# Patient Record
Sex: MALE | Race: WHITE | NOT HISPANIC OR LATINO | Employment: UNEMPLOYED | URBAN - NONMETROPOLITAN AREA
[De-identification: names, ages, dates, MRNs, and addresses within clinical notes are randomized per-mention and may not be internally consistent; named-entity substitution may affect disease eponyms.]

---

## 2017-01-24 ENCOUNTER — TRANSCRIBE ORDERS (OUTPATIENT)
Dept: ADMINISTRATIVE | Facility: HOSPITAL | Age: 62
End: 2017-01-24

## 2017-01-24 ENCOUNTER — HOSPITAL ENCOUNTER (OUTPATIENT)
Dept: GENERAL RADIOLOGY | Facility: HOSPITAL | Age: 62
Discharge: HOME | End: 2017-01-24
Admitting: FAMILY MEDICINE

## 2017-01-24 DIAGNOSIS — M54.2 CERVICALGIA: Primary | ICD-10-CM

## 2017-01-24 DIAGNOSIS — M54.2 CERVICALGIA: ICD-10-CM

## 2017-01-24 PROCEDURE — 72050 X-RAY EXAM NECK SPINE 4/5VWS: CPT

## 2017-01-24 PROCEDURE — 72050 X-RAY EXAM NECK SPINE 4/5VWS: CPT | Performed by: RADIOLOGY

## 2017-05-23 ENCOUNTER — OFFICE VISIT (OUTPATIENT)
Dept: SURGERY | Facility: CLINIC | Age: 62
End: 2017-05-23

## 2017-05-23 VITALS
SYSTOLIC BLOOD PRESSURE: 124 MMHG | HEIGHT: 69 IN | HEART RATE: 59 BPM | TEMPERATURE: 98.5 F | DIASTOLIC BLOOD PRESSURE: 70 MMHG | BODY MASS INDEX: 31.1 KG/M2 | WEIGHT: 210 LBS | RESPIRATION RATE: 18 BRPM

## 2017-05-23 DIAGNOSIS — E78.5 DYSLIPIDEMIA: ICD-10-CM

## 2017-05-23 DIAGNOSIS — Z95.1 S/P CABG X 3: ICD-10-CM

## 2017-05-23 DIAGNOSIS — IMO0001 IDDM (INSULIN DEPENDENT DIABETES MELLITUS): ICD-10-CM

## 2017-05-23 DIAGNOSIS — I10 ESSENTIAL HYPERTENSION: ICD-10-CM

## 2017-05-23 DIAGNOSIS — L98.499 SKIN ULCER, WITH UNSPECIFIED SEVERITY (HCC): ICD-10-CM

## 2017-05-23 DIAGNOSIS — I25.10 ASCVD (ARTERIOSCLEROTIC CARDIOVASCULAR DISEASE): Primary | ICD-10-CM

## 2017-05-23 PROCEDURE — 99215 OFFICE O/P EST HI 40 MIN: CPT | Performed by: SURGERY

## 2017-05-24 ENCOUNTER — ANESTHESIA EVENT (OUTPATIENT)
Dept: PERIOP | Facility: HOSPITAL | Age: 62
End: 2017-05-24

## 2017-05-24 ENCOUNTER — APPOINTMENT (OUTPATIENT)
Dept: PREADMISSION TESTING | Facility: HOSPITAL | Age: 62
End: 2017-05-24

## 2017-05-24 ENCOUNTER — HOSPITAL ENCOUNTER (OUTPATIENT)
Facility: HOSPITAL | Age: 62
Setting detail: HOSPITAL OUTPATIENT SURGERY
Discharge: HOME | End: 2017-05-24
Attending: SURGERY | Admitting: SURGERY

## 2017-05-24 ENCOUNTER — ANESTHESIA (OUTPATIENT)
Dept: PERIOP | Facility: HOSPITAL | Age: 62
End: 2017-05-24

## 2017-05-24 VITALS
TEMPERATURE: 97.9 F | WEIGHT: 210 LBS | RESPIRATION RATE: 18 BRPM | SYSTOLIC BLOOD PRESSURE: 134 MMHG | HEART RATE: 65 BPM | OXYGEN SATURATION: 98 % | HEIGHT: 69 IN | DIASTOLIC BLOOD PRESSURE: 74 MMHG | BODY MASS INDEX: 31.1 KG/M2

## 2017-05-24 DIAGNOSIS — L98.499 SKIN ULCER, WITH UNSPECIFIED SEVERITY (HCC): ICD-10-CM

## 2017-05-24 DIAGNOSIS — I25.10 ASCVD (ARTERIOSCLEROTIC CARDIOVASCULAR DISEASE): ICD-10-CM

## 2017-05-24 LAB
ALBUMIN SERPL-MCNC: 4.3 G/DL (ref 3.4–4.8)
ALBUMIN/GLOB SERPL: 1.1 G/DL (ref 1.5–2.5)
ALP SERPL-CCNC: 166 U/L (ref 40–129)
ALT SERPL W P-5'-P-CCNC: 40 U/L (ref 10–44)
ANION GAP SERPL CALCULATED.3IONS-SCNC: 9.4 MMOL/L (ref 3.6–11.2)
AST SERPL-CCNC: 28 U/L (ref 10–34)
BASOPHILS # BLD AUTO: 0.03 10*3/MM3 (ref 0–0.3)
BASOPHILS NFR BLD AUTO: 0.3 % (ref 0–2)
BILIRUB SERPL-MCNC: 0.3 MG/DL (ref 0.2–1.8)
BUN BLD-MCNC: 18 MG/DL (ref 7–21)
BUN/CREAT SERPL: 18.2 (ref 7–25)
CALCIUM SPEC-SCNC: 9.2 MG/DL (ref 7.7–10)
CHLORIDE SERPL-SCNC: 100 MMOL/L (ref 99–112)
CO2 SERPL-SCNC: 29.6 MMOL/L (ref 24.3–31.9)
CREAT BLD-MCNC: 0.99 MG/DL (ref 0.43–1.29)
DEPRECATED RDW RBC AUTO: 42.5 FL (ref 37–54)
EOSINOPHIL # BLD AUTO: 0.35 10*3/MM3 (ref 0–0.7)
EOSINOPHIL NFR BLD AUTO: 3.3 % (ref 0–5)
ERYTHROCYTE [DISTWIDTH] IN BLOOD BY AUTOMATED COUNT: 12.8 % (ref 11.5–14.5)
GFR SERPL CREATININE-BSD FRML MDRD: 76 ML/MIN/1.73
GLOBULIN UR ELPH-MCNC: 3.8 GM/DL
GLUCOSE BLD-MCNC: 212 MG/DL (ref 70–110)
GLUCOSE BLDC GLUCOMTR-MCNC: 205 MG/DL (ref 70–130)
GLUCOSE BLDC GLUCOMTR-MCNC: 223 MG/DL (ref 70–130)
HCT VFR BLD AUTO: 38.7 % (ref 42–52)
HGB BLD-MCNC: 12.8 G/DL (ref 14–18)
IMM GRANULOCYTES # BLD: 0.04 10*3/MM3 (ref 0–0.03)
IMM GRANULOCYTES NFR BLD: 0.4 % (ref 0–0.5)
LYMPHOCYTES # BLD AUTO: 3.78 10*3/MM3 (ref 1–3)
LYMPHOCYTES NFR BLD AUTO: 35.9 % (ref 21–51)
MCH RBC QN AUTO: 30.3 PG (ref 27–33)
MCHC RBC AUTO-ENTMCNC: 33.1 G/DL (ref 33–37)
MCV RBC AUTO: 91.7 FL (ref 80–94)
MONOCYTES # BLD AUTO: 0.87 10*3/MM3 (ref 0.1–0.9)
MONOCYTES NFR BLD AUTO: 8.3 % (ref 0–10)
NEUTROPHILS # BLD AUTO: 5.47 10*3/MM3 (ref 1.4–6.5)
NEUTROPHILS NFR BLD AUTO: 51.8 % (ref 30–70)
NRBC BLD MANUAL-RTO: 0 /100 WBC (ref 0–0)
OSMOLALITY SERPL CALC.SUM OF ELEC: 285.7 MOSM/KG (ref 273–305)
PLATELET # BLD AUTO: 259 10*3/MM3 (ref 130–400)
PMV BLD AUTO: 11.3 FL (ref 6–10)
POTASSIUM BLD-SCNC: 3.9 MMOL/L (ref 3.5–5.3)
PROT SERPL-MCNC: 8.1 G/DL (ref 6–8)
RBC # BLD AUTO: 4.22 10*6/MM3 (ref 4.7–6.1)
SODIUM BLD-SCNC: 139 MMOL/L (ref 135–153)
WBC NRBC COR # BLD: 10.54 10*3/MM3 (ref 4.5–12.5)

## 2017-05-24 PROCEDURE — 36415 COLL VENOUS BLD VENIPUNCTURE: CPT

## 2017-05-24 PROCEDURE — 25010000002 ONDANSETRON PER 1 MG: Performed by: NURSE ANESTHETIST, CERTIFIED REGISTERED

## 2017-05-24 PROCEDURE — 80053 COMPREHEN METABOLIC PANEL: CPT | Performed by: SURGERY

## 2017-05-24 PROCEDURE — 85025 COMPLETE CBC W/AUTO DIFF WBC: CPT | Performed by: SURGERY

## 2017-05-24 PROCEDURE — 25010000002 MIDAZOLAM PER 1 MG: Performed by: NURSE ANESTHETIST, CERTIFIED REGISTERED

## 2017-05-24 PROCEDURE — 25010000002 PROPOFOL 1000 MG/ML EMULSION: Performed by: NURSE ANESTHETIST, CERTIFIED REGISTERED

## 2017-05-24 PROCEDURE — 25010000002 PROPOFOL 10 MG/ML EMULSION: Performed by: NURSE ANESTHETIST, CERTIFIED REGISTERED

## 2017-05-24 PROCEDURE — 82962 GLUCOSE BLOOD TEST: CPT

## 2017-05-24 PROCEDURE — 11043 DBRDMT MUSC&/FSCA 1ST 20/<: CPT | Performed by: SURGERY

## 2017-05-24 PROCEDURE — 25010000002 FENTANYL CITRATE (PF) 100 MCG/2ML SOLUTION: Performed by: NURSE ANESTHETIST, CERTIFIED REGISTERED

## 2017-05-24 PROCEDURE — 25010000002 VANCOMYCIN PER 500 MG: Performed by: SURGERY

## 2017-05-24 PROCEDURE — 63710000001 INSULIN REGULAR HUMAN PER 5 UNITS: Performed by: ANESTHESIOLOGY

## 2017-05-24 RX ORDER — SODIUM CHLORIDE, SODIUM LACTATE, POTASSIUM CHLORIDE, CALCIUM CHLORIDE 600; 310; 30; 20 MG/100ML; MG/100ML; MG/100ML; MG/100ML
125 INJECTION, SOLUTION INTRAVENOUS CONTINUOUS
Status: DISCONTINUED | OUTPATIENT
Start: 2017-05-24 | End: 2017-05-24 | Stop reason: HOSPADM

## 2017-05-24 RX ORDER — SODIUM CHLORIDE 0.9 % (FLUSH) 0.9 %
1-10 SYRINGE (ML) INJECTION AS NEEDED
Status: DISCONTINUED | OUTPATIENT
Start: 2017-05-24 | End: 2017-05-24 | Stop reason: HOSPADM

## 2017-05-24 RX ORDER — FENTANYL CITRATE 50 UG/ML
INJECTION, SOLUTION INTRAMUSCULAR; INTRAVENOUS AS NEEDED
Status: DISCONTINUED | OUTPATIENT
Start: 2017-05-24 | End: 2017-05-24 | Stop reason: SURG

## 2017-05-24 RX ORDER — PROPOFOL 10 MG/ML
VIAL (ML) INTRAVENOUS AS NEEDED
Status: DISCONTINUED | OUTPATIENT
Start: 2017-05-24 | End: 2017-05-24 | Stop reason: SURG

## 2017-05-24 RX ORDER — MEPERIDINE HYDROCHLORIDE 25 MG/ML
12.5 INJECTION INTRAMUSCULAR; INTRAVENOUS; SUBCUTANEOUS
Status: DISCONTINUED | OUTPATIENT
Start: 2017-05-24 | End: 2017-05-24 | Stop reason: HOSPADM

## 2017-05-24 RX ORDER — IPRATROPIUM BROMIDE AND ALBUTEROL SULFATE 2.5; .5 MG/3ML; MG/3ML
3 SOLUTION RESPIRATORY (INHALATION) ONCE AS NEEDED
Status: DISCONTINUED | OUTPATIENT
Start: 2017-05-24 | End: 2017-05-24 | Stop reason: HOSPADM

## 2017-05-24 RX ORDER — FENTANYL CITRATE 50 UG/ML
50 INJECTION, SOLUTION INTRAMUSCULAR; INTRAVENOUS
Status: DISCONTINUED | OUTPATIENT
Start: 2017-05-24 | End: 2017-05-24 | Stop reason: HOSPADM

## 2017-05-24 RX ORDER — MIDAZOLAM HYDROCHLORIDE 1 MG/ML
INJECTION INTRAMUSCULAR; INTRAVENOUS AS NEEDED
Status: DISCONTINUED | OUTPATIENT
Start: 2017-05-24 | End: 2017-05-24 | Stop reason: SURG

## 2017-05-24 RX ORDER — BUPIVACAINE HYDROCHLORIDE AND EPINEPHRINE 5; 5 MG/ML; UG/ML
INJECTION, SOLUTION PERINEURAL AS NEEDED
Status: DISCONTINUED | OUTPATIENT
Start: 2017-05-24 | End: 2017-05-24 | Stop reason: HOSPADM

## 2017-05-24 RX ORDER — OXYCODONE HYDROCHLORIDE AND ACETAMINOPHEN 5; 325 MG/1; MG/1
1 TABLET ORAL ONCE AS NEEDED
Status: DISCONTINUED | OUTPATIENT
Start: 2017-05-24 | End: 2017-05-24 | Stop reason: HOSPADM

## 2017-05-24 RX ORDER — ONDANSETRON 2 MG/ML
4 INJECTION INTRAMUSCULAR; INTRAVENOUS ONCE AS NEEDED
Status: DISCONTINUED | OUTPATIENT
Start: 2017-05-24 | End: 2017-05-24 | Stop reason: HOSPADM

## 2017-05-24 RX ORDER — MAGNESIUM HYDROXIDE 1200 MG/15ML
LIQUID ORAL AS NEEDED
Status: DISCONTINUED | OUTPATIENT
Start: 2017-05-24 | End: 2017-05-24 | Stop reason: HOSPADM

## 2017-05-24 RX ORDER — LIDOCAINE HYDROCHLORIDE 20 MG/ML
INJECTION, SOLUTION INFILTRATION; PERINEURAL AS NEEDED
Status: DISCONTINUED | OUTPATIENT
Start: 2017-05-24 | End: 2017-05-24 | Stop reason: SURG

## 2017-05-24 RX ORDER — ONDANSETRON 2 MG/ML
INJECTION INTRAMUSCULAR; INTRAVENOUS AS NEEDED
Status: DISCONTINUED | OUTPATIENT
Start: 2017-05-24 | End: 2017-05-24 | Stop reason: SURG

## 2017-05-24 RX ORDER — SULFAMETHOXAZOLE AND TRIMETHOPRIM 800; 160 MG/1; MG/1
1 TABLET ORAL 2 TIMES DAILY
Qty: 14 TABLET | Refills: 1 | Status: SHIPPED | OUTPATIENT
Start: 2017-05-24 | End: 2017-05-31

## 2017-05-24 RX ADMIN — FENTANYL CITRATE 50 MCG: 50 INJECTION INTRAMUSCULAR; INTRAVENOUS at 11:03

## 2017-05-24 RX ADMIN — PROPOFOL 120 MG: 10 INJECTION, EMULSION INTRAVENOUS at 11:09

## 2017-05-24 RX ADMIN — PROPOFOL 60 MCG/KG/MIN: 10 INJECTION, EMULSION INTRAVENOUS at 11:07

## 2017-05-24 RX ADMIN — SODIUM CHLORIDE, POTASSIUM CHLORIDE, SODIUM LACTATE AND CALCIUM CHLORIDE: 600; 310; 30; 20 INJECTION, SOLUTION INTRAVENOUS at 11:03

## 2017-05-24 RX ADMIN — HUMAN INSULIN 2 UNITS: 100 INJECTION, SOLUTION SUBCUTANEOUS at 10:25

## 2017-05-24 RX ADMIN — PROPOFOL 10 MG: 10 INJECTION, EMULSION INTRAVENOUS at 11:07

## 2017-05-24 RX ADMIN — LIDOCAINE HYDROCHLORIDE 60 MG: 20 INJECTION, SOLUTION INFILTRATION; PERINEURAL at 11:07

## 2017-05-24 RX ADMIN — MIDAZOLAM HYDROCHLORIDE 4 MG: 1 INJECTION, SOLUTION INTRAMUSCULAR; INTRAVENOUS at 11:03

## 2017-05-24 RX ADMIN — FENTANYL CITRATE 50 MCG: 50 INJECTION INTRAMUSCULAR; INTRAVENOUS at 11:07

## 2017-05-24 RX ADMIN — VANCOMYCIN HYDROCHLORIDE 1.5 G: 5 INJECTION, POWDER, LYOPHILIZED, FOR SOLUTION INTRAVENOUS at 11:10

## 2017-05-24 RX ADMIN — ONDANSETRON 4 MG: 2 INJECTION, SOLUTION INTRAMUSCULAR; INTRAVENOUS at 11:03

## 2017-05-30 ENCOUNTER — OFFICE VISIT (OUTPATIENT)
Dept: SURGERY | Facility: CLINIC | Age: 62
End: 2017-05-30

## 2017-05-30 VITALS
WEIGHT: 210 LBS | HEART RATE: 71 BPM | DIASTOLIC BLOOD PRESSURE: 71 MMHG | BODY MASS INDEX: 31.1 KG/M2 | TEMPERATURE: 98.5 F | HEIGHT: 69 IN | SYSTOLIC BLOOD PRESSURE: 128 MMHG

## 2017-05-30 DIAGNOSIS — IMO0001 IDDM (INSULIN DEPENDENT DIABETES MELLITUS): ICD-10-CM

## 2017-05-30 DIAGNOSIS — L98.499 SKIN ULCER, WITH UNSPECIFIED SEVERITY (HCC): Primary | ICD-10-CM

## 2017-05-30 PROCEDURE — 99212 OFFICE O/P EST SF 10 MIN: CPT | Performed by: SURGERY

## 2017-06-01 ENCOUNTER — TELEPHONE (OUTPATIENT)
Dept: SURGERY | Facility: CLINIC | Age: 62
End: 2017-06-01

## 2017-06-01 PROBLEM — Z98.890 POST-OPERATIVE STATE: Status: ACTIVE | Noted: 2017-06-01

## 2017-06-01 NOTE — TELEPHONE ENCOUNTER
I called to confirm Christo's appointment with Dr Lee June 19, 2017 at 10:00am. I spoke with Pretty and she confirmed the appointment and I gave her directions and their telephone number.

## 2017-06-01 NOTE — PROGRESS NOTES
5/30/2017    Patient Information  Christo Clifton  [address]  [place] [address]  1955  [phone] (home)     Chief Complaint   Patient presents with   • Post-op Follow-up     Right first Toe         HPI  Patient is a 62-year-old white male here for follow-up on debridement of his right giant toe.        Review of Systems:    General: weight gain  Integumentary: non-healing wound  Eyes: negative  ENT: earache  Respiratory: shortness of breath  Gastrointestinal: negative  Cardiovascular: negative  Neurological: numbness  Psychiatric: depression and mood swings  Hematologic/Lymphatic: negative  Genitourinary: negative  Musculoskeletal: painful joints, sore muscles, joint swelling, back pain and joint stiffness  Endocrine: negative  Breasts: negative      Patient Active Problem List   Diagnosis   • IDDM (insulin dependent diabetes mellitus)   • ASCVD (arteriosclerotic cardiovascular disease)   • S/P CABG x 3   • Essential hypertension   • Dyslipidemia   • Skin ulcer         Past Medical History:   Diagnosis Date   • Anxiety and depression    • Arthritis    • COPD (chronic obstructive pulmonary disease)    • Coronary artery disease    • Diabetes mellitus    • Disease of thyroid gland    • Dyslipidemia    • History of transfusion    • Hypertension    • Seizure disorder     Pt states last seizure x1 mo.         Past Surgical History:   Procedure Laterality Date   • CARDIAC SURGERY     • CIRCUMCISION     • CORONARY ARTERY BYPASS GRAFT     • HERNIA REPAIR      X2   • IA DRAIN LOWER LEG DEEP ABSC/HEMATOMA Right 5/24/2017    Procedure: Debridement of right first toe;  Surgeon: Ronald Perdue MD;  Location: [facility];  Service: General   • TOE SURGERY Left     debridement         Family History   Problem Relation Age of Onset   • Diabetes Mother    • Hypertension Mother    • Heart disease Mother          Social History   Substance Use Topics   • Smoking status: Former Smoker     Packs/day: 2.00     Types: Cigarettes     Quit  "date: 2010   • Smokeless tobacco: Never Used   • Alcohol use No       Current Outpatient Prescriptions   Medication Sig Dispense Refill   • aspirin 81 MG EC tablet Take 81 mg by mouth daily.     • atorvastatin (LIPITOR) 80 MG tablet Take 1 tablet by mouth daily. 90 tablet 3   • CARTIA  MG 24 hr capsule TAKE 1 CAPSULE BY MOUTH DAILY. 90 capsule 1   • escitalopram (LEXAPRO) 10 MG tablet Take 10 mg by mouth daily.     • insulin aspart protamine-insulin aspart (NovoLOG 70/30) (70-30) 100 UNIT/ML injection Inject  under the skin 2 (two) times a day with meals.     • levothyroxine (SYNTHROID, LEVOTHROID) 50 MCG tablet Take 50 mcg by mouth daily.     • lisinopril (PRINIVIL,ZESTRIL) 40 MG tablet Take 40 mg by mouth daily.     • metoprolol tartrate (LOPRESSOR) 25 MG tablet TAKE 1 TABLET TWICE DAILY 180 tablet 1   • OMEGA-3 KRILL OIL PO Take 350 mg by mouth Daily.     • phenytoin (DILANTIN) 100 MG ER capsule Take 100 mg by mouth 3 (three) times a day.       No current facility-administered medications for this visit.          Allergies  Other and Gabapentin      Physical Exam    Wound looks excellent.  Measures 1 cm in diameter with good granulation tissue  /71 (BP Location: Left arm, Patient Position: Sitting)  Pulse 71  Temp 98.5 °F (36.9 °C)  Ht 69\" (175.3 cm)  Wt 210 lb (95.3 kg)  BMI 31.01 kg/m2      ASSESSMENT  Diabetic foot ulcer      PLAN    We will have Dr. chau podiatry see patient.     Ronald Perdue MD    "

## 2017-07-03 ENCOUNTER — LAB (OUTPATIENT)
Dept: LAB | Facility: HOSPITAL | Age: 62
End: 2017-07-03
Attending: PODIATRIST

## 2017-07-03 ENCOUNTER — TRANSCRIBE ORDERS (OUTPATIENT)
Dept: ADMINISTRATIVE | Facility: HOSPITAL | Age: 62
End: 2017-07-03

## 2017-07-03 DIAGNOSIS — E10.622 TYPE 1 DM WITH ULCER (HCC): ICD-10-CM

## 2017-07-03 DIAGNOSIS — E10.622 TYPE 1 DM WITH ULCER (HCC): Primary | ICD-10-CM

## 2017-07-03 DIAGNOSIS — L98.499 TYPE 1 DM WITH ULCER (HCC): ICD-10-CM

## 2017-07-03 DIAGNOSIS — L98.499 TYPE 1 DM WITH ULCER (HCC): Primary | ICD-10-CM

## 2017-07-03 LAB
BASOPHILS # BLD AUTO: 0.04 10*3/MM3 (ref 0–0.3)
BASOPHILS NFR BLD AUTO: 0.4 % (ref 0–2)
CRP SERPL-MCNC: 1.24 MG/DL (ref 0–0.99)
DEPRECATED RDW RBC AUTO: 43.4 FL (ref 37–54)
EOSINOPHIL # BLD AUTO: 0.29 10*3/MM3 (ref 0–0.7)
EOSINOPHIL NFR BLD AUTO: 3.1 % (ref 0–5)
ERYTHROCYTE [DISTWIDTH] IN BLOOD BY AUTOMATED COUNT: 13 % (ref 11.5–14.5)
ERYTHROCYTE [SEDIMENTATION RATE] IN BLOOD: 10 MM/HR (ref 0–20)
HCT VFR BLD AUTO: 38 % (ref 42–52)
HGB BLD-MCNC: 12.3 G/DL (ref 14–18)
IMM GRANULOCYTES # BLD: 0.03 10*3/MM3 (ref 0–0.03)
IMM GRANULOCYTES NFR BLD: 0.3 % (ref 0–0.5)
LYMPHOCYTES # BLD AUTO: 3.44 10*3/MM3 (ref 1–3)
LYMPHOCYTES NFR BLD AUTO: 36.6 % (ref 21–51)
MCH RBC QN AUTO: 29.9 PG (ref 27–33)
MCHC RBC AUTO-ENTMCNC: 32.4 G/DL (ref 33–37)
MCV RBC AUTO: 92.2 FL (ref 80–94)
MONOCYTES # BLD AUTO: 0.65 10*3/MM3 (ref 0.1–0.9)
MONOCYTES NFR BLD AUTO: 6.9 % (ref 0–10)
NEUTROPHILS # BLD AUTO: 4.94 10*3/MM3 (ref 1.4–6.5)
NEUTROPHILS NFR BLD AUTO: 52.7 % (ref 30–70)
PLATELET # BLD AUTO: 276 10*3/MM3 (ref 130–400)
PMV BLD AUTO: 9.9 FL (ref 6–10)
RBC # BLD AUTO: 4.12 10*6/MM3 (ref 4.7–6.1)
WBC NRBC COR # BLD: 9.39 10*3/MM3 (ref 4.5–12.5)

## 2017-07-03 PROCEDURE — 85652 RBC SED RATE AUTOMATED: CPT | Performed by: PODIATRIST

## 2017-07-03 PROCEDURE — 86140 C-REACTIVE PROTEIN: CPT | Performed by: PODIATRIST

## 2017-07-03 PROCEDURE — 36415 COLL VENOUS BLD VENIPUNCTURE: CPT

## 2017-07-03 PROCEDURE — 85025 COMPLETE CBC W/AUTO DIFF WBC: CPT | Performed by: PODIATRIST

## 2017-07-27 ENCOUNTER — LAB REQUISITION (OUTPATIENT)
Dept: LAB | Facility: HOSPITAL | Age: 62
End: 2017-07-27

## 2017-07-27 DIAGNOSIS — Z79.899 OTHER LONG TERM (CURRENT) DRUG THERAPY: ICD-10-CM

## 2017-07-27 LAB — VANCOMYCIN TROUGH SERPL-MCNC: 24.6 MCG/ML (ref 5–15)

## 2017-07-27 PROCEDURE — 80202 ASSAY OF VANCOMYCIN: CPT | Performed by: PODIATRIST

## 2017-08-02 ENCOUNTER — LAB REQUISITION (OUTPATIENT)
Dept: LAB | Facility: HOSPITAL | Age: 62
End: 2017-08-02

## 2017-08-02 DIAGNOSIS — Z79.899 OTHER LONG TERM (CURRENT) DRUG THERAPY: ICD-10-CM

## 2017-08-02 DIAGNOSIS — E11.9 TYPE 2 DIABETES MELLITUS WITHOUT COMPLICATIONS (HCC): ICD-10-CM

## 2017-08-02 LAB
ALBUMIN SERPL-MCNC: 4.2 G/DL (ref 3.4–4.8)
ALBUMIN/GLOB SERPL: 1.4 G/DL (ref 1.5–2.5)
ALP SERPL-CCNC: 167 U/L (ref 40–129)
ALT SERPL W P-5'-P-CCNC: 46 U/L (ref 10–44)
ANION GAP SERPL CALCULATED.3IONS-SCNC: 6.4 MMOL/L (ref 3.6–11.2)
AST SERPL-CCNC: 41 U/L (ref 10–34)
BASOPHILS # BLD AUTO: 0.04 10*3/MM3 (ref 0–0.3)
BASOPHILS NFR BLD AUTO: 0.4 % (ref 0–2)
BILIRUB SERPL-MCNC: 0.2 MG/DL (ref 0.2–1.8)
BUN BLD-MCNC: 17 MG/DL (ref 7–21)
BUN/CREAT SERPL: 18.9 (ref 7–25)
CALCIUM SPEC-SCNC: 9.4 MG/DL (ref 7.7–10)
CHLORIDE SERPL-SCNC: 104 MMOL/L (ref 99–112)
CO2 SERPL-SCNC: 28.6 MMOL/L (ref 24.3–31.9)
CREAT BLD-MCNC: 0.9 MG/DL (ref 0.43–1.29)
CRP SERPL-MCNC: 2.08 MG/DL (ref 0–0.99)
DEPRECATED RDW RBC AUTO: 42.4 FL (ref 37–54)
EOSINOPHIL # BLD AUTO: 0.36 10*3/MM3 (ref 0–0.7)
EOSINOPHIL NFR BLD AUTO: 4 % (ref 0–5)
ERYTHROCYTE [DISTWIDTH] IN BLOOD BY AUTOMATED COUNT: 12.9 % (ref 11.5–14.5)
ERYTHROCYTE [SEDIMENTATION RATE] IN BLOOD: 13 MM/HR (ref 0–20)
GFR SERPL CREATININE-BSD FRML MDRD: 85 ML/MIN/1.73
GLOBULIN UR ELPH-MCNC: 2.9 GM/DL
GLUCOSE BLD-MCNC: 178 MG/DL (ref 70–110)
HBA1C MFR BLD: 8.2 % (ref 4.5–5.7)
HCT VFR BLD AUTO: 37.2 % (ref 42–52)
HGB BLD-MCNC: 12 G/DL (ref 14–18)
IMM GRANULOCYTES # BLD: 0.02 10*3/MM3 (ref 0–0.03)
IMM GRANULOCYTES NFR BLD: 0.2 % (ref 0–0.5)
LYMPHOCYTES # BLD AUTO: 2.86 10*3/MM3 (ref 1–3)
LYMPHOCYTES NFR BLD AUTO: 32 % (ref 21–51)
MCH RBC QN AUTO: 30.1 PG (ref 27–33)
MCHC RBC AUTO-ENTMCNC: 32.3 G/DL (ref 33–37)
MCV RBC AUTO: 93.2 FL (ref 80–94)
MONOCYTES # BLD AUTO: 0.75 10*3/MM3 (ref 0.1–0.9)
MONOCYTES NFR BLD AUTO: 8.4 % (ref 0–10)
NEUTROPHILS # BLD AUTO: 4.9 10*3/MM3 (ref 1.4–6.5)
NEUTROPHILS NFR BLD AUTO: 55 % (ref 30–70)
OSMOLALITY SERPL CALC.SUM OF ELEC: 283.5 MOSM/KG (ref 273–305)
PLATELET # BLD AUTO: 252 10*3/MM3 (ref 130–400)
PMV BLD AUTO: 9.9 FL (ref 6–10)
POTASSIUM BLD-SCNC: 4.6 MMOL/L (ref 3.5–5.3)
PROT SERPL-MCNC: 7.1 G/DL (ref 6–8)
RBC # BLD AUTO: 3.99 10*6/MM3 (ref 4.7–6.1)
SODIUM BLD-SCNC: 139 MMOL/L (ref 135–153)
VANCOMYCIN TROUGH SERPL-MCNC: 14.5 MCG/ML (ref 5–15)
WBC NRBC COR # BLD: 8.93 10*3/MM3 (ref 4.5–12.5)

## 2017-08-02 PROCEDURE — 86140 C-REACTIVE PROTEIN: CPT | Performed by: PODIATRIST

## 2017-08-02 PROCEDURE — 80053 COMPREHEN METABOLIC PANEL: CPT | Performed by: PODIATRIST

## 2017-08-02 PROCEDURE — 85652 RBC SED RATE AUTOMATED: CPT | Performed by: PODIATRIST

## 2017-08-02 PROCEDURE — 80202 ASSAY OF VANCOMYCIN: CPT | Performed by: PODIATRIST

## 2017-08-02 PROCEDURE — 85025 COMPLETE CBC W/AUTO DIFF WBC: CPT | Performed by: PODIATRIST

## 2017-08-02 PROCEDURE — 83036 HEMOGLOBIN GLYCOSYLATED A1C: CPT | Performed by: PODIATRIST

## 2017-08-09 ENCOUNTER — LAB REQUISITION (OUTPATIENT)
Dept: LAB | Facility: HOSPITAL | Age: 62
End: 2017-08-09

## 2017-08-09 DIAGNOSIS — Z79.899 OTHER LONG TERM (CURRENT) DRUG THERAPY: ICD-10-CM

## 2017-08-09 LAB
ALBUMIN SERPL-MCNC: 4.2 G/DL (ref 3.4–4.8)
ALBUMIN/GLOB SERPL: 1.4 G/DL (ref 1.5–2.5)
ALP SERPL-CCNC: 157 U/L (ref 40–129)
ALT SERPL W P-5'-P-CCNC: 47 U/L (ref 10–44)
ANION GAP SERPL CALCULATED.3IONS-SCNC: 6.2 MMOL/L (ref 3.6–11.2)
AST SERPL-CCNC: 39 U/L (ref 10–34)
BASOPHILS # BLD AUTO: 0.04 10*3/MM3 (ref 0–0.3)
BASOPHILS NFR BLD AUTO: 0.5 % (ref 0–2)
BILIRUB SERPL-MCNC: 0.2 MG/DL (ref 0.2–1.8)
BUN BLD-MCNC: 20 MG/DL (ref 7–21)
BUN/CREAT SERPL: 21.3 (ref 7–25)
CALCIUM SPEC-SCNC: 9.7 MG/DL (ref 7.7–10)
CHLORIDE SERPL-SCNC: 102 MMOL/L (ref 99–112)
CO2 SERPL-SCNC: 27.8 MMOL/L (ref 24.3–31.9)
CREAT BLD-MCNC: 0.94 MG/DL (ref 0.43–1.29)
CRP SERPL-MCNC: 1.88 MG/DL (ref 0–0.99)
DEPRECATED RDW RBC AUTO: 42.7 FL (ref 37–54)
EOSINOPHIL # BLD AUTO: 0.31 10*3/MM3 (ref 0–0.7)
EOSINOPHIL NFR BLD AUTO: 3.9 % (ref 0–5)
ERYTHROCYTE [DISTWIDTH] IN BLOOD BY AUTOMATED COUNT: 13 % (ref 11.5–14.5)
ERYTHROCYTE [SEDIMENTATION RATE] IN BLOOD: 10 MM/HR (ref 0–20)
GFR SERPL CREATININE-BSD FRML MDRD: 81 ML/MIN/1.73
GLOBULIN UR ELPH-MCNC: 3.1 GM/DL
GLUCOSE BLD-MCNC: 236 MG/DL (ref 70–110)
HCT VFR BLD AUTO: 37.7 % (ref 42–52)
HGB BLD-MCNC: 12.3 G/DL (ref 14–18)
IMM GRANULOCYTES # BLD: 0.02 10*3/MM3 (ref 0–0.03)
IMM GRANULOCYTES NFR BLD: 0.3 % (ref 0–0.5)
LYMPHOCYTES # BLD AUTO: 2.82 10*3/MM3 (ref 1–3)
LYMPHOCYTES NFR BLD AUTO: 35.7 % (ref 21–51)
MCH RBC QN AUTO: 30.1 PG (ref 27–33)
MCHC RBC AUTO-ENTMCNC: 32.6 G/DL (ref 33–37)
MCV RBC AUTO: 92.4 FL (ref 80–94)
MONOCYTES # BLD AUTO: 0.65 10*3/MM3 (ref 0.1–0.9)
MONOCYTES NFR BLD AUTO: 8.2 % (ref 0–10)
NEUTROPHILS # BLD AUTO: 4.07 10*3/MM3 (ref 1.4–6.5)
NEUTROPHILS NFR BLD AUTO: 51.4 % (ref 30–70)
OSMOLALITY SERPL CALC.SUM OF ELEC: 282.2 MOSM/KG (ref 273–305)
PLATELET # BLD AUTO: 256 10*3/MM3 (ref 130–400)
PMV BLD AUTO: 10.3 FL (ref 6–10)
POTASSIUM BLD-SCNC: 5 MMOL/L (ref 3.5–5.3)
PROT SERPL-MCNC: 7.3 G/DL (ref 6–8)
RBC # BLD AUTO: 4.08 10*6/MM3 (ref 4.7–6.1)
SODIUM BLD-SCNC: 136 MMOL/L (ref 135–153)
VANCOMYCIN TROUGH SERPL-MCNC: 21.5 MCG/ML (ref 5–15)
WBC NRBC COR # BLD: 7.91 10*3/MM3 (ref 4.5–12.5)

## 2017-08-09 PROCEDURE — 86140 C-REACTIVE PROTEIN: CPT | Performed by: PODIATRIST

## 2017-08-09 PROCEDURE — 85025 COMPLETE CBC W/AUTO DIFF WBC: CPT | Performed by: PODIATRIST

## 2017-08-09 PROCEDURE — 80202 ASSAY OF VANCOMYCIN: CPT | Performed by: PODIATRIST

## 2017-08-09 PROCEDURE — 80053 COMPREHEN METABOLIC PANEL: CPT | Performed by: PODIATRIST

## 2017-08-09 PROCEDURE — 85652 RBC SED RATE AUTOMATED: CPT | Performed by: PODIATRIST

## 2017-08-16 ENCOUNTER — LAB REQUISITION (OUTPATIENT)
Dept: LAB | Facility: HOSPITAL | Age: 62
End: 2017-08-16

## 2017-08-16 DIAGNOSIS — G40.89 OTHER SEIZURES (HCC): ICD-10-CM

## 2017-08-16 DIAGNOSIS — Z79.899 OTHER LONG TERM (CURRENT) DRUG THERAPY: ICD-10-CM

## 2017-08-16 LAB
ALBUMIN SERPL-MCNC: 4.4 G/DL (ref 3.4–4.8)
ALBUMIN/GLOB SERPL: 1.5 G/DL (ref 1.5–2.5)
ALP SERPL-CCNC: 160 U/L (ref 40–129)
ALT SERPL W P-5'-P-CCNC: 44 U/L (ref 10–44)
ANION GAP SERPL CALCULATED.3IONS-SCNC: 9 MMOL/L (ref 3.6–11.2)
AST SERPL-CCNC: 35 U/L (ref 10–34)
BASOPHILS # BLD AUTO: 0.04 10*3/MM3 (ref 0–0.3)
BASOPHILS NFR BLD AUTO: 0.5 % (ref 0–2)
BILIRUB SERPL-MCNC: 0.3 MG/DL (ref 0.2–1.8)
BUN BLD-MCNC: 16 MG/DL (ref 7–21)
BUN/CREAT SERPL: 15.5 (ref 7–25)
CALCIUM SPEC-SCNC: 9.2 MG/DL (ref 7.7–10)
CHLORIDE SERPL-SCNC: 105 MMOL/L (ref 99–112)
CO2 SERPL-SCNC: 24 MMOL/L (ref 24.3–31.9)
CREAT BLD-MCNC: 1.03 MG/DL (ref 0.43–1.29)
CRP SERPL-MCNC: 1.58 MG/DL (ref 0–0.99)
DEPRECATED RDW RBC AUTO: 42.6 FL (ref 37–54)
EOSINOPHIL # BLD AUTO: 0.34 10*3/MM3 (ref 0–0.7)
EOSINOPHIL NFR BLD AUTO: 4.1 % (ref 0–5)
ERYTHROCYTE [DISTWIDTH] IN BLOOD BY AUTOMATED COUNT: 13 % (ref 11.5–14.5)
ERYTHROCYTE [SEDIMENTATION RATE] IN BLOOD: 11 MM/HR (ref 0–20)
GFR SERPL CREATININE-BSD FRML MDRD: 73 ML/MIN/1.73
GLOBULIN UR ELPH-MCNC: 2.9 GM/DL
GLUCOSE BLD-MCNC: 153 MG/DL (ref 70–110)
HCT VFR BLD AUTO: 37.9 % (ref 42–52)
HGB BLD-MCNC: 12.3 G/DL (ref 14–18)
IMM GRANULOCYTES # BLD: 0.02 10*3/MM3 (ref 0–0.03)
IMM GRANULOCYTES NFR BLD: 0.2 % (ref 0–0.5)
LYMPHOCYTES # BLD AUTO: 2.76 10*3/MM3 (ref 1–3)
LYMPHOCYTES NFR BLD AUTO: 33.1 % (ref 21–51)
MCH RBC QN AUTO: 29.9 PG (ref 27–33)
MCHC RBC AUTO-ENTMCNC: 32.5 G/DL (ref 33–37)
MCV RBC AUTO: 92.2 FL (ref 80–94)
MONOCYTES # BLD AUTO: 0.67 10*3/MM3 (ref 0.1–0.9)
MONOCYTES NFR BLD AUTO: 8 % (ref 0–10)
NEUTROPHILS # BLD AUTO: 4.51 10*3/MM3 (ref 1.4–6.5)
NEUTROPHILS NFR BLD AUTO: 54.1 % (ref 30–70)
OSMOLALITY SERPL CALC.SUM OF ELEC: 279.9 MOSM/KG (ref 273–305)
PHENYTOIN SERPL-MCNC: 13.7 MCG/ML (ref 10–20)
PLATELET # BLD AUTO: 249 10*3/MM3 (ref 130–400)
PMV BLD AUTO: 9.9 FL (ref 6–10)
POTASSIUM BLD-SCNC: 4.6 MMOL/L (ref 3.5–5.3)
PROT SERPL-MCNC: 7.3 G/DL (ref 6–8)
RBC # BLD AUTO: 4.11 10*6/MM3 (ref 4.7–6.1)
SODIUM BLD-SCNC: 138 MMOL/L (ref 135–153)
VANCOMYCIN TROUGH SERPL-MCNC: 20.8 MCG/ML (ref 5–15)
WBC NRBC COR # BLD: 8.34 10*3/MM3 (ref 4.5–12.5)

## 2017-08-16 PROCEDURE — 80202 ASSAY OF VANCOMYCIN: CPT | Performed by: PODIATRIST

## 2017-08-16 PROCEDURE — 80185 ASSAY OF PHENYTOIN TOTAL: CPT | Performed by: FAMILY MEDICINE

## 2017-08-16 PROCEDURE — 85652 RBC SED RATE AUTOMATED: CPT | Performed by: PODIATRIST

## 2017-08-16 PROCEDURE — 85025 COMPLETE CBC W/AUTO DIFF WBC: CPT | Performed by: PODIATRIST

## 2017-08-16 PROCEDURE — 86140 C-REACTIVE PROTEIN: CPT | Performed by: PODIATRIST

## 2017-08-16 PROCEDURE — 80053 COMPREHEN METABOLIC PANEL: CPT | Performed by: PODIATRIST

## 2017-08-18 ENCOUNTER — LAB REQUISITION (OUTPATIENT)
Dept: LAB | Facility: HOSPITAL | Age: 62
End: 2017-08-18

## 2017-08-18 DIAGNOSIS — Z79.899 OTHER LONG TERM (CURRENT) DRUG THERAPY: ICD-10-CM

## 2017-08-18 LAB — VANCOMYCIN TROUGH SERPL-MCNC: 18.6 MCG/ML (ref 5–15)

## 2017-08-18 PROCEDURE — 80202 ASSAY OF VANCOMYCIN: CPT | Performed by: PODIATRIST

## 2017-08-23 ENCOUNTER — LAB REQUISITION (OUTPATIENT)
Dept: LAB | Facility: HOSPITAL | Age: 62
End: 2017-08-23

## 2017-08-23 DIAGNOSIS — Z79.899 OTHER LONG TERM (CURRENT) DRUG THERAPY: ICD-10-CM

## 2017-08-23 LAB
ALBUMIN SERPL-MCNC: 4 G/DL (ref 3.4–4.8)
ALBUMIN/GLOB SERPL: 1.4 G/DL (ref 1.5–2.5)
ALP SERPL-CCNC: 145 U/L (ref 40–129)
ALT SERPL W P-5'-P-CCNC: 43 U/L (ref 10–44)
ANION GAP SERPL CALCULATED.3IONS-SCNC: 7.5 MMOL/L (ref 3.6–11.2)
AST SERPL-CCNC: 33 U/L (ref 10–34)
BASOPHILS # BLD AUTO: 0.03 10*3/MM3 (ref 0–0.3)
BASOPHILS NFR BLD AUTO: 0.4 % (ref 0–2)
BILIRUB SERPL-MCNC: 0.2 MG/DL (ref 0.2–1.8)
BUN BLD-MCNC: 16 MG/DL (ref 7–21)
BUN/CREAT SERPL: 17.4 (ref 7–25)
CALCIUM SPEC-SCNC: 9.4 MG/DL (ref 7.7–10)
CHLORIDE SERPL-SCNC: 104 MMOL/L (ref 99–112)
CO2 SERPL-SCNC: 24.5 MMOL/L (ref 24.3–31.9)
CREAT BLD-MCNC: 0.92 MG/DL (ref 0.43–1.29)
CRP SERPL-MCNC: 1.69 MG/DL (ref 0–0.99)
DEPRECATED RDW RBC AUTO: 42.4 FL (ref 37–54)
EOSINOPHIL # BLD AUTO: 0.36 10*3/MM3 (ref 0–0.7)
EOSINOPHIL NFR BLD AUTO: 5 % (ref 0–5)
ERYTHROCYTE [DISTWIDTH] IN BLOOD BY AUTOMATED COUNT: 12.8 % (ref 11.5–14.5)
ERYTHROCYTE [SEDIMENTATION RATE] IN BLOOD: 10 MM/HR (ref 0–20)
GFR SERPL CREATININE-BSD FRML MDRD: 83 ML/MIN/1.73
GLOBULIN UR ELPH-MCNC: 2.9 GM/DL
GLUCOSE BLD-MCNC: 198 MG/DL (ref 70–110)
HCT VFR BLD AUTO: 36 % (ref 42–52)
HGB BLD-MCNC: 11.8 G/DL (ref 14–18)
IMM GRANULOCYTES # BLD: 0.01 10*3/MM3 (ref 0–0.03)
IMM GRANULOCYTES NFR BLD: 0.1 % (ref 0–0.5)
LYMPHOCYTES # BLD AUTO: 2.36 10*3/MM3 (ref 1–3)
LYMPHOCYTES NFR BLD AUTO: 32.7 % (ref 21–51)
MCH RBC QN AUTO: 30.2 PG (ref 27–33)
MCHC RBC AUTO-ENTMCNC: 32.8 G/DL (ref 33–37)
MCV RBC AUTO: 92.1 FL (ref 80–94)
MONOCYTES # BLD AUTO: 0.68 10*3/MM3 (ref 0.1–0.9)
MONOCYTES NFR BLD AUTO: 9.4 % (ref 0–10)
NEUTROPHILS # BLD AUTO: 3.77 10*3/MM3 (ref 1.4–6.5)
NEUTROPHILS NFR BLD AUTO: 52.4 % (ref 30–70)
OSMOLALITY SERPL CALC.SUM OF ELEC: 278.7 MOSM/KG (ref 273–305)
PLATELET # BLD AUTO: 268 10*3/MM3 (ref 130–400)
PMV BLD AUTO: 10.2 FL (ref 6–10)
POTASSIUM BLD-SCNC: 4.8 MMOL/L (ref 3.5–5.3)
PROT SERPL-MCNC: 6.9 G/DL (ref 6–8)
RBC # BLD AUTO: 3.91 10*6/MM3 (ref 4.7–6.1)
SODIUM BLD-SCNC: 136 MMOL/L (ref 135–153)
VANCOMYCIN TROUGH SERPL-MCNC: 16.9 MCG/ML (ref 5–15)
WBC NRBC COR # BLD: 7.21 10*3/MM3 (ref 4.5–12.5)

## 2017-08-23 PROCEDURE — 80053 COMPREHEN METABOLIC PANEL: CPT | Performed by: PODIATRIST

## 2017-08-23 PROCEDURE — 85025 COMPLETE CBC W/AUTO DIFF WBC: CPT | Performed by: PODIATRIST

## 2017-08-23 PROCEDURE — 85652 RBC SED RATE AUTOMATED: CPT | Performed by: PODIATRIST

## 2017-08-23 PROCEDURE — 86140 C-REACTIVE PROTEIN: CPT | Performed by: PODIATRIST

## 2017-08-23 PROCEDURE — 80202 ASSAY OF VANCOMYCIN: CPT | Performed by: PODIATRIST

## 2017-08-30 ENCOUNTER — LAB REQUISITION (OUTPATIENT)
Dept: LAB | Facility: HOSPITAL | Age: 62
End: 2017-08-30

## 2017-08-30 DIAGNOSIS — Z79.899 OTHER LONG TERM (CURRENT) DRUG THERAPY: ICD-10-CM

## 2017-08-30 LAB
ALBUMIN SERPL-MCNC: 4.3 G/DL (ref 3.4–4.8)
ALBUMIN/GLOB SERPL: 1.5 G/DL (ref 1.5–2.5)
ALP SERPL-CCNC: 154 U/L (ref 40–129)
ALT SERPL W P-5'-P-CCNC: 42 U/L (ref 10–44)
ANION GAP SERPL CALCULATED.3IONS-SCNC: 8.3 MMOL/L (ref 3.6–11.2)
AST SERPL-CCNC: 28 U/L (ref 10–34)
BASOPHILS # BLD AUTO: 0.05 10*3/MM3 (ref 0–0.3)
BASOPHILS NFR BLD AUTO: 0.6 % (ref 0–2)
BILIRUB SERPL-MCNC: 0.3 MG/DL (ref 0.2–1.8)
BUN BLD-MCNC: 21 MG/DL (ref 7–21)
BUN/CREAT SERPL: 19.6 (ref 7–25)
CALCIUM SPEC-SCNC: 9.7 MG/DL (ref 7.7–10)
CHLORIDE SERPL-SCNC: 101 MMOL/L (ref 99–112)
CO2 SERPL-SCNC: 23.7 MMOL/L (ref 24.3–31.9)
CREAT BLD-MCNC: 1.07 MG/DL (ref 0.43–1.29)
CRP SERPL-MCNC: 2.03 MG/DL (ref 0–0.99)
DEPRECATED RDW RBC AUTO: 41.9 FL (ref 37–54)
EOSINOPHIL # BLD AUTO: 0.32 10*3/MM3 (ref 0–0.7)
EOSINOPHIL NFR BLD AUTO: 3.7 % (ref 0–5)
ERYTHROCYTE [DISTWIDTH] IN BLOOD BY AUTOMATED COUNT: 12.8 % (ref 11.5–14.5)
ERYTHROCYTE [SEDIMENTATION RATE] IN BLOOD: 10 MM/HR (ref 0–20)
GFR SERPL CREATININE-BSD FRML MDRD: 70 ML/MIN/1.73
GLOBULIN UR ELPH-MCNC: 2.9 GM/DL
GLUCOSE BLD-MCNC: 305 MG/DL (ref 70–110)
HCT VFR BLD AUTO: 37.1 % (ref 42–52)
HGB BLD-MCNC: 12.1 G/DL (ref 14–18)
IMM GRANULOCYTES # BLD: 0.01 10*3/MM3 (ref 0–0.03)
IMM GRANULOCYTES NFR BLD: 0.1 % (ref 0–0.5)
LYMPHOCYTES # BLD AUTO: 2.31 10*3/MM3 (ref 1–3)
LYMPHOCYTES NFR BLD AUTO: 27 % (ref 21–51)
MCH RBC QN AUTO: 29.9 PG (ref 27–33)
MCHC RBC AUTO-ENTMCNC: 32.6 G/DL (ref 33–37)
MCV RBC AUTO: 91.6 FL (ref 80–94)
MONOCYTES # BLD AUTO: 0.72 10*3/MM3 (ref 0.1–0.9)
MONOCYTES NFR BLD AUTO: 8.4 % (ref 0–10)
NEUTROPHILS # BLD AUTO: 5.16 10*3/MM3 (ref 1.4–6.5)
NEUTROPHILS NFR BLD AUTO: 60.2 % (ref 30–70)
OSMOLALITY SERPL CALC.SUM OF ELEC: 280.8 MOSM/KG (ref 273–305)
PLATELET # BLD AUTO: 240 10*3/MM3 (ref 130–400)
PMV BLD AUTO: 10.2 FL (ref 6–10)
POTASSIUM BLD-SCNC: 5.1 MMOL/L (ref 3.5–5.3)
PROT SERPL-MCNC: 7.2 G/DL (ref 6–8)
RBC # BLD AUTO: 4.05 10*6/MM3 (ref 4.7–6.1)
SODIUM BLD-SCNC: 133 MMOL/L (ref 135–153)
VANCOMYCIN TROUGH SERPL-MCNC: 19 MCG/ML (ref 5–15)
WBC NRBC COR # BLD: 8.57 10*3/MM3 (ref 4.5–12.5)

## 2017-08-30 PROCEDURE — 85652 RBC SED RATE AUTOMATED: CPT | Performed by: PODIATRIST

## 2017-08-30 PROCEDURE — 80202 ASSAY OF VANCOMYCIN: CPT | Performed by: PODIATRIST

## 2017-08-30 PROCEDURE — 86140 C-REACTIVE PROTEIN: CPT | Performed by: PODIATRIST

## 2017-08-30 PROCEDURE — 80053 COMPREHEN METABOLIC PANEL: CPT | Performed by: PODIATRIST

## 2017-08-30 PROCEDURE — 85025 COMPLETE CBC W/AUTO DIFF WBC: CPT | Performed by: PODIATRIST

## 2017-08-31 ENCOUNTER — HOSPITAL ENCOUNTER (OUTPATIENT)
Dept: GENERAL RADIOLOGY | Facility: HOSPITAL | Age: 62
Discharge: HOME | End: 2017-08-31
Admitting: FAMILY MEDICINE

## 2017-08-31 ENCOUNTER — OFFICE VISIT (OUTPATIENT)
Dept: CARDIOLOGY | Facility: CLINIC | Age: 62
End: 2017-08-31

## 2017-08-31 ENCOUNTER — TELEPHONE (OUTPATIENT)
Dept: CARDIOLOGY | Facility: CLINIC | Age: 62
End: 2017-08-31

## 2017-08-31 VITALS
BODY MASS INDEX: 29.82 KG/M2 | HEIGHT: 71 IN | WEIGHT: 213 LBS | SYSTOLIC BLOOD PRESSURE: 97 MMHG | DIASTOLIC BLOOD PRESSURE: 52 MMHG | HEART RATE: 55 BPM

## 2017-08-31 DIAGNOSIS — Z01.811 PRE-OP CHEST EXAM: ICD-10-CM

## 2017-08-31 DIAGNOSIS — I10 ESSENTIAL HYPERTENSION: ICD-10-CM

## 2017-08-31 DIAGNOSIS — I25.10 ASCVD (ARTERIOSCLEROTIC CARDIOVASCULAR DISEASE): Primary | ICD-10-CM

## 2017-08-31 DIAGNOSIS — Z01.810 PREOP CARDIOVASCULAR EXAM: ICD-10-CM

## 2017-08-31 DIAGNOSIS — E78.5 DYSLIPIDEMIA: ICD-10-CM

## 2017-08-31 PROCEDURE — 99213 OFFICE O/P EST LOW 20 MIN: CPT | Performed by: INTERNAL MEDICINE

## 2017-08-31 PROCEDURE — 71020 XR CHEST PA AND LATERAL: CPT | Performed by: RADIOLOGY

## 2017-08-31 PROCEDURE — 93000 ELECTROCARDIOGRAM COMPLETE: CPT | Performed by: INTERNAL MEDICINE

## 2017-08-31 PROCEDURE — 71020 HC CHEST PA AND LATERAL: CPT

## 2017-08-31 RX ORDER — DILTIAZEM HYDROCHLORIDE 120 MG/1
120 CAPSULE, COATED, EXTENDED RELEASE ORAL DAILY
Qty: 30 CAPSULE | Refills: 6 | Status: SHIPPED | OUTPATIENT
Start: 2017-08-31 | End: 2017-09-12

## 2017-09-07 ENCOUNTER — LAB REQUISITION (OUTPATIENT)
Dept: LAB | Facility: HOSPITAL | Age: 62
End: 2017-09-07

## 2017-09-07 DIAGNOSIS — Z79.899 OTHER LONG TERM (CURRENT) DRUG THERAPY: ICD-10-CM

## 2017-09-07 LAB
ALBUMIN SERPL-MCNC: 4.2 G/DL (ref 3.4–4.8)
ALBUMIN/GLOB SERPL: 1.4 G/DL (ref 1.5–2.5)
ALP SERPL-CCNC: 166 U/L (ref 40–129)
ALT SERPL W P-5'-P-CCNC: 49 U/L (ref 10–44)
ANION GAP SERPL CALCULATED.3IONS-SCNC: 11.9 MMOL/L (ref 3.6–11.2)
AST SERPL-CCNC: 37 U/L (ref 10–34)
BASOPHILS # BLD AUTO: 0.05 10*3/MM3 (ref 0–0.3)
BASOPHILS NFR BLD AUTO: 0.5 % (ref 0–2)
BILIRUB SERPL-MCNC: 0.4 MG/DL (ref 0.2–1.8)
BUN BLD-MCNC: 16 MG/DL (ref 7–21)
BUN/CREAT SERPL: 16.3 (ref 7–25)
CALCIUM SPEC-SCNC: 9.3 MG/DL (ref 7.7–10)
CHLORIDE SERPL-SCNC: 101 MMOL/L (ref 99–112)
CO2 SERPL-SCNC: 22.1 MMOL/L (ref 24.3–31.9)
CREAT BLD-MCNC: 0.98 MG/DL (ref 0.43–1.29)
CRP SERPL-MCNC: 2.61 MG/DL (ref 0–0.99)
DEPRECATED RDW RBC AUTO: 42.5 FL (ref 37–54)
EOSINOPHIL # BLD AUTO: 0.38 10*3/MM3 (ref 0–0.7)
EOSINOPHIL NFR BLD AUTO: 4 % (ref 0–5)
ERYTHROCYTE [DISTWIDTH] IN BLOOD BY AUTOMATED COUNT: 13 % (ref 11.5–14.5)
ERYTHROCYTE [SEDIMENTATION RATE] IN BLOOD: 6 MM/HR (ref 0–20)
GFR SERPL CREATININE-BSD FRML MDRD: 77 ML/MIN/1.73
GLOBULIN UR ELPH-MCNC: 2.9 GM/DL
GLUCOSE BLD-MCNC: 237 MG/DL (ref 70–110)
HCT VFR BLD AUTO: 38 % (ref 42–52)
HGB BLD-MCNC: 12.6 G/DL (ref 14–18)
IMM GRANULOCYTES # BLD: 0.04 10*3/MM3 (ref 0–0.03)
IMM GRANULOCYTES NFR BLD: 0.4 % (ref 0–0.5)
LYMPHOCYTES # BLD AUTO: 2.52 10*3/MM3 (ref 1–3)
LYMPHOCYTES NFR BLD AUTO: 26.7 % (ref 21–51)
MCH RBC QN AUTO: 30.7 PG (ref 27–33)
MCHC RBC AUTO-ENTMCNC: 33.2 G/DL (ref 33–37)
MCV RBC AUTO: 92.7 FL (ref 80–94)
MONOCYTES # BLD AUTO: 0.77 10*3/MM3 (ref 0.1–0.9)
MONOCYTES NFR BLD AUTO: 8.2 % (ref 0–10)
NEUTROPHILS # BLD AUTO: 5.68 10*3/MM3 (ref 1.4–6.5)
NEUTROPHILS NFR BLD AUTO: 60.2 % (ref 30–70)
OSMOLALITY SERPL CALC.SUM OF ELEC: 279 MOSM/KG (ref 273–305)
PLATELET # BLD AUTO: 142 10*3/MM3 (ref 130–400)
PMV BLD AUTO: 10.3 FL (ref 6–10)
POTASSIUM BLD-SCNC: 4.8 MMOL/L (ref 3.5–5.3)
PROT SERPL-MCNC: 7.1 G/DL (ref 6–8)
RBC # BLD AUTO: 4.1 10*6/MM3 (ref 4.7–6.1)
SODIUM BLD-SCNC: 135 MMOL/L (ref 135–153)
WBC NRBC COR # BLD: 9.44 10*3/MM3 (ref 4.5–12.5)

## 2017-09-07 PROCEDURE — 85025 COMPLETE CBC W/AUTO DIFF WBC: CPT | Performed by: PODIATRIST

## 2017-09-07 PROCEDURE — 80053 COMPREHEN METABOLIC PANEL: CPT | Performed by: PODIATRIST

## 2017-09-07 PROCEDURE — 86140 C-REACTIVE PROTEIN: CPT | Performed by: PODIATRIST

## 2017-09-07 PROCEDURE — 85652 RBC SED RATE AUTOMATED: CPT | Performed by: PODIATRIST

## 2017-09-12 ENCOUNTER — APPOINTMENT (OUTPATIENT)
Dept: PREADMISSION TESTING | Facility: HOSPITAL | Age: 62
End: 2017-09-12

## 2017-09-12 RX ORDER — DILTIAZEM HYDROCHLORIDE 180 MG/1
180 CAPSULE, COATED, EXTENDED RELEASE ORAL DAILY
COMMUNITY
End: 2019-12-09

## 2017-09-13 ENCOUNTER — HOSPITAL ENCOUNTER (OUTPATIENT)
Facility: HOSPITAL | Age: 62
Setting detail: HOSPITAL OUTPATIENT SURGERY
Discharge: HOME | End: 2017-09-13
Attending: PODIATRIST | Admitting: PODIATRIST

## 2017-09-13 ENCOUNTER — ANESTHESIA EVENT (OUTPATIENT)
Dept: PERIOP | Facility: HOSPITAL | Age: 62
End: 2017-09-13

## 2017-09-13 ENCOUNTER — ANESTHESIA (OUTPATIENT)
Dept: PERIOP | Facility: HOSPITAL | Age: 62
End: 2017-09-13

## 2017-09-13 VITALS
HEIGHT: 71 IN | BODY MASS INDEX: 29.4 KG/M2 | WEIGHT: 210 LBS | RESPIRATION RATE: 18 BRPM | TEMPERATURE: 98 F | SYSTOLIC BLOOD PRESSURE: 108 MMHG | HEART RATE: 60 BPM | OXYGEN SATURATION: 98 % | DIASTOLIC BLOOD PRESSURE: 63 MMHG

## 2017-09-13 DIAGNOSIS — L89.90 DECUBITUS ULCER: ICD-10-CM

## 2017-09-13 LAB
GLUCOSE BLDC GLUCOMTR-MCNC: 155 MG/DL (ref 70–130)
GLUCOSE BLDC GLUCOMTR-MCNC: 210 MG/DL (ref 70–130)

## 2017-09-13 PROCEDURE — 87070 CULTURE OTHR SPECIMN AEROBIC: CPT | Performed by: PODIATRIST

## 2017-09-13 PROCEDURE — 87205 SMEAR GRAM STAIN: CPT | Performed by: PODIATRIST

## 2017-09-13 PROCEDURE — 25010000002 PROPOFOL 10 MG/ML EMULSION: Performed by: NURSE ANESTHETIST, CERTIFIED REGISTERED

## 2017-09-13 PROCEDURE — 88311 DECALCIFY TISSUE: CPT | Performed by: PODIATRIST

## 2017-09-13 PROCEDURE — 82962 GLUCOSE BLOOD TEST: CPT

## 2017-09-13 PROCEDURE — 25010000002 FENTANYL CITRATE (PF) 100 MCG/2ML SOLUTION: Performed by: NURSE ANESTHETIST, CERTIFIED REGISTERED

## 2017-09-13 PROCEDURE — 87186 SC STD MICRODIL/AGAR DIL: CPT | Performed by: PODIATRIST

## 2017-09-13 PROCEDURE — 87077 CULTURE AEROBIC IDENTIFY: CPT | Performed by: PODIATRIST

## 2017-09-13 PROCEDURE — 63710000001 INSULIN REGULAR HUMAN PER 5 UNITS: Performed by: ANESTHESIOLOGY

## 2017-09-13 PROCEDURE — 25010000002 MIDAZOLAM PER 1 MG: Performed by: NURSE ANESTHETIST, CERTIFIED REGISTERED

## 2017-09-13 PROCEDURE — 88305 TISSUE EXAM BY PATHOLOGIST: CPT | Performed by: PODIATRIST

## 2017-09-13 RX ORDER — LIDOCAINE HYDROCHLORIDE 20 MG/ML
INJECTION, SOLUTION EPIDURAL; INFILTRATION; INTRACAUDAL; PERINEURAL AS NEEDED
Status: DISCONTINUED | OUTPATIENT
Start: 2017-09-13 | End: 2017-09-13 | Stop reason: SURG

## 2017-09-13 RX ORDER — PHENYTOIN SODIUM 100 MG/1
300 CAPSULE, EXTENDED RELEASE ORAL 2 TIMES DAILY
Status: CANCELLED | OUTPATIENT
Start: 2017-09-13

## 2017-09-13 RX ORDER — ESCITALOPRAM OXALATE 10 MG/1
10 TABLET ORAL DAILY
Status: CANCELLED | OUTPATIENT
Start: 2017-09-14

## 2017-09-13 RX ORDER — PHENYTOIN SODIUM 100 MG/1
300 CAPSULE, EXTENDED RELEASE ORAL 2 TIMES DAILY
COMMUNITY
End: 2020-08-13 | Stop reason: ALTCHOICE

## 2017-09-13 RX ORDER — OXYCODONE HYDROCHLORIDE AND ACETAMINOPHEN 5; 325 MG/1; MG/1
1 TABLET ORAL ONCE AS NEEDED
Status: DISCONTINUED | OUTPATIENT
Start: 2017-09-13 | End: 2017-09-13 | Stop reason: HOSPADM

## 2017-09-13 RX ORDER — IBUPROFEN 800 MG/1
800 TABLET ORAL EVERY 6 HOURS PRN
Qty: 60 TABLET | Refills: 0 | Status: SHIPPED | OUTPATIENT
Start: 2017-09-13

## 2017-09-13 RX ORDER — DILTIAZEM HYDROCHLORIDE 180 MG/1
180 CAPSULE, COATED, EXTENDED RELEASE ORAL DAILY
Status: CANCELLED | OUTPATIENT
Start: 2017-09-14

## 2017-09-13 RX ORDER — INSULIN ASPART 100 [IU]/ML
60 INJECTION, SUSPENSION SUBCUTANEOUS 2 TIMES DAILY WITH MEALS
COMMUNITY

## 2017-09-13 RX ORDER — INSULIN ASPART 100 [IU]/ML
45 INJECTION, SUSPENSION SUBCUTANEOUS NIGHTLY
Status: CANCELLED | OUTPATIENT
Start: 2017-09-13

## 2017-09-13 RX ORDER — ASPIRIN 81 MG/1
81 TABLET ORAL NIGHTLY
Status: CANCELLED | OUTPATIENT
Start: 2017-09-13

## 2017-09-13 RX ORDER — HYDROCODONE BITARTRATE AND ACETAMINOPHEN 10; 325 MG/1; MG/1
1 TABLET ORAL EVERY 6 HOURS PRN
Qty: 45 TABLET | Refills: 0 | Status: SHIPPED | OUTPATIENT
Start: 2017-09-13 | End: 2020-08-13 | Stop reason: ALTCHOICE

## 2017-09-13 RX ORDER — FENTANYL CITRATE 50 UG/ML
50 INJECTION, SOLUTION INTRAMUSCULAR; INTRAVENOUS
Status: DISCONTINUED | OUTPATIENT
Start: 2017-09-13 | End: 2017-09-13 | Stop reason: HOSPADM

## 2017-09-13 RX ORDER — LISINOPRIL 10 MG/1
40 TABLET ORAL DAILY
Status: CANCELLED | OUTPATIENT
Start: 2017-09-14

## 2017-09-13 RX ORDER — ASCORBIC ACID 500 MG
500 TABLET ORAL DAILY
Qty: 60 TABLET | Refills: 1 | Status: SHIPPED | OUTPATIENT
Start: 2017-09-13 | End: 2020-08-13 | Stop reason: ALTCHOICE

## 2017-09-13 RX ORDER — BUPIVACAINE HYDROCHLORIDE 5 MG/ML
INJECTION, SOLUTION PERINEURAL AS NEEDED
Status: DISCONTINUED | OUTPATIENT
Start: 2017-09-13 | End: 2017-09-13 | Stop reason: HOSPADM

## 2017-09-13 RX ORDER — SODIUM CHLORIDE, SODIUM LACTATE, POTASSIUM CHLORIDE, CALCIUM CHLORIDE 600; 310; 30; 20 MG/100ML; MG/100ML; MG/100ML; MG/100ML
125 INJECTION, SOLUTION INTRAVENOUS CONTINUOUS
Status: DISCONTINUED | OUTPATIENT
Start: 2017-09-13 | End: 2017-09-13 | Stop reason: HOSPADM

## 2017-09-13 RX ORDER — SODIUM CHLORIDE 0.9 % (FLUSH) 0.9 %
1-10 SYRINGE (ML) INJECTION AS NEEDED
Status: DISCONTINUED | OUTPATIENT
Start: 2017-09-13 | End: 2017-09-13 | Stop reason: HOSPADM

## 2017-09-13 RX ORDER — INSULIN ASPART 100 [IU]/ML
60 INJECTION, SUSPENSION SUBCUTANEOUS EVERY MORNING
Status: CANCELLED | OUTPATIENT
Start: 2017-09-14

## 2017-09-13 RX ORDER — IPRATROPIUM BROMIDE AND ALBUTEROL SULFATE 2.5; .5 MG/3ML; MG/3ML
3 SOLUTION RESPIRATORY (INHALATION) ONCE AS NEEDED
Status: DISCONTINUED | OUTPATIENT
Start: 2017-09-13 | End: 2017-09-13 | Stop reason: HOSPADM

## 2017-09-13 RX ORDER — ATORVASTATIN CALCIUM 40 MG/1
80 TABLET, FILM COATED ORAL NIGHTLY
Status: CANCELLED | OUTPATIENT
Start: 2017-09-13

## 2017-09-13 RX ORDER — LEVOTHYROXINE SODIUM 0.05 MG/1
50 TABLET ORAL DAILY
Status: CANCELLED | OUTPATIENT
Start: 2017-09-14

## 2017-09-13 RX ORDER — FENTANYL CITRATE 50 UG/ML
INJECTION, SOLUTION INTRAMUSCULAR; INTRAVENOUS AS NEEDED
Status: DISCONTINUED | OUTPATIENT
Start: 2017-09-13 | End: 2017-09-13 | Stop reason: SURG

## 2017-09-13 RX ORDER — CLINDAMYCIN PHOSPHATE 600 MG/50ML
600 INJECTION INTRAVENOUS
Status: COMPLETED | OUTPATIENT
Start: 2017-09-14 | End: 2017-09-13

## 2017-09-13 RX ORDER — MIDAZOLAM HYDROCHLORIDE 1 MG/ML
INJECTION INTRAMUSCULAR; INTRAVENOUS AS NEEDED
Status: DISCONTINUED | OUTPATIENT
Start: 2017-09-13 | End: 2017-09-13 | Stop reason: SURG

## 2017-09-13 RX ORDER — PROPOFOL 10 MG/ML
VIAL (ML) INTRAVENOUS AS NEEDED
Status: DISCONTINUED | OUTPATIENT
Start: 2017-09-13 | End: 2017-09-13 | Stop reason: SURG

## 2017-09-13 RX ORDER — ONDANSETRON 2 MG/ML
4 INJECTION INTRAMUSCULAR; INTRAVENOUS ONCE AS NEEDED
Status: DISCONTINUED | OUTPATIENT
Start: 2017-09-13 | End: 2017-09-13 | Stop reason: HOSPADM

## 2017-09-13 RX ORDER — MEPERIDINE HYDROCHLORIDE 25 MG/ML
12.5 INJECTION INTRAMUSCULAR; INTRAVENOUS; SUBCUTANEOUS
Status: DISCONTINUED | OUTPATIENT
Start: 2017-09-13 | End: 2017-09-13 | Stop reason: HOSPADM

## 2017-09-13 RX ORDER — CLINDAMYCIN HYDROCHLORIDE 300 MG/1
300 CAPSULE ORAL 3 TIMES DAILY
Qty: 30 CAPSULE | Refills: 2 | Status: SHIPPED | OUTPATIENT
Start: 2017-09-13 | End: 2020-08-13

## 2017-09-13 RX ADMIN — FENTANYL CITRATE 50 MCG: 50 INJECTION INTRAMUSCULAR; INTRAVENOUS at 14:25

## 2017-09-13 RX ADMIN — FENTANYL CITRATE 50 MCG: 50 INJECTION INTRAMUSCULAR; INTRAVENOUS at 15:14

## 2017-09-13 RX ADMIN — HUMAN INSULIN 2 UNITS: 100 INJECTION, SOLUTION SUBCUTANEOUS at 12:56

## 2017-09-13 RX ADMIN — EPHEDRINE SULFATE 10 MG: 50 INJECTION INTRAMUSCULAR; INTRAVENOUS; SUBCUTANEOUS at 15:03

## 2017-09-13 RX ADMIN — MIDAZOLAM HYDROCHLORIDE 2 MG: 1 INJECTION, SOLUTION INTRAMUSCULAR; INTRAVENOUS at 14:10

## 2017-09-13 RX ADMIN — CLINDAMYCIN PHOSPHATE 600 MG: 12 INJECTION, SOLUTION INTRAVENOUS at 14:15

## 2017-09-13 RX ADMIN — LIDOCAINE HYDROCHLORIDE 3 ML: 20 INJECTION, SOLUTION EPIDURAL; INFILTRATION; INTRACAUDAL; PERINEURAL at 14:14

## 2017-09-13 RX ADMIN — EPHEDRINE SULFATE 10 MG: 50 INJECTION INTRAMUSCULAR; INTRAVENOUS; SUBCUTANEOUS at 14:44

## 2017-09-13 RX ADMIN — PROPOFOL 150 MG: 10 INJECTION, EMULSION INTRAVENOUS at 14:15

## 2017-09-13 RX ADMIN — SODIUM CHLORIDE, POTASSIUM CHLORIDE, SODIUM LACTATE AND CALCIUM CHLORIDE 125 ML/HR: 600; 310; 30; 20 INJECTION, SOLUTION INTRAVENOUS at 12:53

## 2017-09-13 NOTE — OP NOTE
AMPUTATION DIGIT  Procedure Note    Christo Clifton  9/13/2017    Pre-op Diagnosis:   [id number]    Post-op Diagnosis:     * No Diagnosis Codes entered *      Procedure(s):  RIGHT 1ST TOE AMPUTATION     Surgeon(s):  Lee Lee MD      Anesthesia: Choice    Staff:   Circulator: Phong Prather RN  Scrub Person: Iwona Lawton; Carol Waters    HEMOSTASIS: Pneumatic Ankle Tourniquet, Right     Estimated Blood Loss: *No blood loss documented*      Specimens:                  Order Name Source Comment Collection Info Order Time   CULTURE, ROUTINE Toe, Right  Collected By: Lee Lee MD 9/13/2017  3:18 PM   TISSUE PATHOLOGY EXAM Toe, Right  Collected By: Lee Lee MD 9/13/2017  3:07 PM       Injectables:20cc .5% marcaine plain      Drains:           Findings: none     Complications: none       Procedure(s):  RIGHT 1ST TOE AMPUTATION    Under IV sedation the patient brought lower placed in operative table in the supine position.  The extremity was scrubbed prepped and draped in used to manner.  The limb disseminated pneumatic tourniquet for approximately 2 mmHg pressure.  Attention was then directed to the actually where the fishmouth type incision was made base of the first toe the skin space tissue down to bone the osteotic bone was disarticulated base of the first MPJ.  The necrotic portion of soft tissue and bone was sent for bone culture and soft tissue culture.  The flap was trimmed to appropriate no tension skin closure margins and closed over vancomycin powder utilizing a 2-0 Monocryl deep subcutaneous 3-0 Monocryl more superficial subcutaneous and 3 on 4-0 nylon sutures skin level.  20 cc point percent Marcaine plain injection bulky dressing was applied short leg splint patient transferred or PACU vessel stable neurovascular status intact extremity      Lee Lee MD     Date: 9/13/2017  Time: 3:23 PM

## 2017-09-13 NOTE — ANESTHESIA PREPROCEDURE EVALUATION
Anesthesia Evaluation     Patient summary reviewed and Nursing notes reviewed   no history of anesthetic complications:  NPO Solid Status: > 8 hours  NPO Liquid Status: > 8 hours     Airway   Mallampati: II  TM distance: >3 FB  Neck ROM: full  no difficulty expected  Dental - normal exam   (+) edentulous    Pulmonary - normal exam   (+) a smoker Former, COPD, decreased breath sounds,   (-) asthma  Cardiovascular - normal exam  Exercise tolerance: good (4-7 METS)    NYHA Classification: II  Patient on routine beta blocker and Beta blocker given within 24 hours of surgery    (+) hypertension, CAD, CABG, dysrhythmias, PVD, hyperlipidemia  (-) angina, CHF      Neuro/Psych  (+) seizures (July 2017), CVA, psychiatric history Depression and Anxiety,    GI/Hepatic/Renal/Endo    (+) obesity,  diabetes mellitus using insulin,   (-) GERD    Musculoskeletal     (+) back pain, neck pain, radiculopathy Right lower extremity  Abdominal  - normal exam    Bowel sounds: normal.   Substance History - negative use     OB/GYN negative ob/gyn ROS         Other   (+) arthritis                                     Anesthesia Plan    ASA 3     general     intravenous induction   Anesthetic plan and risks discussed with patient.  Use of blood products discussed with patient  Consented to blood products.

## 2017-09-13 NOTE — PLAN OF CARE
Problem: Perioperative Period (Adult)  Goal: Signs and Symptoms of Listed Potential Problems Will be Absent or Manageable (Perioperative Period)  Outcome: Ongoing (interventions implemented as appropriate)    09/13/17 1231   Perioperative Period   Problems Assessed (Perioperative Period) pain   Problems Present (Perioperative Period) none

## 2017-09-13 NOTE — ANESTHESIA PROCEDURE NOTES
Airway  Urgency: elective    Date/Time: 9/13/2017 2:16 PM  End Time:9/13/2017 2:16 PM    General Information and Staff    Patient location during procedure: OR  Anesthesiologist: DIANA COBIAN  CRNA: TORSTEN ENRIQUEZ    Indications and Patient Condition  Indications for airway management: airway protection    Preoxygenated: yes  MILS maintained throughout  Mask difficulty assessment: 0 - not attempted    Final Airway Details  Final airway type: supraglottic airway      Successful airway: unique  Size 4  Airway Seal Pressure (cm H2O): 20    Number of attempts at approach: 1    Additional Comments  Atraumatic

## 2017-09-13 NOTE — PLAN OF CARE
Problem: Patient Care Overview (Adult)  Goal: Plan of Care Review  Outcome: Ongoing (interventions implemented as appropriate)    09/13/17 1231   Coping/Psychosocial Response Interventions   Plan Of Care Reviewed With patient   Patient Care Overview   Progress progress toward functional goals as expected

## 2017-09-13 NOTE — INTERVAL H&P NOTE
H&P reviewed. The patient was examined and there are no changes to the H&P. medical clearance obtained on chart

## 2017-09-13 NOTE — PLAN OF CARE
Problem: Patient Care Overview (Adult)  Goal: Discharge Needs Assessment  Outcome: Ongoing (interventions implemented as appropriate)    09/13/17 1231   Discharge Needs Assessment   Concerns To Be Addressed no discharge needs identified   Readmission Within The Last 30 Days no previous admission in last 30 days   Equipment Needed After Discharge none   Discharge Disposition home or self-care   Current Health   Anticipated Changes Related to Illness none   Self-Care   Equipment Currently Used at Home none   Living Environment   Transportation Available car

## 2017-09-13 NOTE — ANESTHESIA POSTPROCEDURE EVALUATION
Patient: Christo Clifton    Procedure Summary     Date Anesthesia Start Anesthesia Stop Room / Location    09/13/17 1411 1524 [facility] OR 02 / [facility] OR       Procedure Diagnosis Surgeon Provider    RIGHT 1ST TOE AMPUTATION  (Right Fingers) ([id number]) MD Victorino Balderas MD          Anesthesia Type: general  Last vitals  BP   124/66 (09/13/17 1555)    Temp   98.1 °F (36.7 °C) (09/13/17 1524)    Pulse   57 (09/13/17 1555)   Resp   16 (09/13/17 1555)    SpO2   100 % (09/13/17 1555)      Post Anesthesia Care and Evaluation    Patient location during evaluation: PACU  Patient participation: complete - patient participated  Level of consciousness: awake and alert  Pain score: 1  Pain management: adequate  Airway patency: patent  Anesthetic complications: No anesthetic complications  PONV Status: controlled  Cardiovascular status: acceptable  Respiratory status: acceptable  Hydration status: acceptable

## 2017-09-15 NOTE — OP NOTE
Case ID: [id number] Case Time: 9/13/2017  2:30 PM Surgeon: Lee Lee MD      Procedure: RIGHT 1ST TOE AMPUTATION  Location: [facility] OR          []Hide copied text  []Hover for attribution information  AMPUTATION DIGIT  Procedure Note     Christo Clifton  9/13/2017     Pre-op Diagnosis:   [id number]     Post-op Diagnosis:     * No Diagnosis Codes entered *        Procedure(s):  RIGHT 1ST TOE AMPUTATION      Surgeon(s):  Lee Lee MD        Anesthesia: Choice     Staff:   Circulator: Phong Prather RN  Scrub Person: Iwona Lawton; Carol Waters     HEMOSTASIS: Pneumatic Ankle Tourniquet, Right      Estimated Blood Loss: *No blood loss documented*        Specimens:                   Order Name Source Comment Collection Info Order Time   CULTURE, ROUTINE Toe, Right   Collected By: Lee Lee MD 9/13/2017  3:18 PM   TISSUE PATHOLOGY EXAM Toe, Right   Collected By: Lee Lee MD 9/13/2017  3:07 PM         Injectables:20cc .5% marcaine plain      Drains:              Findings: none      Complications: none         Procedure(s):  RIGHT 1ST TOE AMPUTATION    Under IV sedation the patient brought lower placed in operative table in the supine position.  The extremity was scrubbed prepped and draped in used to manner.  The limb disseminated pneumatic tourniquet for approximately 2 mmHg pressure.  Attention was then directed to the actually where the fishmouth type incision was made base of the first toe the skin space tissue down to bone the osteotic bone was disarticulated base of the first MPJ.  The necrotic portion of soft tissue and bone was sent for bone culture and soft tissue culture.  The flap was trimmed to appropriate no tension skin closure margins and closed over vancomycin powder utilizing a 2-0 Monocryl deep subcutaneous 3-0 Monocryl more superficial subcutaneous and 3 on 4-0 nylon sutures skin level.  20 cc point percent Marcaine plain injection bulky dressing was applied short leg splint patient  transferred or PACU vessel stable neurovascular status intact extremity        Lee Lee MD      Date: 9/13/2017  Time: 3:23 PM

## 2017-09-15 NOTE — OP NOTE
Case ID: [id number] Case Time: 9/13/2017  2:30 PM Surgeon: Lee Lee MD      Procedure: RIGHT 1ST TOE AMPUTATION  Location: [facility] OR          []Hide copied text  []Hover for attribution information  AMPUTATION DIGIT  Procedure Note     Christo Clifton  9/13/2017     Pre-op Diagnosis:   [id number]     Post-op Diagnosis:     * No Diagnosis Codes entered *        Procedure(s):  RIGHT 1ST TOE AMPUTATION      Surgeon(s):  Lee Lee MD        Anesthesia: Choice     Staff:   Circulator: Phong Prather RN  Scrub Person: Iwona Lawton; Carol Waters     HEMOSTASIS: Pneumatic Ankle Tourniquet, Right      Estimated Blood Loss: *No blood loss documented*        Specimens:                   Order Name Source Comment Collection Info Order Time   CULTURE, ROUTINE Toe, Right   Collected By: Lee Lee MD 9/13/2017  3:18 PM   TISSUE PATHOLOGY EXAM Toe, Right   Collected By: Lee Lee MD 9/13/2017  3:07 PM         Injectables:20cc .5% marcaine plain      Drains:              Findings: none      Complications: none         Procedure(s):  RIGHT 1ST TOE AMPUTATION    Under IV sedation the patient brought lower placed in operative table in the supine position.  The extremity was scrubbed prepped and draped in used to manner.  The limb disseminated pneumatic tourniquet for approximately 2 mmHg pressure.  Attention was then directed to the actually where the fishmouth type incision was made base of the first toe the skin space tissue down to bone the osteotic bone was disarticulated base of the first MPJ.  The necrotic portion of soft tissue and bone was sent for bone culture and soft tissue culture.  The flap was trimmed to appropriate no tension skin closure margins and closed over vancomycin powder utilizing a 2-0 Monocryl deep subcutaneous 3-0 Monocryl more superficial subcutaneous and 3 on 4-0 nylon sutures skin level.  20 cc point percent Marcaine plain injection bulky dressing was applied short leg splint patient  transferred or PACU vessel stable neurovascular status intact extremity        Lee Lee MD      Date: 9/13/2017  Time: 3:23 PM

## 2017-09-18 LAB
BACTERIA SPEC AEROBE CULT: ABNORMAL
GRAM STN SPEC: ABNORMAL

## 2017-09-19 LAB
LAB AP CASE REPORT: NORMAL
Lab: NORMAL
PATH REPORT.FINAL DX SPEC: NORMAL

## 2017-09-20 LAB
BACTERIA SPEC AEROBE CULT: ABNORMAL
GRAM STN SPEC: ABNORMAL
GRAM STN SPEC: ABNORMAL

## 2017-09-25 ENCOUNTER — HOSPITAL ENCOUNTER (OUTPATIENT)
Dept: INFUSION THERAPY | Facility: HOSPITAL | Age: 62
Discharge: HOME | End: 2017-09-25
Admitting: NURSE PRACTITIONER

## 2017-09-25 ENCOUNTER — TRANSCRIBE ORDERS (OUTPATIENT)
Dept: INFUSION THERAPY | Facility: HOSPITAL | Age: 62
End: 2017-09-25

## 2017-09-25 VITALS
HEART RATE: 56 BPM | TEMPERATURE: 98.3 F | RESPIRATION RATE: 20 BRPM | HEIGHT: 71 IN | WEIGHT: 210 LBS | SYSTOLIC BLOOD PRESSURE: 132 MMHG | DIASTOLIC BLOOD PRESSURE: 66 MMHG | BODY MASS INDEX: 29.4 KG/M2

## 2017-09-25 DIAGNOSIS — M86.9 OSTEOMYELITIS OF FOOT, UNSPECIFIED CHRONICITY, UNSPECIFIED LATERALITY: Primary | ICD-10-CM

## 2017-09-25 DIAGNOSIS — M86.9 OSTEOMYELITIS OF FOOT, UNSPECIFIED CHRONICITY, UNSPECIFIED LATERALITY: ICD-10-CM

## 2017-09-25 PROCEDURE — 25010000002 MEROPENEM: Performed by: PODIATRIST

## 2017-09-25 PROCEDURE — 96365 THER/PROPH/DIAG IV INF INIT: CPT

## 2017-09-25 RX ADMIN — MEROPENEM 1 G: 1 INJECTION, POWDER, FOR SOLUTION INTRAVENOUS at 11:26

## 2017-09-25 NOTE — PATIENT INSTRUCTIONS
Meropenem injection  What is this medicine?  MEROPENEM (wen oh PEN em) is a carbapenem antibiotic. It is used to treat certain kinds of bacterial infections. It will not work for colds, flu, or other viral infections.  This medicine may be used for other purposes; ask your health care provider or pharmacist if you have questions.  COMMON BRAND NAME(S): Merrem  What should I tell my health care provider before I take this medicine?  They need to know if you have any of these conditions:  -brain tumor or lesion  -kidney disease  -seizure disorder  -an unusual or allergic reaction to meropenem, other antibiotics or medicines, foods, dyes, or preservatives  -pregnant or trying to get pregnant  -breast-feeding  How should I use this medicine?  This medicine is for infusion into a vein. It is usually given by a health care professional in a hospital or clinic setting.  If you get this medicine at home, you will be taught how to prepare and give this medicine. Use exactly as directed. Take your medicine at regular intervals. Do not take it more often than directed.  It is important that you put your used needles and syringes in a special sharps container. Do not put them in a trash can. If you do not have a sharps container, call your pharmacist or healthcare provider to get one.  Talk to your pediatrician regarding the use of this medicine in children. While this drug may be prescribed for children as young as newborns for selected conditions, precautions do apply.  Overdosage: If you think you have taken too much of this medicine contact a poison control center or emergency room at once.  NOTE: This medicine is only for you. Do not share this medicine with others.  What if I miss a dose?  If you miss a dose, use it as soon as you can. If it is almost time for your next dose, use only that dose. Do not use double or extra doses.  What may interact with this medicine?  -birth control pills  -probenecid  -valproic  acid  This list may not describe all possible interactions. Give your health care provider a list of all the medicines, herbs, non-prescription drugs, or dietary supplements you use. Also tell them if you smoke, drink alcohol, or use illegal drugs. Some items may interact with your medicine.  What should I watch for while using this medicine?  Your condition will be monitored carefully while you are receiving this medicine. Tell your doctor or healthcare professional if your symptoms do not start to get better or if they get worse.  Do not treat diarrhea with over the counter products. Contact your doctor if you have diarrhea that lasts more than 2 days or if it is severe and watery.  Do not drive, use machinery, or do anything that needs mental alertness until you know how this medicine affects you.  What side effects may I notice from receiving this medicine?  Side effects that you should report to your doctor or health care professional as soon as possible:  -allergic reactions like skin rash, itching or hives, swelling of the face, lips, or tongue  -breathing problems  -change in blood pressure  -chest pain  -confusion  -dark urine  -fever  -irregular heart rate  -pain, swelling, or irritation at site where injected  -pain, tingling, numbness in the hands or feet  -redness, blistering, peeling or loosening of the skin, including inside the mouth  -seizures  -unusual bleeding or bruising  -white or red patches in mouth  -yellowing of the eyes or skin  Side effects that usually do not require medical attention (report to your doctor or health care professional if they continue or are bothersome):  -constipation or diarrhea  -diaper rash  -headache  -nausea, vomiting  -stomach upset or gas  -vaginal itch or irritation  This list may not describe all possible side effects. Call your doctor for medical advice about side effects. You may report side effects to FDA at 4-988-FDA-2082.  Where should I keep my  medicine?  Keep out of the reach of children.  If you are using this medicine at home, you will be instructed on how to store this medicine. Throw away any unused medicine after the expiration date on the label.  NOTE: This sheet is a summary. It may not cover all possible information. If you have questions about this medicine, talk to your doctor, pharmacist, or health care provider.     © 2017, [employer]. (2017-01-05 11:57:07)

## 2017-09-27 ENCOUNTER — LAB REQUISITION (OUTPATIENT)
Dept: LAB | Facility: HOSPITAL | Age: 62
End: 2017-09-27

## 2017-09-27 DIAGNOSIS — Z79.899 OTHER LONG TERM (CURRENT) DRUG THERAPY: ICD-10-CM

## 2017-09-27 LAB
ALBUMIN SERPL-MCNC: 4.4 G/DL (ref 3.4–4.8)
ALBUMIN/GLOB SERPL: 1.5 G/DL (ref 1.5–2.5)
ALP SERPL-CCNC: 188 U/L (ref 40–129)
ALT SERPL W P-5'-P-CCNC: 38 U/L (ref 10–44)
ANION GAP SERPL CALCULATED.3IONS-SCNC: 7.5 MMOL/L (ref 3.6–11.2)
AST SERPL-CCNC: 31 U/L (ref 10–34)
BASOPHILS # BLD AUTO: 0.05 10*3/MM3 (ref 0–0.3)
BASOPHILS NFR BLD AUTO: 0.6 % (ref 0–2)
BILIRUB SERPL-MCNC: 0.2 MG/DL (ref 0.2–1.8)
BUN BLD-MCNC: 23 MG/DL (ref 7–21)
BUN/CREAT SERPL: 20.9 (ref 7–25)
CALCIUM SPEC-SCNC: 10 MG/DL (ref 7.7–10)
CHLORIDE SERPL-SCNC: 103 MMOL/L (ref 99–112)
CO2 SERPL-SCNC: 24.5 MMOL/L (ref 24.3–31.9)
CREAT BLD-MCNC: 1.1 MG/DL (ref 0.43–1.29)
CRP SERPL-MCNC: 2.32 MG/DL (ref 0–0.99)
DEPRECATED RDW RBC AUTO: 41.7 FL (ref 37–54)
EOSINOPHIL # BLD AUTO: 0.37 10*3/MM3 (ref 0–0.7)
EOSINOPHIL NFR BLD AUTO: 4.3 % (ref 0–5)
ERYTHROCYTE [DISTWIDTH] IN BLOOD BY AUTOMATED COUNT: 12.9 % (ref 11.5–14.5)
ERYTHROCYTE [SEDIMENTATION RATE] IN BLOOD: 17 MM/HR (ref 0–20)
GFR SERPL CREATININE-BSD FRML MDRD: 68 ML/MIN/1.73
GLOBULIN UR ELPH-MCNC: 3 GM/DL
GLUCOSE BLD-MCNC: 171 MG/DL (ref 70–110)
HCT VFR BLD AUTO: 36.5 % (ref 42–52)
HGB BLD-MCNC: 12 G/DL (ref 14–18)
IMM GRANULOCYTES # BLD: 0.06 10*3/MM3 (ref 0–0.03)
IMM GRANULOCYTES NFR BLD: 0.7 % (ref 0–0.5)
LYMPHOCYTES # BLD AUTO: 2.81 10*3/MM3 (ref 1–3)
LYMPHOCYTES NFR BLD AUTO: 32.8 % (ref 21–51)
MCH RBC QN AUTO: 30.2 PG (ref 27–33)
MCHC RBC AUTO-ENTMCNC: 32.9 G/DL (ref 33–37)
MCV RBC AUTO: 91.7 FL (ref 80–94)
MONOCYTES # BLD AUTO: 0.81 10*3/MM3 (ref 0.1–0.9)
MONOCYTES NFR BLD AUTO: 9.5 % (ref 0–10)
NEUTROPHILS # BLD AUTO: 4.47 10*3/MM3 (ref 1.4–6.5)
NEUTROPHILS NFR BLD AUTO: 52.1 % (ref 30–70)
OSMOLALITY SERPL CALC.SUM OF ELEC: 277.8 MOSM/KG (ref 273–305)
PLATELET # BLD AUTO: 331 10*3/MM3 (ref 130–400)
PMV BLD AUTO: 9.4 FL (ref 6–10)
POTASSIUM BLD-SCNC: 5.2 MMOL/L (ref 3.5–5.3)
PROT SERPL-MCNC: 7.4 G/DL (ref 6–8)
RBC # BLD AUTO: 3.98 10*6/MM3 (ref 4.7–6.1)
SODIUM BLD-SCNC: 135 MMOL/L (ref 135–153)
WBC NRBC COR # BLD: 8.57 10*3/MM3 (ref 4.5–12.5)

## 2017-09-27 PROCEDURE — 85652 RBC SED RATE AUTOMATED: CPT | Performed by: PODIATRIST

## 2017-09-27 PROCEDURE — 80053 COMPREHEN METABOLIC PANEL: CPT | Performed by: PODIATRIST

## 2017-09-27 PROCEDURE — 85025 COMPLETE CBC W/AUTO DIFF WBC: CPT | Performed by: PODIATRIST

## 2017-09-27 PROCEDURE — 86140 C-REACTIVE PROTEIN: CPT | Performed by: PODIATRIST

## 2017-10-04 ENCOUNTER — LAB REQUISITION (OUTPATIENT)
Dept: LAB | Facility: HOSPITAL | Age: 62
End: 2017-10-04

## 2017-10-04 DIAGNOSIS — Z79.899 OTHER LONG TERM (CURRENT) DRUG THERAPY: ICD-10-CM

## 2017-10-04 LAB
ALBUMIN SERPL-MCNC: 4 G/DL (ref 3.4–4.8)
ALBUMIN/GLOB SERPL: 1.3 G/DL (ref 1.5–2.5)
ALP SERPL-CCNC: 170 U/L (ref 40–129)
ALT SERPL W P-5'-P-CCNC: 45 U/L (ref 10–44)
ANION GAP SERPL CALCULATED.3IONS-SCNC: 6.4 MMOL/L (ref 3.6–11.2)
AST SERPL-CCNC: 37 U/L (ref 10–34)
BASOPHILS # BLD AUTO: 0.05 10*3/MM3 (ref 0–0.3)
BASOPHILS NFR BLD AUTO: 0.6 % (ref 0–2)
BILIRUB SERPL-MCNC: 0.2 MG/DL (ref 0.2–1.8)
BUN BLD-MCNC: 24 MG/DL (ref 7–21)
BUN/CREAT SERPL: 27 (ref 7–25)
CALCIUM SPEC-SCNC: 9.5 MG/DL (ref 7.7–10)
CHLORIDE SERPL-SCNC: 102 MMOL/L (ref 99–112)
CO2 SERPL-SCNC: 26.6 MMOL/L (ref 24.3–31.9)
CREAT BLD-MCNC: 0.89 MG/DL (ref 0.43–1.29)
CRP SERPL-MCNC: 1.25 MG/DL (ref 0–0.99)
DEPRECATED RDW RBC AUTO: 41 FL (ref 37–54)
EOSINOPHIL # BLD AUTO: 0.42 10*3/MM3 (ref 0–0.7)
EOSINOPHIL NFR BLD AUTO: 5.3 % (ref 0–5)
ERYTHROCYTE [DISTWIDTH] IN BLOOD BY AUTOMATED COUNT: 12.6 % (ref 11.5–14.5)
ERYTHROCYTE [SEDIMENTATION RATE] IN BLOOD: 14 MM/HR (ref 0–20)
GFR SERPL CREATININE-BSD FRML MDRD: 86 ML/MIN/1.73
GLOBULIN UR ELPH-MCNC: 3.1 GM/DL
GLUCOSE BLD-MCNC: 144 MG/DL (ref 70–110)
HCT VFR BLD AUTO: 35 % (ref 42–52)
HGB BLD-MCNC: 11.3 G/DL (ref 14–18)
IMM GRANULOCYTES # BLD: 0.03 10*3/MM3 (ref 0–0.03)
IMM GRANULOCYTES NFR BLD: 0.4 % (ref 0–0.5)
LYMPHOCYTES # BLD AUTO: 2.77 10*3/MM3 (ref 1–3)
LYMPHOCYTES NFR BLD AUTO: 35 % (ref 21–51)
MCH RBC QN AUTO: 29.8 PG (ref 27–33)
MCHC RBC AUTO-ENTMCNC: 32.3 G/DL (ref 33–37)
MCV RBC AUTO: 92.3 FL (ref 80–94)
MONOCYTES # BLD AUTO: 0.88 10*3/MM3 (ref 0.1–0.9)
MONOCYTES NFR BLD AUTO: 11.1 % (ref 0–10)
NEUTROPHILS # BLD AUTO: 3.76 10*3/MM3 (ref 1.4–6.5)
NEUTROPHILS NFR BLD AUTO: 47.6 % (ref 30–70)
OSMOLALITY SERPL CALC.SUM OF ELEC: 276.7 MOSM/KG (ref 273–305)
PLATELET # BLD AUTO: 327 10*3/MM3 (ref 130–400)
PMV BLD AUTO: 10 FL (ref 6–10)
POTASSIUM BLD-SCNC: 5 MMOL/L (ref 3.5–5.3)
PROT SERPL-MCNC: 7.1 G/DL (ref 6–8)
RBC # BLD AUTO: 3.79 10*6/MM3 (ref 4.7–6.1)
SODIUM BLD-SCNC: 135 MMOL/L (ref 135–153)
WBC NRBC COR # BLD: 7.91 10*3/MM3 (ref 4.5–12.5)

## 2017-10-04 PROCEDURE — 85025 COMPLETE CBC W/AUTO DIFF WBC: CPT | Performed by: PODIATRIST

## 2017-10-04 PROCEDURE — 85652 RBC SED RATE AUTOMATED: CPT | Performed by: PODIATRIST

## 2017-10-04 PROCEDURE — 86140 C-REACTIVE PROTEIN: CPT | Performed by: PODIATRIST

## 2017-10-04 PROCEDURE — 80053 COMPREHEN METABOLIC PANEL: CPT | Performed by: PODIATRIST

## 2017-10-11 ENCOUNTER — LAB REQUISITION (OUTPATIENT)
Dept: LAB | Facility: HOSPITAL | Age: 62
End: 2017-10-11

## 2017-10-11 DIAGNOSIS — Z79.899 OTHER LONG TERM (CURRENT) DRUG THERAPY: ICD-10-CM

## 2017-10-11 LAB
ALBUMIN SERPL-MCNC: 4 G/DL (ref 3.4–4.8)
ALBUMIN/GLOB SERPL: 1.3 G/DL (ref 1.5–2.5)
ALP SERPL-CCNC: 167 U/L (ref 40–129)
ALT SERPL W P-5'-P-CCNC: 51 U/L (ref 10–44)
ANION GAP SERPL CALCULATED.3IONS-SCNC: 7.3 MMOL/L (ref 3.6–11.2)
AST SERPL-CCNC: 36 U/L (ref 10–34)
BASOPHILS # BLD AUTO: 0.05 10*3/MM3 (ref 0–0.3)
BASOPHILS NFR BLD AUTO: 0.6 % (ref 0–2)
BILIRUB SERPL-MCNC: 0.3 MG/DL (ref 0.2–1.8)
BUN BLD-MCNC: 20 MG/DL (ref 7–21)
BUN/CREAT SERPL: 18.2 (ref 7–25)
CALCIUM SPEC-SCNC: 9.2 MG/DL (ref 7.7–10)
CHLORIDE SERPL-SCNC: 103 MMOL/L (ref 99–112)
CO2 SERPL-SCNC: 24.7 MMOL/L (ref 24.3–31.9)
CREAT BLD-MCNC: 1.1 MG/DL (ref 0.43–1.29)
CRP SERPL-MCNC: 1.33 MG/DL (ref 0–0.99)
DEPRECATED RDW RBC AUTO: 41.7 FL (ref 37–54)
EOSINOPHIL # BLD AUTO: 0.52 10*3/MM3 (ref 0–0.7)
EOSINOPHIL NFR BLD AUTO: 6.4 % (ref 0–5)
ERYTHROCYTE [DISTWIDTH] IN BLOOD BY AUTOMATED COUNT: 12.8 % (ref 11.5–14.5)
ERYTHROCYTE [SEDIMENTATION RATE] IN BLOOD: 14 MM/HR (ref 0–20)
GFR SERPL CREATININE-BSD FRML MDRD: 68 ML/MIN/1.73
GLOBULIN UR ELPH-MCNC: 3 GM/DL
GLUCOSE BLD-MCNC: 176 MG/DL (ref 70–110)
HCT VFR BLD AUTO: 34 % (ref 42–52)
HGB BLD-MCNC: 11 G/DL (ref 14–18)
IMM GRANULOCYTES # BLD: 0.07 10*3/MM3 (ref 0–0.03)
IMM GRANULOCYTES NFR BLD: 0.9 % (ref 0–0.5)
LYMPHOCYTES # BLD AUTO: 2.75 10*3/MM3 (ref 1–3)
LYMPHOCYTES NFR BLD AUTO: 33.9 % (ref 21–51)
MCH RBC QN AUTO: 30 PG (ref 27–33)
MCHC RBC AUTO-ENTMCNC: 32.4 G/DL (ref 33–37)
MCV RBC AUTO: 92.6 FL (ref 80–94)
MONOCYTES # BLD AUTO: 0.72 10*3/MM3 (ref 0.1–0.9)
MONOCYTES NFR BLD AUTO: 8.9 % (ref 0–10)
NEUTROPHILS # BLD AUTO: 4.01 10*3/MM3 (ref 1.4–6.5)
NEUTROPHILS NFR BLD AUTO: 49.3 % (ref 30–70)
OSMOLALITY SERPL CALC.SUM OF ELEC: 277 MOSM/KG (ref 273–305)
PLATELET # BLD AUTO: 279 10*3/MM3 (ref 130–400)
PMV BLD AUTO: 9.6 FL (ref 6–10)
POTASSIUM BLD-SCNC: 4.6 MMOL/L (ref 3.5–5.3)
PROT SERPL-MCNC: 7 G/DL (ref 6–8)
RBC # BLD AUTO: 3.67 10*6/MM3 (ref 4.7–6.1)
SODIUM BLD-SCNC: 135 MMOL/L (ref 135–153)
WBC NRBC COR # BLD: 8.12 10*3/MM3 (ref 4.5–12.5)

## 2017-10-11 PROCEDURE — 86140 C-REACTIVE PROTEIN: CPT | Performed by: PODIATRIST

## 2017-10-11 PROCEDURE — 80053 COMPREHEN METABOLIC PANEL: CPT | Performed by: PODIATRIST

## 2017-10-11 PROCEDURE — 85652 RBC SED RATE AUTOMATED: CPT | Performed by: PODIATRIST

## 2017-10-11 PROCEDURE — 85025 COMPLETE CBC W/AUTO DIFF WBC: CPT | Performed by: PODIATRIST

## 2017-10-18 ENCOUNTER — LAB REQUISITION (OUTPATIENT)
Dept: LAB | Facility: HOSPITAL | Age: 62
End: 2017-10-18

## 2017-10-18 DIAGNOSIS — Z79.899 OTHER LONG TERM (CURRENT) DRUG THERAPY: ICD-10-CM

## 2017-10-18 LAB
BASOPHILS # BLD AUTO: 0.03 10*3/MM3 (ref 0–0.3)
BASOPHILS NFR BLD AUTO: 0.4 % (ref 0–2)
CRP SERPL-MCNC: 1.35 MG/DL (ref 0–0.99)
DEPRECATED RDW RBC AUTO: 41.7 FL (ref 37–54)
EOSINOPHIL # BLD AUTO: 0.34 10*3/MM3 (ref 0–0.7)
EOSINOPHIL NFR BLD AUTO: 4.9 % (ref 0–5)
ERYTHROCYTE [DISTWIDTH] IN BLOOD BY AUTOMATED COUNT: 12.8 % (ref 11.5–14.5)
ERYTHROCYTE [SEDIMENTATION RATE] IN BLOOD: 13 MM/HR (ref 0–20)
HCT VFR BLD AUTO: 36.4 % (ref 42–52)
HGB BLD-MCNC: 11.9 G/DL (ref 14–18)
IMM GRANULOCYTES # BLD: 0.03 10*3/MM3 (ref 0–0.03)
IMM GRANULOCYTES NFR BLD: 0.4 % (ref 0–0.5)
LYMPHOCYTES # BLD AUTO: 2.61 10*3/MM3 (ref 1–3)
LYMPHOCYTES NFR BLD AUTO: 37.2 % (ref 21–51)
MCH RBC QN AUTO: 29.9 PG (ref 27–33)
MCHC RBC AUTO-ENTMCNC: 32.7 G/DL (ref 33–37)
MCV RBC AUTO: 91.5 FL (ref 80–94)
MONOCYTES # BLD AUTO: 0.6 10*3/MM3 (ref 0.1–0.9)
MONOCYTES NFR BLD AUTO: 8.6 % (ref 0–10)
NEUTROPHILS # BLD AUTO: 3.4 10*3/MM3 (ref 1.4–6.5)
NEUTROPHILS NFR BLD AUTO: 48.5 % (ref 30–70)
PLATELET # BLD AUTO: 249 10*3/MM3 (ref 130–400)
PMV BLD AUTO: 9.6 FL (ref 6–10)
RBC # BLD AUTO: 3.98 10*6/MM3 (ref 4.7–6.1)
WBC NRBC COR # BLD: 7.01 10*3/MM3 (ref 4.5–12.5)

## 2017-10-18 PROCEDURE — 85652 RBC SED RATE AUTOMATED: CPT | Performed by: PODIATRIST

## 2017-10-18 PROCEDURE — 86140 C-REACTIVE PROTEIN: CPT | Performed by: PODIATRIST

## 2017-10-18 PROCEDURE — 85025 COMPLETE CBC W/AUTO DIFF WBC: CPT | Performed by: PODIATRIST

## 2017-10-25 ENCOUNTER — LAB REQUISITION (OUTPATIENT)
Dept: LAB | Facility: HOSPITAL | Age: 62
End: 2017-10-25

## 2017-10-25 DIAGNOSIS — Z79.899 OTHER LONG TERM (CURRENT) DRUG THERAPY: ICD-10-CM

## 2017-10-25 LAB
BASOPHILS # BLD AUTO: 0.04 10*3/MM3 (ref 0–0.3)
BASOPHILS NFR BLD AUTO: 0.5 % (ref 0–2)
CRP SERPL-MCNC: 0.99 MG/DL (ref 0–0.99)
DEPRECATED RDW RBC AUTO: 40.1 FL (ref 37–54)
EOSINOPHIL # BLD AUTO: 0.37 10*3/MM3 (ref 0–0.7)
EOSINOPHIL NFR BLD AUTO: 4.5 % (ref 0–5)
ERYTHROCYTE [DISTWIDTH] IN BLOOD BY AUTOMATED COUNT: 12.3 % (ref 11.5–14.5)
ERYTHROCYTE [SEDIMENTATION RATE] IN BLOOD: 12 MM/HR (ref 0–20)
HCT VFR BLD AUTO: 36.8 % (ref 42–52)
HGB BLD-MCNC: 12.1 G/DL (ref 14–18)
IMM GRANULOCYTES # BLD: 0.03 10*3/MM3 (ref 0–0.03)
IMM GRANULOCYTES NFR BLD: 0.4 % (ref 0–0.5)
LYMPHOCYTES # BLD AUTO: 3.38 10*3/MM3 (ref 1–3)
LYMPHOCYTES NFR BLD AUTO: 41.2 % (ref 21–51)
MCH RBC QN AUTO: 29.8 PG (ref 27–33)
MCHC RBC AUTO-ENTMCNC: 32.9 G/DL (ref 33–37)
MCV RBC AUTO: 90.6 FL (ref 80–94)
MONOCYTES # BLD AUTO: 0.75 10*3/MM3 (ref 0.1–0.9)
MONOCYTES NFR BLD AUTO: 9.1 % (ref 0–10)
NEUTROPHILS # BLD AUTO: 3.63 10*3/MM3 (ref 1.4–6.5)
NEUTROPHILS NFR BLD AUTO: 44.3 % (ref 30–70)
PLATELET # BLD AUTO: 267 10*3/MM3 (ref 130–400)
PMV BLD AUTO: 9.6 FL (ref 6–10)
RBC # BLD AUTO: 4.06 10*6/MM3 (ref 4.7–6.1)
WBC NRBC COR # BLD: 8.2 10*3/MM3 (ref 4.5–12.5)

## 2017-10-25 PROCEDURE — 85652 RBC SED RATE AUTOMATED: CPT | Performed by: PODIATRIST

## 2017-10-25 PROCEDURE — 86140 C-REACTIVE PROTEIN: CPT | Performed by: PODIATRIST

## 2017-10-25 PROCEDURE — 85025 COMPLETE CBC W/AUTO DIFF WBC: CPT | Performed by: PODIATRIST

## 2017-11-01 ENCOUNTER — LAB REQUISITION (OUTPATIENT)
Dept: LAB | Facility: HOSPITAL | Age: 62
End: 2017-11-01

## 2017-11-01 DIAGNOSIS — Z79.899 OTHER LONG TERM (CURRENT) DRUG THERAPY: ICD-10-CM

## 2017-11-01 LAB
BASOPHILS # BLD AUTO: 0.04 10*3/MM3 (ref 0–0.3)
BASOPHILS NFR BLD AUTO: 0.3 % (ref 0–2)
CRP SERPL-MCNC: 1.38 MG/DL (ref 0–0.99)
DEPRECATED RDW RBC AUTO: 40.5 FL (ref 37–54)
EOSINOPHIL # BLD AUTO: 0.34 10*3/MM3 (ref 0–0.7)
EOSINOPHIL NFR BLD AUTO: 2.8 % (ref 0–5)
ERYTHROCYTE [DISTWIDTH] IN BLOOD BY AUTOMATED COUNT: 12.4 % (ref 11.5–14.5)
ERYTHROCYTE [SEDIMENTATION RATE] IN BLOOD: 15 MM/HR (ref 0–20)
HCT VFR BLD AUTO: 35.9 % (ref 42–52)
HGB BLD-MCNC: 11.7 G/DL (ref 14–18)
IMM GRANULOCYTES # BLD: 0.03 10*3/MM3 (ref 0–0.03)
IMM GRANULOCYTES NFR BLD: 0.2 % (ref 0–0.5)
LYMPHOCYTES # BLD AUTO: 2.93 10*3/MM3 (ref 1–3)
LYMPHOCYTES NFR BLD AUTO: 24 % (ref 21–51)
MCH RBC QN AUTO: 30.1 PG (ref 27–33)
MCHC RBC AUTO-ENTMCNC: 32.6 G/DL (ref 33–37)
MCV RBC AUTO: 92.3 FL (ref 80–94)
MONOCYTES # BLD AUTO: 0.96 10*3/MM3 (ref 0.1–0.9)
MONOCYTES NFR BLD AUTO: 7.9 % (ref 0–10)
NEUTROPHILS # BLD AUTO: 7.9 10*3/MM3 (ref 1.4–6.5)
NEUTROPHILS NFR BLD AUTO: 64.8 % (ref 30–70)
PLATELET # BLD AUTO: 285 10*3/MM3 (ref 130–400)
PMV BLD AUTO: 9.7 FL (ref 6–10)
RBC # BLD AUTO: 3.89 10*6/MM3 (ref 4.7–6.1)
WBC NRBC COR # BLD: 12.2 10*3/MM3 (ref 4.5–12.5)

## 2017-11-01 PROCEDURE — 86140 C-REACTIVE PROTEIN: CPT | Performed by: PODIATRIST

## 2017-11-01 PROCEDURE — 85025 COMPLETE CBC W/AUTO DIFF WBC: CPT | Performed by: PODIATRIST

## 2017-11-01 PROCEDURE — 85652 RBC SED RATE AUTOMATED: CPT | Performed by: PODIATRIST

## 2017-11-08 ENCOUNTER — LAB REQUISITION (OUTPATIENT)
Dept: LAB | Facility: HOSPITAL | Age: 62
End: 2017-11-08

## 2017-11-08 DIAGNOSIS — L08.9 LOCAL INFECTION OF SKIN AND SUBCUTANEOUS TISSUE: ICD-10-CM

## 2017-11-08 LAB
BASOPHILS # BLD AUTO: 0.04 10*3/MM3 (ref 0–0.3)
BASOPHILS NFR BLD AUTO: 0.5 % (ref 0–2)
CRP SERPL-MCNC: 1.13 MG/DL (ref 0–0.99)
DEPRECATED RDW RBC AUTO: 40.7 FL (ref 37–54)
EOSINOPHIL # BLD AUTO: 0.36 10*3/MM3 (ref 0–0.7)
EOSINOPHIL NFR BLD AUTO: 4.2 % (ref 0–5)
ERYTHROCYTE [DISTWIDTH] IN BLOOD BY AUTOMATED COUNT: 12.6 % (ref 11.5–14.5)
ERYTHROCYTE [SEDIMENTATION RATE] IN BLOOD: 18 MM/HR (ref 0–20)
HCT VFR BLD AUTO: 36.8 % (ref 42–52)
HGB BLD-MCNC: 12.4 G/DL (ref 14–18)
IMM GRANULOCYTES # BLD: 0.03 10*3/MM3 (ref 0–0.03)
IMM GRANULOCYTES NFR BLD: 0.4 % (ref 0–0.5)
LYMPHOCYTES # BLD AUTO: 2.43 10*3/MM3 (ref 1–3)
LYMPHOCYTES NFR BLD AUTO: 28.5 % (ref 21–51)
MCH RBC QN AUTO: 30.8 PG (ref 27–33)
MCHC RBC AUTO-ENTMCNC: 33.7 G/DL (ref 33–37)
MCV RBC AUTO: 91.5 FL (ref 80–94)
MONOCYTES # BLD AUTO: 0.69 10*3/MM3 (ref 0.1–0.9)
MONOCYTES NFR BLD AUTO: 8.1 % (ref 0–10)
NEUTROPHILS # BLD AUTO: 4.97 10*3/MM3 (ref 1.4–6.5)
NEUTROPHILS NFR BLD AUTO: 58.3 % (ref 30–70)
PLATELET # BLD AUTO: 261 10*3/MM3 (ref 130–400)
PMV BLD AUTO: 10.2 FL (ref 6–10)
RBC # BLD AUTO: 4.02 10*6/MM3 (ref 4.7–6.1)
WBC NRBC COR # BLD: 8.52 10*3/MM3 (ref 4.5–12.5)

## 2017-11-08 PROCEDURE — 85025 COMPLETE CBC W/AUTO DIFF WBC: CPT | Performed by: PODIATRIST

## 2017-11-08 PROCEDURE — 86140 C-REACTIVE PROTEIN: CPT | Performed by: PODIATRIST

## 2017-11-08 PROCEDURE — 85652 RBC SED RATE AUTOMATED: CPT | Performed by: PODIATRIST

## 2017-11-15 ENCOUNTER — LAB REQUISITION (OUTPATIENT)
Dept: LAB | Facility: HOSPITAL | Age: 62
End: 2017-11-15

## 2017-11-15 DIAGNOSIS — L08.9 LOCAL INFECTION OF SKIN AND SUBCUTANEOUS TISSUE: ICD-10-CM

## 2017-11-15 LAB
ALBUMIN SERPL-MCNC: 4 G/DL (ref 3.4–4.8)
ALBUMIN/GLOB SERPL: 1.4 G/DL (ref 1.5–2.5)
ALP SERPL-CCNC: 125 U/L (ref 40–129)
ALT SERPL W P-5'-P-CCNC: 32 U/L (ref 10–44)
ANION GAP SERPL CALCULATED.3IONS-SCNC: 6.4 MMOL/L (ref 3.6–11.2)
AST SERPL-CCNC: 31 U/L (ref 10–34)
BASOPHILS # BLD AUTO: 0.05 10*3/MM3 (ref 0–0.3)
BASOPHILS NFR BLD AUTO: 0.6 % (ref 0–2)
BILIRUB SERPL-MCNC: 0.1 MG/DL (ref 0.2–1.8)
BUN BLD-MCNC: 22 MG/DL (ref 7–21)
BUN/CREAT SERPL: 20.2 (ref 7–25)
CALCIUM SPEC-SCNC: 9.1 MG/DL (ref 7.7–10)
CHLORIDE SERPL-SCNC: 103 MMOL/L (ref 99–112)
CO2 SERPL-SCNC: 24.6 MMOL/L (ref 24.3–31.9)
CREAT BLD-MCNC: 1.09 MG/DL (ref 0.43–1.29)
DEPRECATED RDW RBC AUTO: 41.9 FL (ref 37–54)
EOSINOPHIL # BLD AUTO: 0.28 10*3/MM3 (ref 0–0.7)
EOSINOPHIL NFR BLD AUTO: 3.5 % (ref 0–5)
ERYTHROCYTE [DISTWIDTH] IN BLOOD BY AUTOMATED COUNT: 13.1 % (ref 11.5–14.5)
ERYTHROCYTE [SEDIMENTATION RATE] IN BLOOD: 9 MM/HR (ref 0–20)
GFR SERPL CREATININE-BSD FRML MDRD: 68 ML/MIN/1.73
GLOBULIN UR ELPH-MCNC: 2.8 GM/DL
GLUCOSE BLD-MCNC: 231 MG/DL (ref 70–110)
HCT VFR BLD AUTO: 34.8 % (ref 42–52)
HGB BLD-MCNC: 11.5 G/DL (ref 14–18)
IMM GRANULOCYTES # BLD: 0.03 10*3/MM3 (ref 0–0.03)
IMM GRANULOCYTES NFR BLD: 0.4 % (ref 0–0.5)
LYMPHOCYTES # BLD AUTO: 3.07 10*3/MM3 (ref 1–3)
LYMPHOCYTES NFR BLD AUTO: 38.1 % (ref 21–51)
MCH RBC QN AUTO: 30.3 PG (ref 27–33)
MCHC RBC AUTO-ENTMCNC: 33 G/DL (ref 33–37)
MCV RBC AUTO: 91.8 FL (ref 80–94)
MONOCYTES # BLD AUTO: 0.67 10*3/MM3 (ref 0.1–0.9)
MONOCYTES NFR BLD AUTO: 8.3 % (ref 0–10)
NEUTROPHILS # BLD AUTO: 3.95 10*3/MM3 (ref 1.4–6.5)
NEUTROPHILS NFR BLD AUTO: 49.1 % (ref 30–70)
OSMOLALITY SERPL CALC.SUM OF ELEC: 278.9 MOSM/KG (ref 273–305)
PLATELET # BLD AUTO: 242 10*3/MM3 (ref 130–400)
PMV BLD AUTO: 10.6 FL (ref 6–10)
POTASSIUM BLD-SCNC: 4.5 MMOL/L (ref 3.5–5.3)
PROT SERPL-MCNC: 6.8 G/DL (ref 6–8)
RBC # BLD AUTO: 3.79 10*6/MM3 (ref 4.7–6.1)
SODIUM BLD-SCNC: 134 MMOL/L (ref 135–153)
WBC NRBC COR # BLD: 8.05 10*3/MM3 (ref 4.5–12.5)

## 2017-11-15 PROCEDURE — 80053 COMPREHEN METABOLIC PANEL: CPT | Performed by: PODIATRIST

## 2017-11-15 PROCEDURE — 85025 COMPLETE CBC W/AUTO DIFF WBC: CPT | Performed by: PODIATRIST

## 2017-11-15 PROCEDURE — 85652 RBC SED RATE AUTOMATED: CPT | Performed by: PODIATRIST

## 2018-03-29 RX ORDER — DILTIAZEM HYDROCHLORIDE 120 MG/1
CAPSULE, EXTENDED RELEASE ORAL
Qty: 30 CAPSULE | Refills: 0 | Status: SHIPPED | OUTPATIENT
Start: 2018-03-29 | End: 2018-04-23 | Stop reason: SDUPTHER

## 2018-04-11 ENCOUNTER — OFFICE VISIT (OUTPATIENT)
Dept: CARDIOLOGY | Facility: CLINIC | Age: 63
End: 2018-04-11

## 2018-04-11 VITALS
WEIGHT: 211 LBS | BODY MASS INDEX: 29.54 KG/M2 | DIASTOLIC BLOOD PRESSURE: 70 MMHG | HEIGHT: 71 IN | HEART RATE: 67 BPM | RESPIRATION RATE: 16 BRPM | OXYGEN SATURATION: 98 % | SYSTOLIC BLOOD PRESSURE: 119 MMHG

## 2018-04-11 DIAGNOSIS — I10 ESSENTIAL HYPERTENSION: ICD-10-CM

## 2018-04-11 DIAGNOSIS — I25.10 ASCVD (ARTERIOSCLEROTIC CARDIOVASCULAR DISEASE): Primary | ICD-10-CM

## 2018-04-11 DIAGNOSIS — E78.5 DYSLIPIDEMIA: ICD-10-CM

## 2018-04-11 PROCEDURE — 93000 ELECTROCARDIOGRAM COMPLETE: CPT | Performed by: INTERNAL MEDICINE

## 2018-04-11 PROCEDURE — 99213 OFFICE O/P EST LOW 20 MIN: CPT | Performed by: INTERNAL MEDICINE

## 2018-04-13 ENCOUNTER — APPOINTMENT (OUTPATIENT)
Dept: LAB | Facility: HOSPITAL | Age: 63
End: 2018-04-13
Attending: INTERNAL MEDICINE

## 2018-04-13 LAB
ALBUMIN SERPL-MCNC: 4.3 G/DL (ref 3.4–4.8)
ALBUMIN/GLOB SERPL: 1.3 G/DL (ref 1.5–2.5)
ALP SERPL-CCNC: 146 U/L (ref 40–129)
ALT SERPL W P-5'-P-CCNC: 44 U/L (ref 10–44)
ANION GAP SERPL CALCULATED.3IONS-SCNC: 8.5 MMOL/L (ref 3.6–11.2)
AST SERPL-CCNC: 34 U/L (ref 10–34)
BILIRUB SERPL-MCNC: 0.3 MG/DL (ref 0.2–1.8)
BUN BLD-MCNC: 22 MG/DL (ref 7–21)
BUN/CREAT SERPL: 20.4 (ref 7–25)
CALCIUM SPEC-SCNC: 9.4 MG/DL (ref 7.7–10)
CHLORIDE SERPL-SCNC: 104 MMOL/L (ref 99–112)
CHOLEST SERPL-MCNC: 115 MG/DL (ref 0–200)
CO2 SERPL-SCNC: 24.5 MMOL/L (ref 24.3–31.9)
CREAT BLD-MCNC: 1.08 MG/DL (ref 0.43–1.29)
GFR SERPL CREATININE-BSD FRML MDRD: 69 ML/MIN/1.73
GLOBULIN UR ELPH-MCNC: 3.4 GM/DL
GLUCOSE BLD-MCNC: 209 MG/DL (ref 70–110)
HDLC SERPL-MCNC: 28 MG/DL (ref 60–100)
LDLC SERPL CALC-MCNC: 37 MG/DL (ref 0–100)
LDLC/HDLC SERPL: 1.31 {RATIO}
OSMOLALITY SERPL CALC.SUM OF ELEC: 283.3 MOSM/KG (ref 273–305)
POTASSIUM BLD-SCNC: 4.1 MMOL/L (ref 3.5–5.3)
PROT SERPL-MCNC: 7.7 G/DL (ref 6–8)
SODIUM BLD-SCNC: 137 MMOL/L (ref 135–153)
TRIGL SERPL-MCNC: 252 MG/DL (ref 0–150)
VLDLC SERPL-MCNC: 50.4 MG/DL

## 2018-04-13 PROCEDURE — 36415 COLL VENOUS BLD VENIPUNCTURE: CPT | Performed by: INTERNAL MEDICINE

## 2018-04-13 PROCEDURE — 80061 LIPID PANEL: CPT | Performed by: INTERNAL MEDICINE

## 2018-04-13 PROCEDURE — 80053 COMPREHEN METABOLIC PANEL: CPT | Performed by: INTERNAL MEDICINE

## 2018-04-16 ENCOUNTER — RESULTS ENCOUNTER (OUTPATIENT)
Dept: CARDIOLOGY | Facility: CLINIC | Age: 63
End: 2018-04-16

## 2018-04-16 DIAGNOSIS — E78.5 DYSLIPIDEMIA: ICD-10-CM

## 2018-04-16 DIAGNOSIS — I10 ESSENTIAL HYPERTENSION: ICD-10-CM

## 2018-04-30 RX ORDER — DILTIAZEM HYDROCHLORIDE 120 MG/1
CAPSULE, EXTENDED RELEASE ORAL
Qty: 30 CAPSULE | Refills: 0 | Status: SHIPPED | OUTPATIENT
Start: 2018-04-30 | End: 2019-12-09

## 2018-05-14 ENCOUNTER — LAB (OUTPATIENT)
Dept: LAB | Facility: HOSPITAL | Age: 63
End: 2018-05-14

## 2018-05-14 ENCOUNTER — TRANSCRIBE ORDERS (OUTPATIENT)
Dept: LAB | Facility: HOSPITAL | Age: 63
End: 2018-05-14

## 2018-05-14 DIAGNOSIS — G40.909 SEIZURE DISORDER (HCC): ICD-10-CM

## 2018-05-14 DIAGNOSIS — E11.40 TYPE 2 DIABETES MELLITUS WITH DIABETIC NEUROPATHY, UNSPECIFIED LONG TERM INSULIN USE STATUS: Primary | ICD-10-CM

## 2018-05-14 DIAGNOSIS — N42.9 DISORDER OF PROSTATE: ICD-10-CM

## 2018-05-14 DIAGNOSIS — R39.198 SLOWING OF URINARY STREAM: ICD-10-CM

## 2018-05-14 DIAGNOSIS — I10 ESSENTIAL HYPERTENSION: ICD-10-CM

## 2018-05-14 DIAGNOSIS — E78.5 DYSLIPIDEMIA: ICD-10-CM

## 2018-05-14 DIAGNOSIS — E11.40 TYPE 2 DIABETES MELLITUS WITH DIABETIC NEUROPATHY, UNSPECIFIED LONG TERM INSULIN USE STATUS: ICD-10-CM

## 2018-05-14 LAB
ALBUMIN SERPL-MCNC: 4.4 G/DL (ref 3.4–4.8)
ALBUMIN UR-MCNC: 27.6 MG/L
ALP SERPL-CCNC: 130 U/L (ref 40–129)
ALT SERPL W P-5'-P-CCNC: 43 U/L (ref 10–44)
ANION GAP SERPL CALCULATED.3IONS-SCNC: 8 MMOL/L (ref 3.6–11.2)
AST SERPL-CCNC: 32 U/L (ref 10–34)
BILIRUB CONJ SERPL-MCNC: 0.1 MG/DL (ref 0–0.2)
BILIRUB INDIRECT SERPL-MCNC: 0.2 MG/DL
BILIRUB SERPL-MCNC: 0.3 MG/DL (ref 0.2–1.8)
BUN BLD-MCNC: 24 MG/DL (ref 7–21)
BUN/CREAT SERPL: 23.1 (ref 7–25)
CALCIUM SPEC-SCNC: 9.8 MG/DL (ref 7.7–10)
CHLORIDE SERPL-SCNC: 103 MMOL/L (ref 99–112)
CHOLEST SERPL-MCNC: 153 MG/DL (ref 0–200)
CO2 SERPL-SCNC: 25 MMOL/L (ref 24.3–31.9)
CREAT BLD-MCNC: 1.04 MG/DL (ref 0.43–1.29)
CREAT UR-MCNC: 74.2 MG/DL
GFR SERPL CREATININE-BSD FRML MDRD: 72 ML/MIN/1.73
GLUCOSE BLD-MCNC: 249 MG/DL (ref 70–110)
HBA1C MFR BLD: 9.6 % (ref 4.5–5.7)
HDLC SERPL-MCNC: 30 MG/DL (ref 60–100)
LDLC SERPL CALC-MCNC: ABNORMAL MG/DL (ref 0–100)
LDLC/HDLC SERPL: ABNORMAL {RATIO}
MICROALBUMIN/CREAT UR: 37.2 MG/G
OSMOLALITY SERPL CALC.SUM OF ELEC: 284.4 MOSM/KG (ref 273–305)
PHENYTOIN SERPL-MCNC: 9.2 MCG/ML (ref 10–20)
POTASSIUM BLD-SCNC: 4.2 MMOL/L (ref 3.5–5.3)
PROT SERPL-MCNC: 7.7 G/DL (ref 6–8)
SODIUM BLD-SCNC: 136 MMOL/L (ref 135–153)
TRIGL SERPL-MCNC: 501 MG/DL (ref 0–150)
TSH SERPL DL<=0.05 MIU/L-ACNC: 1.46 MIU/ML (ref 0.55–4.78)
VLDLC SERPL-MCNC: ABNORMAL MG/DL

## 2018-05-14 PROCEDURE — 80185 ASSAY OF PHENYTOIN TOTAL: CPT

## 2018-05-14 PROCEDURE — 80076 HEPATIC FUNCTION PANEL: CPT

## 2018-05-14 PROCEDURE — 82043 UR ALBUMIN QUANTITATIVE: CPT

## 2018-05-14 PROCEDURE — 84443 ASSAY THYROID STIM HORMONE: CPT

## 2018-05-14 PROCEDURE — 80061 LIPID PANEL: CPT

## 2018-05-14 PROCEDURE — 84153 ASSAY OF PSA TOTAL: CPT

## 2018-05-14 PROCEDURE — 80048 BASIC METABOLIC PNL TOTAL CA: CPT

## 2018-05-14 PROCEDURE — 84154 ASSAY OF PSA FREE: CPT

## 2018-05-14 PROCEDURE — 82570 ASSAY OF URINE CREATININE: CPT

## 2018-05-14 PROCEDURE — 36415 COLL VENOUS BLD VENIPUNCTURE: CPT

## 2018-05-14 PROCEDURE — 83036 HEMOGLOBIN GLYCOSYLATED A1C: CPT

## 2018-05-15 LAB
PSA FREE MFR SERPL: 33.7 %
PSA FREE SERPL-MCNC: 0.64 NG/ML
PSA SERPL-MCNC: 1.9 NG/ML (ref 0–4)

## 2018-10-15 ENCOUNTER — LAB (OUTPATIENT)
Dept: LAB | Facility: HOSPITAL | Age: 63
End: 2018-10-15

## 2018-10-15 ENCOUNTER — TRANSCRIBE ORDERS (OUTPATIENT)
Dept: ADMINISTRATIVE | Facility: HOSPITAL | Age: 63
End: 2018-10-15

## 2018-10-15 DIAGNOSIS — E11.9 DIABETES MELLITUS WITHOUT COMPLICATION (HCC): Primary | ICD-10-CM

## 2018-10-15 DIAGNOSIS — E11.9 DIABETES MELLITUS WITHOUT COMPLICATION (HCC): ICD-10-CM

## 2018-10-15 DIAGNOSIS — E78.5 HYPERLIPIDEMIA, UNSPECIFIED HYPERLIPIDEMIA TYPE: ICD-10-CM

## 2018-10-15 LAB
ALBUMIN SERPL-MCNC: 4.6 G/DL (ref 3.4–4.8)
ALBUMIN UR-MCNC: 24.6 MG/L
ALP SERPL-CCNC: 133 U/L (ref 40–129)
ALT SERPL W P-5'-P-CCNC: 33 U/L (ref 10–44)
ANION GAP SERPL CALCULATED.3IONS-SCNC: 7.1 MMOL/L (ref 3.6–11.2)
AST SERPL-CCNC: 21 U/L (ref 10–34)
BILIRUB CONJ SERPL-MCNC: 0.1 MG/DL (ref 0–0.2)
BILIRUB INDIRECT SERPL-MCNC: 0.2 MG/DL
BILIRUB SERPL-MCNC: 0.3 MG/DL (ref 0.2–1.8)
BUN BLD-MCNC: 20 MG/DL (ref 7–21)
BUN/CREAT SERPL: 18.2 (ref 7–25)
CALCIUM SPEC-SCNC: 9.7 MG/DL (ref 7.7–10)
CHLORIDE SERPL-SCNC: 99 MMOL/L (ref 99–112)
CHOLEST SERPL-MCNC: 142 MG/DL (ref 0–200)
CO2 SERPL-SCNC: 27.9 MMOL/L (ref 24.3–31.9)
CREAT BLD-MCNC: 1.1 MG/DL (ref 0.43–1.29)
CREAT UR-MCNC: 58.2 MG/DL
GFR SERPL CREATININE-BSD FRML MDRD: 67 ML/MIN/1.73
GLUCOSE BLD-MCNC: 306 MG/DL (ref 70–110)
HBA1C MFR BLD: 9.1 % (ref 4.5–5.7)
HDLC SERPL-MCNC: 39 MG/DL (ref 60–100)
LDLC SERPL CALC-MCNC: 44 MG/DL (ref 0–100)
LDLC/HDLC SERPL: 1.14 {RATIO}
MICROALBUMIN/CREAT UR: 42.3 MG/G
OSMOLALITY SERPL CALC.SUM OF ELEC: 282.4 MOSM/KG (ref 273–305)
POTASSIUM BLD-SCNC: 4.4 MMOL/L (ref 3.5–5.3)
PROT SERPL-MCNC: 7.8 G/DL (ref 6–8)
SODIUM BLD-SCNC: 134 MMOL/L (ref 135–153)
TRIGL SERPL-MCNC: 293 MG/DL (ref 0–150)
TSH SERPL DL<=0.05 MIU/L-ACNC: 1.71 MIU/ML (ref 0.55–4.78)
VLDLC SERPL-MCNC: 58.6 MG/DL

## 2018-10-15 PROCEDURE — 84443 ASSAY THYROID STIM HORMONE: CPT

## 2018-10-15 PROCEDURE — 80048 BASIC METABOLIC PNL TOTAL CA: CPT

## 2018-10-15 PROCEDURE — 80061 LIPID PANEL: CPT

## 2018-10-15 PROCEDURE — 82043 UR ALBUMIN QUANTITATIVE: CPT

## 2018-10-15 PROCEDURE — 80076 HEPATIC FUNCTION PANEL: CPT

## 2018-10-15 PROCEDURE — 36415 COLL VENOUS BLD VENIPUNCTURE: CPT

## 2018-10-15 PROCEDURE — 82570 ASSAY OF URINE CREATININE: CPT

## 2018-10-15 PROCEDURE — 83036 HEMOGLOBIN GLYCOSYLATED A1C: CPT

## 2018-11-06 ENCOUNTER — OFFICE VISIT (OUTPATIENT)
Dept: CARDIOLOGY | Facility: CLINIC | Age: 63
End: 2018-11-06

## 2018-11-06 VITALS
OXYGEN SATURATION: 98 % | SYSTOLIC BLOOD PRESSURE: 154 MMHG | HEART RATE: 62 BPM | BODY MASS INDEX: 30.38 KG/M2 | WEIGHT: 217 LBS | HEIGHT: 71 IN | DIASTOLIC BLOOD PRESSURE: 74 MMHG

## 2018-11-06 DIAGNOSIS — E78.5 DYSLIPIDEMIA: ICD-10-CM

## 2018-11-06 DIAGNOSIS — I25.10 ASCVD (ARTERIOSCLEROTIC CARDIOVASCULAR DISEASE): Primary | ICD-10-CM

## 2018-11-06 DIAGNOSIS — I10 ESSENTIAL HYPERTENSION: ICD-10-CM

## 2018-11-06 DIAGNOSIS — IMO0001 IDDM (INSULIN DEPENDENT DIABETES MELLITUS): ICD-10-CM

## 2018-11-06 DIAGNOSIS — Z95.1 S/P CABG X 3: ICD-10-CM

## 2018-11-06 PROCEDURE — 93000 ELECTROCARDIOGRAM COMPLETE: CPT | Performed by: PHYSICIAN ASSISTANT

## 2018-11-06 PROCEDURE — 99213 OFFICE O/P EST LOW 20 MIN: CPT | Performed by: PHYSICIAN ASSISTANT

## 2018-11-06 RX ORDER — HUMAN INSULIN 100 [USP'U]/ML
INJECTION, SUSPENSION SUBCUTANEOUS
COMMUNITY
Start: 2018-10-17 | End: 2020-08-13 | Stop reason: SDUPTHER

## 2018-11-06 RX ORDER — FLUTICASONE PROPIONATE 50 MCG
SPRAY, SUSPENSION (ML) NASAL
COMMUNITY
Start: 2018-10-17 | End: 2020-08-13

## 2018-11-06 RX ORDER — PIOGLITAZONEHYDROCHLORIDE 30 MG/1
TABLET ORAL
COMMUNITY
Start: 2018-10-17 | End: 2021-03-05 | Stop reason: ALTCHOICE

## 2018-11-06 RX ORDER — GABAPENTIN 600 MG/1
TABLET ORAL
COMMUNITY
Start: 2018-10-19 | End: 2020-08-13 | Stop reason: ALTCHOICE

## 2018-11-06 NOTE — PROGRESS NOTES
Analilia Castellon MD  Christo Clifton  1955 11/06/2018    Patient Active Problem List   Diagnosis   • IDDM (insulin dependent diabetes mellitus) (CMS/[facility])   • ASCVD (arteriosclerotic cardiovascular disease)   • S/P CABG x 3   • Essential hypertension   • Dyslipidemia   • Skin ulcer (CMS/HCC)   • Post-operative state   • Preop cardiovascular exam       Dear Analilia Castellon MD:    Subjective       History of Present Illness:    Chief Complaint   Patient presents with   • ASCVD     Follow up   • Med Management       Christo Clifton is a pleasant 63 y.o. male with a past medical history significant for atherosclerotic cardiovascular disease status post 3 vessel coronary artery bypass grafting in 2010, hypertension, dyslipidemia, and insulin-dependent diabetes mellitus. Patient comes in for routine cardiology follow up.     He denies any symptoms such as chest pains, shortness of breath, palpitations, weakness, fatigue, dizziness, or syncope. His only compliant is some chronic neck pain.       Allergies   Allergen Reactions   • Other    • Gabapentin Confusion   :      Current Outpatient Prescriptions:   •  aspirin 81 MG EC tablet, Take 81 mg by mouth Every Night., Disp: , Rfl:   •  atorvastatin (LIPITOR) 80 MG tablet, Take 1 tablet by mouth daily. (Patient taking differently: Take 80 mg by mouth Every Night.), Disp: 90 tablet, Rfl: 3  •  diltiaZEM CD (CARTIA XT) 180 MG 24 hr capsule, Take 180 mg by mouth Daily., Disp: , Rfl:   •  escitalopram (LEXAPRO) 10 MG tablet, Take 10 mg by mouth daily., Disp: , Rfl:   •  fluticasone (FLONASE) 50 MCG/ACT nasal spray, , Disp: , Rfl:   •  gabapentin (NEURONTIN) 600 MG tablet, , Disp: , Rfl:   •  ibuprofen (ADVIL,MOTRIN) 800 MG tablet, Take 1 tablet by mouth Every 6 (Six) Hours As Needed for Mild Pain ., Disp: 60 tablet, Rfl: 0  •  levothyroxine (SYNTHROID, LEVOTHROID) 50 MCG tablet, Take 50 mcg by mouth daily., Disp: , Rfl:   •  lisinopril (PRINIVIL,ZESTRIL) 40 MG tablet, Take 40 mg  by mouth daily., Disp: , Rfl:   •  metoprolol tartrate (LOPRESSOR) 25 MG tablet, Take 25 mg by mouth 2 (Two) Times a Day., Disp: , Rfl:   •  NOVOLIN 70/30 (70-30) 100 UNIT/ML injection, , Disp: , Rfl:   •  OMEGA-3 KRILL OIL PO, Take 350 mg by mouth Daily., Disp: , Rfl:   •  phenytoin (DILANTIN) 100 MG ER capsule, Take 300 mg by mouth 2 (Two) Times a Day., Disp: , Rfl:   •  pioglitazone (ACTOS) 30 MG tablet, , Disp: , Rfl:   •  CARTIA  MG 24 hr capsule, TAKE 1 CAPSULE EVERY DAY, Disp: 30 capsule, Rfl: 0  •  clindamycin (CLEOCIN) 300 MG capsule, Take 1 capsule by mouth 3 (Three) Times a Day., Disp: 30 capsule, Rfl: 2  •  HYDROcodone-acetaminophen (NORCO)  MG per tablet, Take 1 tablet by mouth Every 6 (Six) Hours As Needed for Moderate Pain ., Disp: 45 tablet, Rfl: 0  •  insulin aspart prot-insulin aspart (novoLOG 70/30) (70-30) 100 UNIT/ML injection, Inject 45 Units under the skin Every Night., Disp: , Rfl:   •  insulin aspart protamine-insulin aspart (NovoLOG 70/30) (70-30) 100 UNIT/ML injection, Inject 60 Units under the skin Every Morning., Disp: , Rfl:   •  vitamin C (ASCORBIC ACID) 500 MG tablet, Take 1 tablet by mouth Daily., Disp: 60 tablet, Rfl: 1      The following portions of the patient's history were reviewed and updated as appropriate: allergies, current medications, past family history, past medical history, past social history, past surgical history and problem list.    Social History   Substance Use Topics   • Smoking status: Former Smoker     Packs/day: 2.00     Years: 45.00     Types: Cigarettes     Quit date: 2010   • Smokeless tobacco: Never Used   • Alcohol use No       Review of Systems   Constitution: Negative for weakness and malaise/fatigue.   Cardiovascular: Positive for dyspnea on exertion. Negative for chest pain and irregular heartbeat.   Respiratory: Negative for cough and shortness of breath.    Hematologic/Lymphatic: Negative for bleeding problem. Does not bruise/bleed  "easily.   Gastrointestinal: Negative for nausea and vomiting.       Objective   Vitals:    11/06/18 1405   BP: 154/74   BP Location: Left arm   Patient Position: Sitting   Cuff Size: Adult   Pulse: 62   SpO2: 98%   Weight: 98.4 kg (217 lb)   Height: 180.3 cm (71\")     Body mass index is 30.27 kg/m².        Physical Exam   Constitutional: He is oriented to person, place, and time. He appears well-developed and well-nourished. No distress.   HENT:   Head: Normocephalic and atraumatic.   Cardiovascular: Normal rate, regular rhythm, normal heart sounds and intact distal pulses.    Pulmonary/Chest: Effort normal and breath sounds normal. No respiratory distress.   Musculoskeletal: He exhibits no edema.   Neurological: He is alert and oriented to person, place, and time.   Skin: He is not diaphoretic.       Lab Results   Component Value Date     (L) 10/15/2018    K 4.4 10/15/2018    CL 99 10/15/2018    CO2 27.9 10/15/2018    BUN 20 10/15/2018    CREATININE 1.10 10/15/2018    GLUCOSE 306 (H) 10/15/2018    CALCIUM 9.7 10/15/2018    AST 21 10/15/2018    ALT 33 10/15/2018    ALKPHOS 133 (H) 10/15/2018    LABIL2 1.1 (L) 12/21/2015     No results found for: CKTOTAL  Lab Results   Component Value Date    WBC 8.05 11/15/2017    HGB 11.5 (L) 11/15/2017    HCT 34.8 (L) 11/15/2017     11/15/2017     No results found for: INR  No results found for: MG  Lab Results   Component Value Date    TSH 1.712 10/15/2018    PSA 1.9 05/14/2018    CHLPL 169 04/16/2015    TRIG 293 (H) 10/15/2018    HDL 39 (L) 10/15/2018    LDL 44 10/15/2018      No results found for: BNP    During this visit the following were done:  Labs Reviewed [x]    Labs Ordered []    Radiology Reports Reviewed [x]    Radiology Ordered []    PCP Records Reviewed []    Referring Provider Records Reviewed []    ER Records Reviewed []    Hospital Records Reviewed []    History Obtained From Family []    Radiology Images Reviewed []    Other Reviewed []    Records " Requested []         ECG 12 Lead  Date/Time: 11/6/2018 2:07 PM  Performed by: Tan Worthington PA-C  Authorized by: Tan Worthington PA-C   Rhythm: sinus rhythm  Conduction: conduction normal  ST Segments: ST segments normal  T Waves: T waves normal  Other findings: PRWP  Clinical impression: normal ECG  Comments:                Assessment/Plan    Diagnosis Plan   1. ASCVD (arteriosclerotic cardiovascular disease)     2. Essential hypertension     3. IDDM (insulin dependent diabetes mellitus) (CMS/[facility])     4. Dyslipidemia     5. S/P CABG x 3                  Recommendations:  1. Since patient's hemoglobin A1c is >9 I would recommend for him to be placed on a SGLT-2 inhibitor since he also has a history of ASCVD.   2. Continue current regimen, follow up in 6 months.     Patient's Body mass index is 30.27 kg/m². BMI is above normal parameters. Recommendations include: educational material.       Return in about 6 months (around 5/6/2019).    As always, I appreciate very much the opportunity to participate in the cardiovascular care of your patients.      With Best Regards,    NIKITA Razo disclaimer:  Much of this encounter note is an electronic transcription/translation of spoken language to printed text. The electronic translation of spoken language may permit erroneous, or at times, nonsensical words or phrases to be inadvertently transcribed; Although I have reviewed the note for such errors, some may still exist.

## 2019-05-07 ENCOUNTER — OFFICE VISIT (OUTPATIENT)
Dept: CARDIOLOGY | Facility: CLINIC | Age: 64
End: 2019-05-07

## 2019-05-07 VITALS
DIASTOLIC BLOOD PRESSURE: 83 MMHG | HEART RATE: 65 BPM | SYSTOLIC BLOOD PRESSURE: 130 MMHG | WEIGHT: 218 LBS | BODY MASS INDEX: 30.4 KG/M2

## 2019-05-07 DIAGNOSIS — E78.5 DYSLIPIDEMIA: ICD-10-CM

## 2019-05-07 DIAGNOSIS — I25.10 ASCVD (ARTERIOSCLEROTIC CARDIOVASCULAR DISEASE): Primary | ICD-10-CM

## 2019-05-07 DIAGNOSIS — I10 ESSENTIAL HYPERTENSION: ICD-10-CM

## 2019-05-07 DIAGNOSIS — Z95.1 S/P CABG X 3: ICD-10-CM

## 2019-05-07 PROCEDURE — 99213 OFFICE O/P EST LOW 20 MIN: CPT | Performed by: PHYSICIAN ASSISTANT

## 2019-05-07 NOTE — PROGRESS NOTES
Analilia Castellon MD  Christo Clifton  1955 05/07/2019    Patient Active Problem List   Diagnosis   • IDDM (insulin dependent diabetes mellitus) (CMS/[facility])   • ASCVD (arteriosclerotic cardiovascular disease)   • S/P CABG x 3   • Essential hypertension   • Dyslipidemia   • Skin ulcer (CMS/HCC)   • Post-operative state   • Preop cardiovascular exam       Dear Analilia Castellon MD:    Subjective     History of Present Illness:    Chief Complaint: Follow up on hypertension.    Christo Clifton is a pleasant 64 y.o. male with a past medical history significant for ASCVD S/P remote CABG, essential hypertension, dyslipidemia, and diabetes mellitus. He comes in today for routine cardilogy follow up.    Patient reports he has been doing well since he was last seen. Patient denies any chest pain, shortness of breath, palpitations, dizziness, syncope or near syncope.     Allergies   Allergen Reactions   • Other    • Gabapentin Confusion   :      Current Outpatient Medications:   •  aspirin 81 MG EC tablet, Take 81 mg by mouth Every Night., Disp: , Rfl:   •  atorvastatin (LIPITOR) 80 MG tablet, Take 1 tablet by mouth daily. (Patient taking differently: Take 80 mg by mouth Every Night.), Disp: 90 tablet, Rfl: 3  •  CARTIA  MG 24 hr capsule, TAKE 1 CAPSULE EVERY DAY, Disp: 30 capsule, Rfl: 0  •  clindamycin (CLEOCIN) 300 MG capsule, Take 1 capsule by mouth 3 (Three) Times a Day., Disp: 30 capsule, Rfl: 2  •  diltiaZEM CD (CARTIA XT) 180 MG 24 hr capsule, Take 180 mg by mouth Daily., Disp: , Rfl:   •  escitalopram (LEXAPRO) 10 MG tablet, Take 10 mg by mouth daily., Disp: , Rfl:   •  fluticasone (FLONASE) 50 MCG/ACT nasal spray, , Disp: , Rfl:   •  gabapentin (NEURONTIN) 600 MG tablet, , Disp: , Rfl:   •  HYDROcodone-acetaminophen (NORCO)  MG per tablet, Take 1 tablet by mouth Every 6 (Six) Hours As Needed for Moderate Pain ., Disp: 45 tablet, Rfl: 0  •  ibuprofen (ADVIL,MOTRIN) 800 MG tablet, Take 1 tablet by mouth Every 6  (Six) Hours As Needed for Mild Pain ., Disp: 60 tablet, Rfl: 0  •  insulin aspart prot-insulin aspart (novoLOG 70/30) (70-30) 100 UNIT/ML injection, Inject 45 Units under the skin Every Night., Disp: , Rfl:   •  insulin aspart protamine-insulin aspart (NovoLOG 70/30) (70-30) 100 UNIT/ML injection, Inject 60 Units under the skin Every Morning., Disp: , Rfl:   •  levothyroxine (SYNTHROID, LEVOTHROID) 50 MCG tablet, Take 50 mcg by mouth daily., Disp: , Rfl:   •  lisinopril (PRINIVIL,ZESTRIL) 40 MG tablet, Take 40 mg by mouth daily., Disp: , Rfl:   •  metoprolol tartrate (LOPRESSOR) 25 MG tablet, Take 25 mg by mouth 2 (Two) Times a Day., Disp: , Rfl:   •  NOVOLIN 70/30 (70-30) 100 UNIT/ML injection, , Disp: , Rfl:   •  OMEGA-3 KRILL OIL PO, Take 350 mg by mouth Daily., Disp: , Rfl:   •  phenytoin (DILANTIN) 100 MG ER capsule, Take 300 mg by mouth 2 (Two) Times a Day., Disp: , Rfl:   •  pioglitazone (ACTOS) 30 MG tablet, , Disp: , Rfl:   •  vitamin C (ASCORBIC ACID) 500 MG tablet, Take 1 tablet by mouth Daily., Disp: 60 tablet, Rfl: 1    The following portions of the patient's history were reviewed and updated as appropriate: allergies, current medications, past family history, past medical history, past social history, past surgical history and problem list.    Social History     Tobacco Use   • Smoking status: Former Smoker     Packs/day: 2.00     Years: 45.00     Pack years: 90.00     Types: Cigarettes     Last attempt to quit: 2010     Years since quittin.3   • Smokeless tobacco: Never Used   Substance Use Topics   • Alcohol use: No   • Drug use: No       Review of Systems   Constitution: Negative for weakness and malaise/fatigue.   Cardiovascular: Negative for chest pain, dyspnea on exertion and irregular heartbeat.   Respiratory: Negative for cough and shortness of breath.    Hematologic/Lymphatic: Negative for bleeding problem. Does not bruise/bleed easily.   Gastrointestinal: Negative for nausea and vomiting.        Objective   Vitals:    05/07/19 1523   BP: 130/83   Pulse: 65   Weight: 98.9 kg (218 lb)     Body mass index is 30.4 kg/m².    Physical Exam   Constitutional: He is oriented to person, place, and time. He appears well-developed and well-nourished. No distress.   HENT:   Head: Normocephalic and atraumatic.   Cardiovascular: Normal rate, regular rhythm, normal heart sounds and intact distal pulses.   Pulmonary/Chest: Effort normal. No respiratory distress. He has wheezes.   Musculoskeletal: He exhibits no edema.   Neurological: He is alert and oriented to person, place, and time.   Skin: He is not diaphoretic.       Lab Results   Component Value Date     (L) 10/15/2018    K 4.4 10/15/2018    CL 99 10/15/2018    CO2 27.9 10/15/2018    BUN 20 10/15/2018    CREATININE 1.10 10/15/2018    GLUCOSE 306 (H) 10/15/2018    CALCIUM 9.7 10/15/2018    AST 21 10/15/2018    ALT 33 10/15/2018    ALKPHOS 133 (H) 10/15/2018    LABIL2 1.1 (L) 12/21/2015     No results found for: CKTOTAL  Lab Results   Component Value Date    WBC 8.05 11/15/2017    HGB 11.5 (L) 11/15/2017    HCT 34.8 (L) 11/15/2017     11/15/2017     No results found for: INR  No results found for: MG  Lab Results   Component Value Date    TSH 1.712 10/15/2018    PSA 1.9 05/14/2018    CHLPL 169 04/16/2015    TRIG 293 (H) 10/15/2018    HDL 39 (L) 10/15/2018    LDL 44 10/15/2018      No results found for: BNP    During this visit the following were done:  Labs Reviewed [x]    Labs Ordered []    Radiology Reports Reviewed [x]    Radiology Ordered []    PCP Records Reviewed []    Referring Provider Records Reviewed []    ER Records Reviewed []    Hospital Records Reviewed []    History Obtained From Family []    Radiology Images Reviewed []    Other Reviewed []    Records Requested []       Procedures    Assessment/Plan    Diagnosis Plan   1. ASCVD (arteriosclerotic cardiovascular disease)  Lipid Panel    Comprehensive Metabolic Panel   2. Essential  hypertension     3. S/P CABG x 3     4. Dyslipidemia              Recommendations:  1. Patient appears to be doing well from cardiac standpoint he is asymptomatic with good blood pressure control today.  He admits that he never checks his blood pressure which I did encourage him to start doing.  Since it has been several months since he is at last had cholesterol and CMP checked I will order this.    Return in about 6 months (around 11/7/2019).    As always, I appreciate very much the opportunity to participate in the cardiovascular care of your patients.      With Best Regards,    Tan Worthington PA-C

## 2019-05-12 ENCOUNTER — RESULTS ENCOUNTER (OUTPATIENT)
Dept: CARDIOLOGY | Facility: CLINIC | Age: 64
End: 2019-05-12

## 2019-05-12 DIAGNOSIS — I25.10 ASCVD (ARTERIOSCLEROTIC CARDIOVASCULAR DISEASE): ICD-10-CM

## 2019-09-18 ENCOUNTER — HOSPITAL ENCOUNTER (OUTPATIENT)
Dept: GENERAL RADIOLOGY | Facility: HOSPITAL | Age: 64
Discharge: HOME | End: 2019-09-18
Admitting: PSYCHIATRY & NEUROLOGY

## 2019-09-18 ENCOUNTER — TRANSCRIBE ORDERS (OUTPATIENT)
Dept: ADMINISTRATIVE | Facility: HOSPITAL | Age: 64
End: 2019-09-18

## 2019-09-18 ENCOUNTER — HOSPITAL ENCOUNTER (OUTPATIENT)
Dept: GENERAL RADIOLOGY | Facility: HOSPITAL | Age: 64
Discharge: HOME | End: 2019-09-18

## 2019-09-18 DIAGNOSIS — R20.0 NUMBNESS: Primary | ICD-10-CM

## 2019-09-18 PROCEDURE — 72040 X-RAY EXAM NECK SPINE 2-3 VW: CPT

## 2019-09-18 PROCEDURE — 72050 X-RAY EXAM NECK SPINE 4/5VWS: CPT

## 2019-09-18 PROCEDURE — 72110 X-RAY EXAM L-2 SPINE 4/>VWS: CPT | Performed by: RADIOLOGY

## 2019-09-18 PROCEDURE — 72052 X-RAY EXAM NECK SPINE 6/>VWS: CPT | Performed by: RADIOLOGY

## 2019-09-18 PROCEDURE — 72110 X-RAY EXAM L-2 SPINE 4/>VWS: CPT

## 2019-12-09 ENCOUNTER — OFFICE VISIT (OUTPATIENT)
Dept: CARDIOLOGY | Facility: CLINIC | Age: 64
End: 2019-12-09

## 2019-12-09 VITALS
HEART RATE: 43 BPM | OXYGEN SATURATION: 98 % | HEIGHT: 68 IN | DIASTOLIC BLOOD PRESSURE: 60 MMHG | SYSTOLIC BLOOD PRESSURE: 122 MMHG | BODY MASS INDEX: 34.71 KG/M2 | WEIGHT: 229 LBS

## 2019-12-09 DIAGNOSIS — I10 ESSENTIAL HYPERTENSION: ICD-10-CM

## 2019-12-09 DIAGNOSIS — R00.1 BRADYCARDIA, DRUG INDUCED: Primary | ICD-10-CM

## 2019-12-09 DIAGNOSIS — R00.2 PALPITATIONS: ICD-10-CM

## 2019-12-09 DIAGNOSIS — I25.10 ASCVD (ARTERIOSCLEROTIC CARDIOVASCULAR DISEASE): ICD-10-CM

## 2019-12-09 DIAGNOSIS — T50.905A BRADYCARDIA, DRUG INDUCED: Primary | ICD-10-CM

## 2019-12-09 DIAGNOSIS — Z95.1 S/P CABG X 3: ICD-10-CM

## 2019-12-09 PROCEDURE — 93000 ELECTROCARDIOGRAM COMPLETE: CPT | Performed by: PHYSICIAN ASSISTANT

## 2019-12-09 PROCEDURE — 99213 OFFICE O/P EST LOW 20 MIN: CPT | Performed by: PHYSICIAN ASSISTANT

## 2019-12-09 NOTE — PROGRESS NOTES
Analilia Castellon MD  Christo Clifton  1955 12/09/2019    Patient Active Problem List   Diagnosis   • IDDM (insulin dependent diabetes mellitus) (CMS/HCC)   • ASCVD (arteriosclerotic cardiovascular disease)   • S/P CABG x 3   • Essential hypertension   • Dyslipidemia   • Skin ulcer (CMS/HCC)   • Post-operative state   • Preop cardiovascular exam       Dear Analilia Castellon MD:    Subjective     History of Present Illness:    Chief Complaint   Patient presents with   • ASCVD     6 MON F/U       Christo Clifton is a pleasant 64 y.o. male with a past medical history significant for ASCVD S/P remote CABG, essential hypertension, dyslipidemia, and diabetes mellitus. He comes in today for routine cardilogy follow up.     Patient reports he has been significantly fatigued and weak over the last month or so.  He reports he will sleep close to 16 hours a day and still have no energy.  Vital signs today shows a heart rate of 43 bpm he also had a EKG performed that showed a heart rate of 41 with the patient in sinus rhythm.  He is taking both 180 mg of diltiazem as well as 25 mg of metoprolol tartrate for unclear reason at this time.  He does deny ever being told he has atrial fibrillation or SVT.  Other than his weakness and fatigue he does deny any chest pains, worsening shortness of breath from his baseline, palpitations, or syncope.    Allergies   Allergen Reactions   • Other    • Gabapentin Confusion   :      Current Outpatient Medications:   •  atorvastatin (LIPITOR) 80 MG tablet, Take 1 tablet by mouth daily. (Patient taking differently: Take 80 mg by mouth Every Night.), Disp: 90 tablet, Rfl: 3  •  escitalopram (LEXAPRO) 10 MG tablet, Take 10 mg by mouth daily., Disp: , Rfl:   •  insulin aspart prot-insulin aspart (novoLOG 70/30) (70-30) 100 UNIT/ML injection, Inject 45 Units under the skin Every Night., Disp: , Rfl:   •  insulin aspart protamine-insulin aspart (NovoLOG 70/30) (70-30) 100 UNIT/ML injection, Inject 60  Units under the skin Every Morning., Disp: , Rfl:   •  levothyroxine (SYNTHROID, LEVOTHROID) 50 MCG tablet, Take 50 mcg by mouth daily., Disp: , Rfl:   •  lisinopril (PRINIVIL,ZESTRIL) 40 MG tablet, Take 40 mg by mouth daily., Disp: , Rfl:   •  metoprolol tartrate (LOPRESSOR) 25 MG tablet, Take 25 mg by mouth 2 (Two) Times a Day., Disp: , Rfl:   •  aspirin 81 MG EC tablet, Take 81 mg by mouth Every Night., Disp: , Rfl:   •  clindamycin (CLEOCIN) 300 MG capsule, Take 1 capsule by mouth 3 (Three) Times a Day., Disp: 30 capsule, Rfl: 2  •  fluticasone (FLONASE) 50 MCG/ACT nasal spray, , Disp: , Rfl:   •  gabapentin (NEURONTIN) 600 MG tablet, , Disp: , Rfl:   •  HYDROcodone-acetaminophen (NORCO)  MG per tablet, Take 1 tablet by mouth Every 6 (Six) Hours As Needed for Moderate Pain ., Disp: 45 tablet, Rfl: 0  •  ibuprofen (ADVIL,MOTRIN) 800 MG tablet, Take 1 tablet by mouth Every 6 (Six) Hours As Needed for Mild Pain ., Disp: 60 tablet, Rfl: 0  •  NOVOLIN 70/30 (70-30) 100 UNIT/ML injection, , Disp: , Rfl:   •  OMEGA-3 KRILL OIL PO, Take 350 mg by mouth Daily., Disp: , Rfl:   •  phenytoin (DILANTIN) 100 MG ER capsule, Take 300 mg by mouth 2 (Two) Times a Day., Disp: , Rfl:   •  pioglitazone (ACTOS) 30 MG tablet, , Disp: , Rfl:   •  vitamin C (ASCORBIC ACID) 500 MG tablet, Take 1 tablet by mouth Daily., Disp: 60 tablet, Rfl: 1    The following portions of the patient's history were reviewed and updated as appropriate: allergies, current medications, past family history, past medical history, past social history, past surgical history and problem list.    Social History     Tobacco Use   • Smoking status: Former Smoker     Packs/day: 2.00     Years: 45.00     Pack years: 90.00     Types: Cigarettes     Last attempt to quit: 2010     Years since quittin.9   • Smokeless tobacco: Never Used   Substance Use Topics   • Alcohol use: No   • Drug use: No       Review of Systems   Constitution: Positive for  "malaise/fatigue.   Cardiovascular: Negative for chest pain, dyspnea on exertion and irregular heartbeat.   Respiratory: Negative for cough and shortness of breath.    Hematologic/Lymphatic: Negative for bleeding problem. Does not bruise/bleed easily.   Gastrointestinal: Negative for nausea and vomiting.   Neurological: Positive for dizziness. Negative for weakness.       Objective   Vitals:    12/09/19 1236   BP: 122/60   Pulse: (!) 43   SpO2: 98%   Weight: 104 kg (229 lb)   Height: 172.7 cm (68\")     Body mass index is 34.82 kg/m².    Physical Exam   Constitutional: He is oriented to person, place, and time. He appears well-developed and well-nourished. No distress.   HENT:   Head: Normocephalic and atraumatic.   Cardiovascular: Regular rhythm and normal heart sounds. Bradycardia present.   Pulmonary/Chest: Effort normal and breath sounds normal. No respiratory distress.   Musculoskeletal: He exhibits no edema.   Neurological: He is alert and oriented to person, place, and time.   Skin: He is not diaphoretic.       Lab Results   Component Value Date     (L) 10/15/2018    K 4.4 10/15/2018    CL 99 10/15/2018    CO2 27.9 10/15/2018    BUN 20 10/15/2018    CREATININE 1.10 10/15/2018    GLUCOSE 306 (H) 10/15/2018    CALCIUM 9.7 10/15/2018    AST 21 10/15/2018    ALT 33 10/15/2018    ALKPHOS 133 (H) 10/15/2018    LABIL2 1.1 (L) 12/21/2015     No results found for: CKTOTAL  Lab Results   Component Value Date    WBC 8.05 11/15/2017    HGB 11.5 (L) 11/15/2017    HCT 34.8 (L) 11/15/2017     11/15/2017     No results found for: INR  No results found for: MG  Lab Results   Component Value Date    TSH 1.712 10/15/2018    PSA 1.9 05/14/2018    CHLPL 169 04/16/2015    TRIG 293 (H) 10/15/2018    HDL 39 (L) 10/15/2018    LDL 44 10/15/2018      No results found for: BNP    During this visit the following were done:  Labs Reviewed [x]    Labs Ordered []    Radiology Reports Reviewed [x]    Radiology Ordered []    PCP " Records Reviewed []    Referring Provider Records Reviewed []    ER Records Reviewed []    Hospital Records Reviewed []    History Obtained From Family []    Radiology Images Reviewed []    Other Reviewed []    Records Requested []         ECG 12 Lead  Date/Time: 12/9/2019 12:41 PM  Performed by: Tan Worthington PA-C  Authorized by: Tan Worthington PA-C   Comparison: compared with previous ECG   Similar to previous ECG  Rhythm: sinus bradycardia  Conduction: conduction normal  T inversion: V1    Clinical impression: non-specific ECG            Assessment/Plan    Diagnosis Plan   1. Bradycardia, drug induced     2. ASCVD (arteriosclerotic cardiovascular disease)     3. Essential hypertension     4. S/P CABG x 3     5. Palpitations  Holter Monitor - 48 Hour            Recommendations:  1. For patient's severe bradycardia today I will stop his diltiazem and asked him to hold the second dose of metoprolol tartrate today.  I am also going to be ordering a 48-hour Holter monitor.  2. If his bradycardia persist may have to stop metoprolol tartrate altogether however given his history of CAD prefer him to stay on this if possible.  3. Continue lisinopril, atorvastatin, and aspirin.    Return in about 4 weeks (around 1/6/2020).    As always, I appreciate very much the opportunity to participate in the cardiovascular care of your patients.      With Best Regards,    Tan Worthington PA-C

## 2019-12-19 ENCOUNTER — HOSPITAL ENCOUNTER (OUTPATIENT)
Dept: RESPIRATORY THERAPY | Facility: HOSPITAL | Age: 64
Discharge: HOME | End: 2019-12-19
Admitting: PHYSICIAN ASSISTANT

## 2019-12-19 DIAGNOSIS — R00.2 PALPITATIONS: ICD-10-CM

## 2019-12-19 PROCEDURE — 93226 XTRNL ECG REC<48 HR SCAN A/R: CPT

## 2019-12-19 PROCEDURE — 93225 XTRNL ECG REC<48 HRS REC: CPT

## 2019-12-24 PROCEDURE — 93227 XTRNL ECG REC<48 HR R&I: CPT | Performed by: INTERNAL MEDICINE

## 2019-12-30 ENCOUNTER — OFFICE VISIT (OUTPATIENT)
Dept: CARDIOLOGY | Facility: CLINIC | Age: 64
End: 2019-12-30

## 2019-12-30 VITALS
HEIGHT: 71 IN | SYSTOLIC BLOOD PRESSURE: 92 MMHG | HEART RATE: 53 BPM | RESPIRATION RATE: 16 BRPM | BODY MASS INDEX: 31.58 KG/M2 | DIASTOLIC BLOOD PRESSURE: 53 MMHG | WEIGHT: 225.6 LBS

## 2019-12-30 DIAGNOSIS — E78.5 DYSLIPIDEMIA: ICD-10-CM

## 2019-12-30 DIAGNOSIS — I25.10 ASCVD (ARTERIOSCLEROTIC CARDIOVASCULAR DISEASE): Primary | ICD-10-CM

## 2019-12-30 DIAGNOSIS — I10 ESSENTIAL HYPERTENSION: ICD-10-CM

## 2019-12-30 PROCEDURE — 99214 OFFICE O/P EST MOD 30 MIN: CPT | Performed by: PHYSICIAN ASSISTANT

## 2019-12-30 RX ORDER — LEVETIRACETAM 1000 MG/1
1000 TABLET ORAL 2 TIMES DAILY
COMMUNITY

## 2019-12-30 RX ORDER — ATORVASTATIN CALCIUM 40 MG/1
40 TABLET, FILM COATED ORAL NIGHTLY
Qty: 90 TABLET | Refills: 3 | Status: SHIPPED | OUTPATIENT
Start: 2019-12-30

## 2019-12-30 NOTE — PROGRESS NOTES
Analilia Castellon MD  Christo Clifton  1955 12/30/2019    Patient Active Problem List   Diagnosis   • IDDM (insulin dependent diabetes mellitus) (CMS/[facility])   • ASCVD (arteriosclerotic cardiovascular disease)   • S/P CABG x 3   • Essential hypertension   • Dyslipidemia   • Skin ulcer (CMS/HCC)   • Post-operative state   • Preop cardiovascular exam       Dear Analilia Castellon MD:    Subjective     History of Present Illness:    Chief Complaint   Patient presents with   • Palpitations     48hr holter findings   • Shortness of Breath     mild exertion   • Med Management     list provided       Christo Clifton is a pleasant 64 y.o. male with a past medical history significant for ASCVD S/P remote CABG, essential hypertension, dyslipidemia, and diabetes mellitus. He comes in today for routine cardilogy follow up.    Patient reports that he is doing better since I stopped his diltizem, however, he does still admit to some weakness and fatigue.  He also is reporting some muscle weakness in his legs that makes it difficult him to go from sitting to standing.  He reports after stopping diltiazem his blood pressure did become elevated but soon dropped back down to within normal levels and has now been on the low end with systolic being around 90 mmHg at home.  He does deny any chest pains, syncope, or near syncope.  He did wear 48-hour Holter monitor which showed normal sinus rhythm with some PACs and PVCs with an average heart rate of 58 bpm.            Allergies   Allergen Reactions   • Other    :      Current Outpatient Medications:   •  aspirin 81 MG EC tablet, Take 81 mg by mouth Every Night., Disp: , Rfl:   •  atorvastatin (LIPITOR) 40 MG tablet, Take 1 tablet by mouth Every Night., Disp: 90 tablet, Rfl: 3  •  escitalopram (LEXAPRO) 10 MG tablet, Take 10 mg by mouth daily., Disp: , Rfl:   •  fluticasone (FLONASE) 50 MCG/ACT nasal spray, , Disp: , Rfl:   •  ibuprofen (ADVIL,MOTRIN) 800 MG tablet, Take 1 tablet by mouth Every  6 (Six) Hours As Needed for Mild Pain ., Disp: 60 tablet, Rfl: 0  •  insulin aspart protamine-insulin aspart (NovoLOG 70/30) (70-30) 100 UNIT/ML injection, Inject 60 Units under the skin into the appropriate area as directed 2 (Two) Times a Day With Meals., Disp: , Rfl:   •  levETIRAcetam (KEPPRA) 1000 MG tablet, Take 1,000 mg by mouth 2 (Two) Times a Day., Disp: , Rfl:   •  levothyroxine (SYNTHROID, LEVOTHROID) 50 MCG tablet, Take 50 mcg by mouth daily., Disp: , Rfl:   •  lisinopril (PRINIVIL,ZESTRIL) 40 MG tablet, Take 40 mg by mouth daily., Disp: , Rfl:   •  metoprolol tartrate (LOPRESSOR) 25 MG tablet, Take 0.5 tablets by mouth 2 (Two) Times a Day., Disp: 45 tablet, Rfl: 3  •  OMEGA-3 KRILL OIL PO, Take 350 mg by mouth Daily., Disp: , Rfl:   •  pioglitazone (ACTOS) 30 MG tablet, , Disp: , Rfl:   •  vitamin C (ASCORBIC ACID) 500 MG tablet, Take 1 tablet by mouth Daily., Disp: 60 tablet, Rfl: 1  •  clindamycin (CLEOCIN) 300 MG capsule, Take 1 capsule by mouth 3 (Three) Times a Day., Disp: 30 capsule, Rfl: 2  •  gabapentin (NEURONTIN) 600 MG tablet, , Disp: , Rfl:   •  HYDROcodone-acetaminophen (NORCO)  MG per tablet, Take 1 tablet by mouth Every 6 (Six) Hours As Needed for Moderate Pain ., Disp: 45 tablet, Rfl: 0  •  insulin aspart prot-insulin aspart (novoLOG 70/30) (70-30) 100 UNIT/ML injection, Inject 45 Units under the skin Every Night., Disp: , Rfl:   •  NOVOLIN 70/30 (70-30) 100 UNIT/ML injection, , Disp: , Rfl:   •  phenytoin (DILANTIN) 100 MG ER capsule, Take 300 mg by mouth 2 (Two) Times a Day., Disp: , Rfl:     The following portions of the patient's history were reviewed and updated as appropriate: allergies, current medications, past family history, past medical history, past social history, past surgical history and problem list.    Social History     Tobacco Use   • Smoking status: Former Smoker     Packs/day: 2.00     Years: 45.00     Pack years: 90.00     Types: Cigarettes     Last attempt to  "quit: 2010     Years since quitting: 10.0   • Smokeless tobacco: Never Used   Substance Use Topics   • Alcohol use: No   • Drug use: No       Review of Systems   Constitution: Positive for malaise/fatigue.   Cardiovascular: Positive for dyspnea on exertion and leg swelling. Negative for chest pain, irregular heartbeat and palpitations.   Respiratory: Negative for cough and shortness of breath.    Hematologic/Lymphatic: Negative for bleeding problem. Does not bruise/bleed easily.   Musculoskeletal: Positive for muscle weakness.   Gastrointestinal: Negative for nausea and vomiting.   Neurological: Negative for weakness.       Objective   Vitals:    12/30/19 1400   BP: 92/53   Pulse: 53   Resp: 16   Weight: 102 kg (225 lb 9.6 oz)   Height: 180.3 cm (71\")     Body mass index is 31.46 kg/m².    Physical Exam   Constitutional: He is oriented to person, place, and time. He appears well-developed and well-nourished. No distress.   HENT:   Head: Normocephalic and atraumatic.   Cardiovascular: Regular rhythm and normal heart sounds. Bradycardia present.   Pulmonary/Chest: Effort normal and breath sounds normal. No respiratory distress.   Musculoskeletal: He exhibits no edema.   Neurological: He is alert and oriented to person, place, and time.   Skin: He is not diaphoretic.       Lab Results   Component Value Date     (L) 10/15/2018    K 4.4 10/15/2018    CL 99 10/15/2018    CO2 27.9 10/15/2018    BUN 20 10/15/2018    CREATININE 1.10 10/15/2018    GLUCOSE 306 (H) 10/15/2018    CALCIUM 9.7 10/15/2018    AST 21 10/15/2018    ALT 33 10/15/2018    ALKPHOS 133 (H) 10/15/2018    LABIL2 1.1 (L) 12/21/2015     No results found for: CKTOTAL  Lab Results   Component Value Date    WBC 8.05 11/15/2017    HGB 11.5 (L) 11/15/2017    HCT 34.8 (L) 11/15/2017     11/15/2017     No results found for: INR  No results found for: MG  Lab Results   Component Value Date    TSH 1.712 10/15/2018    PSA 1.9 05/14/2018    CHLPL 169 " 04/16/2015    TRIG 293 (H) 10/15/2018    HDL 39 (L) 10/15/2018    LDL 44 10/15/2018      No results found for: BNP    During this visit the following were done:  Labs Reviewed [x]    Labs Ordered []    Radiology Reports Reviewed [x]    Radiology Ordered []    PCP Records Reviewed []    Referring Provider Records Reviewed []    ER Records Reviewed []    Hospital Records Reviewed []    History Obtained From Family []    Radiology Images Reviewed []    Other Reviewed []    Records Requested []       Procedures    Assessment/Plan    Diagnosis Plan   1. ASCVD (arteriosclerotic cardiovascular disease)  Comprehensive Metabolic Panel    Lipid Panel   2. Essential hypertension     3. Dyslipidemia              Recommendations:  1. Will decrease patient's metoprolol tartrate to 12.5 mg BID due to persistent weakness and fatigue with low blood pressure and heart rate.   2. Will also decrease lipitor to 40 ssince he also reports markedly decreased leg strength. Last lipid panel his cholesterol was very well controlled. Will repeat CMP and lipid panel in a month.   3. I discuss results of holter monitor with him.        Return in about 3 months (around 3/30/2020).    As always, I appreciate very much the opportunity to participate in the cardiovascular care of your patients.      With Best Regards,    Tan Worthington PA-C

## 2020-01-04 ENCOUNTER — RESULTS ENCOUNTER (OUTPATIENT)
Dept: CARDIOLOGY | Facility: CLINIC | Age: 65
End: 2020-01-04

## 2020-01-04 DIAGNOSIS — I25.10 ASCVD (ARTERIOSCLEROTIC CARDIOVASCULAR DISEASE): ICD-10-CM

## 2020-01-17 ENCOUNTER — TRANSCRIBE ORDERS (OUTPATIENT)
Dept: ADMINISTRATIVE | Facility: HOSPITAL | Age: 65
End: 2020-01-17

## 2020-01-17 DIAGNOSIS — R41.3 POOR SHORT TERM MEMORY: Primary | ICD-10-CM

## 2020-01-23 ENCOUNTER — HOSPITAL ENCOUNTER (OUTPATIENT)
Dept: MRI IMAGING | Facility: HOSPITAL | Age: 65
Discharge: HOME | End: 2020-01-23
Admitting: FAMILY MEDICINE

## 2020-01-23 DIAGNOSIS — R41.3 POOR SHORT TERM MEMORY: ICD-10-CM

## 2020-01-23 PROCEDURE — 70551 MRI BRAIN STEM W/O DYE: CPT | Performed by: RADIOLOGY

## 2020-01-23 PROCEDURE — 70551 MRI BRAIN STEM W/O DYE: CPT

## 2020-06-08 ENCOUNTER — TRANSCRIBE ORDERS (OUTPATIENT)
Dept: LAB | Facility: HOSPITAL | Age: 65
End: 2020-06-08

## 2020-06-08 ENCOUNTER — HOSPITAL ENCOUNTER (OUTPATIENT)
Dept: ULTRASOUND IMAGING | Facility: HOSPITAL | Age: 65
Discharge: HOME | End: 2020-06-08
Admitting: FAMILY MEDICINE

## 2020-06-08 DIAGNOSIS — N50.819 TESTICULAR PAIN, UNSPECIFIED: ICD-10-CM

## 2020-06-08 DIAGNOSIS — N50.819 TESTICULAR PAIN, UNSPECIFIED: Primary | ICD-10-CM

## 2020-06-08 PROCEDURE — 76870 US EXAM SCROTUM: CPT | Performed by: RADIOLOGY

## 2020-06-08 PROCEDURE — 76870 US EXAM SCROTUM: CPT

## 2020-07-13 ENCOUNTER — OFFICE VISIT (OUTPATIENT)
Dept: CARDIOLOGY | Facility: CLINIC | Age: 65
End: 2020-07-13

## 2020-07-13 ENCOUNTER — HOSPITAL ENCOUNTER (OUTPATIENT)
Dept: GENERAL RADIOLOGY | Facility: HOSPITAL | Age: 65
Discharge: HOME | End: 2020-07-13
Admitting: PHYSICIAN ASSISTANT

## 2020-07-13 VITALS
SYSTOLIC BLOOD PRESSURE: 135 MMHG | WEIGHT: 237 LBS | DIASTOLIC BLOOD PRESSURE: 62 MMHG | HEIGHT: 71 IN | OXYGEN SATURATION: 97 % | RESPIRATION RATE: 18 BRPM | BODY MASS INDEX: 33.18 KG/M2 | TEMPERATURE: 98.6 F | HEART RATE: 52 BPM

## 2020-07-13 DIAGNOSIS — R06.02 SHORTNESS OF BREATH: ICD-10-CM

## 2020-07-13 DIAGNOSIS — E78.5 DYSLIPIDEMIA: ICD-10-CM

## 2020-07-13 DIAGNOSIS — I25.10 ASCVD (ARTERIOSCLEROTIC CARDIOVASCULAR DISEASE): Primary | ICD-10-CM

## 2020-07-13 DIAGNOSIS — I10 ESSENTIAL HYPERTENSION: ICD-10-CM

## 2020-07-13 PROCEDURE — 71046 X-RAY EXAM CHEST 2 VIEWS: CPT | Performed by: RADIOLOGY

## 2020-07-13 PROCEDURE — 93000 ELECTROCARDIOGRAM COMPLETE: CPT | Performed by: PHYSICIAN ASSISTANT

## 2020-07-13 PROCEDURE — 71046 X-RAY EXAM CHEST 2 VIEWS: CPT

## 2020-07-13 PROCEDURE — 99214 OFFICE O/P EST MOD 30 MIN: CPT | Performed by: PHYSICIAN ASSISTANT

## 2020-07-13 RX ORDER — FUROSEMIDE 20 MG/1
20 TABLET ORAL DAILY PRN
Qty: 30 TABLET | Refills: 1 | Status: SHIPPED | OUTPATIENT
Start: 2020-07-13 | End: 2020-07-14 | Stop reason: SDUPTHER

## 2020-07-13 RX ORDER — FUROSEMIDE 20 MG/1
20 TABLET ORAL DAILY
Qty: 30 TABLET | Refills: 11 | Status: SHIPPED | OUTPATIENT
Start: 2020-07-13 | End: 2020-07-13 | Stop reason: SDUPTHER

## 2020-07-14 ENCOUNTER — TELEPHONE (OUTPATIENT)
Dept: CARDIOLOGY | Facility: CLINIC | Age: 65
End: 2020-07-14

## 2020-07-14 RX ORDER — FUROSEMIDE 20 MG/1
20 TABLET ORAL DAILY PRN
Qty: 30 TABLET | Refills: 1 | Status: SHIPPED | OUTPATIENT
Start: 2020-07-14 | End: 2021-03-05 | Stop reason: ALTCHOICE

## 2020-07-15 ENCOUNTER — TRANSCRIBE ORDERS (OUTPATIENT)
Dept: ULTRASOUND IMAGING | Facility: HOSPITAL | Age: 65
End: 2020-07-15

## 2020-07-15 DIAGNOSIS — R79.89 OTHER SPECIFIED ABNORMAL FINDINGS OF BLOOD CHEMISTRY: ICD-10-CM

## 2020-07-15 DIAGNOSIS — R79.89 CREATININE ELEVATION: Primary | ICD-10-CM

## 2020-07-16 ENCOUNTER — HOSPITAL ENCOUNTER (OUTPATIENT)
Dept: ULTRASOUND IMAGING | Facility: HOSPITAL | Age: 65
Discharge: HOME | End: 2020-07-16
Admitting: FAMILY MEDICINE

## 2020-07-16 DIAGNOSIS — R79.89 CREATININE ELEVATION: ICD-10-CM

## 2020-07-16 PROCEDURE — 76775 US EXAM ABDO BACK WALL LIM: CPT

## 2020-07-16 PROCEDURE — 76775 US EXAM ABDO BACK WALL LIM: CPT | Performed by: RADIOLOGY

## 2020-07-18 ENCOUNTER — RESULTS ENCOUNTER (OUTPATIENT)
Dept: CARDIOLOGY | Facility: CLINIC | Age: 65
End: 2020-07-18

## 2020-07-18 DIAGNOSIS — R06.02 SHORTNESS OF BREATH: ICD-10-CM

## 2020-07-20 ENCOUNTER — LAB (OUTPATIENT)
Dept: LAB | Facility: HOSPITAL | Age: 65
End: 2020-07-20

## 2020-07-20 DIAGNOSIS — R79.89 OTHER SPECIFIED ABNORMAL FINDINGS OF BLOOD CHEMISTRY: ICD-10-CM

## 2020-07-20 PROCEDURE — 82570 ASSAY OF URINE CREATININE: CPT

## 2020-07-20 PROCEDURE — 81050 URINALYSIS VOLUME MEASURE: CPT

## 2020-07-21 ENCOUNTER — HOSPITAL ENCOUNTER (OUTPATIENT)
Dept: CARDIOLOGY | Facility: HOSPITAL | Age: 65
Discharge: HOME | End: 2020-07-21
Admitting: PHYSICIAN ASSISTANT

## 2020-07-21 DIAGNOSIS — R06.02 SHORTNESS OF BREATH: ICD-10-CM

## 2020-07-21 LAB
COLLECT DURATION TIME UR: 24 HRS
CREAT UR-MCNC: 60.9 MG/DL
CREATINE 24H UR-MRATE: 1.52 G/24 HR (ref 1–2.4)
SPECIMEN VOL 24H UR: 2500 ML

## 2020-07-21 PROCEDURE — 93306 TTE W/DOPPLER COMPLETE: CPT

## 2020-07-21 PROCEDURE — 93306 TTE W/DOPPLER COMPLETE: CPT | Performed by: INTERNAL MEDICINE

## 2020-07-23 ENCOUNTER — OFFICE VISIT (OUTPATIENT)
Dept: UROLOGY | Facility: CLINIC | Age: 65
End: 2020-07-23

## 2020-07-23 VITALS — BODY MASS INDEX: 33.5 KG/M2 | HEIGHT: 70 IN | TEMPERATURE: 96.7 F | WEIGHT: 234 LBS

## 2020-07-23 DIAGNOSIS — N45.1 EPIDIDYMITIS: ICD-10-CM

## 2020-07-23 DIAGNOSIS — N40.0 BENIGN PROSTATIC HYPERPLASIA WITHOUT LOWER URINARY TRACT SYMPTOMS: Primary | ICD-10-CM

## 2020-07-23 PROCEDURE — 99203 OFFICE O/P NEW LOW 30 MIN: CPT | Performed by: UROLOGY

## 2020-07-23 RX ORDER — TAMSULOSIN HYDROCHLORIDE 0.4 MG/1
1 CAPSULE ORAL NIGHTLY
Qty: 30 CAPSULE | Refills: 5 | Status: SHIPPED | OUTPATIENT
Start: 2020-07-23 | End: 2020-08-13 | Stop reason: ALTCHOICE

## 2020-07-23 NOTE — PROGRESS NOTES
Chief Complaint:          Chief Complaint   Patient presents with   • Testicle Pain       HPI:   65 y.o. male referred for evaluation of left testicular pain.  Dr. Barclay gave him infection pills he is not had trauma he has had no infection.  No pain with sitting no burning, blood in the urine, discharge, obstructive symptomatology hesitancy.  No other complaints.  Has had hernias twice on the contralateral side and a bypass he had a kidney surgery but he cannot elaborate on his had a normal testicular ultrasound normal kidney ultrasound I believe this is mild epididymitis I am to try him on alpha blockade and there is nothing else I have to offer him I will see him back in 1 month      Past Medical History:        Past Medical History:   Diagnosis Date   • Anxiety and depression    • Arthritis    • COPD (chronic obstructive pulmonary disease) (CMS/[facility])    • Coronary artery disease    • Diabetes mellitus (CMS/[facility])    • Disease of thyroid gland    • Dyslipidemia    • History of transfusion    • Hypertension    • Seizure disorder (CMS/[facility])     Pt states last seizure x1 mo.         Current Meds:     Current Outpatient Medications   Medication Sig Dispense Refill   • aspirin 81 MG EC tablet Take 81 mg by mouth Every Night.     • atorvastatin (LIPITOR) 40 MG tablet Take 1 tablet by mouth Every Night. 90 tablet 3   • clindamycin (CLEOCIN) 300 MG capsule Take 1 capsule by mouth 3 (Three) Times a Day. 30 capsule 2   • escitalopram (LEXAPRO) 10 MG tablet Take 10 mg by mouth daily.     • fluticasone (FLONASE) 50 MCG/ACT nasal spray      • furosemide (LASIX) 20 MG tablet Take 1 tablet by mouth Daily As Needed (Increase shortness of breath, increased pedal edema, or increased weight gain greater than 2 to 3 pounds in a day). 30 tablet 1   • gabapentin (NEURONTIN) 600 MG tablet      • HYDROcodone-acetaminophen (NORCO)  MG per tablet Take 1 tablet by mouth Every 6 (Six) Hours As Needed for Moderate Pain . 45 tablet 0   •  ibuprofen (ADVIL,MOTRIN) 800 MG tablet Take 1 tablet by mouth Every 6 (Six) Hours As Needed for Mild Pain . 60 tablet 0   • insulin aspart prot-insulin aspart (novoLOG 70/30) (70-30) 100 UNIT/ML injection Inject 60 Units under the skin into the appropriate area as directed 2 (Two) Times a Day With Meals.     • insulin aspart protamine-insulin aspart (NovoLOG 70/30) (70-30) 100 UNIT/ML injection Inject 60 Units under the skin into the appropriate area as directed 2 (Two) Times a Day With Meals.     • levETIRAcetam (KEPPRA) 1000 MG tablet Take 1,000 mg by mouth 2 (Two) Times a Day.     • levothyroxine (SYNTHROID, LEVOTHROID) 50 MCG tablet Take 50 mcg by mouth daily.     • lisinopril (PRINIVIL,ZESTRIL) 40 MG tablet Take 40 mg by mouth daily.     • metoprolol tartrate (LOPRESSOR) 25 MG tablet Take 1 tablet by mouth 2 (Two) Times a Day. 180 tablet 2   • NOVOLIN 70/30 (70-30) 100 UNIT/ML injection      • OMEGA-3 KRILL OIL PO Take 350 mg by mouth Daily.     • phenytoin (DILANTIN) 100 MG ER capsule Take 300 mg by mouth 2 (Two) Times a Day.     • pioglitazone (ACTOS) 30 MG tablet      • vitamin C (ASCORBIC ACID) 500 MG tablet Take 1 tablet by mouth Daily. 60 tablet 1     No current facility-administered medications for this visit.         Allergies:      Allergies   Allergen Reactions   • Other         Past Surgical History:     Past Surgical History:   Procedure Laterality Date   • AMPUTATION DIGIT Right 9/13/2017    Procedure: RIGHT 1ST TOE AMPUTATION ;  Surgeon: Lee Lee MD;  Location: [facility] OR;  Service:    • CARDIAC SURGERY     • CIRCUMCISION     • CORONARY ARTERY BYPASS GRAFT     • HERNIA REPAIR      X2   • MA DRAIN LOWER LEG DEEP ABSC/HEMATOMA Right 5/24/2017    Procedure: Debridement of right first toe;  Surgeon: Ronald Perdue MD;  Location: [facility] OR;  Service: General   • TOE SURGERY Left     debridement         Social History:     Social History     Socioeconomic History   • Marital status:       Spouse name: Not on file   • Number of children: Not on file   • Years of education: Not on file   • Highest education level: Not on file   Tobacco Use   • Smoking status: Former Smoker     Packs/day: 2.00     Years: 45.00     Pack years: 90.00     Types: Cigarettes     Last attempt to quit: 2010     Years since quitting: 10.5   • Smokeless tobacco: Never Used   Substance and Sexual Activity   • Alcohol use: No   • Drug use: No   • Sexual activity: Defer       Family History:     Family History   Problem Relation Age of Onset   • Diabetes Mother    • Hypertension Mother    • Heart disease Mother        Review of Systems:     Review of Systems   Constitutional: Negative.    HENT: Negative.    Eyes: Negative.    Respiratory: Negative.    Cardiovascular: Negative.    Gastrointestinal: Negative.    Endocrine: Negative.    Genitourinary: Positive for testicular pain.   Musculoskeletal: Negative.    Allergic/Immunologic: Negative.    Neurological: Negative.    Hematological: Negative.    Psychiatric/Behavioral: Negative.        Physical Exam:     Physical Exam   Constitutional: He is oriented to person, place, and time. He appears well-developed and well-nourished.   HENT:   Head: Normocephalic and atraumatic.   Eyes: Pupils are equal, round, and reactive to light. Conjunctivae and EOM are normal.   Neck: Normal range of motion.   Cardiovascular: Normal rate, regular rhythm, normal heart sounds and intact distal pulses.   Pulmonary/Chest: Effort normal and breath sounds normal.   Abdominal: Soft. Bowel sounds are normal.   Genitourinary:   Genitourinary Comments: Normal phallus normal testes very mild left epididymitis   Musculoskeletal: Normal range of motion.   Neurological: He is alert and oriented to person, place, and time. He has normal reflexes.   Skin: Skin is warm and dry.   Psychiatric: He has a normal mood and affect. His behavior is normal. Judgment and thought content normal.   Nursing note and vitals  reviewed.      I have reviewed the following portions of the patient's history: allergies, current medications, past family history, past medical history, past social history, past surgical history, problem list and ROS and confirm it's accurate.      Procedure:       Assessment/Plan:   Epididymitis-we discussed the etiology of epididymitis from lifting heavy objects to the full spectrum of epididymoorchitis.  I discussed the staging and grading system including a stage I epididymitis meaning confined to the epididymis but in a separation between the epididymis and testicle grade 2 being a concretion of both layers were I can feel the difference and finally grade 3 with scrotal fixation of the skin we discussed the recommendation of warm soaks, elevation and antibiotics were indicated.  I discussed the indication for aggressive intervention such as the presence of an abscess in the presence of significant systemic symptomatology such as fevers and chills.  We also discussed the fact that the thickening and swelling may persist beyond the pain and that sometimes a prolonged course of antibiotics may be indicated finally we discussed ultimately there may be significant atrophy of the testicle after the episode of epididymitis and its important to watch closely for that I believe he has very mild left-sided epididymitis I am to try to decrease voiding pressure with alpha blockade and will reassess him in a month other than that there is nothing else I would offer him            Patient's Body mass index is 33.58 kg/m². BMI is above normal parameters. Recommendations include: educational material.              This document has been electronically signed by PRAFUL PEREZ MD July 23, 2020 08:13

## 2020-07-24 PROBLEM — N45.1 EPIDIDYMITIS: Status: ACTIVE | Noted: 2020-07-24

## 2020-07-24 LAB
BH CV ECHO MEAS - % IVS THICK: 4.3 %
BH CV ECHO MEAS - % LVPW THICK: 15.6 %
BH CV ECHO MEAS - ACS: 2.1 CM
BH CV ECHO MEAS - AO MAX PG: 13.5 MMHG
BH CV ECHO MEAS - AO MEAN PG: 5 MMHG
BH CV ECHO MEAS - AO ROOT AREA (BSA CORRECTED): 1.7
BH CV ECHO MEAS - AO ROOT AREA: 11.6 CM^2
BH CV ECHO MEAS - AO ROOT DIAM: 3.9 CM
BH CV ECHO MEAS - AO V2 MAX: 184 CM/SEC
BH CV ECHO MEAS - AO V2 MEAN: 106 CM/SEC
BH CV ECHO MEAS - AO V2 VTI: 42.5 CM
BH CV ECHO MEAS - BSA(HAYCOCK): 2.4 M^2
BH CV ECHO MEAS - BSA: 2.3 M^2
BH CV ECHO MEAS - BZI_BMI: 33.1 KILOGRAMS/M^2
BH CV ECHO MEAS - BZI_METRIC_HEIGHT: 180.3 CM
BH CV ECHO MEAS - BZI_METRIC_WEIGHT: 107.5 KG
BH CV ECHO MEAS - EDV(CUBED): 110.6 ML
BH CV ECHO MEAS - EDV(MOD-SP4): 106 ML
BH CV ECHO MEAS - EDV(TEICH): 107.5 ML
BH CV ECHO MEAS - EF(CUBED): 55.8 %
BH CV ECHO MEAS - EF(MOD-SP4): 57.5 %
BH CV ECHO MEAS - EF(TEICH): 47.5 %
BH CV ECHO MEAS - ESV(CUBED): 48.8 ML
BH CV ECHO MEAS - ESV(MOD-SP4): 45 ML
BH CV ECHO MEAS - ESV(TEICH): 56.4 ML
BH CV ECHO MEAS - FS: 23.9 %
BH CV ECHO MEAS - IVS/LVPW: 1
BH CV ECHO MEAS - IVSD: 1.2 CM
BH CV ECHO MEAS - IVSS: 1.2 CM
BH CV ECHO MEAS - LA DIMENSION: 3.9 CM
BH CV ECHO MEAS - LA/AO: 1
BH CV ECHO MEAS - LV DIASTOLIC VOL/BSA (35-75): 46.8 ML/M^2
BH CV ECHO MEAS - LV MASS(C)D: 205.7 GRAMS
BH CV ECHO MEAS - LV MASS(C)DI: 90.8 GRAMS/M^2
BH CV ECHO MEAS - LV MASS(C)S: 155.9 GRAMS
BH CV ECHO MEAS - LV MASS(C)SI: 68.8 GRAMS/M^2
BH CV ECHO MEAS - LV SYSTOLIC VOL/BSA (12-30): 19.9 ML/M^2
BH CV ECHO MEAS - LVIDD: 4.8 CM
BH CV ECHO MEAS - LVIDS: 3.7 CM
BH CV ECHO MEAS - LVLD AP4: 7.3 CM
BH CV ECHO MEAS - LVLS AP4: 7.1 CM
BH CV ECHO MEAS - LVOT AREA (M): 3.8 CM^2
BH CV ECHO MEAS - LVOT AREA: 3.8 CM^2
BH CV ECHO MEAS - LVOT DIAM: 2.2 CM
BH CV ECHO MEAS - LVPWD: 1.1 CM
BH CV ECHO MEAS - LVPWS: 1.3 CM
BH CV ECHO MEAS - MV A MAX VEL: 99.8 CM/SEC
BH CV ECHO MEAS - MV E MAX VEL: 114 CM/SEC
BH CV ECHO MEAS - MV E/A: 1.1
BH CV ECHO MEAS - PA ACC TIME: 0.09 SEC
BH CV ECHO MEAS - PA PR(ACCEL): 40.8 MMHG
BH CV ECHO MEAS - SI(AO): 218.3 ML/M^2
BH CV ECHO MEAS - SI(CUBED): 27.2 ML/M^2
BH CV ECHO MEAS - SI(MOD-SP4): 26.9 ML/M^2
BH CV ECHO MEAS - SI(TEICH): 22.5 ML/M^2
BH CV ECHO MEAS - SV(AO): 494.8 ML
BH CV ECHO MEAS - SV(CUBED): 61.8 ML
BH CV ECHO MEAS - SV(MOD-SP4): 61 ML
BH CV ECHO MEAS - SV(TEICH): 51.1 ML
MAXIMAL PREDICTED HEART RATE: 154 BPM
STRESS TARGET HR: 131 BPM

## 2020-08-13 ENCOUNTER — OFFICE VISIT (OUTPATIENT)
Dept: CARDIOLOGY | Facility: CLINIC | Age: 65
End: 2020-08-13

## 2020-08-13 VITALS
DIASTOLIC BLOOD PRESSURE: 69 MMHG | TEMPERATURE: 96.4 F | OXYGEN SATURATION: 97 % | SYSTOLIC BLOOD PRESSURE: 135 MMHG | BODY MASS INDEX: 33.64 KG/M2 | HEIGHT: 70 IN | WEIGHT: 235 LBS | HEART RATE: 59 BPM

## 2020-08-13 DIAGNOSIS — N28.1 RENAL CYST: Primary | ICD-10-CM

## 2020-08-13 DIAGNOSIS — I10 ESSENTIAL HYPERTENSION: ICD-10-CM

## 2020-08-13 DIAGNOSIS — R06.09 DYSPNEA ON EXERTION: ICD-10-CM

## 2020-08-13 PROCEDURE — 99214 OFFICE O/P EST MOD 30 MIN: CPT | Performed by: PHYSICIAN ASSISTANT

## 2020-08-13 RX ORDER — DILTIAZEM HYDROCHLORIDE 180 MG/1
180 CAPSULE, COATED, EXTENDED RELEASE ORAL DAILY
COMMUNITY
End: 2020-11-13 | Stop reason: SDUPTHER

## 2020-08-13 RX ORDER — GLIPIZIDE 5 MG/1
5 TABLET ORAL
COMMUNITY

## 2020-08-13 NOTE — PROGRESS NOTES
Analilia Castellon MD  Christo Clifton  1955 08/13/2020    Patient Active Problem List   Diagnosis   • IDDM (insulin dependent diabetes mellitus)   • ASCVD (arteriosclerotic cardiovascular disease)   • S/P CABG x 3   • Essential hypertension   • Dyslipidemia   • Skin ulcer (CMS/HCC)   • Post-operative state   • Preop cardiovascular exam   • Epididymitis       Dear Analilia Castellon MD:    Subjective     History of Present Illness:    Chief Complaint   Patient presents with   • Results     echo and CXR.    • Med Management     med list.    • Shortness of Breath     always   • Palpitations     beats hard when lying down.        Christo Clifton is a pleasant 65 y.o. male with a past medical history significant for ASCVD S/P remote CABG, essential hypertension, dyslipidemia, and diabetes mellitus. He comes in today for routine cardilogy follow up.     Mr. Bautista reports that he has been doing well from cardiac standpoint.  Biggest concerns recently has been his shortness of breath he does deny any recent worsening of this he did recently had echocardiogram performed which did show normal left ventricular ejection fraction with only mild mitral valve regurgitation and trace aortic valve regurgitation.  He does deny any recent chest pains, palpitations, dizziness, or syncope.  He does believe this could be from his history of tobacco abuse and has underlying COPD which has not been evaluated recently.    Allergies   Allergen Reactions   • Other    :      Current Outpatient Medications:   •  aspirin 81 MG EC tablet, Take 81 mg by mouth Every Night., Disp: , Rfl:   •  atorvastatin (LIPITOR) 40 MG tablet, Take 1 tablet by mouth Every Night., Disp: 90 tablet, Rfl: 3  •  dilTIAZem CD (CARDIZEM CD) 180 MG 24 hr capsule, Take 180 mg by mouth Daily., Disp: , Rfl:   •  Ergocalciferol (VITAMIN D2 PO), Take  by mouth., Disp: , Rfl:   •  escitalopram (LEXAPRO) 10 MG tablet, Take 10 mg by mouth daily., Disp: , Rfl:   •  furosemide (LASIX)  20 MG tablet, Take 1 tablet by mouth Daily As Needed (Increase shortness of breath, increased pedal edema, or increased weight gain greater than 2 to 3 pounds in a day)., Disp: 30 tablet, Rfl: 1  •  glipizide (GLUCOTROL) 5 MG tablet, Take 5 mg by mouth 2 (Two) Times a Day Before Meals., Disp: , Rfl:   •  ibuprofen (ADVIL,MOTRIN) 800 MG tablet, Take 1 tablet by mouth Every 6 (Six) Hours As Needed for Mild Pain ., Disp: 60 tablet, Rfl: 0  •  insulin aspart prot-insulin aspart (novoLOG 70/30) (70-30) 100 UNIT/ML injection, Inject 60 Units under the skin into the appropriate area as directed 2 (Two) Times a Day With Meals., Disp: , Rfl:   •  levETIRAcetam (KEPPRA) 1000 MG tablet, Take 1,000 mg by mouth 2 (Two) Times a Day., Disp: , Rfl:   •  levothyroxine (SYNTHROID, LEVOTHROID) 50 MCG tablet, Take 50 mcg by mouth daily., Disp: , Rfl:   •  lisinopril (PRINIVIL,ZESTRIL) 40 MG tablet, Take 40 mg by mouth daily., Disp: , Rfl:   •  metoprolol tartrate (LOPRESSOR) 25 MG tablet, Take 1 tablet by mouth 2 (Two) Times a Day., Disp: 180 tablet, Rfl: 2  •  OMEGA-3 KRILL OIL PO, Take 350 mg by mouth Daily., Disp: , Rfl:   •  pioglitazone (ACTOS) 30 MG tablet, , Disp: , Rfl:     The following portions of the patient's history were reviewed and updated as appropriate: allergies, current medications, past family history, past medical history, past social history, past surgical history and problem list.    Social History     Tobacco Use   • Smoking status: Former Smoker     Packs/day: 2.00     Years: 45.00     Pack years: 90.00     Types: Cigarettes     Last attempt to quit: 2010     Years since quitting: 10.6   • Smokeless tobacco: Never Used   Substance Use Topics   • Alcohol use: No   • Drug use: No       Review of Systems   Constitution: Negative for malaise/fatigue.   Cardiovascular: Positive for dyspnea on exertion. Negative for chest pain and irregular heartbeat.   Respiratory: Positive for shortness of breath. Negative for  "cough.    Hematologic/Lymphatic: Negative for bleeding problem. Does not bruise/bleed easily.   Gastrointestinal: Negative for nausea and vomiting.   Neurological: Negative for weakness.       Objective   Vitals:    08/13/20 1041   BP: 135/69   BP Location: Right arm   Patient Position: Sitting   Cuff Size: Adult   Pulse: 59   Temp: 96.4 °F (35.8 °C)   TempSrc: Infrared   SpO2: 97%   Weight: 107 kg (235 lb)   Height: 177.8 cm (70\")     Body mass index is 33.72 kg/m².    Physical Exam   Constitutional: He is oriented to person, place, and time. He appears well-developed and well-nourished. No distress.   HENT:   Head: Normocephalic and atraumatic.   Cardiovascular: Normal rate, regular rhythm and normal heart sounds.   Pulmonary/Chest: Effort normal and breath sounds normal. No respiratory distress.   Musculoskeletal: He exhibits no edema.   Neurological: He is alert and oriented to person, place, and time.   Skin: He is not diaphoretic.       Lab Results   Component Value Date     (L) 10/15/2018    K 4.4 10/15/2018    CL 99 10/15/2018    CO2 27.9 10/15/2018    BUN 20 10/15/2018    CREATININE 1.10 10/15/2018    GLUCOSE 306 (H) 10/15/2018    CALCIUM 9.7 10/15/2018    AST 21 10/15/2018    ALT 33 10/15/2018    ALKPHOS 133 (H) 10/15/2018    LABIL2 1.1 (L) 12/21/2015     No results found for: CKTOTAL  Lab Results   Component Value Date    WBC 8.05 11/15/2017    HGB 11.5 (L) 11/15/2017    HCT 34.8 (L) 11/15/2017     11/15/2017     No results found for: INR  No results found for: MG  Lab Results   Component Value Date    TSH 1.712 10/15/2018    PSA 1.9 05/14/2018    CHLPL 169 04/16/2015    TRIG 293 (H) 10/15/2018    HDL 39 (L) 10/15/2018    LDL 44 10/15/2018      No results found for: BNP    During this visit the following were done:  Labs Reviewed [x]    Labs Ordered []    Radiology Reports Reviewed [x]    Radiology Ordered []    PCP Records Reviewed []    Referring Provider Records Reviewed []    ER Records " Reviewed []    Hospital Records Reviewed []    History Obtained From Family []    Radiology Images Reviewed []    Other Reviewed []    Records Requested []       Procedures    Assessment/Plan    Diagnosis Plan   1. Renal cyst  Ambulatory Referral to Nephrology   2. Essential hypertension  Basic Metabolic Panel   3. Dyspnea on exertion  Full Pulmonary Function Test With Bronchodilator            Recommendations:  1. Patient did have renal cyst recently found on ultrasound he has seen urology however I am going to still refer him to nephrology for this as it seems urology is only treating his epididymitis.  2. I am also going to be ordering pulmonary function test to evaluate for possible COPD as a cause of his shortness of breath.  3. Also going to reorder BMP and informed him of the importance of having this done to monitor renal function.        Return in about 3 months (around 11/13/2020).    As always, I appreciate very much the opportunity to participate in the cardiovascular care of your patients.      With Best Regards,    Tan Worthington PA-C

## 2020-08-17 ENCOUNTER — LAB (OUTPATIENT)
Dept: LAB | Facility: HOSPITAL | Age: 65
End: 2020-08-17

## 2020-08-17 PROCEDURE — 80048 BASIC METABOLIC PNL TOTAL CA: CPT | Performed by: PHYSICIAN ASSISTANT

## 2020-08-17 PROCEDURE — 36415 COLL VENOUS BLD VENIPUNCTURE: CPT | Performed by: PHYSICIAN ASSISTANT

## 2020-08-18 ENCOUNTER — RESULTS ENCOUNTER (OUTPATIENT)
Dept: CARDIOLOGY | Facility: CLINIC | Age: 65
End: 2020-08-18

## 2020-08-18 DIAGNOSIS — I10 ESSENTIAL HYPERTENSION: ICD-10-CM

## 2020-08-18 LAB
ANION GAP SERPL CALCULATED.3IONS-SCNC: 14.5 MMOL/L (ref 5–15)
BUN SERPL-MCNC: 37 MG/DL (ref 8–23)
BUN/CREAT SERPL: 21.8 (ref 7–25)
CALCIUM SPEC-SCNC: 9.9 MG/DL (ref 8.6–10.5)
CHLORIDE SERPL-SCNC: 98 MMOL/L (ref 98–107)
CO2 SERPL-SCNC: 19.5 MMOL/L (ref 22–29)
CREAT SERPL-MCNC: 1.7 MG/DL (ref 0.76–1.27)
GFR SERPL CREATININE-BSD FRML MDRD: 41 ML/MIN/1.73
GLUCOSE SERPL-MCNC: 196 MG/DL (ref 65–99)
POTASSIUM SERPL-SCNC: 4.9 MMOL/L (ref 3.5–5.2)
SODIUM SERPL-SCNC: 132 MMOL/L (ref 136–145)

## 2020-08-20 ENCOUNTER — TRANSCRIBE ORDERS (OUTPATIENT)
Dept: ADMINISTRATIVE | Facility: HOSPITAL | Age: 65
End: 2020-08-20

## 2020-08-20 DIAGNOSIS — Z11.59 ENCOUNTER FOR SCREENING FOR OTHER VIRAL DISEASES: Primary | ICD-10-CM

## 2020-08-24 ENCOUNTER — LAB (OUTPATIENT)
Dept: LAB | Facility: HOSPITAL | Age: 65
End: 2020-08-24

## 2020-08-24 DIAGNOSIS — Z11.59 ENCOUNTER FOR SCREENING FOR OTHER VIRAL DISEASES: ICD-10-CM

## 2020-08-24 PROCEDURE — U0004 COV-19 TEST NON-CDC HGH THRU: HCPCS

## 2020-08-24 PROCEDURE — C9803 HOPD COVID-19 SPEC COLLECT: HCPCS

## 2020-08-24 PROCEDURE — U0002 COVID-19 LAB TEST NON-CDC: HCPCS

## 2020-08-25 ENCOUNTER — OFFICE VISIT (OUTPATIENT)
Dept: UROLOGY | Facility: CLINIC | Age: 65
End: 2020-08-25

## 2020-08-25 VITALS — WEIGHT: 235 LBS | BODY MASS INDEX: 33.64 KG/M2 | HEIGHT: 70 IN | TEMPERATURE: 98.9 F

## 2020-08-25 DIAGNOSIS — N50.819 TESTALGIA: Primary | ICD-10-CM

## 2020-08-25 LAB
REF LAB TEST METHOD: NORMAL
SARS-COV-2 RNA RESP QL NAA+PROBE: NOT DETECTED

## 2020-08-25 PROCEDURE — 99213 OFFICE O/P EST LOW 20 MIN: CPT | Performed by: UROLOGY

## 2020-08-25 NOTE — PROGRESS NOTES
Chief Complaint:          Chief Complaint   Patient presents with   • Testicle Pain       HPI:   65 y.o. male returns today the Flomax actually helped but he could not take it because of low blood pressure is actually somewhat better I told him there is nothing more urologically I have to offer him other than an epididymectomy which I do not recommend I will see him back on an as-needed basis      Past Medical History:        Past Medical History:   Diagnosis Date   • Anxiety and depression    • Arthritis    • COPD (chronic obstructive pulmonary disease) (CMS/[facility])    • Coronary artery disease    • Diabetes mellitus (CMS/HCC)    • Disease of thyroid gland    • Dyslipidemia    • History of transfusion    • Hypertension    • Seizure disorder (CMS/HCC)     Pt states last seizure x1 mo.         Current Meds:     Current Outpatient Medications   Medication Sig Dispense Refill   • aspirin 81 MG EC tablet Take 81 mg by mouth Every Night.     • atorvastatin (LIPITOR) 40 MG tablet Take 1 tablet by mouth Every Night. 90 tablet 3   • dilTIAZem CD (CARDIZEM CD) 180 MG 24 hr capsule Take 180 mg by mouth Daily.     • Ergocalciferol (VITAMIN D2 PO) Take  by mouth.     • escitalopram (LEXAPRO) 10 MG tablet Take 10 mg by mouth daily.     • furosemide (LASIX) 20 MG tablet Take 1 tablet by mouth Daily As Needed (Increase shortness of breath, increased pedal edema, or increased weight gain greater than 2 to 3 pounds in a day). 30 tablet 1   • glipizide (GLUCOTROL) 5 MG tablet Take 5 mg by mouth 2 (Two) Times a Day Before Meals.     • ibuprofen (ADVIL,MOTRIN) 800 MG tablet Take 1 tablet by mouth Every 6 (Six) Hours As Needed for Mild Pain . 60 tablet 0   • insulin aspart prot-insulin aspart (novoLOG 70/30) (70-30) 100 UNIT/ML injection Inject 60 Units under the skin into the appropriate area as directed 2 (Two) Times a Day With Meals.     • levETIRAcetam (KEPPRA) 1000 MG tablet Take 1,000 mg by mouth 2 (Two) Times a Day.     •  levothyroxine (SYNTHROID, LEVOTHROID) 50 MCG tablet Take 50 mcg by mouth daily.     • lisinopril (PRINIVIL,ZESTRIL) 40 MG tablet Take 40 mg by mouth daily.     • metoprolol tartrate (LOPRESSOR) 25 MG tablet Take 1 tablet by mouth 2 (Two) Times a Day. 180 tablet 2   • OMEGA-3 KRILL OIL PO Take 350 mg by mouth Daily.     • pioglitazone (ACTOS) 30 MG tablet        No current facility-administered medications for this visit.         Allergies:      Allergies   Allergen Reactions   • Other         Past Surgical History:     Past Surgical History:   Procedure Laterality Date   • AMPUTATION DIGIT Right 9/13/2017    Procedure: RIGHT 1ST TOE AMPUTATION ;  Surgeon: Lee Lee MD;  Location: [facility];  Service:    • CARDIAC SURGERY     • CIRCUMCISION     • CORONARY ARTERY BYPASS GRAFT     • HERNIA REPAIR      X2   • KS DRAIN LOWER LEG DEEP ABSC/HEMATOMA Right 5/24/2017    Procedure: Debridement of right first toe;  Surgeon: Ronald Perdue MD;  Location: [facility];  Service: General   • TOE SURGERY Left     debridement         Social History:     Social History     Socioeconomic History   • Marital status:      Spouse name: Not on file   • Number of children: Not on file   • Years of education: Not on file   • Highest education level: Not on file   Tobacco Use   • Smoking status: Former Smoker     Packs/day: 2.00     Years: 45.00     Pack years: 90.00     Types: Cigarettes     Last attempt to quit: 2010     Years since quitting: 10.6   • Smokeless tobacco: Never Used   Substance and Sexual Activity   • Alcohol use: No   • Drug use: No   • Sexual activity: Defer       Family History:     Family History   Problem Relation Age of Onset   • Diabetes Mother    • Hypertension Mother    • Heart disease Mother        Review of Systems:     Review of Systems   Constitutional: Negative.    HENT: Negative.    Eyes: Negative.    Respiratory: Negative.    Cardiovascular: Negative.    Gastrointestinal: Negative.    Endocrine:  Negative.    Musculoskeletal: Negative.    Allergic/Immunologic: Negative.    Neurological: Negative.    Hematological: Negative.    Psychiatric/Behavioral: Negative.        Physical Exam:     Physical Exam   Constitutional: He is oriented to person, place, and time. He appears well-developed and well-nourished.   HENT:   Head: Normocephalic and atraumatic.   Eyes: Pupils are equal, round, and reactive to light. Conjunctivae and EOM are normal.   Neck: Normal range of motion.   Cardiovascular: Normal rate, regular rhythm, normal heart sounds and intact distal pulses.   Pulmonary/Chest: Effort normal and breath sounds normal.   Abdominal: Soft. Bowel sounds are normal.   Genitourinary:   Genitourinary Comments: Stable left testicular examination with mild tenderness at the globus major of the epididymis   Musculoskeletal: Normal range of motion.   Neurological: He is alert and oriented to person, place, and time. He has normal reflexes.   Skin: Skin is warm and dry.   Psychiatric: He has a normal mood and affect. His behavior is normal. Judgment and thought content normal.   Nursing note and vitals reviewed.      I have reviewed the following portions of the patient's history: allergies, current medications, past family history, past medical history, past social history, past surgical history, problem list and ROS and confirm it's accurate.      Procedure:       Assessment/Plan:   Testalgia- Flomax initially helped but he could not take it secondary to hypotensive and dizziness therefore he is just going to keep an eye on it I will see him back on an as-needed basis I do not recommend surgery            Patient's Body mass index is 33.72 kg/m². BMI is above normal parameters. Recommendations include: educational material.              This document has been electronically signed by PRAFUL PEREZ MD August 25, 2020 11:13

## 2020-08-26 ENCOUNTER — HOSPITAL ENCOUNTER (OUTPATIENT)
Dept: RESPIRATORY THERAPY | Facility: HOSPITAL | Age: 65
Discharge: HOME | End: 2020-08-26
Admitting: PHYSICIAN ASSISTANT

## 2020-08-26 DIAGNOSIS — R06.09 DYSPNEA ON EXERTION: ICD-10-CM

## 2020-08-26 PROCEDURE — 94727 GAS DIL/WSHOT DETER LNG VOL: CPT

## 2020-08-26 PROCEDURE — 94729 DIFFUSING CAPACITY: CPT

## 2020-08-26 PROCEDURE — 94060 EVALUATION OF WHEEZING: CPT

## 2020-08-26 PROCEDURE — 94729 DIFFUSING CAPACITY: CPT | Performed by: INTERNAL MEDICINE

## 2020-08-26 PROCEDURE — 94727 GAS DIL/WSHOT DETER LNG VOL: CPT | Performed by: INTERNAL MEDICINE

## 2020-08-26 PROCEDURE — 94640 AIRWAY INHALATION TREATMENT: CPT

## 2020-08-26 PROCEDURE — 94060 EVALUATION OF WHEEZING: CPT | Performed by: INTERNAL MEDICINE

## 2020-08-26 RX ORDER — ALBUTEROL SULFATE 2.5 MG/3ML
2.5 SOLUTION RESPIRATORY (INHALATION) ONCE
Status: COMPLETED | OUTPATIENT
Start: 2020-08-26 | End: 2020-08-26

## 2020-08-26 RX ADMIN — ALBUTEROL SULFATE 2.5 MG: 2.5 SOLUTION RESPIRATORY (INHALATION) at 12:41

## 2020-08-27 PROBLEM — N50.819 TESTALGIA: Status: ACTIVE | Noted: 2020-08-27

## 2020-09-01 ENCOUNTER — TELEPHONE (OUTPATIENT)
Dept: CARDIOLOGY | Facility: CLINIC | Age: 65
End: 2020-09-01

## 2020-09-01 NOTE — TELEPHONE ENCOUNTER
Result Notes for Full Pulmonary Function Test With Bronchodilator     Notes recorded by Shakila Gonzalez MA on 9/1/2020 at 3:42 PM EDT  Called pt and advised his wife. She expressed understanding.   Sent copy to PCP.  ------    Notes recorded by Melba Turpin APRN on 8/31/2020 at 3:53 PM EDT  PFT is abnormal. Please let him know to follow up with PCP for this. Also, please forward these results to his PCP as well.

## 2020-09-18 ENCOUNTER — TELEPHONE (OUTPATIENT)
Dept: CARDIOLOGY | Facility: CLINIC | Age: 65
End: 2020-09-18

## 2020-09-25 ENCOUNTER — TRANSCRIBE ORDERS (OUTPATIENT)
Dept: ADMINISTRATIVE | Facility: HOSPITAL | Age: 65
End: 2020-09-25

## 2020-09-25 DIAGNOSIS — N17.9 ACUTE RENAL FAILURE, UNSPECIFIED ACUTE RENAL FAILURE TYPE (HCC): Primary | ICD-10-CM

## 2020-10-26 ENCOUNTER — HOSPITAL ENCOUNTER (OUTPATIENT)
Dept: ULTRASOUND IMAGING | Facility: HOSPITAL | Age: 65
Discharge: HOME | End: 2020-10-26

## 2020-10-26 ENCOUNTER — TRANSCRIBE ORDERS (OUTPATIENT)
Dept: ADMINISTRATIVE | Facility: HOSPITAL | Age: 65
End: 2020-10-26

## 2020-10-26 ENCOUNTER — LAB (OUTPATIENT)
Dept: LAB | Facility: HOSPITAL | Age: 65
End: 2020-10-26

## 2020-10-26 DIAGNOSIS — N17.9 ACUTE RENAL FAILURE, UNSPECIFIED ACUTE RENAL FAILURE TYPE (HCC): ICD-10-CM

## 2020-10-26 DIAGNOSIS — N17.9 ACUTE RENAL FAILURE, UNSPECIFIED ACUTE RENAL FAILURE TYPE (HCC): Primary | ICD-10-CM

## 2020-10-26 PROCEDURE — 81001 URINALYSIS AUTO W/SCOPE: CPT

## 2020-10-26 PROCEDURE — 84156 ASSAY OF PROTEIN URINE: CPT

## 2020-10-26 PROCEDURE — 36415 COLL VENOUS BLD VENIPUNCTURE: CPT

## 2020-10-26 PROCEDURE — 76775 US EXAM ABDO BACK WALL LIM: CPT

## 2020-10-26 PROCEDURE — 82043 UR ALBUMIN QUANTITATIVE: CPT

## 2020-10-26 PROCEDURE — 76775 US EXAM ABDO BACK WALL LIM: CPT | Performed by: RADIOLOGY

## 2020-10-26 PROCEDURE — 82570 ASSAY OF URINE CREATININE: CPT

## 2020-10-26 PROCEDURE — 80053 COMPREHEN METABOLIC PANEL: CPT

## 2020-10-27 LAB
ALBUMIN SERPL-MCNC: 4.2 G/DL (ref 3.5–5.2)
ALBUMIN UR-MCNC: 31 MG/DL
ALBUMIN/GLOB SERPL: 1.4 G/DL
ALP SERPL-CCNC: 120 U/L (ref 39–117)
ALT SERPL W P-5'-P-CCNC: 26 U/L (ref 1–41)
ANION GAP SERPL CALCULATED.3IONS-SCNC: 13.1 MMOL/L (ref 5–15)
AST SERPL-CCNC: 21 U/L (ref 1–40)
BACTERIA UR QL AUTO: ABNORMAL /HPF
BILIRUB SERPL-MCNC: 0.5 MG/DL (ref 0–1.2)
BILIRUB UR QL STRIP: NEGATIVE
BUN SERPL-MCNC: 17 MG/DL (ref 8–23)
BUN/CREAT SERPL: 14.2 (ref 7–25)
CALCIUM SPEC-SCNC: 9.6 MG/DL (ref 8.6–10.5)
CHLORIDE SERPL-SCNC: 98 MMOL/L (ref 98–107)
CLARITY UR: ABNORMAL
CO2 SERPL-SCNC: 21.9 MMOL/L (ref 22–29)
COLOR UR: YELLOW
CREAT SERPL-MCNC: 1.2 MG/DL (ref 0.76–1.27)
CREAT UR-MCNC: 110.5 MG/DL
CREAT UR-MCNC: 110.5 MG/DL
GFR SERPL CREATININE-BSD FRML MDRD: 61 ML/MIN/1.73
GLOBULIN UR ELPH-MCNC: 3.1 GM/DL
GLUCOSE SERPL-MCNC: 243 MG/DL (ref 65–99)
GLUCOSE UR STRIP-MCNC: ABNORMAL MG/DL
HGB UR QL STRIP.AUTO: NEGATIVE
HYALINE CASTS UR QL AUTO: ABNORMAL /LPF
KETONES UR QL STRIP: NEGATIVE
LEUKOCYTE ESTERASE UR QL STRIP.AUTO: NEGATIVE
MICROALBUMIN/CREAT UR: 280.5 MG/G
NITRITE UR QL STRIP: NEGATIVE
PH UR STRIP.AUTO: 6 [PH] (ref 5–8)
POTASSIUM SERPL-SCNC: 4.7 MMOL/L (ref 3.5–5.2)
PROT SERPL-MCNC: 7.3 G/DL (ref 6–8.5)
PROT UR QL STRIP: ABNORMAL
PROT UR-MCNC: 60 MG/DL
PROT/CREAT UR: 543 MG/G CREA (ref 0–200)
RBC # UR: ABNORMAL /HPF
REF LAB TEST METHOD: ABNORMAL
SODIUM SERPL-SCNC: 133 MMOL/L (ref 136–145)
SP GR UR STRIP: 1.02 (ref 1–1.03)
SQUAMOUS #/AREA URNS HPF: ABNORMAL /HPF
UROBILINOGEN UR QL STRIP: ABNORMAL
WBC UR QL AUTO: ABNORMAL /HPF

## 2020-11-13 ENCOUNTER — OFFICE VISIT (OUTPATIENT)
Dept: CARDIOLOGY | Facility: CLINIC | Age: 65
End: 2020-11-13

## 2020-11-13 VITALS
BODY MASS INDEX: 32.35 KG/M2 | SYSTOLIC BLOOD PRESSURE: 130 MMHG | HEIGHT: 70 IN | WEIGHT: 226 LBS | HEART RATE: 47 BPM | OXYGEN SATURATION: 95 % | DIASTOLIC BLOOD PRESSURE: 55 MMHG | TEMPERATURE: 96.9 F

## 2020-11-13 DIAGNOSIS — I25.10 ASCVD (ARTERIOSCLEROTIC CARDIOVASCULAR DISEASE): Primary | ICD-10-CM

## 2020-11-13 DIAGNOSIS — I10 ESSENTIAL HYPERTENSION: ICD-10-CM

## 2020-11-13 PROCEDURE — 93000 ELECTROCARDIOGRAM COMPLETE: CPT | Performed by: PHYSICIAN ASSISTANT

## 2020-11-13 PROCEDURE — 99214 OFFICE O/P EST MOD 30 MIN: CPT | Performed by: PHYSICIAN ASSISTANT

## 2020-11-13 RX ORDER — DILTIAZEM HYDROCHLORIDE 120 MG/1
120 CAPSULE, COATED, EXTENDED RELEASE ORAL DAILY
Qty: 90 CAPSULE | Refills: 2 | Status: SHIPPED | OUTPATIENT
Start: 2020-11-13 | End: 2021-03-05

## 2020-11-13 NOTE — PROGRESS NOTES
Analilia Castellon MD  Christo Clifton  1955 11/13/2020    Patient Active Problem List   Diagnosis   • IDDM (insulin dependent diabetes mellitus)   • ASCVD (arteriosclerotic cardiovascular disease)   • S/P CABG x 3   • Essential hypertension   • Dyslipidemia   • Skin ulcer (CMS/HCC)   • Post-operative state   • Preop cardiovascular exam   • Epididymitis   • Testalgia       Dear Analilia Castellon MD:    Subjective     History of Present Illness:    Chief Complaint   Patient presents with   • Med Management   • Shortness of Breath   • Edema   • Palpitations       Christo Clifton is a pleasant 65 y.o. male with a past medical history significant for ASCVD S/P remote CABG, essential hypertension, dyslipidemia, and diabetes mellitus. He comes in today for routine cardilogy follow up.    Since patient was last seen he did have pulmonary function test performed which did show moderate restrictive lung disease. He does have chronic shortness of breath but does deny any recent worsening of this. He also denies any chest pains, dizziness, or syncope. He does have some palpitations at night when he lays down to sleep but otherwise does not feel them. He also has been seeing nephrology his renal function has improved with creatinine dropping from 1.7-1.2.    Allergies   Allergen Reactions   • Other    :      Current Outpatient Medications:   •  aspirin 81 MG EC tablet, Take 81 mg by mouth Every Night., Disp: , Rfl:   •  atorvastatin (LIPITOR) 40 MG tablet, Take 1 tablet by mouth Every Night., Disp: 90 tablet, Rfl: 3  •  dilTIAZem CD (CARDIZEM CD) 120 MG 24 hr capsule, Take 1 capsule by mouth Daily., Disp: 90 capsule, Rfl: 2  •  Ergocalciferol (VITAMIN D2 PO), Take  by mouth., Disp: , Rfl:   •  escitalopram (LEXAPRO) 10 MG tablet, Take 10 mg by mouth daily., Disp: , Rfl:   •  furosemide (LASIX) 20 MG tablet, Take 1 tablet by mouth Daily As Needed (Increase shortness of breath, increased pedal edema, or increased weight gain greater  than 2 to 3 pounds in a day)., Disp: 30 tablet, Rfl: 1  •  glipizide (GLUCOTROL) 5 MG tablet, Take 5 mg by mouth 2 (Two) Times a Day Before Meals., Disp: , Rfl:   •  ibuprofen (ADVIL,MOTRIN) 800 MG tablet, Take 1 tablet by mouth Every 6 (Six) Hours As Needed for Mild Pain ., Disp: 60 tablet, Rfl: 0  •  insulin aspart prot-insulin aspart (novoLOG 70/30) (70-30) 100 UNIT/ML injection, Inject 60 Units under the skin into the appropriate area as directed 2 (Two) Times a Day With Meals., Disp: , Rfl:   •  levETIRAcetam (KEPPRA) 1000 MG tablet, Take 1,000 mg by mouth 2 (Two) Times a Day., Disp: , Rfl:   •  levothyroxine (SYNTHROID, LEVOTHROID) 50 MCG tablet, Take 50 mcg by mouth daily., Disp: , Rfl:   •  lisinopril (PRINIVIL,ZESTRIL) 40 MG tablet, Take 40 mg by mouth daily., Disp: , Rfl:   •  metoprolol tartrate (LOPRESSOR) 25 MG tablet, Take 1 tablet by mouth 2 (Two) Times a Day., Disp: 180 tablet, Rfl: 2  •  OMEGA-3 KRILL OIL PO, Take 350 mg by mouth Daily., Disp: , Rfl:   •  pioglitazone (ACTOS) 30 MG tablet, , Disp: , Rfl:     The following portions of the patient's history were reviewed and updated as appropriate: allergies, current medications, past family history, past medical history, past social history, past surgical history and problem list.    Social History     Tobacco Use   • Smoking status: Former Smoker     Packs/day: 2.00     Years: 45.00     Pack years: 90.00     Types: Cigarettes     Quit date: 2010     Years since quitting: 10.8   • Smokeless tobacco: Never Used   Substance Use Topics   • Alcohol use: No   • Drug use: No       Review of Systems   Constitution: Negative for malaise/fatigue.   Cardiovascular: Positive for dyspnea on exertion. Negative for chest pain and irregular heartbeat.   Respiratory: Positive for shortness of breath. Negative for cough.    Hematologic/Lymphatic: Negative for bleeding problem. Does not bruise/bleed easily.   Gastrointestinal: Negative for nausea and vomiting.  "  Neurological: Negative for weakness.       Objective   Vitals:    11/13/20 1057   BP: 130/55   BP Location: Left arm   Patient Position: Sitting   Cuff Size: Adult   Pulse: (!) 47   Temp: 96.9 °F (36.1 °C)   TempSrc: Infrared   SpO2: 95%   Weight: 103 kg (226 lb)   Height: 177.8 cm (70\")     Body mass index is 32.43 kg/m².    Constitutional:       General: Not in acute distress.     Appearance: Healthy appearance. Well-developed and not in distress. Not diaphoretic.   Eyes:      Conjunctiva/sclera: Conjunctivae normal.      Pupils: Pupils are equal, round, and reactive to light.   HENT:      Head: Normocephalic and atraumatic.   Neck:      Vascular: No carotid bruit or JVD.   Pulmonary:      Effort: Pulmonary effort is normal. No respiratory distress.      Breath sounds: Wheezing present.   Cardiovascular:      Normal rate. Regular rhythm.   Skin:     General: Skin is cool.   Neurological:      Mental Status: Alert, oriented to person, place, and time and oriented to person, place and time.         Lab Results   Component Value Date     (L) 10/26/2020    K 4.7 10/26/2020    CL 98 10/26/2020    CO2 21.9 (L) 10/26/2020    BUN 17 10/26/2020    CREATININE 1.20 10/26/2020    GLUCOSE 243 (H) 10/26/2020    CALCIUM 9.6 10/26/2020    AST 21 10/26/2020    ALT 26 10/26/2020    ALKPHOS 120 (H) 10/26/2020    LABIL2 1.1 (L) 12/21/2015     No results found for: CKTOTAL  Lab Results   Component Value Date    WBC 8.05 11/15/2017    HGB 11.5 (L) 11/15/2017    HCT 34.8 (L) 11/15/2017     11/15/2017     No results found for: INR  No results found for: MG  Lab Results   Component Value Date    TSH 1.712 10/15/2018    PSA 1.9 05/14/2018    CHLPL 169 04/16/2015    TRIG 293 (H) 10/15/2018    HDL 39 (L) 10/15/2018    LDL 44 10/15/2018      No results found for: BNP    During this visit the following were done:  Labs Reviewed [x]    Labs Ordered []    Radiology Reports Reviewed [x]    Radiology Ordered []    PCP Records " Reviewed []    Referring Provider Records Reviewed []    ER Records Reviewed []    Hospital Records Reviewed []    History Obtained From Family []    Radiology Images Reviewed []    Other Reviewed []    Records Requested []         ECG 12 Lead    Date/Time: 11/13/2020 10:59 AM  Performed by: Tan Worthington PA-C  Authorized by: Tan Worthington PA-C   Comparison: compared with previous ECG   Similar to previous ECG  Rhythm: sinus bradycardia  Rhythm comments: rate of 47  Conduction: conduction normal  ST Segments: ST segments normal              Assessment/Plan    Diagnosis Plan   1. ASCVD (arteriosclerotic cardiovascular disease)     2. Essential hypertension              Recommendations:  1. ASCVD  1. Clinically is doing well and is asymptomatic. I will continue metoprolol, lisinopril, Lipitor, and aspirin.  2. Bradycardia  1. Almost certainly is medication induced I will decrease his diltiazem to 120 mg daily. If remains less than 60 bpm I did advise him to call her office and we will stop diltiazem altogether.      No follow-ups on file.    As always, I appreciate very much the opportunity to participate in the cardiovascular care of your patients.      With Best Regards,    Tan Worthington PA-C

## 2021-02-22 DIAGNOSIS — Z23 IMMUNIZATION DUE: ICD-10-CM

## 2021-03-05 ENCOUNTER — OFFICE VISIT (OUTPATIENT)
Dept: CARDIOLOGY | Facility: CLINIC | Age: 66
End: 2021-03-05

## 2021-03-05 VITALS
SYSTOLIC BLOOD PRESSURE: 127 MMHG | WEIGHT: 222.6 LBS | DIASTOLIC BLOOD PRESSURE: 70 MMHG | HEART RATE: 55 BPM | TEMPERATURE: 96.6 F | BODY MASS INDEX: 31.94 KG/M2

## 2021-03-05 DIAGNOSIS — E78.5 DYSLIPIDEMIA: ICD-10-CM

## 2021-03-05 DIAGNOSIS — I25.10 ASCVD (ARTERIOSCLEROTIC CARDIOVASCULAR DISEASE): Primary | ICD-10-CM

## 2021-03-05 DIAGNOSIS — I10 ESSENTIAL HYPERTENSION: ICD-10-CM

## 2021-03-05 PROCEDURE — 99214 OFFICE O/P EST MOD 30 MIN: CPT | Performed by: PHYSICIAN ASSISTANT

## 2021-03-05 RX ORDER — METOPROLOL SUCCINATE 25 MG/1
25 TABLET, EXTENDED RELEASE ORAL DAILY
Qty: 90 TABLET | Refills: 3 | Status: SHIPPED | OUTPATIENT
Start: 2021-03-05 | End: 2021-08-16 | Stop reason: SDUPTHER

## 2021-03-05 NOTE — PROGRESS NOTES
Analilia Castellon MD  Christo Clifton  1955 03/05/2021    Patient Active Problem List   Diagnosis   • IDDM (insulin dependent diabetes mellitus)   • ASCVD (arteriosclerotic cardiovascular disease)   • S/P CABG x 3   • Essential hypertension   • Dyslipidemia   • Skin ulcer (CMS/HCC)   • Post-operative state   • Preop cardiovascular exam   • Epididymitis   • Testalgia       Dear Analilia Castellon MD:    Subjective     History of Present Illness:    Chief Complaint   Patient presents with   • Follow-up     ROUTINE   • Med Management     list   • Numbness     hands       Christo Clifton is a pleasant 65 y.o. male with a past medical history significant for ASCVD S/P remote CABG, essential hypertension, dyslipidemia, and diabetes mellitus. He comes in today for routine cardilogy follow up.    Mr. Encarnacion reports that he is still fatigued and weak although this has become chronic that he thinks this is around his norm and although he is still bradycardic today at 54 he is still taking diltiazem 120 mg and metoprolol tartrate 25 mg twice daily.  He does deny any chest pains, shortness of breath worsening from his baseline, dizziness, or syncope recently.    Allergies   Allergen Reactions   • Other    :      Current Outpatient Medications:   •  aspirin 81 MG EC tablet, Take 81 mg by mouth Every Night., Disp: , Rfl:   •  atorvastatin (LIPITOR) 40 MG tablet, Take 1 tablet by mouth Every Night., Disp: 90 tablet, Rfl: 3  •  escitalopram (LEXAPRO) 10 MG tablet, Take 10 mg by mouth daily., Disp: , Rfl:   •  glipizide (GLUCOTROL) 5 MG tablet, Take 5 mg by mouth 2 (Two) Times a Day Before Meals., Disp: , Rfl:   •  ibuprofen (ADVIL,MOTRIN) 800 MG tablet, Take 1 tablet by mouth Every 6 (Six) Hours As Needed for Mild Pain ., Disp: 60 tablet, Rfl: 0  •  insulin aspart prot-insulin aspart (novoLOG 70/30) (70-30) 100 UNIT/ML injection, Inject 60 Units under the skin into the appropriate area as directed 2 (Two) Times a Day With Meals., Disp: ,  Rfl:   •  levETIRAcetam (KEPPRA) 1000 MG tablet, Take 1,000 mg by mouth 2 (Two) Times a Day., Disp: , Rfl:   •  levothyroxine (SYNTHROID, LEVOTHROID) 50 MCG tablet, Take 50 mcg by mouth daily., Disp: , Rfl:   •  lisinopril (PRINIVIL,ZESTRIL) 40 MG tablet, Take 40 mg by mouth daily., Disp: , Rfl:   •  OMEGA-3 KRILL OIL PO, Take 350 mg by mouth Daily., Disp: , Rfl:   •  Ergocalciferol (VITAMIN D2 PO), Take  by mouth., Disp: , Rfl:   •  metoprolol succinate XL (TOPROL-XL) 25 MG 24 hr tablet, Take 1 tablet by mouth Daily., Disp: 90 tablet, Rfl: 3    The following portions of the patient's history were reviewed and updated as appropriate: allergies, current medications, past family history, past medical history, past social history, past surgical history and problem list.    Social History     Tobacco Use   • Smoking status: Former Smoker     Packs/day: 2.00     Years: 45.00     Pack years: 90.00     Types: Cigarettes     Quit date:      Years since quittin.1   • Smokeless tobacco: Never Used   Substance Use Topics   • Alcohol use: No   • Drug use: No       ROS    Objective   Vitals:    21 0815   BP: 127/70   Pulse: 55   Temp: 96.6 °F (35.9 °C)   Weight: 101 kg (222 lb 9.6 oz)     Body mass index is 31.94 kg/m².    Constitutional:       General: Not in acute distress.     Appearance: Healthy appearance. Well-developed and not in distress. Not diaphoretic.   Eyes:      Conjunctiva/sclera: Conjunctivae normal.      Pupils: Pupils are equal, round, and reactive to light.   HENT:      Head: Normocephalic and atraumatic.   Neck:      Vascular: No carotid bruit or JVD.   Pulmonary:      Effort: Pulmonary effort is normal. No respiratory distress.      Breath sounds: Wheezing present.   Cardiovascular:      Normal rate. Regular rhythm.   Skin:     General: Skin is cool.   Neurological:      Mental Status: Alert, oriented to person, place, and time and oriented to person, place and time.         Lab Results    Component Value Date     (L) 10/26/2020    K 4.7 10/26/2020    CL 98 10/26/2020    CO2 21.9 (L) 10/26/2020    BUN 17 10/26/2020    CREATININE 1.20 10/26/2020    GLUCOSE 243 (H) 10/26/2020    CALCIUM 9.6 10/26/2020    AST 21 10/26/2020    ALT 26 10/26/2020    ALKPHOS 120 (H) 10/26/2020    LABIL2 1.1 (L) 12/21/2015     No results found for: CKTOTAL  Lab Results   Component Value Date    WBC 8.05 11/15/2017    HGB 11.5 (L) 11/15/2017    HCT 34.8 (L) 11/15/2017     11/15/2017     No results found for: INR  No results found for: MG  Lab Results   Component Value Date    TSH 1.712 10/15/2018    PSA 1.9 05/14/2018    CHLPL 169 04/16/2015    TRIG 293 (H) 10/15/2018    HDL 39 (L) 10/15/2018    LDL 44 10/15/2018      No results found for: BNP    During this visit the following were done:  Labs Reviewed [x]    Labs Ordered []    Radiology Reports Reviewed [x]    Radiology Ordered []    PCP Records Reviewed []    Referring Provider Records Reviewed []    ER Records Reviewed []    Hospital Records Reviewed []    History Obtained From Family []    Radiology Images Reviewed []    Other Reviewed []    Records Requested []       Procedures    Assessment/Plan    Diagnosis Plan   1. ASCVD (arteriosclerotic cardiovascular disease)     2. Dyslipidemia     3. Essential hypertension              Recommendations:  1. Bradycardia  1. Since patient is still bradycardic today I will go ahead and stop diltiazem.  2. Also going to switch his metoprolol tartrate to metoprolol succinate 25 mg daily.  2. Coronary artery disease  1. Doing well clinically we will continue current regimen.  Which does include aspirin, Lipitor, lisinopril, and metoprolol succinate.      No follow-ups on file.    As always, I appreciate very much the opportunity to participate in the cardiovascular care of your patients.      With Best Regards,    Tan Worthington PA-C

## 2021-03-17 ENCOUNTER — IMMUNIZATION (OUTPATIENT)
Dept: VACCINE CLINIC | Facility: HOSPITAL | Age: 66
End: 2021-03-17

## 2021-03-17 PROCEDURE — 0001A: CPT | Performed by: INTERNAL MEDICINE

## 2021-03-17 PROCEDURE — 91300 HC SARSCOV02 VAC 30MCG/0.3ML IM: CPT | Performed by: INTERNAL MEDICINE

## 2021-04-07 ENCOUNTER — IMMUNIZATION (OUTPATIENT)
Dept: VACCINE CLINIC | Facility: HOSPITAL | Age: 66
End: 2021-04-07

## 2021-04-07 PROCEDURE — 91300 HC SARSCOV02 VAC 30MCG/0.3ML IM: CPT | Performed by: INTERNAL MEDICINE

## 2021-04-07 PROCEDURE — 0002A: CPT | Performed by: INTERNAL MEDICINE

## 2021-05-19 ENCOUNTER — TELEPHONE (OUTPATIENT)
Dept: CARDIOLOGY | Facility: CLINIC | Age: 66
End: 2021-05-19

## 2021-05-25 NOTE — TELEPHONE ENCOUNTER
Medication List  As of 3/5/2021  8:56 AM     Aspirin 81 mg Oral Nightly     Atorvastatin Calcium 40 mg Oral Nightly     Ergocalciferol Oral     Escitalopram Oxalate 10 mg Oral Daily     glipiZIDE 5 mg Oral 2 Times Daily Before Meals     Ibuprofen 800 mg Oral Every 6 Hours PRN     Insulin Aspart Prot & Aspart (70-30) 100 UNIT/ML 60 Units Subcutaneous 2 Times Daily With Meals     Krill Oil 350 mg Oral Daily     levETIRAcetam 1,000 mg Oral 2 Times Daily     Levothyroxine Sodium 50 mcg Oral Daily     Lisinopril 40 mg Oral Daily     Metoprolol Succinate 25 mg Oral Daily      Spoke with pt's wife and advised her. She expressed understanding.

## 2021-06-04 ENCOUNTER — OFFICE VISIT (OUTPATIENT)
Dept: CARDIOLOGY | Facility: CLINIC | Age: 66
End: 2021-06-04

## 2021-06-04 ENCOUNTER — TELEPHONE (OUTPATIENT)
Dept: CARDIOLOGY | Facility: CLINIC | Age: 66
End: 2021-06-04

## 2021-06-04 VITALS
BODY MASS INDEX: 30.64 KG/M2 | HEIGHT: 70 IN | DIASTOLIC BLOOD PRESSURE: 53 MMHG | TEMPERATURE: 97.5 F | WEIGHT: 214 LBS | HEART RATE: 72 BPM | OXYGEN SATURATION: 98 % | SYSTOLIC BLOOD PRESSURE: 93 MMHG

## 2021-06-04 DIAGNOSIS — E78.5 DYSLIPIDEMIA: ICD-10-CM

## 2021-06-04 DIAGNOSIS — I25.10 ASCVD (ARTERIOSCLEROTIC CARDIOVASCULAR DISEASE): Primary | ICD-10-CM

## 2021-06-04 DIAGNOSIS — Z95.1 S/P CABG X 3: ICD-10-CM

## 2021-06-04 DIAGNOSIS — I10 ESSENTIAL HYPERTENSION: ICD-10-CM

## 2021-06-04 PROCEDURE — 93000 ELECTROCARDIOGRAM COMPLETE: CPT | Performed by: PHYSICIAN ASSISTANT

## 2021-06-04 PROCEDURE — 99213 OFFICE O/P EST LOW 20 MIN: CPT | Performed by: PHYSICIAN ASSISTANT

## 2021-06-04 NOTE — TELEPHONE ENCOUNTER
----- Message from Tan Worthington PA-C sent at 6/4/2021 12:34 PM EDT -----  Can we get recent blood work from pcp?         The office closed at noon on Friday

## 2021-06-04 NOTE — PROGRESS NOTES
Analilia Castellon MD  Christo Clifton  1955 06/04/2021    Patient Active Problem List   Diagnosis   • IDDM (insulin dependent diabetes mellitus)   • ASCVD (arteriosclerotic cardiovascular disease)   • S/P CABG x 3   • Essential hypertension   • Dyslipidemia   • Skin ulcer (CMS/HCC)   • Post-operative state   • Preop cardiovascular exam   • Epididymitis   • Testalgia       Dear Analilia Castellon MD:    Subjective     History of Present Illness:    Chief Complaint   Patient presents with   • Med Management     verbal.   • Edema   • Shortness of Breath   • Dizziness   • Palpitations       Christo Clifton is a pleasant 66 y.o. male with a past medical history significant for ASCVD S/P remote CABG, essential hypertension, dyslipidemia, and diabetes mellitus. He comes in today for routine cardilogy follow up.    Mr. Villafuerte reports overall he has been doing well from cardiac standpoint.  He reports since stopping diltiazem he denies any further episodes of bradycardia.  He also denies any chest pains, shortness of breath worsening from his baseline, dizziness, or syncope.    Allergies   Allergen Reactions   • Other    :      Current Outpatient Medications:   •  aspirin 81 MG EC tablet, Take 81 mg by mouth Every Night., Disp: , Rfl:   •  atorvastatin (LIPITOR) 40 MG tablet, Take 1 tablet by mouth Every Night., Disp: 90 tablet, Rfl: 3  •  Ergocalciferol (VITAMIN D2 PO), Take  by mouth., Disp: , Rfl:   •  escitalopram (LEXAPRO) 10 MG tablet, Take 10 mg by mouth daily., Disp: , Rfl:   •  glipizide (GLUCOTROL) 5 MG tablet, Take 5 mg by mouth 2 (Two) Times a Day Before Meals., Disp: , Rfl:   •  ibuprofen (ADVIL,MOTRIN) 800 MG tablet, Take 1 tablet by mouth Every 6 (Six) Hours As Needed for Mild Pain ., Disp: 60 tablet, Rfl: 0  •  insulin aspart prot-insulin aspart (novoLOG 70/30) (70-30) 100 UNIT/ML injection, Inject 60 Units under the skin into the appropriate area as directed 2 (Two) Times a Day With Meals., Disp: , Rfl:   •   "levETIRAcetam (KEPPRA) 1000 MG tablet, Take 1,000 mg by mouth 2 (Two) Times a Day., Disp: , Rfl:   •  levothyroxine (SYNTHROID, LEVOTHROID) 50 MCG tablet, Take 50 mcg by mouth daily., Disp: , Rfl:   •  lisinopril (PRINIVIL,ZESTRIL) 40 MG tablet, Take 40 mg by mouth daily., Disp: , Rfl:   •  metoprolol succinate XL (TOPROL-XL) 25 MG 24 hr tablet, Take 1 tablet by mouth Daily., Disp: 90 tablet, Rfl: 3  •  OMEGA-3 KRILL OIL PO, Take 350 mg by mouth Daily., Disp: , Rfl:     The following portions of the patient's history were reviewed and updated as appropriate: allergies, current medications, past family history, past medical history, past social history, past surgical history and problem list.    Social History     Tobacco Use   • Smoking status: Former Smoker     Packs/day: 2.00     Years: 45.00     Pack years: 90.00     Types: Cigarettes     Quit date:      Years since quittin.4   • Smokeless tobacco: Never Used   Substance Use Topics   • Alcohol use: No   • Drug use: No         Objective   Vitals:    21 1018   BP: 93/53   BP Location: Right arm   Patient Position: Sitting   Cuff Size: Adult   Pulse: 72   Temp: 97.5 °F (36.4 °C)   TempSrc: Infrared   SpO2: 98%   Weight: 97.1 kg (214 lb)   Height: 177.8 cm (70\")     Body mass index is 30.71 kg/m².    Constitutional:       General: Not in acute distress.     Appearance: Healthy appearance. Well-developed and not in distress. Not diaphoretic.   Eyes:      Conjunctiva/sclera: Conjunctivae normal.      Pupils: Pupils are equal, round, and reactive to light.   HENT:      Head: Normocephalic and atraumatic.   Neck:      Vascular: No carotid bruit or JVD.   Pulmonary:      Effort: Pulmonary effort is normal. No respiratory distress.      Breath sounds: Normal breath sounds.   Cardiovascular:      Normal rate. Regular rhythm.   Skin:     General: Skin is cool.   Neurological:      Mental Status: Alert, oriented to person, place, and time and oriented to " person, place and time.         Lab Results   Component Value Date     (L) 10/26/2020    K 4.7 10/26/2020    CL 98 10/26/2020    CO2 21.9 (L) 10/26/2020    BUN 17 10/26/2020    CREATININE 1.20 10/26/2020    GLUCOSE 243 (H) 10/26/2020    CALCIUM 9.6 10/26/2020    AST 21 10/26/2020    ALT 26 10/26/2020    ALKPHOS 120 (H) 10/26/2020    LABIL2 1.1 (L) 12/21/2015     No results found for: CKTOTAL  Lab Results   Component Value Date    WBC 8.05 11/15/2017    HGB 11.5 (L) 11/15/2017    HCT 34.8 (L) 11/15/2017     11/15/2017     No results found for: INR  No results found for: MG  Lab Results   Component Value Date    TSH 1.712 10/15/2018    PSA 1.9 05/14/2018    CHLPL 169 04/16/2015    TRIG 293 (H) 10/15/2018    HDL 39 (L) 10/15/2018    LDL 44 10/15/2018      No results found for: BNP    During this visit the following were done:  Labs Reviewed [x]    Labs Ordered []    Radiology Reports Reviewed []    Radiology Ordered []    PCP Records Reviewed []    Referring Provider Records Reviewed []    ER Records Reviewed []    Hospital Records Reviewed []    History Obtained From Family []    Radiology Images Reviewed []    Other Reviewed []    Records Requested []         ECG 12 Lead    Date/Time: 6/4/2021 10:14 AM  Performed by: Tan Worthington PA-C  Authorized by: Tan Worthington PA-C   Comparison: compared with previous ECG   Comparison to previous ECG: Nonspecific ST changes in V4 and V5  Rhythm: sinus rhythm  ST Depression: V5 and V6  Other findings: non-specific ST-T wave changes    Clinical impression: non-specific ECG            Assessment/Plan    Diagnosis Plan   1. ASCVD (arteriosclerotic cardiovascular disease)     2. S/P CABG x 3     3. Essential hypertension     4. Dyslipidemia              Recommendations:  1. ASCVD  1. Clinically stable and asymptomatic we will continue current regimen of aspirin, Lipitor, lisinopril, metoprolol.  2. Essential hypertension  1. Currently well controlled we will  continue regimen mentioned above.  2. We will request recent blood work from PCP      Return in about 6 months (around 12/4/2021).    As always, I appreciate very much the opportunity to participate in the cardiovascular care of your patients.      With Best Regards,    Tan Worthington PA-C

## 2021-06-11 ENCOUNTER — TELEPHONE (OUTPATIENT)
Dept: CARDIOLOGY | Facility: CLINIC | Age: 66
End: 2021-06-11

## 2021-06-11 NOTE — TELEPHONE ENCOUNTER
----- Message from Tan Worthington PA-C sent at 6/4/2021 12:34 PM EDT -----  Can we get recent blood work from pcp?       REQUESTED

## 2021-06-18 DIAGNOSIS — I25.10 ASCVD (ARTERIOSCLEROTIC CARDIOVASCULAR DISEASE): Primary | ICD-10-CM

## 2021-08-16 ENCOUNTER — TELEPHONE (OUTPATIENT)
Dept: CARDIOLOGY | Facility: CLINIC | Age: 66
End: 2021-08-16

## 2021-08-16 RX ORDER — METOPROLOL SUCCINATE 25 MG/1
25 TABLET, EXTENDED RELEASE ORAL DAILY
Qty: 90 TABLET | Refills: 3 | Status: SHIPPED | OUTPATIENT
Start: 2021-08-16 | End: 2021-12-06

## 2021-12-06 ENCOUNTER — OFFICE VISIT (OUTPATIENT)
Dept: CARDIOLOGY | Facility: CLINIC | Age: 66
End: 2021-12-06

## 2021-12-06 VITALS
BODY MASS INDEX: 31.7 KG/M2 | RESPIRATION RATE: 16 BRPM | SYSTOLIC BLOOD PRESSURE: 115 MMHG | HEART RATE: 58 BPM | DIASTOLIC BLOOD PRESSURE: 64 MMHG | WEIGHT: 214 LBS | HEIGHT: 69 IN | TEMPERATURE: 96.9 F

## 2021-12-06 DIAGNOSIS — I10 ESSENTIAL HYPERTENSION: ICD-10-CM

## 2021-12-06 DIAGNOSIS — Z95.1 S/P CABG X 3: Primary | ICD-10-CM

## 2021-12-06 PROCEDURE — 99213 OFFICE O/P EST LOW 20 MIN: CPT | Performed by: PHYSICIAN ASSISTANT

## 2021-12-06 PROCEDURE — 93000 ELECTROCARDIOGRAM COMPLETE: CPT | Performed by: PHYSICIAN ASSISTANT

## 2021-12-06 NOTE — PROGRESS NOTES
Analilia Castellon MD  Christo Clifton  1955 12/06/2021    Patient Active Problem List   Diagnosis   • IDDM (insulin dependent diabetes mellitus)   • ASCVD (arteriosclerotic cardiovascular disease)   • S/P CABG x 3   • Essential hypertension   • Dyslipidemia   • Skin ulcer (HCC)   • Post-operative state   • Preop cardiovascular exam   • Epididymitis   • Testalgia       Dear Analilia Castellon MD:    Subjective     History of Present Illness:    Chief Complaint   Patient presents with   • Coronary Artery Disease     6 mos follow   • Shortness of Breath     unchanged   • Med Management     list provided       Christo Clifton is a pleasant 66 y.o. male with a past medical history significant for ASCVD S/P remote CABG, essential hypertension, dyslipidemia, and diabetes mellitus. He comes in today for routine cardilogy follow up.    Plan has no complaints with open questions today.  He does have chronic dyspnea but denies any recent worsening of this.  He also denies any chest pains, palpitations, dizziness, syncope, or presyncope.  Blood pressure is also been stable as far as he is aware.      Allergies   Allergen Reactions   • Other    :      Current Outpatient Medications:   •  aspirin 81 MG EC tablet, Take 81 mg by mouth Every Night., Disp: , Rfl:   •  atorvastatin (LIPITOR) 40 MG tablet, Take 1 tablet by mouth Every Night., Disp: 90 tablet, Rfl: 3  •  Dulaglutide (TRULICITY SC), Inject  under the skin into the appropriate area as directed 1 (One) Time Per Week., Disp: , Rfl:   •  escitalopram (LEXAPRO) 10 MG tablet, Take 10 mg by mouth daily., Disp: , Rfl:   •  glipizide (GLUCOTROL) 5 MG tablet, Take 5 mg by mouth 2 (Two) Times a Day Before Meals., Disp: , Rfl:   •  insulin aspart prot-insulin aspart (novoLOG 70/30) (70-30) 100 UNIT/ML injection, Inject 60 Units under the skin into the appropriate area as directed 2 (Two) Times a Day With Meals., Disp: , Rfl:   •  levETIRAcetam (KEPPRA) 1000 MG tablet, Take 1,000 mg by  "mouth 2 (Two) Times a Day., Disp: , Rfl:   •  levothyroxine (SYNTHROID, LEVOTHROID) 50 MCG tablet, Take 50 mcg by mouth daily., Disp: , Rfl:   •  lisinopril (PRINIVIL,ZESTRIL) 40 MG tablet, Take 40 mg by mouth daily., Disp: , Rfl:   •  OMEGA-3 KRILL OIL PO, Take 350 mg by mouth Daily., Disp: , Rfl:   •  Ergocalciferol (VITAMIN D2 PO), Take  by mouth., Disp: , Rfl:   •  ibuprofen (ADVIL,MOTRIN) 800 MG tablet, Take 1 tablet by mouth Every 6 (Six) Hours As Needed for Mild Pain ., Disp: 60 tablet, Rfl: 0  •  metoprolol tartrate (LOPRESSOR) 25 MG tablet, Take 1 tablet by mouth 2 (Two) Times a Day., Disp: 180 tablet, Rfl: 3    The following portions of the patient's history were reviewed and updated as appropriate: allergies, current medications, past family history, past medical history, past social history, past surgical history and problem list.    Social History     Tobacco Use   • Smoking status: Former Smoker     Packs/day: 2.00     Years: 45.00     Pack years: 90.00     Types: Cigarettes     Quit date:      Years since quittin.9   • Smokeless tobacco: Never Used   Substance Use Topics   • Alcohol use: No   • Drug use: No         Objective   Vitals:    21 0857   BP: 115/64   Pulse: 58   Resp: 16   Temp: 96.9 °F (36.1 °C)   Weight: 97.1 kg (214 lb)   Height: 175.3 cm (69\")     Body mass index is 31.6 kg/m².    Constitutional:       General: Not in acute distress.     Appearance: Healthy appearance. Well-developed and not in distress. Not diaphoretic.   Eyes:      Conjunctiva/sclera: Conjunctivae normal.      Pupils: Pupils are equal, round, and reactive to light.   HENT:      Head: Normocephalic and atraumatic.   Neck:      Vascular: No carotid bruit or JVD.   Pulmonary:      Effort: Pulmonary effort is normal. No respiratory distress.      Breath sounds: Normal breath sounds.   Cardiovascular:      Normal rate. Regular rhythm.   Skin:     General: Skin is cool.   Neurological:      Mental Status: " Alert, oriented to person, place, and time and oriented to person, place and time.       Lab Results   Component Value Date     (L) 10/26/2020    K 4.7 10/26/2020    CL 98 10/26/2020    CO2 21.9 (L) 10/26/2020    BUN 17 10/26/2020    CREATININE 1.20 10/26/2020    GLUCOSE 243 (H) 10/26/2020    CALCIUM 9.6 10/26/2020    AST 21 10/26/2020    ALT 26 10/26/2020    ALKPHOS 120 (H) 10/26/2020    LABIL2 1.1 (L) 12/21/2015     No results found for: CKTOTAL  Lab Results   Component Value Date    WBC 8.05 11/15/2017    HGB 11.5 (L) 11/15/2017    HCT 34.8 (L) 11/15/2017     11/15/2017     No results found for: INR  No results found for: MG  Lab Results   Component Value Date    TSH 1.712 10/15/2018    PSA 1.9 05/14/2018    CHLPL 169 04/16/2015    TRIG 293 (H) 10/15/2018    HDL 39 (L) 10/15/2018    LDL 44 10/15/2018      No results found for: BNP    During this visit the following were done:  Labs Reviewed []    Labs Ordered []    Radiology Reports Reviewed []    Radiology Ordered []    PCP Records Reviewed []    Referring Provider Records Reviewed []    ER Records Reviewed []    Hospital Records Reviewed []    History Obtained From Family []    Radiology Images Reviewed []    Other Reviewed []    Records Requested []         ECG 12 Lead    Date/Time: 12/6/2021 8:58 AM  Performed by: Tan Worthington PA-C  Authorized by: Tan Worthington PA-C   Comparison: compared with previous ECG   Similar to previous ECG  Rhythm: sinus rhythm  Conduction: conduction normal  T inversion: V6 and V5    Clinical impression: non-specific ECG            Assessment/Plan    Diagnosis Plan   1. S/P CABG x 3     2. Essential hypertension              Recommendations:  1. ASCVD  1. Denies any recent chest pains.  We will continue aspirin, Lipitor, lisinopril, metoprolol.  2. Essential hypertension  1. Currently well controlled continue current regimen mentioned above      No follow-ups on file.    As always, I appreciate very much the  opportunity to participate in the cardiovascular care of your patients.      With Best Regards,    Tan Worthington PA-C

## 2022-08-17 ENCOUNTER — TRANSCRIBE ORDERS (OUTPATIENT)
Dept: CARDIOLOGY | Facility: HOSPITAL | Age: 67
End: 2022-08-17

## 2022-08-17 ENCOUNTER — HOSPITAL ENCOUNTER (OUTPATIENT)
Dept: CARDIOLOGY | Facility: HOSPITAL | Age: 67
Discharge: HOME | End: 2022-08-17
Admitting: FAMILY MEDICINE

## 2022-08-17 DIAGNOSIS — E11.65 TYPE 2 DIABETES MELLITUS WITH HYPERGLYCEMIA, UNSPECIFIED WHETHER LONG TERM INSULIN USE: Primary | ICD-10-CM

## 2022-08-17 DIAGNOSIS — E11.65 TYPE 2 DIABETES MELLITUS WITH HYPERGLYCEMIA, UNSPECIFIED WHETHER LONG TERM INSULIN USE: ICD-10-CM

## 2022-08-17 PROCEDURE — 93010 ELECTROCARDIOGRAM REPORT: CPT | Performed by: INTERNAL MEDICINE

## 2022-08-17 PROCEDURE — 93005 ELECTROCARDIOGRAM TRACING: CPT | Performed by: FAMILY MEDICINE

## 2022-08-18 LAB
QT INTERVAL: 452 MS
QTC INTERVAL: 443 MS

## 2022-08-29 ENCOUNTER — TRANSCRIBE ORDERS (OUTPATIENT)
Dept: LAB | Facility: HOSPITAL | Age: 67
End: 2022-08-29

## 2022-08-29 ENCOUNTER — LAB (OUTPATIENT)
Dept: LAB | Facility: HOSPITAL | Age: 67
End: 2022-08-29

## 2022-08-29 DIAGNOSIS — M25.50 MULTIPLE JOINT PAIN: Primary | ICD-10-CM

## 2022-08-29 DIAGNOSIS — M25.50 MULTIPLE JOINT PAIN: ICD-10-CM

## 2022-08-29 PROCEDURE — 86431 RHEUMATOID FACTOR QUANT: CPT

## 2022-08-29 PROCEDURE — 85652 RBC SED RATE AUTOMATED: CPT

## 2022-08-29 PROCEDURE — 86038 ANTINUCLEAR ANTIBODIES: CPT

## 2022-08-29 PROCEDURE — 86140 C-REACTIVE PROTEIN: CPT

## 2022-08-29 PROCEDURE — 36415 COLL VENOUS BLD VENIPUNCTURE: CPT

## 2022-08-30 LAB
CHROMATIN AB SERPL-ACNC: <10 IU/ML (ref 0–14)
CRP SERPL-MCNC: 0.77 MG/DL (ref 0–0.5)
ERYTHROCYTE [SEDIMENTATION RATE] IN BLOOD: 37 MM/HR (ref 0–20)

## 2022-08-31 LAB — ANA SER QL: NEGATIVE

## 2022-09-28 ENCOUNTER — TRANSCRIBE ORDERS (OUTPATIENT)
Dept: ADMINISTRATIVE | Facility: HOSPITAL | Age: 67
End: 2022-09-28

## 2022-09-28 DIAGNOSIS — N20.0 URIC ACID NEPHROLITHIASIS: Primary | ICD-10-CM

## 2023-04-05 NOTE — PROGRESS NOTES
Analilia Castellon MD  Christo Clifton  1955 07/13/2020    Patient Active Problem List   Diagnosis   • IDDM (insulin dependent diabetes mellitus)   • ASCVD (arteriosclerotic cardiovascular disease)   • S/P CABG x 3   • Essential hypertension   • Dyslipidemia   • Skin ulcer (CMS/HCC)   • Post-operative state   • Preop cardiovascular exam       Dear Analilia Castellon MD:    Subjective     History of Present Illness:    Chief Complaint   Patient presents with   • Follow-up     for problems listed below   • Med Management     does not have   • Edema     right and left ankle area   • Shortness of Breath     at times when walking and sitting       Christo Clifton is a pleasant 65 y.o. male with a past medical history significant for ASCVD S/P remote CABG, essential hypertension, dyslipidemia, and diabetes mellitus. He comes in today for routine cardilogy follow up.    Patient reports that he has been having issues with pedal edema lately.  He noticed about a week and a half ago that he has been having increased swelling particularly worse on the right than the left.  He does report that he is noticed some increased shortness of breath lately as well and get slightly more short of breath when laying down at night but reports that he only sleeps with 1 pillow nightly.  He denies any PND.  He also denies any chest pains recently as well.    Allergies   Allergen Reactions   • Other    :      Current Outpatient Medications:   •  aspirin 81 MG EC tablet, Take 81 mg by mouth Every Night., Disp: , Rfl:   •  atorvastatin (LIPITOR) 40 MG tablet, Take 1 tablet by mouth Every Night., Disp: 90 tablet, Rfl: 3  •  escitalopram (LEXAPRO) 10 MG tablet, Take 10 mg by mouth daily., Disp: , Rfl:   •  fluticasone (FLONASE) 50 MCG/ACT nasal spray, , Disp: , Rfl:   •  ibuprofen (ADVIL,MOTRIN) 800 MG tablet, Take 1 tablet by mouth Every 6 (Six) Hours As Needed for Mild Pain ., Disp: 60 tablet, Rfl: 0  •  insulin aspart prot-insulin aspart (novoLOG  Exercise Session Detail 70/30) (70-30) 100 UNIT/ML injection, Inject 60 Units under the skin into the appropriate area as directed 2 (Two) Times a Day With Meals., Disp: , Rfl:   •  levothyroxine (SYNTHROID, LEVOTHROID) 50 MCG tablet, Take 50 mcg by mouth daily., Disp: , Rfl:   •  lisinopril (PRINIVIL,ZESTRIL) 40 MG tablet, Take 40 mg by mouth daily., Disp: , Rfl:   •  OMEGA-3 KRILL OIL PO, Take 350 mg by mouth Daily., Disp: , Rfl:   •  pioglitazone (ACTOS) 30 MG tablet, , Disp: , Rfl:   •  vitamin C (ASCORBIC ACID) 500 MG tablet, Take 1 tablet by mouth Daily., Disp: 60 tablet, Rfl: 1  •  clindamycin (CLEOCIN) 300 MG capsule, Take 1 capsule by mouth 3 (Three) Times a Day., Disp: 30 capsule, Rfl: 2  •  furosemide (LASIX) 20 MG tablet, Take 1 tablet by mouth Daily As Needed (Increase shortness of breath, increased pedal edema, or increased weight gain greater than 2 to 3 pounds in a day)., Disp: 30 tablet, Rfl: 1  •  gabapentin (NEURONTIN) 600 MG tablet, , Disp: , Rfl:   •  HYDROcodone-acetaminophen (NORCO)  MG per tablet, Take 1 tablet by mouth Every 6 (Six) Hours As Needed for Moderate Pain ., Disp: 45 tablet, Rfl: 0  •  insulin aspart protamine-insulin aspart (NovoLOG 70/30) (70-30) 100 UNIT/ML injection, Inject 60 Units under the skin into the appropriate area as directed 2 (Two) Times a Day With Meals., Disp: , Rfl:   •  levETIRAcetam (KEPPRA) 1000 MG tablet, Take 1,000 mg by mouth 2 (Two) Times a Day., Disp: , Rfl:   •  metoprolol tartrate (LOPRESSOR) 25 MG tablet, Take 1 tablet by mouth 2 (Two) Times a Day., Disp: 180 tablet, Rfl: 2  •  NOVOLIN 70/30 (70-30) 100 UNIT/ML injection, , Disp: , Rfl:   •  phenytoin (DILANTIN) 100 MG ER capsule, Take 300 mg by mouth 2 (Two) Times a Day., Disp: , Rfl:     The following portions of the patient's history were reviewed and updated as appropriate: allergies, current medications, past family history, past medical history, past social history, past surgical history and problem  "list.    Social History     Tobacco Use   • Smoking status: Former Smoker     Packs/day: 2.00     Years: 45.00     Pack years: 90.00     Types: Cigarettes     Last attempt to quit: 2010     Years since quitting: 10.5   • Smokeless tobacco: Never Used   Substance Use Topics   • Alcohol use: No   • Drug use: No       Review of Systems   Constitution: Negative for malaise/fatigue.   Cardiovascular: Positive for dyspnea on exertion. Negative for chest pain and irregular heartbeat.   Respiratory: Positive for shortness of breath. Negative for cough.    Hematologic/Lymphatic: Negative for bleeding problem. Does not bruise/bleed easily.   Gastrointestinal: Negative for nausea and vomiting.   Neurological: Negative for weakness.       Objective   Vitals:    07/13/20 1029   BP: 135/62   BP Location: Left arm   Patient Position: Sitting   Cuff Size: Large Adult   Pulse: 52   Resp: 18   Temp: 98.6 °F (37 °C)   TempSrc: Temporal   SpO2: 97%   Weight: 108 kg (237 lb)   Height: 180.3 cm (70.98\")     Body mass index is 33.07 kg/m².    Physical Exam   Constitutional: He is oriented to person, place, and time. He appears well-developed and well-nourished. No distress.   HENT:   Head: Normocephalic and atraumatic.   Cardiovascular: Normal rate and regular rhythm.   Murmur ( soft systolic murmur best heart RUSB) heard.  Pulmonary/Chest: Effort normal and breath sounds normal. No respiratory distress.   Musculoskeletal: He exhibits edema ( 1-2+ pedal edema bilaterally. ).   Neurological: He is alert and oriented to person, place, and time.   Skin: He is not diaphoretic.       Lab Results   Component Value Date     (L) 10/15/2018    K 4.4 10/15/2018    CL 99 10/15/2018    CO2 27.9 10/15/2018    BUN 20 10/15/2018    CREATININE 1.10 10/15/2018    GLUCOSE 306 (H) 10/15/2018    CALCIUM 9.7 10/15/2018    AST 21 10/15/2018    ALT 33 10/15/2018    ALKPHOS 133 (H) 10/15/2018    LABIL2 1.1 (L) 12/21/2015     No results found for: " CKTOTAL  Lab Results   Component Value Date    WBC 8.05 11/15/2017    HGB 11.5 (L) 11/15/2017    HCT 34.8 (L) 11/15/2017     11/15/2017     No results found for: INR  No results found for: MG  Lab Results   Component Value Date    TSH 1.712 10/15/2018    PSA 1.9 05/14/2018    CHLPL 169 04/16/2015    TRIG 293 (H) 10/15/2018    HDL 39 (L) 10/15/2018    LDL 44 10/15/2018      No results found for: BNP    During this visit the following were done:  Labs Reviewed [x]    Labs Ordered []    Radiology Reports Reviewed [x]    Radiology Ordered []    PCP Records Reviewed []    Referring Provider Records Reviewed []    ER Records Reviewed []    Hospital Records Reviewed []    History Obtained From Family []    Radiology Images Reviewed []    Other Reviewed []    Records Requested []         ECG 12 Lead  Date/Time: 7/13/2020 10:29 AM  Performed by: Tan Worthington PA-C  Authorized by: Tan Worthington PA-C   Comparison: compared with previous ECG   Similar to previous ECG  Rhythm: sinus rhythm    Clinical impression: non-specific ECG            Assessment/Plan    Diagnosis Plan   1. ASCVD (arteriosclerotic cardiovascular disease)     2. Essential hypertension     3. Dyslipidemia     4. Shortness of breath  Basic Metabolic Panel    Adult Transthoracic Echo Complete W/ Cont if Necessary Per Protocol    XR Chest 2 View            Recommendations:  1. For patient's increase shortness of breath and pedal edema I will prescribe him Lasix 20 mg daily to take as needed.  2. I am also going to be ordering a chest x-ray and echocardiogram for further evaluation.  3. I did ask him to check a BMP in 1 week.      Return in about 4 weeks (around 8/10/2020).    As always, I appreciate very much the opportunity to participate in the cardiovascular care of your patients.      With Best Regards,    Tan Worthington PA-C

## 2024-05-17 PROCEDURE — 99283 EMERGENCY DEPT VISIT LOW MDM: CPT

## 2024-05-17 PROCEDURE — 82948 REAGENT STRIP/BLOOD GLUCOSE: CPT

## 2024-05-18 ENCOUNTER — HOSPITAL ENCOUNTER (EMERGENCY)
Facility: HOSPITAL | Age: 69
Discharge: HOME | End: 2024-05-18
Attending: EMERGENCY MEDICINE
Payer: MEDICARE

## 2024-05-18 VITALS
DIASTOLIC BLOOD PRESSURE: 71 MMHG | TEMPERATURE: 98.2 F | RESPIRATION RATE: 17 BRPM | SYSTOLIC BLOOD PRESSURE: 176 MMHG | WEIGHT: 170 LBS | HEIGHT: 71 IN | OXYGEN SATURATION: 100 % | HEART RATE: 56 BPM | BODY MASS INDEX: 23.8 KG/M2

## 2024-05-18 DIAGNOSIS — J02.9 ACUTE PHARYNGITIS, UNSPECIFIED ETIOLOGY: Primary | ICD-10-CM

## 2024-05-18 DIAGNOSIS — I10 PRIMARY HYPERTENSION: ICD-10-CM

## 2024-05-18 LAB — GLUCOSE BLDC GLUCOMTR-MCNC: 252 MG/DL (ref 70–130)

## 2024-05-18 RX ORDER — CEFDINIR 300 MG/1
300 CAPSULE ORAL ONCE
Status: COMPLETED | OUTPATIENT
Start: 2024-05-18 | End: 2024-05-18

## 2024-05-18 RX ORDER — CEFDINIR 300 MG/1
300 CAPSULE ORAL 2 TIMES DAILY
Qty: 14 CAPSULE | Refills: 0 | Status: SHIPPED | OUTPATIENT
Start: 2024-05-18

## 2024-05-18 RX ORDER — CLONIDINE HYDROCHLORIDE 0.1 MG/1
0.1 TABLET ORAL ONCE
Status: COMPLETED | OUTPATIENT
Start: 2024-05-18 | End: 2024-05-18

## 2024-05-18 RX ADMIN — CLONIDINE HYDROCHLORIDE 0.1 MG: 0.1 TABLET ORAL at 01:02

## 2024-05-18 RX ADMIN — CEFDINIR 300 MG: 300 CAPSULE ORAL at 01:02

## 2024-05-20 NOTE — ED PROVIDER NOTES
Subjective   History of Present Illness  Patient presents to eR with headache and elevated blood sugar    Headache  Pain location:  Frontal  Quality:  Dull  Severity currently:  4/10  Severity at highest:  4/10  Onset quality:  Gradual  Duration:  2 days  Progression:  Waxing and waning  Chronicity:  Recurrent  Associated symptoms: fatigue        Review of Systems   Constitutional:  Positive for activity change and fatigue.   Eyes: Negative.    Respiratory: Negative.     Cardiovascular: Negative.    Gastrointestinal: Negative.    Endocrine: Negative.    Genitourinary:  Positive for frequency.   Allergic/Immunologic: Negative.    Neurological:  Positive for headaches.   Hematological: Negative.    Psychiatric/Behavioral: Negative.         Past Medical History:   Diagnosis Date    Anxiety and depression     Arthritis     COPD (chronic obstructive pulmonary disease)     Coronary artery disease     Diabetes mellitus     Disease of thyroid gland     Dyslipidemia     History of transfusion     Hypertension     Seizure disorder     Pt states last seizure x1 mo.       Allergies   Allergen Reactions    Other        Past Surgical History:   Procedure Laterality Date    AMPUTATION DIGIT Right 9/13/2017    Procedure: RIGHT 1ST TOE AMPUTATION ;  Surgeon: Lee Lee MD;  Location: [facility];  Service:     CARDIAC SURGERY      CIRCUMCISION      CORONARY ARTERY BYPASS GRAFT      HERNIA REPAIR      X2    TN DRAIN LOWER LEG DEEP ABSC/HEMATOMA Right 5/24/2017    Procedure: Debridement of right first toe;  Surgeon: Ronald Perdue MD;  Location: [facility] OR;  Service: General    TOE SURGERY Left     debridement       Family History   Problem Relation Age of Onset    Diabetes Mother     Hypertension Mother     Heart disease Mother        Social History     Socioeconomic History    Marital status:    Tobacco Use    Smoking status: Former     Current packs/day: 0.00     Average packs/day: 2.0 packs/day for 45.0 years (90.0 ttl  pk-yrs)     Types: Cigarettes     Start date:      Quit date: 2010     Years since quittin.3    Smokeless tobacco: Never   Substance and Sexual Activity    Alcohol use: No    Drug use: No    Sexual activity: Defer           Objective   Physical Exam  Vitals and nursing note reviewed.   Constitutional:       Appearance: Normal appearance.   HENT:      Head: Normocephalic.      Nose: Nose normal.      Mouth/Throat:      Mouth: Mucous membranes are moist.   Eyes:      Pupils: Pupils are equal, round, and reactive to light.   Cardiovascular:      Rate and Rhythm: Normal rate and regular rhythm.   Pulmonary:      Effort: Pulmonary effort is normal.   Abdominal:      General: Abdomen is flat.   Musculoskeletal:         General: Normal range of motion.      Cervical back: Normal range of motion.   Skin:     General: Skin is warm and dry.   Neurological:      General: No focal deficit present.      Mental Status: He is alert.   Psychiatric:         Mood and Affect: Mood normal.         Procedures           ED Course                                             Medical Decision Making  Problems Addressed:  Acute pharyngitis, unspecified etiology: complicated acute illness or injury  Primary hypertension: complicated acute illness or injury    Amount and/or Complexity of Data Reviewed  Labs: ordered.    Risk  Prescription drug management.        Final diagnoses:   Acute pharyngitis, unspecified etiology   Primary hypertension       ED Disposition  ED Disposition       ED Disposition   Discharge    Condition   Stable    Comment   --               No follow-up provider specified.       Medication List        New Prescriptions      cefdinir 300 MG capsule  Commonly known as: OMNICEF  Take 1 capsule by mouth 2 (Two) Times a Day.               Where to Get Your Medications        These medications were sent to [facility] - [place], [place] - [address] - [phone]  - [phone]  [phone], [facility] [address]      Phone: [phone]   cefdinir 300 MG capsule            Ang Wan MD  05/20/24 0613

## 2024-11-25 ENCOUNTER — OFFICE VISIT (OUTPATIENT)
Dept: NEUROSURGERY | Facility: CLINIC | Age: 69
End: 2024-11-25
Payer: MEDICARE

## 2024-11-25 VITALS
OXYGEN SATURATION: 100 % | HEART RATE: 70 BPM | SYSTOLIC BLOOD PRESSURE: 140 MMHG | DIASTOLIC BLOOD PRESSURE: 60 MMHG | WEIGHT: 167.4 LBS | BODY MASS INDEX: 23.44 KG/M2 | TEMPERATURE: 98 F | HEIGHT: 71 IN

## 2024-11-25 DIAGNOSIS — I67.1 CEREBRAL ANEURYSM WITHOUT RUPTURE: Primary | ICD-10-CM

## 2024-11-25 PROCEDURE — 1159F MED LIST DOCD IN RCRD: CPT | Performed by: RADIOLOGY

## 2024-11-25 PROCEDURE — 1160F RVW MEDS BY RX/DR IN RCRD: CPT | Performed by: RADIOLOGY

## 2024-11-25 PROCEDURE — 3078F DIAST BP <80 MM HG: CPT | Performed by: RADIOLOGY

## 2024-11-25 PROCEDURE — 99204 OFFICE O/P NEW MOD 45 MIN: CPT | Performed by: RADIOLOGY

## 2024-11-25 PROCEDURE — 3077F SYST BP >= 140 MM HG: CPT | Performed by: RADIOLOGY

## 2024-11-25 NOTE — PROGRESS NOTES
NAME: ANAT FELIX   DOS: 2024  : 1955  PCP: Lesa Rae APRN    Chief Complaint:    Chief Complaint   Patient presents with    Cerebral Aneurysm       History of Present Illness:  69 y.o. male who suffered a right PCA stroke in May 2024, treated in the [facility] system.  As part of his workup/evaluation, he was found to have a small, incidental 3 mm ACOM region cerebral aneurysm.  He gives no history of a singular headache to suggest prior subarachnoid or intracranial hemorrhage.  He is a current every day smoker.  He was initially treated with Plavix/aspirin for antiplatelet regimen, but is currently being maintained on an aspirin/statin regimen.  He has no family history of cerebral aneurysms.  I am seeing him in consultation regarding his incidental ACOM cerebral aneurysm.    Past Medical History:  Past Medical History:   Diagnosis Date    Anxiety and depression     Arthritis     COPD (chronic obstructive pulmonary disease)     Coronary artery disease     Diabetes mellitus     Disease of thyroid gland     Dyslipidemia     History of transfusion     Hypertension     Seizure disorder     Pt states last seizure x1 mo.       Past Surgical History:  Past Surgical History:   Procedure Laterality Date    AMPUTATION DIGIT Right 2017    Procedure: RIGHT 1ST TOE AMPUTATION ;  Surgeon: Lee Lee MD;  Location: [facility];  Service:     CARDIAC SURGERY      CIRCUMCISION      CORONARY ARTERY BYPASS GRAFT      HERNIA REPAIR      X2    [place] INCISION & DRAINAGE LEG/ANKLE ABSCESS/HEMATOMA Right 2017    Procedure: Debridement of right first toe;  Surgeon: Ronald Perdue MD;  Location: [facility];  Service: General    TOE SURGERY Left     debridement       Review of Systems:        Review of Systems   All other systems reviewed and are negative.       Medications    Current Outpatient Medications:     aspirin 81 MG EC tablet, Take 81 mg by mouth Every Night., Disp: , Rfl:      atorvastatin (LIPITOR) 40 MG tablet, Take 1 tablet by mouth Every Night., Disp: 90 tablet, Rfl: 3    cefdinir (OMNICEF) 300 MG capsule, Take 1 capsule by mouth 2 (Two) Times a Day., Disp: 14 capsule, Rfl: 0    Dulaglutide (TRULICITY SC), Inject  under the skin into the appropriate area as directed 1 (One) Time Per Week., Disp: , Rfl:     Ergocalciferol (VITAMIN D2 PO), Take  by mouth., Disp: , Rfl:     escitalopram (LEXAPRO) 10 MG tablet, Take 10 mg by mouth daily., Disp: , Rfl:     glipizide (GLUCOTROL) 5 MG tablet, Take 5 mg by mouth 2 (Two) Times a Day Before Meals., Disp: , Rfl:     ibuprofen (ADVIL,MOTRIN) 800 MG tablet, Take 1 tablet by mouth Every 6 (Six) Hours As Needed for Mild Pain ., Disp: 60 tablet, Rfl: 0    insulin aspart prot-insulin aspart (novoLOG 70/30) (70-30) 100 UNIT/ML injection, Inject 60 Units under the skin into the appropriate area as directed 2 (Two) Times a Day With Meals., Disp: , Rfl:     levETIRAcetam (KEPPRA) 1000 MG tablet, Take 1,000 mg by mouth 2 (Two) Times a Day., Disp: , Rfl:     levothyroxine (SYNTHROID, LEVOTHROID) 50 MCG tablet, Take 50 mcg by mouth daily., Disp: , Rfl:     lisinopril (PRINIVIL,ZESTRIL) 40 MG tablet, Take 40 mg by mouth daily., Disp: , Rfl:     metoprolol tartrate (LOPRESSOR) 25 MG tablet, Take 1 tablet by mouth 2 (Two) Times a Day., Disp: 180 tablet, Rfl: 3    OMEGA-3 KRILL OIL PO, Take 350 mg by mouth Daily., Disp: , Rfl:     Allergies:  Allergies   Allergen Reactions    Other        Social Hx:  Social History     Tobacco Use    Smoking status: Former     Current packs/day: 0.00     Average packs/day: 2.0 packs/day for 45.0 years (90.0 ttl pk-yrs)     Types: Cigarettes     Start date:      Quit date:      Years since quittin.9    Smokeless tobacco: Never    Tobacco comments:     Trying to quit   Vaping Use    Vaping status: Never Used   Substance Use Topics    Alcohol use: No    Drug use: No       Family Hx:  Family History   Problem Relation  "Age of Onset    Diabetes Mother     Hypertension Mother     Diabetes Father        Review of Imaging:  CT angiograms of the head dated 5/28/2024 and 9/5/2024 from [facility] were reviewed along with their corresponding radiologic reports.  Occlusion/stenosis of the right PCA is present, consistent with known prior stroke.  There is a small, 3 mm ACOM region cerebral aneurysm, fed from the left A1/2 junction (right A1 segment hypoplastic), and pointing superiorly.  The \"thin section\" source images are not available for review, and it is challenging to delineate whether this is a wide-necked aneurysm.    Physical Examination:  Vitals:    11/25/24 1422   BP: 140/60   Pulse: 70   Temp: 98 °F (36.7 °C)   SpO2: 100%        General Appearance:   Well developed, well nourished, well groomed, alert, and cooperative.    Neurological examination:  Alert and oriented x 3.  Speech is clear.  I do not appreciate any gross visual field deficits.  No facial droop or pronator drift.  He does have some dizziness and balance issues, in particular when walking, but strength is symmetric in the upper and lower extremities.    Diagnoses/Plan:    Mr. Clifton is a 69 y.o. male with incidental, 3 mm ACOM region cerebral aneurysm found during evaluation of a right PCA stroke back in May 2024.  Subsequent follow-up CT angiogram in September 2024 demonstrates stable size and appearance of the aneurysm.  Mr. Clifton gives no history of a singular headache to suggest a prior subarachnoid or intracranial hemorrhage.  He is a current every day smoker.  The natural history of small aneurysms was discussed in detail with Mr. Clifton, to include a low risk (but not 0) of aneurysm rupture.  Various treatment options were also discussed in detail, to include conservative management versus potential embolization.  Mr. Clifton is going to go home and contemplate his choices, and will follow-up with me with a telehealth visit in 1 weeks " "time.    The etiology of his right PCA stroke remains \"cryptogenic\" at this point.  He did wear a 30-day event monitor, but he has not followed up with cardiology for these results.  During the next week or so, he is going to follow-up with the results of his event monitor.    Christo Clifton  reports that he quit smoking about 14 years ago. His smoking use included cigarettes. He started smoking about 59 years ago. He has a 90 pack-year smoking history. He has never used smokeless tobacco. I have educated him on the risk of diseases from using tobacco products such as cancer, COPD, heart disease, and recurrent stroke, increased risk of aneurysm rupture .   I advised him to quit and he is not willing to quit.  I spent 3  minutes counseling the patient.                          "

## 2024-11-26 ENCOUNTER — PATIENT ROUNDING (BHMG ONLY) (OUTPATIENT)
Dept: NEUROSURGERY | Facility: CLINIC | Age: 69
End: 2024-11-26
Payer: MEDICARE

## 2024-11-26 NOTE — PROGRESS NOTES
A MY-CHART MESSAGE HAS BEEN SENT TO THE PATIENT FOR PATIENT ROUNDING WITH [facility]UROS[facility]ERY.

## 2024-12-02 ENCOUNTER — TELEMEDICINE (OUTPATIENT)
Dept: NEUROSURGERY | Facility: CLINIC | Age: 69
End: 2024-12-02
Payer: MEDICARE

## 2024-12-02 ENCOUNTER — PREP FOR SURGERY (OUTPATIENT)
Dept: OTHER | Facility: HOSPITAL | Age: 69
End: 2024-12-02
Payer: MEDICARE

## 2024-12-02 DIAGNOSIS — I72.0 ANEURYSM OF CAROTID ARTERY: ICD-10-CM

## 2024-12-02 DIAGNOSIS — I67.1 CEREBRAL ANEURYSM, NONRUPTURED: Primary | ICD-10-CM

## 2024-12-02 PROCEDURE — 99442 PR PHYS/QHP TELEPHONE EVALUATION 11-20 MIN: CPT | Performed by: RADIOLOGY

## 2024-12-02 RX ORDER — SODIUM CHLORIDE 9 MG/ML
40 INJECTION, SOLUTION INTRAVENOUS AS NEEDED
OUTPATIENT
Start: 2024-12-02

## 2024-12-02 RX ORDER — SODIUM CHLORIDE 0.9 % (FLUSH) 0.9 %
10 SYRINGE (ML) INJECTION EVERY 12 HOURS SCHEDULED
OUTPATIENT
Start: 2024-12-02

## 2024-12-02 RX ORDER — SODIUM CHLORIDE 0.9 % (FLUSH) 0.9 %
1-10 SYRINGE (ML) INJECTION AS NEEDED
OUTPATIENT
Start: 2024-12-02

## 2024-12-02 NOTE — PROGRESS NOTES
NAME: ANAT CLIFTON   DOS: 2024  : 1955  PCP: Lesa Rae APRN    Chief Complaint:    Chief Complaint   Patient presents with    Cerebral Aneurysm     You have chosen to receive care through a telephone visit. Do you consent to use a telephone visit for your medical care today? Yes     History of Present Illness:  69 y.o. male who is known to the neurointerventional service, having recently seen in consultation regarding an incidental, 3 mm ACOM region cerebral aneurysm.  Mr. Clifton suffered a right PCA stroke in May 2024, treated in the [facility] system. As part of his workup/evaluation, his small ACOM region cerebral aneurysm was found as an incidental finding. He gives no history of a singular headache to suggest prior subarachnoid or intracranial hemorrhage. He is a current every day smoker. He was initially treated with Plavix/aspirin for antiplatelet regimen, but is currently being maintained on an aspirin/statin regimen. He has no family history of cerebral aneurysms.     I initially saw Mr. Clifton on 2024, and after discussing treatment options (embolization, clipping, conservative management) for his a cerebral aneurysm, he wished to go home and contemplate his options.  He presents today for follow-up and to further discuss management of his incidental incidental ACOM cerebral aneurysm.     Past Medical History:  Past Medical History:   Diagnosis Date    Anxiety and depression     Arthritis     COPD (chronic obstructive pulmonary disease)     Coronary artery disease     Diabetes mellitus     Disease of thyroid gland     Dyslipidemia     History of transfusion     Hypertension     Seizure disorder     Pt states last seizure x1 mo.       Past Surgical History:  Past Surgical History:   Procedure Laterality Date    AMPUTATION DIGIT Right 2017    Procedure: RIGHT 1ST TOE AMPUTATION ;  Surgeon: Lee Lee MD;  Location: [facility];  Service:     CARDIAC SURGERY       CIRCUMCISION      CORONARY ARTERY BYPASS GRAFT      HERNIA REPAIR      X2    [place] INCISION & DRAINAGE LEG/ANKLE ABSCESS/HEMATOMA Right 5/24/2017    Procedure: Debridement of right first toe;  Surgeon: Ronald Perdue MD;  Location: [facility];  Service: General    TOE SURGERY Left     debridement       Review of Systems:        Review of Systems   All other systems reviewed and are negative.       Medications    Current Outpatient Medications:     aspirin 81 MG EC tablet, Take 81 mg by mouth Every Night., Disp: , Rfl:     atorvastatin (LIPITOR) 40 MG tablet, Take 1 tablet by mouth Every Night., Disp: 90 tablet, Rfl: 3    cefdinir (OMNICEF) 300 MG capsule, Take 1 capsule by mouth 2 (Two) Times a Day., Disp: 14 capsule, Rfl: 0    Dulaglutide (TRULICITY SC), Inject  under the skin into the appropriate area as directed 1 (One) Time Per Week., Disp: , Rfl:     Ergocalciferol (VITAMIN D2 PO), Take  by mouth., Disp: , Rfl:     escitalopram (LEXAPRO) 10 MG tablet, Take 10 mg by mouth daily., Disp: , Rfl:     glipizide (GLUCOTROL) 5 MG tablet, Take 5 mg by mouth 2 (Two) Times a Day Before Meals., Disp: , Rfl:     ibuprofen (ADVIL,MOTRIN) 800 MG tablet, Take 1 tablet by mouth Every 6 (Six) Hours As Needed for Mild Pain ., Disp: 60 tablet, Rfl: 0    insulin aspart prot-insulin aspart (novoLOG 70/30) (70-30) 100 UNIT/ML injection, Inject 60 Units under the skin into the appropriate area as directed 2 (Two) Times a Day With Meals., Disp: , Rfl:     levETIRAcetam (KEPPRA) 1000 MG tablet, Take 1,000 mg by mouth 2 (Two) Times a Day., Disp: , Rfl:     levothyroxine (SYNTHROID, LEVOTHROID) 50 MCG tablet, Take 50 mcg by mouth daily., Disp: , Rfl:     lisinopril (PRINIVIL,ZESTRIL) 40 MG tablet, Take 40 mg by mouth daily., Disp: , Rfl:     metoprolol tartrate (LOPRESSOR) 25 MG tablet, Take 1 tablet by mouth 2 (Two) Times a Day., Disp: 180 tablet, Rfl: 3    OMEGA-3 KRILL OIL PO, Take 350 mg by mouth Daily., Disp: , Rfl:  "    Allergies:  Allergies   Allergen Reactions    Other        Social Hx:  Social History     Tobacco Use    Smoking status: Former     Current packs/day: 0.00     Average packs/day: 2.0 packs/day for 45.0 years (90.0 ttl pk-yrs)     Types: Cigarettes     Start date:      Quit date:      Years since quittin.9    Smokeless tobacco: Never    Tobacco comments:     Trying to quit   Vaping Use    Vaping status: Never Used   Substance Use Topics    Alcohol use: No    Drug use: No       Family Hx:  Family History   Problem Relation Age of Onset    Diabetes Mother     Hypertension Mother     Diabetes Father        Review of Imaging:  CT angiograms of the head dated 2024 and 2024 from [facility] were reviewed along with their corresponding radiologic reports.  Occlusion/stenosis of the right PCA is present, consistent with known prior stroke.  There is a small, 3 mm ACOM region cerebral aneurysm, fed from the left A1/2 junction (right A1 segment hypoplastic), and pointing superiorly.  The \"thin section\" source images are not available for review, and it is challenging to delineate whether this is a wide-necked aneurysm.     Physical Examination:  This examination is being performed as part of a \"telephone\" visit.  Mr. Clifton speech is clear.  He denies any unilateral weakness or numbness.  Tolerating p.o. intake very well.  Ambulating unassisted.      Diagnoses/Plan:    Mr. Clifton is a 69 y.o. male with incidental, 3 mm ACOM region cerebral aneurysm found during evaluation of a right PCA stroke back in May 2024.  Subsequent follow-up CT angiogram in 2024 demonstrates stable size and appearance of the aneurysm.  Mr. Clifton gives no history of a singular headache to suggest a prior subarachnoid or intracranial hemorrhage.  He is a current every day smoker.  The natural history of small aneurysms was discussed in detail with Mr. Clifton, to include a low risk (but not 0) of aneurysm " "rupture.  Various treatment options were again discussed today in detail, to include conservative management, embolization, and clipping.  After deliberation, Mr. Clifton is interested in pursuing treatment of his aneurysm, if it can be treated endovascularly.  Based on his CT angiogram, I am not sure about the endovascular versus open surgical options for his aneurysm, and thus I have recommended definitive diagnostic angiogram to evaluate the morphology of the aneurysm and triage care.  Mr. Clifton is in agreement with this treatment plan, and we will tentatively schedule him for a diagnostic cerebral angiogram (ideally via radial access) in the next couple of weeks or so, deferring any further intervention pending results of that study.        The etiology of his right PCA stroke remains \"cryptogenic\" at this point.  He did wear a 30-day event monitor, but he has not followed up with cardiology for these results.  During the next week or so, he is going to follow-up with the results of his event monitor.    Christo Clifton  reports that he quit smoking about 14 years ago. His smoking use included cigarettes. He started smoking about 59 years ago. He has a 90 pack-year smoking history. He has never used smokeless tobacco. I have educated him on the risk of diseases from using tobacco products such as cancer, COPD, heart disease, and recurrent stroke .   I advised him to quit and he is not willing to quit.  I spent 3  minutes counseling the patient.    Copied text and portions of the note have been reviewed and are accurate as of 12/24/24.    Approximately 16 minutes was utilized during this telehealth phone visit.                     "

## 2024-12-09 DIAGNOSIS — I72.0 ANEURYSM OF CAROTID ARTERY: ICD-10-CM

## 2024-12-09 DIAGNOSIS — I67.1 ANTERIOR COMMUNICATING ARTERY ANEURYSM: ICD-10-CM

## 2024-12-09 DIAGNOSIS — I67.1 CEREBRAL ANEURYSM, NONRUPTURED: Primary | ICD-10-CM

## 2025-01-01 ENCOUNTER — APPOINTMENT (OUTPATIENT)
Dept: GENERAL RADIOLOGY | Facility: HOSPITAL | Age: 70
DRG: 283 | End: 2025-01-01
Payer: MEDICARE

## 2025-01-01 ENCOUNTER — HOSPITAL ENCOUNTER (INPATIENT)
Facility: HOSPITAL | Age: 70
LOS: 18 days | DRG: 283 | End: 2025-07-27
Attending: INTERNAL MEDICINE | Admitting: INTERNAL MEDICINE
Payer: MEDICARE

## 2025-01-01 ENCOUNTER — APPOINTMENT (OUTPATIENT)
Dept: MRI IMAGING | Facility: HOSPITAL | Age: 70
DRG: 283 | End: 2025-01-01
Payer: MEDICARE

## 2025-01-01 ENCOUNTER — APPOINTMENT (OUTPATIENT)
Dept: NEUROLOGY | Facility: HOSPITAL | Age: 70
DRG: 283 | End: 2025-01-01
Payer: MEDICARE

## 2025-01-01 ENCOUNTER — APPOINTMENT (OUTPATIENT)
Dept: CT IMAGING | Facility: HOSPITAL | Age: 70
DRG: 283 | End: 2025-01-01
Payer: MEDICARE

## 2025-01-01 ENCOUNTER — APPOINTMENT (OUTPATIENT)
Dept: ULTRASOUND IMAGING | Facility: HOSPITAL | Age: 70
DRG: 283 | End: 2025-01-01
Payer: MEDICARE

## 2025-01-01 ENCOUNTER — APPOINTMENT (OUTPATIENT)
Dept: PULMONOLOGY | Facility: HOSPITAL | Age: 70
DRG: 283 | End: 2025-01-01
Payer: MEDICARE

## 2025-01-01 VITALS
WEIGHT: 160.27 LBS | OXYGEN SATURATION: 70 % | HEIGHT: 71 IN | DIASTOLIC BLOOD PRESSURE: 57 MMHG | BODY MASS INDEX: 22.44 KG/M2 | SYSTOLIC BLOOD PRESSURE: 106 MMHG | RESPIRATION RATE: 6 BRPM | TEMPERATURE: 98.4 F

## 2025-01-01 DIAGNOSIS — I25.5 ISCHEMIC CARDIOMYOPATHY: ICD-10-CM

## 2025-01-01 DIAGNOSIS — I50.21 ACUTE HFREF (HEART FAILURE WITH REDUCED EJECTION FRACTION): Primary | ICD-10-CM

## 2025-01-01 LAB
ABO GROUP BLD: NORMAL
ALBUMIN SERPL-MCNC: 2.3 G/DL (ref 3.5–5.2)
ALBUMIN SERPL-MCNC: 2.5 G/DL (ref 3.5–5.2)
ALBUMIN SERPL-MCNC: 2.5 G/DL (ref 3.5–5.2)
ALBUMIN SERPL-MCNC: 2.6 G/DL (ref 3.5–5.2)
ALBUMIN SERPL-MCNC: 2.7 G/DL (ref 3.5–5.2)
ALBUMIN SERPL-MCNC: 2.8 G/DL (ref 3.5–5.2)
ALBUMIN SERPL-MCNC: 2.9 G/DL (ref 3.5–5.2)
ALBUMIN SERPL-MCNC: 2.9 G/DL (ref 3.5–5.2)
ALBUMIN SERPL-MCNC: 3 G/DL (ref 3.5–5.2)
ALBUMIN SERPL-MCNC: 3.4 G/DL (ref 3.5–5.2)
ALBUMIN/GLOB SERPL: 0.7 G/DL
ALBUMIN/GLOB SERPL: 0.8 G/DL
ALBUMIN/GLOB SERPL: 0.9 G/DL
ALBUMIN/GLOB SERPL: 1 G/DL
ALBUMIN/GLOB SERPL: 1.1 G/DL
ALP SERPL-CCNC: 101 U/L (ref 39–117)
ALP SERPL-CCNC: 103 U/L (ref 39–117)
ALP SERPL-CCNC: 113 U/L (ref 39–117)
ALP SERPL-CCNC: 113 U/L (ref 39–117)
ALP SERPL-CCNC: 114 U/L (ref 39–117)
ALP SERPL-CCNC: 118 U/L (ref 39–117)
ALP SERPL-CCNC: 122 U/L (ref 39–117)
ALP SERPL-CCNC: 126 U/L (ref 39–117)
ALP SERPL-CCNC: 132 U/L (ref 39–117)
ALP SERPL-CCNC: 70 U/L (ref 39–117)
ALP SERPL-CCNC: 76 U/L (ref 39–117)
ALP SERPL-CCNC: 83 U/L (ref 39–117)
ALP SERPL-CCNC: 84 U/L (ref 39–117)
ALP SERPL-CCNC: 85 U/L (ref 39–117)
ALP SERPL-CCNC: 90 U/L (ref 39–117)
ALP SERPL-CCNC: 91 U/L (ref 39–117)
ALP SERPL-CCNC: 92 U/L (ref 39–117)
ALP SERPL-CCNC: 96 U/L (ref 39–117)
ALP SERPL-CCNC: 97 U/L (ref 39–117)
ALT SERPL W P-5'-P-CCNC: 1166 U/L (ref 1–41)
ALT SERPL W P-5'-P-CCNC: 140 U/L (ref 1–41)
ALT SERPL W P-5'-P-CCNC: 1450 U/L (ref 1–41)
ALT SERPL W P-5'-P-CCNC: 153 U/L (ref 1–41)
ALT SERPL W P-5'-P-CCNC: 179 U/L (ref 1–41)
ALT SERPL W P-5'-P-CCNC: 1839 U/L (ref 1–41)
ALT SERPL W P-5'-P-CCNC: 1871 U/L (ref 1–41)
ALT SERPL W P-5'-P-CCNC: 2020 U/L (ref 1–41)
ALT SERPL W P-5'-P-CCNC: 218 U/L (ref 1–41)
ALT SERPL W P-5'-P-CCNC: 254 U/L (ref 1–41)
ALT SERPL W P-5'-P-CCNC: 294 U/L (ref 1–41)
ALT SERPL W P-5'-P-CCNC: 296 U/L (ref 1–41)
ALT SERPL W P-5'-P-CCNC: 319 U/L (ref 1–41)
ALT SERPL W P-5'-P-CCNC: 339 U/L (ref 1–41)
ALT SERPL W P-5'-P-CCNC: 514 U/L (ref 1–41)
ALT SERPL W P-5'-P-CCNC: 614 U/L (ref 1–41)
ALT SERPL W P-5'-P-CCNC: 658 U/L (ref 1–41)
ALT SERPL W P-5'-P-CCNC: 658 U/L (ref 1–41)
ALT SERPL W P-5'-P-CCNC: 670 U/L (ref 1–41)
AMMONIA BLD-SCNC: 33 UMOL/L (ref 16–60)
AMMONIA BLD-SCNC: 33 UMOL/L (ref 16–60)
AMMONIA BLD-SCNC: 34 UMOL/L (ref 16–60)
AMYLASE SERPL-CCNC: 33 U/L (ref 28–100)
ANION GAP SERPL CALCULATED.3IONS-SCNC: 10 MMOL/L (ref 5–15)
ANION GAP SERPL CALCULATED.3IONS-SCNC: 10.6 MMOL/L (ref 5–15)
ANION GAP SERPL CALCULATED.3IONS-SCNC: 11 MMOL/L (ref 5–15)
ANION GAP SERPL CALCULATED.3IONS-SCNC: 11.2 MMOL/L (ref 5–15)
ANION GAP SERPL CALCULATED.3IONS-SCNC: 12 MMOL/L (ref 5–15)
ANION GAP SERPL CALCULATED.3IONS-SCNC: 12.9 MMOL/L (ref 5–15)
ANION GAP SERPL CALCULATED.3IONS-SCNC: 13 MMOL/L (ref 5–15)
ANION GAP SERPL CALCULATED.3IONS-SCNC: 13.3 MMOL/L (ref 5–15)
ANION GAP SERPL CALCULATED.3IONS-SCNC: 14 MMOL/L (ref 5–15)
ANION GAP SERPL CALCULATED.3IONS-SCNC: 14.7 MMOL/L (ref 5–15)
ANION GAP SERPL CALCULATED.3IONS-SCNC: 15.1 MMOL/L (ref 5–15)
ANION GAP SERPL CALCULATED.3IONS-SCNC: 15.5 MMOL/L (ref 5–15)
ANION GAP SERPL CALCULATED.3IONS-SCNC: 16 MMOL/L (ref 5–15)
ANION GAP SERPL CALCULATED.3IONS-SCNC: 17 MMOL/L (ref 5–15)
ANION GAP SERPL CALCULATED.3IONS-SCNC: 6 MMOL/L (ref 5–15)
ANION GAP SERPL CALCULATED.3IONS-SCNC: 7.7 MMOL/L (ref 5–15)
ANION GAP SERPL CALCULATED.3IONS-SCNC: 8.7 MMOL/L (ref 5–15)
ANION GAP SERPL CALCULATED.3IONS-SCNC: 8.9 MMOL/L (ref 5–15)
ANION GAP SERPL CALCULATED.3IONS-SCNC: 9 MMOL/L (ref 5–15)
ANION GAP SERPL CALCULATED.3IONS-SCNC: 9.4 MMOL/L (ref 5–15)
ANION GAP SERPL CALCULATED.3IONS-SCNC: 9.7 MMOL/L (ref 5–15)
APTT PPP: 36.5 SECONDS (ref 60–90)
APTT PPP: 40.3 SECONDS (ref 22–39)
ARTERIAL PATENCY WRIST A: ABNORMAL
ARTERIAL PATENCY WRIST A: POSITIVE
AST SERPL-CCNC: 1221 U/L (ref 1–40)
AST SERPL-CCNC: 137 U/L (ref 1–40)
AST SERPL-CCNC: 170 U/L (ref 1–40)
AST SERPL-CCNC: 179 U/L (ref 1–40)
AST SERPL-CCNC: 19 U/L (ref 1–40)
AST SERPL-CCNC: 20 U/L (ref 1–40)
AST SERPL-CCNC: 213 U/L (ref 1–40)
AST SERPL-CCNC: 2383 U/L (ref 1–40)
AST SERPL-CCNC: 2438 U/L (ref 1–40)
AST SERPL-CCNC: 27 U/L (ref 1–40)
AST SERPL-CCNC: 319 U/L (ref 1–40)
AST SERPL-CCNC: 365 U/L (ref 1–40)
AST SERPL-CCNC: 483 U/L (ref 1–40)
AST SERPL-CCNC: 536 U/L (ref 1–40)
AST SERPL-CCNC: 54 U/L (ref 1–40)
AST SERPL-CCNC: 61 U/L (ref 1–40)
AST SERPL-CCNC: 63 U/L (ref 1–40)
AST SERPL-CCNC: 73 U/L (ref 1–40)
AST SERPL-CCNC: 82 U/L (ref 1–40)
ATMOSPHERIC PRESS: ABNORMAL MM[HG]
BACTERIA BLD CULT: ABNORMAL
BACTERIA FLD CULT: NORMAL
BACTERIA SPEC AEROBE CULT: ABNORMAL
BACTERIA SPEC AEROBE CULT: ABNORMAL
BACTERIA SPEC AEROBE CULT: NO GROWTH
BACTERIA SPEC AEROBE CULT: NORMAL
BACTERIA SPEC ANAEROBE CULT: NORMAL
BACTERIA SPEC RESP CULT: NORMAL
BACTERIA UR QL AUTO: ABNORMAL /HPF
BASE EXCESS BLDA CALC-SCNC: -0.8 MMOL/L (ref 0–2)
BASE EXCESS BLDA CALC-SCNC: -1.4 MMOL/L (ref 0–2)
BASE EXCESS BLDA CALC-SCNC: -2.3 MMOL/L (ref 0–2)
BASE EXCESS BLDA CALC-SCNC: -2.6 MMOL/L (ref 0–2)
BASE EXCESS BLDA CALC-SCNC: -3.2 MMOL/L (ref 0–2)
BASE EXCESS BLDA CALC-SCNC: -4.6 MMOL/L (ref 0–2)
BASE EXCESS BLDA CALC-SCNC: -5.5 MMOL/L (ref 0–2)
BASE EXCESS BLDA CALC-SCNC: -5.8 MMOL/L (ref 0–2)
BASE EXCESS BLDA CALC-SCNC: -6.7 MMOL/L (ref 0–2)
BASE EXCESS BLDA CALC-SCNC: -6.9 MMOL/L (ref 0–2)
BASE EXCESS BLDA CALC-SCNC: -7.8 MMOL/L (ref 0–2)
BASE EXCESS BLDA CALC-SCNC: -8.4 MMOL/L (ref 0–2)
BASE EXCESS BLDA CALC-SCNC: 0.4 MMOL/L (ref 0–2)
BASE EXCESS BLDA CALC-SCNC: 2.6 MMOL/L (ref 0–2)
BASE EXCESS BLDA CALC-SCNC: 3.1 MMOL/L (ref 0–2)
BASE EXCESS BLDV CALC-SCNC: -4.6 MMOL/L (ref -2–2)
BASOPHILS # BLD AUTO: 0.01 10*3/MM3 (ref 0–0.2)
BASOPHILS # BLD AUTO: 0.02 10*3/MM3 (ref 0–0.2)
BASOPHILS # BLD AUTO: 0.04 10*3/MM3 (ref 0–0.2)
BASOPHILS # BLD AUTO: 0.05 10*3/MM3 (ref 0–0.2)
BASOPHILS # BLD AUTO: 0.09 10*3/MM3 (ref 0–0.2)
BASOPHILS NFR BLD AUTO: 0.1 % (ref 0–1.5)
BASOPHILS NFR BLD AUTO: 0.2 % (ref 0–1.5)
BASOPHILS NFR BLD AUTO: 0.2 % (ref 0–1.5)
BASOPHILS NFR BLD AUTO: 0.3 % (ref 0–1.5)
BDY SITE: ABNORMAL
BH BB BLOOD EXPIRATION DATE: NORMAL
BH BB BLOOD EXPIRATION DATE: NORMAL
BH BB BLOOD TYPE BARCODE: 6200
BH BB BLOOD TYPE BARCODE: NORMAL
BH BB DISPENSE STATUS: NORMAL
BH BB DISPENSE STATUS: NORMAL
BH BB PRODUCT CODE: NORMAL
BH BB PRODUCT CODE: NORMAL
BH BB UNIT NUMBER: NORMAL
BH BB UNIT NUMBER: NORMAL
BILIRUB CONJ SERPL-MCNC: 0.1 MG/DL (ref 0–0.3)
BILIRUB INDIRECT SERPL-MCNC: 0.1 MG/DL
BILIRUB SERPL-MCNC: 0.2 MG/DL (ref 0–1.2)
BILIRUB SERPL-MCNC: 0.3 MG/DL (ref 0–1.2)
BILIRUB SERPL-MCNC: 0.3 MG/DL (ref 0–1.2)
BILIRUB SERPL-MCNC: 0.4 MG/DL (ref 0–1.2)
BILIRUB SERPL-MCNC: 0.5 MG/DL (ref 0–1.2)
BILIRUB SERPL-MCNC: 0.5 MG/DL (ref 0–1.2)
BILIRUB SERPL-MCNC: 0.6 MG/DL (ref 0–1.2)
BILIRUB SERPL-MCNC: 0.7 MG/DL (ref 0–1.2)
BILIRUB SERPL-MCNC: 0.7 MG/DL (ref 0–1.2)
BILIRUB SERPL-MCNC: 0.8 MG/DL (ref 0–1.2)
BILIRUB SERPL-MCNC: 0.9 MG/DL (ref 0–1.2)
BILIRUB UR QL STRIP: ABNORMAL
BLD GP AB SCN SERPL QL: NEGATIVE
BODY TEMPERATURE: 37
BOTTLE TYPE: ABNORMAL
BUN SERPL-MCNC: 39.5 MG/DL (ref 8–23)
BUN SERPL-MCNC: 40.2 MG/DL (ref 8–23)
BUN SERPL-MCNC: 42.1 MG/DL (ref 8–23)
BUN SERPL-MCNC: 47.1 MG/DL (ref 8–23)
BUN SERPL-MCNC: 49.4 MG/DL (ref 8–23)
BUN SERPL-MCNC: 52 MG/DL (ref 8–23)
BUN SERPL-MCNC: 52.5 MG/DL (ref 8–23)
BUN SERPL-MCNC: 52.7 MG/DL (ref 8–23)
BUN SERPL-MCNC: 54 MG/DL (ref 8–23)
BUN SERPL-MCNC: 54.6 MG/DL (ref 8–23)
BUN SERPL-MCNC: 57.4 MG/DL (ref 8–23)
BUN SERPL-MCNC: 57.9 MG/DL (ref 8–23)
BUN SERPL-MCNC: 62.2 MG/DL (ref 8–23)
BUN SERPL-MCNC: 65 MG/DL (ref 8–23)
BUN SERPL-MCNC: 73.5 MG/DL (ref 8–23)
BUN SERPL-MCNC: 79.7 MG/DL (ref 8–23)
BUN SERPL-MCNC: 85.9 MG/DL (ref 8–23)
BUN SERPL-MCNC: 86.2 MG/DL (ref 8–23)
BUN SERPL-MCNC: 87.4 MG/DL (ref 8–23)
BUN SERPL-MCNC: 89.2 MG/DL (ref 8–23)
BUN SERPL-MCNC: 89.6 MG/DL (ref 8–23)
BUN/CREAT SERPL: 15.3 (ref 7–25)
BUN/CREAT SERPL: 18.9 (ref 7–25)
BUN/CREAT SERPL: 23.3 (ref 7–25)
BUN/CREAT SERPL: 25.8 (ref 7–25)
BUN/CREAT SERPL: 26.8 (ref 7–25)
BUN/CREAT SERPL: 27.1 (ref 7–25)
BUN/CREAT SERPL: 32.5 (ref 7–25)
BUN/CREAT SERPL: 33.7 (ref 7–25)
BUN/CREAT SERPL: 34.2 (ref 7–25)
BUN/CREAT SERPL: 39.3 (ref 7–25)
BUN/CREAT SERPL: 39.9 (ref 7–25)
BUN/CREAT SERPL: 41.3 (ref 7–25)
BUN/CREAT SERPL: 46.2 (ref 7–25)
BUN/CREAT SERPL: 47.2 (ref 7–25)
BUN/CREAT SERPL: 47.7 (ref 7–25)
BUN/CREAT SERPL: 49.5 (ref 7–25)
BUN/CREAT SERPL: 50.1 (ref 7–25)
BUN/CREAT SERPL: 50.4 (ref 7–25)
BUN/CREAT SERPL: 53 (ref 7–25)
BUN/CREAT SERPL: 53.6 (ref 7–25)
BUN/CREAT SERPL: 61.9 (ref 7–25)
CA-I SERPL ISE-MCNC: 1.02 MMOL/L (ref 1.15–1.3)
CA-I SERPL ISE-MCNC: 1.07 MMOL/L (ref 1.15–1.3)
CA-I SERPL ISE-MCNC: 1.15 MMOL/L (ref 1.15–1.3)
CA-I SERPL ISE-MCNC: 1.23 MMOL/L (ref 1.15–1.3)
CALCIUM SPEC-SCNC: 7.8 MG/DL (ref 8.6–10.5)
CALCIUM SPEC-SCNC: 8.2 MG/DL (ref 8.6–10.5)
CALCIUM SPEC-SCNC: 8.3 MG/DL (ref 8.6–10.5)
CALCIUM SPEC-SCNC: 8.3 MG/DL (ref 8.6–10.5)
CALCIUM SPEC-SCNC: 8.4 MG/DL (ref 8.6–10.5)
CALCIUM SPEC-SCNC: 8.5 MG/DL (ref 8.6–10.5)
CALCIUM SPEC-SCNC: 8.7 MG/DL (ref 8.6–10.5)
CALCIUM SPEC-SCNC: 8.8 MG/DL (ref 8.6–10.5)
CALCIUM SPEC-SCNC: 8.9 MG/DL (ref 8.6–10.5)
CALCIUM SPEC-SCNC: 9.1 MG/DL (ref 8.6–10.5)
CHLORIDE SERPL-SCNC: 101 MMOL/L (ref 98–107)
CHLORIDE SERPL-SCNC: 105 MMOL/L (ref 98–107)
CHLORIDE SERPL-SCNC: 108 MMOL/L (ref 98–107)
CHLORIDE SERPL-SCNC: 110 MMOL/L (ref 98–107)
CHLORIDE SERPL-SCNC: 111 MMOL/L (ref 98–107)
CHLORIDE SERPL-SCNC: 112 MMOL/L (ref 98–107)
CHLORIDE SERPL-SCNC: 113 MMOL/L (ref 98–107)
CHLORIDE SERPL-SCNC: 114 MMOL/L (ref 98–107)
CHLORIDE SERPL-SCNC: 115 MMOL/L (ref 98–107)
CHLORIDE SERPL-SCNC: 117 MMOL/L (ref 98–107)
CHLORIDE SERPL-SCNC: 119 MMOL/L (ref 98–107)
CHLORIDE SERPL-SCNC: 121 MMOL/L (ref 98–107)
CHLORIDE SERPL-SCNC: 121 MMOL/L (ref 98–107)
CHLORIDE SERPL-SCNC: 122 MMOL/L (ref 98–107)
CHLORIDE SERPL-SCNC: 123 MMOL/L (ref 98–107)
CHLORIDE SERPL-SCNC: 124 MMOL/L (ref 98–107)
CHLORIDE SERPL-SCNC: 96 MMOL/L (ref 98–107)
CLARITY UR: ABNORMAL
CO2 BLDA-SCNC: 16.1 MMOL/L (ref 22–33)
CO2 BLDA-SCNC: 18 MMOL/L (ref 22–33)
CO2 BLDA-SCNC: 19.4 MMOL/L (ref 22–33)
CO2 BLDA-SCNC: 19.5 MMOL/L (ref 22–33)
CO2 BLDA-SCNC: 19.9 MMOL/L (ref 22–33)
CO2 BLDA-SCNC: 20.7 MMOL/L (ref 22–33)
CO2 BLDA-SCNC: 21.1 MMOL/L (ref 22–33)
CO2 BLDA-SCNC: 22.8 MMOL/L (ref 22–33)
CO2 BLDA-SCNC: 23 MMOL/L (ref 22–33)
CO2 BLDA-SCNC: 23.3 MMOL/L (ref 22–33)
CO2 BLDA-SCNC: 24.3 MMOL/L (ref 22–33)
CO2 BLDA-SCNC: 24.4 MMOL/L (ref 22–33)
CO2 BLDA-SCNC: 25.7 MMOL/L (ref 22–33)
CO2 BLDA-SCNC: 25.9 MMOL/L (ref 22–33)
CO2 BLDA-SCNC: 28.8 MMOL/L (ref 22–33)
CO2 BLDA-SCNC: 28.9 MMOL/L (ref 22–33)
CO2 SERPL-SCNC: 14 MMOL/L (ref 22–29)
CO2 SERPL-SCNC: 17.1 MMOL/L (ref 22–29)
CO2 SERPL-SCNC: 17.3 MMOL/L (ref 22–29)
CO2 SERPL-SCNC: 17.3 MMOL/L (ref 22–29)
CO2 SERPL-SCNC: 17.9 MMOL/L (ref 22–29)
CO2 SERPL-SCNC: 18.5 MMOL/L (ref 22–29)
CO2 SERPL-SCNC: 18.7 MMOL/L (ref 22–29)
CO2 SERPL-SCNC: 19 MMOL/L (ref 22–29)
CO2 SERPL-SCNC: 20 MMOL/L (ref 22–29)
CO2 SERPL-SCNC: 20 MMOL/L (ref 22–29)
CO2 SERPL-SCNC: 20.8 MMOL/L (ref 22–29)
CO2 SERPL-SCNC: 21 MMOL/L (ref 22–29)
CO2 SERPL-SCNC: 21.1 MMOL/L (ref 22–29)
CO2 SERPL-SCNC: 21.6 MMOL/L (ref 22–29)
CO2 SERPL-SCNC: 23.3 MMOL/L (ref 22–29)
CO2 SERPL-SCNC: 23.4 MMOL/L (ref 22–29)
CO2 SERPL-SCNC: 25 MMOL/L (ref 22–29)
CO2 SERPL-SCNC: 26.3 MMOL/L (ref 22–29)
CO2 SERPL-SCNC: 27 MMOL/L (ref 22–29)
COHGB MFR BLD: 0.9 % (ref 0–2)
COHGB MFR BLD: 1 %
COHGB MFR BLD: 1 % (ref 0–2)
COHGB MFR BLD: 1.1 % (ref 0–2)
COHGB MFR BLD: 1.1 % (ref 0–2)
COHGB MFR BLD: 1.2 % (ref 0–2)
COHGB MFR BLD: 1.3 % (ref 0–2)
COHGB MFR BLD: 1.4 % (ref 0–2)
COHGB MFR BLD: 1.4 % (ref 0–2)
COLOR UR: ABNORMAL
CORTIS SERPL-MCNC: 6.03 MCG/DL
CREAT SERPL-MCNC: 1.07 MG/DL (ref 0.76–1.27)
CREAT SERPL-MCNC: 1.08 MG/DL (ref 0.76–1.27)
CREAT SERPL-MCNC: 1.2 MG/DL (ref 0.76–1.27)
CREAT SERPL-MCNC: 1.23 MG/DL (ref 0.76–1.27)
CREAT SERPL-MCNC: 1.29 MG/DL (ref 0.76–1.27)
CREAT SERPL-MCNC: 1.44 MG/DL (ref 0.76–1.27)
CREAT SERPL-MCNC: 1.5 MG/DL (ref 0.76–1.27)
CREAT SERPL-MCNC: 1.53 MG/DL (ref 0.76–1.27)
CREAT SERPL-MCNC: 1.56 MG/DL (ref 0.76–1.27)
CREAT SERPL-MCNC: 1.56 MG/DL (ref 0.76–1.27)
CREAT SERPL-MCNC: 1.6 MG/DL (ref 0.76–1.27)
CREAT SERPL-MCNC: 1.61 MG/DL (ref 0.76–1.27)
CREAT SERPL-MCNC: 1.65 MG/DL (ref 0.76–1.27)
CREAT SERPL-MCNC: 1.72 MG/DL (ref 0.76–1.27)
CREAT SERPL-MCNC: 1.78 MG/DL (ref 0.76–1.27)
CREAT SERPL-MCNC: 1.8 MG/DL (ref 0.76–1.27)
CREAT SERPL-MCNC: 1.9 MG/DL (ref 0.76–1.27)
CREAT SERPL-MCNC: 1.99 MG/DL (ref 0.76–1.27)
CREAT SERPL-MCNC: 2.34 MG/DL (ref 0.76–1.27)
CREAT SERPL-MCNC: 3.03 MG/DL (ref 0.76–1.27)
CREAT SERPL-MCNC: 3.44 MG/DL (ref 0.76–1.27)
CROSSMATCH INTERPRETATION: NORMAL
CROSSMATCH INTERPRETATION: NORMAL
CRP SERPL-MCNC: 10.5 MG/DL (ref 0–0.5)
CRP SERPL-MCNC: 14.37 MG/DL (ref 0–0.5)
CRP SERPL-MCNC: 15.9 MG/DL (ref 0–0.5)
D-LACTATE SERPL-SCNC: 1.1 MMOL/L (ref 0.5–2)
D-LACTATE SERPL-SCNC: 1.1 MMOL/L (ref 0.5–2)
D-LACTATE SERPL-SCNC: 1.2 MMOL/L (ref 0.5–2)
D-LACTATE SERPL-SCNC: 1.3 MMOL/L (ref 0.5–2)
D-LACTATE SERPL-SCNC: 1.4 MMOL/L (ref 0.5–2)
D-LACTATE SERPL-SCNC: 1.6 MMOL/L (ref 0.5–2)
D-LACTATE SERPL-SCNC: 1.7 MMOL/L (ref 0.5–2)
D-LACTATE SERPL-SCNC: 1.8 MMOL/L (ref 0.5–2)
D-LACTATE SERPL-SCNC: 1.8 MMOL/L (ref 0.5–2)
D-LACTATE SERPL-SCNC: 2 MMOL/L (ref 0.5–2)
D-LACTATE SERPL-SCNC: 2.2 MMOL/L (ref 0.5–2)
D-LACTATE SERPL-SCNC: 2.2 MMOL/L (ref 0.5–2)
D-LACTATE SERPL-SCNC: 2.5 MMOL/L (ref 0.5–2)
D-LACTATE SERPL-SCNC: 2.6 MMOL/L (ref 0.5–2)
D-LACTATE SERPL-SCNC: 3 MMOL/L (ref 0.5–2)
D-LACTATE SERPL-SCNC: 4.2 MMOL/L (ref 0.5–2)
D-LACTATE SERPL-SCNC: 4.7 MMOL/L (ref 0.5–2)
D-LACTATE SERPL-SCNC: 4.9 MMOL/L (ref 0.5–2)
DEPRECATED RDW RBC AUTO: 48.4 FL (ref 37–54)
DEPRECATED RDW RBC AUTO: 49.1 FL (ref 37–54)
DEPRECATED RDW RBC AUTO: 49.7 FL (ref 37–54)
DEPRECATED RDW RBC AUTO: 51.5 FL (ref 37–54)
DEPRECATED RDW RBC AUTO: 54.4 FL (ref 37–54)
DEPRECATED RDW RBC AUTO: 54.7 FL (ref 37–54)
DEPRECATED RDW RBC AUTO: 55.1 FL (ref 37–54)
DEPRECATED RDW RBC AUTO: 55.8 FL (ref 37–54)
DEPRECATED RDW RBC AUTO: 55.8 FL (ref 37–54)
DEPRECATED RDW RBC AUTO: 56 FL (ref 37–54)
DEPRECATED RDW RBC AUTO: 56.3 FL (ref 37–54)
DEPRECATED RDW RBC AUTO: 56.8 FL (ref 37–54)
DEPRECATED RDW RBC AUTO: 57.6 FL (ref 37–54)
DEPRECATED RDW RBC AUTO: 57.6 FL (ref 37–54)
DEPRECATED RDW RBC AUTO: 57.8 FL (ref 37–54)
DEPRECATED RDW RBC AUTO: 58.4 FL (ref 37–54)
DEPRECATED RDW RBC AUTO: 58.9 FL (ref 37–54)
DEPRECATED RDW RBC AUTO: 60.3 FL (ref 37–54)
DEPRECATED RDW RBC AUTO: 60.8 FL (ref 37–54)
DEPRECATED RDW RBC AUTO: 61.7 FL (ref 37–54)
EGFRCR SERPLBLD CKD-EPI 2021: 18.3 ML/MIN/1.73
EGFRCR SERPLBLD CKD-EPI 2021: 21.3 ML/MIN/1.73
EGFRCR SERPLBLD CKD-EPI 2021: 29 ML/MIN/1.73
EGFRCR SERPLBLD CKD-EPI 2021: 35.2 ML/MIN/1.73
EGFRCR SERPLBLD CKD-EPI 2021: 37.2 ML/MIN/1.73
EGFRCR SERPLBLD CKD-EPI 2021: 39.7 ML/MIN/1.73
EGFRCR SERPLBLD CKD-EPI 2021: 40.3 ML/MIN/1.73
EGFRCR SERPLBLD CKD-EPI 2021: 42 ML/MIN/1.73
EGFRCR SERPLBLD CKD-EPI 2021: 44.1 ML/MIN/1.73
EGFRCR SERPLBLD CKD-EPI 2021: 45.4 ML/MIN/1.73
EGFRCR SERPLBLD CKD-EPI 2021: 45.8 ML/MIN/1.73
EGFRCR SERPLBLD CKD-EPI 2021: 47.2 ML/MIN/1.73
EGFRCR SERPLBLD CKD-EPI 2021: 47.2 ML/MIN/1.73
EGFRCR SERPLBLD CKD-EPI 2021: 48.3 ML/MIN/1.73
EGFRCR SERPLBLD CKD-EPI 2021: 49.5 ML/MIN/1.73
EGFRCR SERPLBLD CKD-EPI 2021: 51.9 ML/MIN/1.73
EGFRCR SERPLBLD CKD-EPI 2021: 59.3 ML/MIN/1.73
EGFRCR SERPLBLD CKD-EPI 2021: 62.8 ML/MIN/1.73
EGFRCR SERPLBLD CKD-EPI 2021: 64.7 ML/MIN/1.73
EGFRCR SERPLBLD CKD-EPI 2021: 73.4 ML/MIN/1.73
EGFRCR SERPLBLD CKD-EPI 2021: 74.2 ML/MIN/1.73
EOSINOPHIL # BLD AUTO: 0 10*3/MM3 (ref 0–0.4)
EOSINOPHIL # BLD AUTO: 0.01 10*3/MM3 (ref 0–0.4)
EOSINOPHIL # BLD AUTO: 0.03 10*3/MM3 (ref 0–0.4)
EOSINOPHIL NFR BLD AUTO: 0 % (ref 0.3–6.2)
EOSINOPHIL NFR BLD AUTO: 0.1 % (ref 0.3–6.2)
EPAP: 0
ERYTHROCYTE [DISTWIDTH] IN BLOOD BY AUTOMATED COUNT: 14.9 % (ref 12.3–15.4)
ERYTHROCYTE [DISTWIDTH] IN BLOOD BY AUTOMATED COUNT: 15 % (ref 12.3–15.4)
ERYTHROCYTE [DISTWIDTH] IN BLOOD BY AUTOMATED COUNT: 15.1 % (ref 12.3–15.4)
ERYTHROCYTE [DISTWIDTH] IN BLOOD BY AUTOMATED COUNT: 15.8 % (ref 12.3–15.4)
ERYTHROCYTE [DISTWIDTH] IN BLOOD BY AUTOMATED COUNT: 16.1 % (ref 12.3–15.4)
ERYTHROCYTE [DISTWIDTH] IN BLOOD BY AUTOMATED COUNT: 16.2 % (ref 12.3–15.4)
ERYTHROCYTE [DISTWIDTH] IN BLOOD BY AUTOMATED COUNT: 16.3 % (ref 12.3–15.4)
ERYTHROCYTE [DISTWIDTH] IN BLOOD BY AUTOMATED COUNT: 16.4 % (ref 12.3–15.4)
ERYTHROCYTE [DISTWIDTH] IN BLOOD BY AUTOMATED COUNT: 16.4 % (ref 12.3–15.4)
ERYTHROCYTE [DISTWIDTH] IN BLOOD BY AUTOMATED COUNT: 16.5 % (ref 12.3–15.4)
ERYTHROCYTE [DISTWIDTH] IN BLOOD BY AUTOMATED COUNT: 17 % (ref 12.3–15.4)
ERYTHROCYTE [DISTWIDTH] IN BLOOD BY AUTOMATED COUNT: 17.2 % (ref 12.3–15.4)
ERYTHROCYTE [DISTWIDTH] IN BLOOD BY AUTOMATED COUNT: 17.2 % (ref 12.3–15.4)
ERYTHROCYTE [DISTWIDTH] IN BLOOD BY AUTOMATED COUNT: 17.9 % (ref 12.3–15.4)
ERYTHROCYTE [DISTWIDTH] IN BLOOD BY AUTOMATED COUNT: 18.4 % (ref 12.3–15.4)
ERYTHROCYTE [DISTWIDTH] IN BLOOD BY AUTOMATED COUNT: 19.1 % (ref 12.3–15.4)
ERYTHROCYTE [DISTWIDTH] IN BLOOD BY AUTOMATED COUNT: 19.9 % (ref 12.3–15.4)
ERYTHROCYTE [DISTWIDTH] IN BLOOD BY AUTOMATED COUNT: 20.2 % (ref 12.3–15.4)
FIBRINOGEN PPP-MCNC: 550 MG/DL (ref 203–567)
GEN 5 1HR TROPONIN T REFLEX: 5991 NG/L
GLOBULIN UR ELPH-MCNC: 2.8 GM/DL
GLOBULIN UR ELPH-MCNC: 2.9 GM/DL
GLOBULIN UR ELPH-MCNC: 2.9 GM/DL
GLOBULIN UR ELPH-MCNC: 3 GM/DL
GLOBULIN UR ELPH-MCNC: 3.1 GM/DL
GLOBULIN UR ELPH-MCNC: 3.3 GM/DL
GLOBULIN UR ELPH-MCNC: 3.4 GM/DL
GLOBULIN UR ELPH-MCNC: 3.5 GM/DL
GLUCOSE BLDC GLUCOMTR-MCNC: 101 MG/DL (ref 70–130)
GLUCOSE BLDC GLUCOMTR-MCNC: 102 MG/DL (ref 70–130)
GLUCOSE BLDC GLUCOMTR-MCNC: 103 MG/DL (ref 70–130)
GLUCOSE BLDC GLUCOMTR-MCNC: 104 MG/DL (ref 70–130)
GLUCOSE BLDC GLUCOMTR-MCNC: 104 MG/DL (ref 70–130)
GLUCOSE BLDC GLUCOMTR-MCNC: 107 MG/DL (ref 70–130)
GLUCOSE BLDC GLUCOMTR-MCNC: 109 MG/DL (ref 70–130)
GLUCOSE BLDC GLUCOMTR-MCNC: 112 MG/DL (ref 70–130)
GLUCOSE BLDC GLUCOMTR-MCNC: 114 MG/DL (ref 70–130)
GLUCOSE BLDC GLUCOMTR-MCNC: 126 MG/DL (ref 70–130)
GLUCOSE BLDC GLUCOMTR-MCNC: 133 MG/DL (ref 70–130)
GLUCOSE BLDC GLUCOMTR-MCNC: 137 MG/DL (ref 70–130)
GLUCOSE BLDC GLUCOMTR-MCNC: 141 MG/DL (ref 70–130)
GLUCOSE BLDC GLUCOMTR-MCNC: 142 MG/DL (ref 70–130)
GLUCOSE BLDC GLUCOMTR-MCNC: 148 MG/DL (ref 70–130)
GLUCOSE BLDC GLUCOMTR-MCNC: 150 MG/DL (ref 70–130)
GLUCOSE BLDC GLUCOMTR-MCNC: 158 MG/DL (ref 70–130)
GLUCOSE BLDC GLUCOMTR-MCNC: 163 MG/DL (ref 70–130)
GLUCOSE BLDC GLUCOMTR-MCNC: 172 MG/DL (ref 70–130)
GLUCOSE BLDC GLUCOMTR-MCNC: 175 MG/DL (ref 70–130)
GLUCOSE BLDC GLUCOMTR-MCNC: 177 MG/DL (ref 70–130)
GLUCOSE BLDC GLUCOMTR-MCNC: 180 MG/DL (ref 70–130)
GLUCOSE BLDC GLUCOMTR-MCNC: 181 MG/DL (ref 70–130)
GLUCOSE BLDC GLUCOMTR-MCNC: 181 MG/DL (ref 70–130)
GLUCOSE BLDC GLUCOMTR-MCNC: 186 MG/DL (ref 70–130)
GLUCOSE BLDC GLUCOMTR-MCNC: 189 MG/DL (ref 70–130)
GLUCOSE BLDC GLUCOMTR-MCNC: 189 MG/DL (ref 70–130)
GLUCOSE BLDC GLUCOMTR-MCNC: 201 MG/DL (ref 70–130)
GLUCOSE BLDC GLUCOMTR-MCNC: 205 MG/DL (ref 70–130)
GLUCOSE BLDC GLUCOMTR-MCNC: 206 MG/DL (ref 70–130)
GLUCOSE BLDC GLUCOMTR-MCNC: 213 MG/DL (ref 70–130)
GLUCOSE BLDC GLUCOMTR-MCNC: 214 MG/DL (ref 70–130)
GLUCOSE BLDC GLUCOMTR-MCNC: 215 MG/DL (ref 70–130)
GLUCOSE BLDC GLUCOMTR-MCNC: 219 MG/DL (ref 70–130)
GLUCOSE BLDC GLUCOMTR-MCNC: 221 MG/DL (ref 70–130)
GLUCOSE BLDC GLUCOMTR-MCNC: 222 MG/DL (ref 70–130)
GLUCOSE BLDC GLUCOMTR-MCNC: 223 MG/DL (ref 70–130)
GLUCOSE BLDC GLUCOMTR-MCNC: 229 MG/DL (ref 70–130)
GLUCOSE BLDC GLUCOMTR-MCNC: 233 MG/DL (ref 70–130)
GLUCOSE BLDC GLUCOMTR-MCNC: 233 MG/DL (ref 70–130)
GLUCOSE BLDC GLUCOMTR-MCNC: 241 MG/DL (ref 70–130)
GLUCOSE BLDC GLUCOMTR-MCNC: 242 MG/DL (ref 70–130)
GLUCOSE BLDC GLUCOMTR-MCNC: 242 MG/DL (ref 70–130)
GLUCOSE BLDC GLUCOMTR-MCNC: 246 MG/DL (ref 70–130)
GLUCOSE BLDC GLUCOMTR-MCNC: 248 MG/DL (ref 70–130)
GLUCOSE BLDC GLUCOMTR-MCNC: 249 MG/DL (ref 70–130)
GLUCOSE BLDC GLUCOMTR-MCNC: 251 MG/DL (ref 70–130)
GLUCOSE BLDC GLUCOMTR-MCNC: 254 MG/DL (ref 70–130)
GLUCOSE BLDC GLUCOMTR-MCNC: 263 MG/DL (ref 70–130)
GLUCOSE BLDC GLUCOMTR-MCNC: 270 MG/DL (ref 70–130)
GLUCOSE BLDC GLUCOMTR-MCNC: 277 MG/DL (ref 70–130)
GLUCOSE BLDC GLUCOMTR-MCNC: 277 MG/DL (ref 70–130)
GLUCOSE BLDC GLUCOMTR-MCNC: 279 MG/DL (ref 70–130)
GLUCOSE BLDC GLUCOMTR-MCNC: 283 MG/DL (ref 70–130)
GLUCOSE BLDC GLUCOMTR-MCNC: 284 MG/DL (ref 70–130)
GLUCOSE BLDC GLUCOMTR-MCNC: 285 MG/DL (ref 70–130)
GLUCOSE BLDC GLUCOMTR-MCNC: 286 MG/DL (ref 70–130)
GLUCOSE BLDC GLUCOMTR-MCNC: 287 MG/DL (ref 70–130)
GLUCOSE BLDC GLUCOMTR-MCNC: 296 MG/DL (ref 70–130)
GLUCOSE BLDC GLUCOMTR-MCNC: 306 MG/DL (ref 70–130)
GLUCOSE BLDC GLUCOMTR-MCNC: 310 MG/DL (ref 70–130)
GLUCOSE BLDC GLUCOMTR-MCNC: 310 MG/DL (ref 70–130)
GLUCOSE BLDC GLUCOMTR-MCNC: 316 MG/DL (ref 70–130)
GLUCOSE BLDC GLUCOMTR-MCNC: 317 MG/DL (ref 70–130)
GLUCOSE BLDC GLUCOMTR-MCNC: 327 MG/DL (ref 70–130)
GLUCOSE BLDC GLUCOMTR-MCNC: 359 MG/DL (ref 70–130)
GLUCOSE BLDC GLUCOMTR-MCNC: 370 MG/DL (ref 70–130)
GLUCOSE BLDC GLUCOMTR-MCNC: 410 MG/DL (ref 70–130)
GLUCOSE BLDC GLUCOMTR-MCNC: 452 MG/DL (ref 70–130)
GLUCOSE BLDC GLUCOMTR-MCNC: 482 MG/DL (ref 70–130)
GLUCOSE BLDC GLUCOMTR-MCNC: 59 MG/DL (ref 70–130)
GLUCOSE BLDC GLUCOMTR-MCNC: 62 MG/DL (ref 70–130)
GLUCOSE BLDC GLUCOMTR-MCNC: 70 MG/DL (ref 70–130)
GLUCOSE BLDC GLUCOMTR-MCNC: 87 MG/DL (ref 70–130)
GLUCOSE BLDC GLUCOMTR-MCNC: 92 MG/DL (ref 70–130)
GLUCOSE BLDC GLUCOMTR-MCNC: 94 MG/DL (ref 70–130)
GLUCOSE SERPL-MCNC: 102 MG/DL (ref 65–99)
GLUCOSE SERPL-MCNC: 104 MG/DL (ref 65–99)
GLUCOSE SERPL-MCNC: 149 MG/DL (ref 65–99)
GLUCOSE SERPL-MCNC: 168 MG/DL (ref 65–99)
GLUCOSE SERPL-MCNC: 183 MG/DL (ref 65–99)
GLUCOSE SERPL-MCNC: 186 MG/DL (ref 65–99)
GLUCOSE SERPL-MCNC: 196 MG/DL (ref 65–99)
GLUCOSE SERPL-MCNC: 199 MG/DL (ref 65–99)
GLUCOSE SERPL-MCNC: 204 MG/DL (ref 65–99)
GLUCOSE SERPL-MCNC: 207 MG/DL (ref 65–99)
GLUCOSE SERPL-MCNC: 222 MG/DL (ref 65–99)
GLUCOSE SERPL-MCNC: 232 MG/DL (ref 65–99)
GLUCOSE SERPL-MCNC: 254 MG/DL (ref 65–99)
GLUCOSE SERPL-MCNC: 272 MG/DL (ref 65–99)
GLUCOSE SERPL-MCNC: 279 MG/DL (ref 65–99)
GLUCOSE SERPL-MCNC: 283 MG/DL (ref 65–99)
GLUCOSE SERPL-MCNC: 293 MG/DL (ref 65–99)
GLUCOSE SERPL-MCNC: 293 MG/DL (ref 65–99)
GLUCOSE SERPL-MCNC: 323 MG/DL (ref 65–99)
GLUCOSE SERPL-MCNC: 350 MG/DL (ref 65–99)
GLUCOSE SERPL-MCNC: 93 MG/DL (ref 65–99)
GLUCOSE UR STRIP-MCNC: ABNORMAL MG/DL
GRAM STN SPEC: ABNORMAL
GRAM STN SPEC: ABNORMAL
GRAM STN SPEC: NORMAL
HCO3 BLDA-SCNC: 15.3 MMOL/L (ref 20–26)
HCO3 BLDA-SCNC: 17 MMOL/L (ref 20–26)
HCO3 BLDA-SCNC: 18.4 MMOL/L (ref 20–26)
HCO3 BLDA-SCNC: 18.4 MMOL/L (ref 20–26)
HCO3 BLDA-SCNC: 18.9 MMOL/L (ref 20–26)
HCO3 BLDA-SCNC: 19.7 MMOL/L (ref 20–26)
HCO3 BLDA-SCNC: 19.9 MMOL/L (ref 20–26)
HCO3 BLDA-SCNC: 21.8 MMOL/L (ref 20–26)
HCO3 BLDA-SCNC: 22.2 MMOL/L (ref 20–26)
HCO3 BLDA-SCNC: 23.1 MMOL/L (ref 20–26)
HCO3 BLDA-SCNC: 23.2 MMOL/L (ref 20–26)
HCO3 BLDA-SCNC: 24.4 MMOL/L (ref 20–26)
HCO3 BLDA-SCNC: 24.8 MMOL/L (ref 20–26)
HCO3 BLDA-SCNC: 27.5 MMOL/L (ref 20–26)
HCO3 BLDA-SCNC: 27.6 MMOL/L (ref 20–26)
HCO3 BLDV-SCNC: 21.5 MMOL/L (ref 22–28)
HCT VFR BLD AUTO: 23.7 % (ref 37.5–51)
HCT VFR BLD AUTO: 23.9 % (ref 37.5–51)
HCT VFR BLD AUTO: 23.9 % (ref 37.5–51)
HCT VFR BLD AUTO: 24 % (ref 37.5–51)
HCT VFR BLD AUTO: 24.1 % (ref 37.5–51)
HCT VFR BLD AUTO: 24.3 % (ref 37.5–51)
HCT VFR BLD AUTO: 24.4 % (ref 37.5–51)
HCT VFR BLD AUTO: 24.6 % (ref 37.5–51)
HCT VFR BLD AUTO: 25.3 % (ref 37.5–51)
HCT VFR BLD AUTO: 25.5 % (ref 37.5–51)
HCT VFR BLD AUTO: 26 % (ref 37.5–51)
HCT VFR BLD AUTO: 26 % (ref 37.5–51)
HCT VFR BLD AUTO: 26.3 % (ref 37.5–51)
HCT VFR BLD AUTO: 26.3 % (ref 37.5–51)
HCT VFR BLD AUTO: 28.1 % (ref 37.5–51)
HCT VFR BLD AUTO: 28.9 % (ref 37.5–51)
HCT VFR BLD AUTO: 29.2 % (ref 37.5–51)
HCT VFR BLD AUTO: 29.8 % (ref 37.5–51)
HCT VFR BLD AUTO: 31.2 % (ref 37.5–51)
HCT VFR BLD AUTO: 31.3 % (ref 37.5–51)
HCT VFR BLD AUTO: 31.4 % (ref 37.5–51)
HCT VFR BLD AUTO: 31.8 % (ref 37.5–51)
HCT VFR BLD CALC: 22.1 % (ref 38–51)
HCT VFR BLD CALC: 22.4 % (ref 38–51)
HCT VFR BLD CALC: 22.6 % (ref 38–51)
HCT VFR BLD CALC: 22.7 % (ref 38–51)
HCT VFR BLD CALC: 23.9 % (ref 38–51)
HCT VFR BLD CALC: 24 % (ref 38–51)
HCT VFR BLD CALC: 24.8 % (ref 38–51)
HCT VFR BLD CALC: 25 % (ref 38–51)
HCT VFR BLD CALC: 25 % (ref 38–51)
HCT VFR BLD CALC: 25.5 % (ref 38–51)
HCT VFR BLD CALC: 26.1 % (ref 38–51)
HCT VFR BLD CALC: 27.5 % (ref 38–51)
HCT VFR BLD CALC: 28.3 % (ref 38–51)
HCT VFR BLD CALC: 30.9 % (ref 38–51)
HCT VFR BLD CALC: 31.2 % (ref 38–51)
HGB BLD-MCNC: 6.8 G/DL (ref 13–17.7)
HGB BLD-MCNC: 7 G/DL (ref 13–17.7)
HGB BLD-MCNC: 7.1 G/DL (ref 13–17.7)
HGB BLD-MCNC: 7.1 G/DL (ref 13–17.7)
HGB BLD-MCNC: 7.2 G/DL (ref 13–17.7)
HGB BLD-MCNC: 7.3 G/DL (ref 13–17.7)
HGB BLD-MCNC: 7.4 G/DL (ref 13–17.7)
HGB BLD-MCNC: 7.6 G/DL (ref 13–17.7)
HGB BLD-MCNC: 7.7 G/DL (ref 13–17.7)
HGB BLD-MCNC: 7.7 G/DL (ref 13–17.7)
HGB BLD-MCNC: 7.8 G/DL (ref 13–17.7)
HGB BLD-MCNC: 8.2 G/DL (ref 13–17.7)
HGB BLD-MCNC: 8.3 G/DL (ref 13–17.7)
HGB BLD-MCNC: 8.7 G/DL (ref 13–17.7)
HGB BLD-MCNC: 8.9 G/DL (ref 13–17.7)
HGB BLD-MCNC: 9.1 G/DL (ref 13–17.7)
HGB BLD-MCNC: 9.5 G/DL (ref 13–17.7)
HGB BLD-MCNC: 9.5 G/DL (ref 13–17.7)
HGB BLD-MCNC: 9.7 G/DL (ref 13–17.7)
HGB BLD-MCNC: 9.8 G/DL (ref 13–17.7)
HGB BLDA-MCNC: 10.1 G/DL (ref 13.5–17.5)
HGB BLDA-MCNC: 10.2 G/DL (ref 13.5–17.5)
HGB BLDA-MCNC: 7.2 G/DL (ref 13.5–17.5)
HGB BLDA-MCNC: 7.3 G/DL (ref 13.5–17.5)
HGB BLDA-MCNC: 7.3 G/DL (ref 13.5–17.5)
HGB BLDA-MCNC: 7.4 G/DL (ref 13.5–17.5)
HGB BLDA-MCNC: 7.4 G/DL (ref 13.5–17.5)
HGB BLDA-MCNC: 7.8 G/DL (ref 13.5–17.5)
HGB BLDA-MCNC: 7.8 G/DL (ref 13.5–17.5)
HGB BLDA-MCNC: 8.1 G/DL (ref 13.5–17.5)
HGB BLDA-MCNC: 8.1 G/DL (ref 13.5–17.5)
HGB BLDA-MCNC: 8.2 G/DL (ref 13.5–17.5)
HGB BLDA-MCNC: 8.3 G/DL (ref 13.5–17.5)
HGB BLDA-MCNC: 8.5 G/DL (ref 13.5–17.5)
HGB BLDA-MCNC: 9 G/DL (ref 13.5–17.5)
HGB BLDA-MCNC: 9.2 G/DL (ref 13.5–17.5)
HGB UR QL STRIP.AUTO: ABNORMAL
HYALINE CASTS UR QL AUTO: ABNORMAL /LPF
IMM GRANULOCYTES # BLD AUTO: 0.21 10*3/MM3 (ref 0–0.05)
IMM GRANULOCYTES # BLD AUTO: 0.22 10*3/MM3 (ref 0–0.05)
IMM GRANULOCYTES # BLD AUTO: 0.31 10*3/MM3 (ref 0–0.05)
IMM GRANULOCYTES # BLD AUTO: 0.34 10*3/MM3 (ref 0–0.05)
IMM GRANULOCYTES # BLD AUTO: 0.39 10*3/MM3 (ref 0–0.05)
IMM GRANULOCYTES # BLD AUTO: 0.41 10*3/MM3 (ref 0–0.05)
IMM GRANULOCYTES # BLD AUTO: 0.98 10*3/MM3 (ref 0–0.05)
IMM GRANULOCYTES NFR BLD AUTO: 1.1 % (ref 0–0.5)
IMM GRANULOCYTES NFR BLD AUTO: 1.2 % (ref 0–0.5)
IMM GRANULOCYTES NFR BLD AUTO: 1.4 % (ref 0–0.5)
IMM GRANULOCYTES NFR BLD AUTO: 1.5 % (ref 0–0.5)
IMM GRANULOCYTES NFR BLD AUTO: 2 % (ref 0–0.5)
IMM GRANULOCYTES NFR BLD AUTO: 2 % (ref 0–0.5)
IMM GRANULOCYTES NFR BLD AUTO: 2.8 % (ref 0–0.5)
INHALED O2 CONCENTRATION: 30 %
INHALED O2 CONCENTRATION: 35 %
INHALED O2 CONCENTRATION: 40 %
INHALED O2 CONCENTRATION: 50 %
INHALED O2 CONCENTRATION: 50 %
INR PPP: 1.37 (ref 0.89–1.12)
INR PPP: 1.43 (ref 0.89–1.12)
INR PPP: 1.46 (ref 0.89–1.12)
INR PPP: 1.65 (ref 0.89–1.12)
IPAP: 0
ISOLATED FROM: ABNORMAL
ISOLATED FROM: ABNORMAL
KETONES UR QL STRIP: NEGATIVE
LEUKOCYTE ESTERASE UR QL STRIP.AUTO: ABNORMAL
LIPASE SERPL-CCNC: 11 U/L (ref 13–60)
LYMPHOCYTES # BLD AUTO: 0.57 10*3/MM3 (ref 0.7–3.1)
LYMPHOCYTES # BLD AUTO: 0.59 10*3/MM3 (ref 0.7–3.1)
LYMPHOCYTES # BLD AUTO: 0.7 10*3/MM3 (ref 0.7–3.1)
LYMPHOCYTES # BLD AUTO: 0.87 10*3/MM3 (ref 0.7–3.1)
LYMPHOCYTES # BLD AUTO: 1.34 10*3/MM3 (ref 0.7–3.1)
LYMPHOCYTES # BLD AUTO: 3.73 10*3/MM3 (ref 0.7–3.1)
LYMPHOCYTES # BLD AUTO: 3.82 10*3/MM3 (ref 0.7–3.1)
LYMPHOCYTES NFR BLD AUTO: 10.9 % (ref 19.6–45.3)
LYMPHOCYTES NFR BLD AUTO: 16.3 % (ref 19.6–45.3)
LYMPHOCYTES NFR BLD AUTO: 2.9 % (ref 19.6–45.3)
LYMPHOCYTES NFR BLD AUTO: 3.3 % (ref 19.6–45.3)
LYMPHOCYTES NFR BLD AUTO: 4.1 % (ref 19.6–45.3)
LYMPHOCYTES NFR BLD AUTO: 4.3 % (ref 19.6–45.3)
LYMPHOCYTES NFR BLD AUTO: 5.1 % (ref 19.6–45.3)
MAGNESIUM SERPL-MCNC: 2.2 MG/DL (ref 1.6–2.4)
MAGNESIUM SERPL-MCNC: 2.3 MG/DL (ref 1.6–2.4)
MAGNESIUM SERPL-MCNC: 2.3 MG/DL (ref 1.6–2.4)
MAGNESIUM SERPL-MCNC: 2.4 MG/DL (ref 1.6–2.4)
MAGNESIUM SERPL-MCNC: 2.6 MG/DL (ref 1.6–2.4)
MAGNESIUM SERPL-MCNC: 2.7 MG/DL (ref 1.6–2.4)
MAGNESIUM SERPL-MCNC: 4.8 MG/DL (ref 1.6–2.4)
MCH RBC QN AUTO: 27.4 PG (ref 26.6–33)
MCH RBC QN AUTO: 27.7 PG (ref 26.6–33)
MCH RBC QN AUTO: 27.9 PG (ref 26.6–33)
MCH RBC QN AUTO: 28.1 PG (ref 26.6–33)
MCH RBC QN AUTO: 28.2 PG (ref 26.6–33)
MCH RBC QN AUTO: 28.3 PG (ref 26.6–33)
MCH RBC QN AUTO: 28.4 PG (ref 26.6–33)
MCH RBC QN AUTO: 28.4 PG (ref 26.6–33)
MCH RBC QN AUTO: 28.5 PG (ref 26.6–33)
MCH RBC QN AUTO: 28.6 PG (ref 26.6–33)
MCH RBC QN AUTO: 28.7 PG (ref 26.6–33)
MCH RBC QN AUTO: 28.7 PG (ref 26.6–33)
MCH RBC QN AUTO: 29.1 PG (ref 26.6–33)
MCH RBC QN AUTO: 29.3 PG (ref 26.6–33)
MCHC RBC AUTO-ENTMCNC: 28.2 G/DL (ref 31.5–35.7)
MCHC RBC AUTO-ENTMCNC: 28.5 G/DL (ref 31.5–35.7)
MCHC RBC AUTO-ENTMCNC: 29.5 G/DL (ref 31.5–35.7)
MCHC RBC AUTO-ENTMCNC: 29.7 G/DL (ref 31.5–35.7)
MCHC RBC AUTO-ENTMCNC: 29.8 G/DL (ref 31.5–35.7)
MCHC RBC AUTO-ENTMCNC: 29.8 G/DL (ref 31.5–35.7)
MCHC RBC AUTO-ENTMCNC: 29.9 G/DL (ref 31.5–35.7)
MCHC RBC AUTO-ENTMCNC: 30 G/DL (ref 31.5–35.7)
MCHC RBC AUTO-ENTMCNC: 30.4 G/DL (ref 31.5–35.7)
MCHC RBC AUTO-ENTMCNC: 31.1 G/DL (ref 31.5–35.7)
MCHC RBC AUTO-ENTMCNC: 31.2 G/DL (ref 31.5–35.7)
MCHC RBC AUTO-ENTMCNC: 31.3 G/DL (ref 31.5–35.7)
MCHC RBC AUTO-ENTMCNC: 31.5 G/DL (ref 31.5–35.7)
MCHC RBC AUTO-ENTMCNC: 31.5 G/DL (ref 31.5–35.7)
MCHC RBC AUTO-ENTMCNC: 31.7 G/DL (ref 31.5–35.7)
MCV RBC AUTO: 89 FL (ref 79–97)
MCV RBC AUTO: 89.7 FL (ref 79–97)
MCV RBC AUTO: 91 FL (ref 79–97)
MCV RBC AUTO: 91.1 FL (ref 79–97)
MCV RBC AUTO: 91.2 FL (ref 79–97)
MCV RBC AUTO: 92.9 FL (ref 79–97)
MCV RBC AUTO: 93.3 FL (ref 79–97)
MCV RBC AUTO: 93.7 FL (ref 79–97)
MCV RBC AUTO: 93.8 FL (ref 79–97)
MCV RBC AUTO: 94.3 FL (ref 79–97)
MCV RBC AUTO: 94.6 FL (ref 79–97)
MCV RBC AUTO: 94.6 FL (ref 79–97)
MCV RBC AUTO: 94.8 FL (ref 79–97)
MCV RBC AUTO: 94.8 FL (ref 79–97)
MCV RBC AUTO: 94.9 FL (ref 79–97)
MCV RBC AUTO: 95.2 FL (ref 79–97)
MCV RBC AUTO: 96.4 FL (ref 79–97)
MCV RBC AUTO: 97.2 FL (ref 79–97)
MCV RBC AUTO: 98.1 FL (ref 79–97)
MCV RBC AUTO: 98.6 FL (ref 79–97)
METHGB BLD QL: -0.1 % (ref 0–1.5)
METHGB BLD QL: -0.1 % (ref 0–1.5)
METHGB BLD QL: 0 % (ref 0–1.5)
METHGB BLD QL: 0.1 % (ref 0–1.5)
METHGB BLD QL: 0.1 % (ref 0–1.5)
METHGB BLD QL: 0.2 % (ref 0–1.5)
METHGB BLD QL: 0.3 % (ref 0–1.5)
METHGB BLD QL: 0.4 %
METHGB BLD QL: ABNORMAL
MODALITY: ABNORMAL
MONOCYTES # BLD AUTO: 0.41 10*3/MM3 (ref 0.1–0.9)
MONOCYTES # BLD AUTO: 0.83 10*3/MM3 (ref 0.1–0.9)
MONOCYTES # BLD AUTO: 0.85 10*3/MM3 (ref 0.1–0.9)
MONOCYTES # BLD AUTO: 0.87 10*3/MM3 (ref 0.1–0.9)
MONOCYTES # BLD AUTO: 0.92 10*3/MM3 (ref 0.1–0.9)
MONOCYTES # BLD AUTO: 0.93 10*3/MM3 (ref 0.1–0.9)
MONOCYTES # BLD AUTO: 1.67 10*3/MM3 (ref 0.1–0.9)
MONOCYTES NFR BLD AUTO: 2.1 % (ref 5–12)
MONOCYTES NFR BLD AUTO: 3.5 % (ref 5–12)
MONOCYTES NFR BLD AUTO: 3.6 % (ref 5–12)
MONOCYTES NFR BLD AUTO: 4.3 % (ref 5–12)
MONOCYTES NFR BLD AUTO: 4.7 % (ref 5–12)
MONOCYTES NFR BLD AUTO: 4.8 % (ref 5–12)
MONOCYTES NFR BLD AUTO: 5.4 % (ref 5–12)
NEUTROPHILS NFR BLD AUTO: 15.18 10*3/MM3 (ref 1.7–7)
NEUTROPHILS NFR BLD AUTO: 16.4 10*3/MM3 (ref 1.7–7)
NEUTROPHILS NFR BLD AUTO: 18 10*3/MM3 (ref 1.7–7)
NEUTROPHILS NFR BLD AUTO: 18.21 10*3/MM3 (ref 1.7–7)
NEUTROPHILS NFR BLD AUTO: 18.51 10*3/MM3 (ref 1.7–7)
NEUTROPHILS NFR BLD AUTO: 23.57 10*3/MM3 (ref 1.7–7)
NEUTROPHILS NFR BLD AUTO: 28.58 10*3/MM3 (ref 1.7–7)
NEUTROPHILS NFR BLD AUTO: 78.4 % (ref 42.7–76)
NEUTROPHILS NFR BLD AUTO: 81.2 % (ref 42.7–76)
NEUTROPHILS NFR BLD AUTO: 88.4 % (ref 42.7–76)
NEUTROPHILS NFR BLD AUTO: 89.3 % (ref 42.7–76)
NEUTROPHILS NFR BLD AUTO: 89.7 % (ref 42.7–76)
NEUTROPHILS NFR BLD AUTO: 90.7 % (ref 42.7–76)
NEUTROPHILS NFR BLD AUTO: 93.8 % (ref 42.7–76)
NITRITE UR QL STRIP: NEGATIVE
NRBC BLD AUTO-RTO: 0 /100 WBC (ref 0–0.2)
NRBC BLD AUTO-RTO: 0.1 /100 WBC (ref 0–0.2)
NRBC BLD AUTO-RTO: 0.1 /100 WBC (ref 0–0.2)
NRBC BLD AUTO-RTO: 0.2 /100 WBC (ref 0–0.2)
NRBC BLD AUTO-RTO: 3.2 /100 WBC (ref 0–0.2)
NT-PROBNP SERPL-MCNC: ABNORMAL PG/ML (ref 0–900)
OXYHGB MFR BLDV: 28.1 %
OXYHGB MFR BLDV: 82.7 % (ref 94–99)
OXYHGB MFR BLDV: 93.1 % (ref 94–99)
OXYHGB MFR BLDV: 94.3 % (ref 94–99)
OXYHGB MFR BLDV: 94.4 % (ref 94–99)
OXYHGB MFR BLDV: 94.5 % (ref 94–99)
OXYHGB MFR BLDV: 96 % (ref 94–99)
OXYHGB MFR BLDV: 96.2 % (ref 94–99)
OXYHGB MFR BLDV: 96.7 % (ref 94–99)
OXYHGB MFR BLDV: 97.1 % (ref 94–99)
OXYHGB MFR BLDV: 97.2 % (ref 94–99)
OXYHGB MFR BLDV: 97.7 % (ref 94–99)
OXYHGB MFR BLDV: 98.4 % (ref 94–99)
OXYHGB MFR BLDV: 98.5 % (ref 94–99)
OXYHGB MFR BLDV: 98.8 % (ref 94–99)
OXYHGB MFR BLDV: 98.8 % (ref 94–99)
PAW @ PEAK INSP FLOW SETTING VENT: 0 CMH2O
PCO2 BLDA: 23.9 MM HG (ref 35–45)
PCO2 BLDA: 32.4 MM HG (ref 35–45)
PCO2 BLDA: 32.9 MM HG (ref 35–45)
PCO2 BLDA: 33.6 MM HG (ref 35–45)
PCO2 BLDA: 33.9 MM HG (ref 35–45)
PCO2 BLDA: 35.1 MM HG (ref 35–45)
PCO2 BLDA: 36 MM HG (ref 35–45)
PCO2 BLDA: 37.1 MM HG (ref 35–45)
PCO2 BLDA: 37.6 MM HG (ref 35–45)
PCO2 BLDA: 37.7 MM HG (ref 35–45)
PCO2 BLDA: 37.8 MM HG (ref 35–45)
PCO2 BLDA: 40.3 MM HG (ref 35–45)
PCO2 BLDA: 42 MM HG (ref 35–45)
PCO2 BLDA: 43.3 MM HG (ref 35–45)
PCO2 BLDA: 43.9 MM HG (ref 35–45)
PCO2 BLDV: 43.1 MM HG (ref 41–51)
PCO2 TEMP ADJ BLD: 23.9 MM HG (ref 35–48)
PCO2 TEMP ADJ BLD: 32.4 MM HG (ref 35–48)
PCO2 TEMP ADJ BLD: 32.9 MM HG (ref 35–48)
PCO2 TEMP ADJ BLD: 33.6 MM HG (ref 35–48)
PCO2 TEMP ADJ BLD: 33.9 MM HG (ref 35–48)
PCO2 TEMP ADJ BLD: 35.1 MM HG (ref 35–48)
PCO2 TEMP ADJ BLD: 36 MM HG (ref 35–48)
PCO2 TEMP ADJ BLD: 37.1 MM HG (ref 35–48)
PCO2 TEMP ADJ BLD: 37.6 MM HG (ref 35–48)
PCO2 TEMP ADJ BLD: 37.7 MM HG (ref 35–48)
PCO2 TEMP ADJ BLD: 37.8 MM HG (ref 35–48)
PCO2 TEMP ADJ BLD: 40.3 MM HG (ref 35–48)
PCO2 TEMP ADJ BLD: 42 MM HG (ref 35–48)
PCO2 TEMP ADJ BLD: 43.3 MM HG (ref 35–48)
PCO2 TEMP ADJ BLD: 43.9 MM HG (ref 35–48)
PEEP RESPIRATORY: 5 CM[H2O]
PEEP RESPIRATORY: 8 CM[H2O]
PH BLDA: 7.31 PH UNITS (ref 7.35–7.45)
PH BLDA: 7.33 PH UNITS (ref 7.35–7.45)
PH BLDA: 7.35 PH UNITS (ref 7.35–7.45)
PH BLDA: 7.37 PH UNITS (ref 7.35–7.45)
PH BLDA: 7.39 PH UNITS (ref 7.35–7.45)
PH BLDA: 7.4 PH UNITS (ref 7.35–7.45)
PH BLDA: 7.4 PH UNITS (ref 7.35–7.45)
PH BLDA: 7.41 PH UNITS (ref 7.35–7.45)
PH BLDA: 7.42 PH UNITS (ref 7.35–7.45)
PH BLDA: 7.43 PH UNITS (ref 7.35–7.45)
PH BLDA: 7.44 PH UNITS (ref 7.35–7.45)
PH BLDV: 7.3 PH UNITS (ref 7.31–7.41)
PH UR STRIP.AUTO: <=5 [PH] (ref 5–8)
PH, TEMP CORRECTED: 7.31 PH UNITS
PH, TEMP CORRECTED: 7.33 PH UNITS
PH, TEMP CORRECTED: 7.35 PH UNITS
PH, TEMP CORRECTED: 7.37 PH UNITS
PH, TEMP CORRECTED: 7.39 PH UNITS
PH, TEMP CORRECTED: 7.4 PH UNITS
PH, TEMP CORRECTED: 7.4 PH UNITS
PH, TEMP CORRECTED: 7.41 PH UNITS
PH, TEMP CORRECTED: 7.42 PH UNITS
PH, TEMP CORRECTED: 7.43 PH UNITS
PH, TEMP CORRECTED: 7.44 PH UNITS
PHOSPHATE SERPL-MCNC: 2.6 MG/DL (ref 2.5–4.5)
PHOSPHATE SERPL-MCNC: 3.3 MG/DL (ref 2.5–4.5)
PHOSPHATE SERPL-MCNC: 3.5 MG/DL (ref 2.5–4.5)
PHOSPHATE SERPL-MCNC: 3.8 MG/DL (ref 2.5–4.5)
PHOSPHATE SERPL-MCNC: 4.9 MG/DL (ref 2.5–4.5)
PHOSPHATE SERPL-MCNC: 5.5 MG/DL (ref 2.5–4.5)
PHOSPHATE SERPL-MCNC: 7 MG/DL (ref 2.5–4.5)
PLAT MORPH BLD: NORMAL
PLATELET # BLD AUTO: 132 10*3/MM3 (ref 140–450)
PLATELET # BLD AUTO: 165 10*3/MM3 (ref 140–450)
PLATELET # BLD AUTO: 245 10*3/MM3 (ref 140–450)
PLATELET # BLD AUTO: 277 10*3/MM3 (ref 140–450)
PLATELET # BLD AUTO: 288 10*3/MM3 (ref 140–450)
PLATELET # BLD AUTO: 309 10*3/MM3 (ref 140–450)
PLATELET # BLD AUTO: 312 10*3/MM3 (ref 140–450)
PLATELET # BLD AUTO: 312 10*3/MM3 (ref 140–450)
PLATELET # BLD AUTO: 324 10*3/MM3 (ref 140–450)
PLATELET # BLD AUTO: 331 10*3/MM3 (ref 140–450)
PLATELET # BLD AUTO: 348 10*3/MM3 (ref 140–450)
PLATELET # BLD AUTO: 353 10*3/MM3 (ref 140–450)
PLATELET # BLD AUTO: 360 10*3/MM3 (ref 140–450)
PLATELET # BLD AUTO: 361 10*3/MM3 (ref 140–450)
PLATELET # BLD AUTO: 374 10*3/MM3 (ref 140–450)
PLATELET # BLD AUTO: 384 10*3/MM3 (ref 140–450)
PLATELET # BLD AUTO: 411 10*3/MM3 (ref 140–450)
PLATELET # BLD AUTO: 414 10*3/MM3 (ref 140–450)
PLATELET # BLD AUTO: 430 10*3/MM3 (ref 140–450)
PLATELET # BLD AUTO: 431 10*3/MM3 (ref 140–450)
PMV BLD AUTO: 10 FL (ref 6–12)
PMV BLD AUTO: 10 FL (ref 6–12)
PMV BLD AUTO: 10.1 FL (ref 6–12)
PMV BLD AUTO: 10.4 FL (ref 6–12)
PMV BLD AUTO: 10.5 FL (ref 6–12)
PMV BLD AUTO: 10.5 FL (ref 6–12)
PMV BLD AUTO: 10.8 FL (ref 6–12)
PMV BLD AUTO: 11.3 FL (ref 6–12)
PMV BLD AUTO: 11.7 FL (ref 6–12)
PMV BLD AUTO: 11.8 FL (ref 6–12)
PMV BLD AUTO: 12.1 FL (ref 6–12)
PMV BLD AUTO: 12.2 FL (ref 6–12)
PMV BLD AUTO: 9.9 FL (ref 6–12)
PO2 BLDA: 108 MM HG (ref 83–108)
PO2 BLDA: 119 MM HG (ref 83–108)
PO2 BLDA: 125 MM HG (ref 83–108)
PO2 BLDA: 130 MM HG (ref 83–108)
PO2 BLDA: 151 MM HG (ref 83–108)
PO2 BLDA: 56.4 MM HG (ref 83–108)
PO2 BLDA: 72.4 MM HG (ref 83–108)
PO2 BLDA: 77.3 MM HG (ref 83–108)
PO2 BLDA: 81.5 MM HG (ref 83–108)
PO2 BLDA: 82.5 MM HG (ref 83–108)
PO2 BLDA: 85.7 MM HG (ref 83–108)
PO2 BLDA: 87 MM HG (ref 83–108)
PO2 BLDA: 92.7 MM HG (ref 83–108)
PO2 BLDA: 97.4 MM HG (ref 83–108)
PO2 BLDA: 99.9 MM HG (ref 83–108)
PO2 BLDV: 23.1 MM HG (ref 27–53)
PO2 TEMP ADJ BLD: 108 MM HG (ref 83–108)
PO2 TEMP ADJ BLD: 119 MM HG (ref 83–108)
PO2 TEMP ADJ BLD: 125 MM HG (ref 83–108)
PO2 TEMP ADJ BLD: 130 MM HG (ref 83–108)
PO2 TEMP ADJ BLD: 151 MM HG (ref 83–108)
PO2 TEMP ADJ BLD: 56.4 MM HG (ref 83–108)
PO2 TEMP ADJ BLD: 72.4 MM HG (ref 83–108)
PO2 TEMP ADJ BLD: 77.3 MM HG (ref 83–108)
PO2 TEMP ADJ BLD: 81.5 MM HG (ref 83–108)
PO2 TEMP ADJ BLD: 82.5 MM HG (ref 83–108)
PO2 TEMP ADJ BLD: 85.7 MM HG (ref 83–108)
PO2 TEMP ADJ BLD: 87 MM HG (ref 83–108)
PO2 TEMP ADJ BLD: 92.7 MM HG (ref 83–108)
PO2 TEMP ADJ BLD: 97.4 MM HG (ref 83–108)
PO2 TEMP ADJ BLD: 99.9 MM HG (ref 83–108)
POTASSIUM SERPL-SCNC: 3.5 MMOL/L (ref 3.5–5.2)
POTASSIUM SERPL-SCNC: 3.6 MMOL/L (ref 3.5–5.2)
POTASSIUM SERPL-SCNC: 3.7 MMOL/L (ref 3.5–5.2)
POTASSIUM SERPL-SCNC: 3.8 MMOL/L (ref 3.5–5.2)
POTASSIUM SERPL-SCNC: 3.9 MMOL/L (ref 3.5–5.2)
POTASSIUM SERPL-SCNC: 4.1 MMOL/L (ref 3.5–5.2)
POTASSIUM SERPL-SCNC: 4.2 MMOL/L (ref 3.5–5.2)
POTASSIUM SERPL-SCNC: 4.3 MMOL/L (ref 3.5–5.2)
POTASSIUM SERPL-SCNC: 4.3 MMOL/L (ref 3.5–5.2)
POTASSIUM SERPL-SCNC: 4.4 MMOL/L (ref 3.5–5.2)
POTASSIUM SERPL-SCNC: 4.5 MMOL/L (ref 3.5–5.2)
POTASSIUM SERPL-SCNC: 4.6 MMOL/L (ref 3.5–5.2)
POTASSIUM SERPL-SCNC: 4.7 MMOL/L (ref 3.5–5.2)
POTASSIUM SERPL-SCNC: 4.8 MMOL/L (ref 3.5–5.2)
POTASSIUM SERPL-SCNC: 4.8 MMOL/L (ref 3.5–5.2)
POTASSIUM SERPL-SCNC: 5.2 MMOL/L (ref 3.5–5.2)
PREALB SERPL-MCNC: 16.3 MG/DL (ref 20–40)
PREALB SERPL-MCNC: 7.1 MG/DL (ref 20–40)
PREALB SERPL-MCNC: 8.1 MG/DL (ref 20–40)
PROCALCITONIN SERPL-MCNC: 0.29 NG/ML (ref 0–0.25)
PROCALCITONIN SERPL-MCNC: 0.32 NG/ML (ref 0–0.25)
PROCALCITONIN SERPL-MCNC: 0.37 NG/ML (ref 0–0.25)
PROCALCITONIN SERPL-MCNC: 0.39 NG/ML (ref 0–0.25)
PROCALCITONIN SERPL-MCNC: 0.41 NG/ML (ref 0–0.25)
PROCALCITONIN SERPL-MCNC: 0.77 NG/ML (ref 0–0.25)
PROT SERPL-MCNC: 5.2 G/DL (ref 6–8.5)
PROT SERPL-MCNC: 5.5 G/DL (ref 6–8.5)
PROT SERPL-MCNC: 5.5 G/DL (ref 6–8.5)
PROT SERPL-MCNC: 5.7 G/DL (ref 6–8.5)
PROT SERPL-MCNC: 5.8 G/DL (ref 6–8.5)
PROT SERPL-MCNC: 5.9 G/DL (ref 6–8.5)
PROT SERPL-MCNC: 6 G/DL (ref 6–8.5)
PROT SERPL-MCNC: 6.1 G/DL (ref 6–8.5)
PROT SERPL-MCNC: 6.2 G/DL (ref 6–8.5)
PROT SERPL-MCNC: 6.2 G/DL (ref 6–8.5)
PROT SERPL-MCNC: 6.4 G/DL (ref 6–8.5)
PROT SERPL-MCNC: 6.8 G/DL (ref 6–8.5)
PROT UR QL STRIP: ABNORMAL
PROTHROMBIN TIME: 17.8 SECONDS (ref 12.2–15.3)
PROTHROMBIN TIME: 18.4 SECONDS (ref 12.2–15.3)
PROTHROMBIN TIME: 18.7 SECONDS (ref 12.2–15.3)
PROTHROMBIN TIME: 20.6 SECONDS (ref 12.2–15.3)
PSV: 15 CMH2O
QT INTERVAL: 280 MS
QT INTERVAL: 298 MS
QT INTERVAL: 306 MS
QT INTERVAL: 328 MS
QT INTERVAL: 350 MS
QT INTERVAL: 376 MS
QT INTERVAL: 384 MS
QT INTERVAL: 394 MS
QT INTERVAL: 394 MS
QT INTERVAL: 428 MS
QT INTERVAL: 478 MS
QTC INTERVAL: 425 MS
QTC INTERVAL: 434 MS
QTC INTERVAL: 446 MS
QTC INTERVAL: 453 MS
QTC INTERVAL: 457 MS
QTC INTERVAL: 459 MS
QTC INTERVAL: 465 MS
QTC INTERVAL: 472 MS
QTC INTERVAL: 497 MS
QTC INTERVAL: 502 MS
QTC INTERVAL: 540 MS
RBC # BLD AUTO: 2.48 10*6/MM3 (ref 4.14–5.8)
RBC # BLD AUTO: 2.52 10*6/MM3 (ref 4.14–5.8)
RBC # BLD AUTO: 2.52 10*6/MM3 (ref 4.14–5.8)
RBC # BLD AUTO: 2.58 10*6/MM3 (ref 4.14–5.8)
RBC # BLD AUTO: 2.58 10*6/MM3 (ref 4.14–5.8)
RBC # BLD AUTO: 2.7 10*6/MM3 (ref 4.14–5.8)
RBC # BLD AUTO: 2.71 10*6/MM3 (ref 4.14–5.8)
RBC # BLD AUTO: 2.72 10*6/MM3 (ref 4.14–5.8)
RBC # BLD AUTO: 2.77 10*6/MM3 (ref 4.14–5.8)
RBC # BLD AUTO: 2.8 10*6/MM3 (ref 4.14–5.8)
RBC # BLD AUTO: 2.82 10*6/MM3 (ref 4.14–5.8)
RBC # BLD AUTO: 2.85 10*6/MM3 (ref 4.14–5.8)
RBC # BLD AUTO: 2.92 10*6/MM3 (ref 4.14–5.8)
RBC # BLD AUTO: 3.03 10*6/MM3 (ref 4.14–5.8)
RBC # BLD AUTO: 3.13 10*6/MM3 (ref 4.14–5.8)
RBC # BLD AUTO: 3.17 10*6/MM3 (ref 4.14–5.8)
RBC # BLD AUTO: 3.32 10*6/MM3 (ref 4.14–5.8)
RBC # BLD AUTO: 3.35 10*6/MM3 (ref 4.14–5.8)
RBC # BLD AUTO: 3.36 10*6/MM3 (ref 4.14–5.8)
RBC # BLD AUTO: 3.43 10*6/MM3 (ref 4.14–5.8)
RBC # UR STRIP: ABNORMAL /HPF
RBC MORPH BLD: NORMAL
REF LAB TEST METHOD: ABNORMAL
RH BLD: POSITIVE
SODIUM SERPL-SCNC: 130 MMOL/L (ref 136–145)
SODIUM SERPL-SCNC: 136 MMOL/L (ref 136–145)
SODIUM SERPL-SCNC: 138 MMOL/L (ref 136–145)
SODIUM SERPL-SCNC: 141 MMOL/L (ref 136–145)
SODIUM SERPL-SCNC: 143 MMOL/L (ref 136–145)
SODIUM SERPL-SCNC: 143 MMOL/L (ref 136–145)
SODIUM SERPL-SCNC: 145 MMOL/L (ref 136–145)
SODIUM SERPL-SCNC: 145 MMOL/L (ref 136–145)
SODIUM SERPL-SCNC: 146 MMOL/L (ref 136–145)
SODIUM SERPL-SCNC: 147 MMOL/L (ref 136–145)
SODIUM SERPL-SCNC: 150 MMOL/L (ref 136–145)
SODIUM SERPL-SCNC: 151 MMOL/L (ref 136–145)
SODIUM SERPL-SCNC: 151 MMOL/L (ref 136–145)
SODIUM SERPL-SCNC: 153 MMOL/L (ref 136–145)
SP GR UR STRIP: 1.03 (ref 1–1.03)
SQUAMOUS #/AREA URNS HPF: ABNORMAL /HPF
T&S EXPIRATION DATE: NORMAL
TOTAL RATE: 0 BREATHS/MINUTE
TOTAL RATE: 16 BREATHS/MINUTE
TOTAL RATE: 19 BREATHS/MINUTE
TOTAL RATE: 20 BREATHS/MINUTE
TOTAL RATE: 28 BREATHS/MINUTE
TRIGL SERPL-MCNC: 107 MG/DL (ref 0–150)
TROPONIN T % DELTA: 16
TROPONIN T NUMERIC DELTA: 848 NG/L
TROPONIN T SERPL HS-MCNC: 5143 NG/L
TROPONIN T SERPL HS-MCNC: 5540 NG/L
UFH PPP CHRO-ACNC: 0.12 IU/ML (ref 0.3–0.7)
UFH PPP CHRO-ACNC: 0.12 IU/ML (ref 0.3–0.7)
UFH PPP CHRO-ACNC: 0.15 IU/ML (ref 0.3–0.7)
UFH PPP CHRO-ACNC: 0.17 IU/ML (ref 0.3–0.7)
UFH PPP CHRO-ACNC: 0.23 IU/ML (ref 0.3–0.7)
UFH PPP CHRO-ACNC: 0.28 IU/ML (ref 0.3–0.7)
UFH PPP CHRO-ACNC: 0.33 IU/ML (ref 0.3–0.7)
UFH PPP CHRO-ACNC: 0.35 IU/ML (ref 0.3–0.7)
UFH PPP CHRO-ACNC: 0.38 IU/ML (ref 0.3–0.7)
UFH PPP CHRO-ACNC: 0.39 IU/ML (ref 0.3–0.7)
UFH PPP CHRO-ACNC: 0.41 IU/ML (ref 0.3–0.7)
UFH PPP CHRO-ACNC: 0.47 IU/ML (ref 0.3–0.7)
UNIT  ABO: NORMAL
UNIT  ABO: NORMAL
UNIT  RH: NORMAL
UNIT  RH: NORMAL
UROBILINOGEN UR QL STRIP: ABNORMAL
VANCOMYCIN SERPL-MCNC: 14.3 MCG/ML (ref 5–40)
VANCOMYCIN SERPL-MCNC: 14.4 MCG/ML (ref 5–40)
VANCOMYCIN SERPL-MCNC: 15.3 MCG/ML (ref 5–40)
VENTILATOR MODE: ABNORMAL
VT ON VENT VENT: 0.47 ML
VT ON VENT VENT: 0.5 ML
VT ON VENT VENT: 0.53 ML
WBC # UR STRIP: ABNORMAL /HPF
WBC MORPH BLD: NORMAL
WBC NRBC COR # BLD AUTO: 13.34 10*3/MM3 (ref 3.4–10.8)
WBC NRBC COR # BLD AUTO: 16.46 10*3/MM3 (ref 3.4–10.8)
WBC NRBC COR # BLD AUTO: 17.15 10*3/MM3 (ref 3.4–10.8)
WBC NRBC COR # BLD AUTO: 17.71 10*3/MM3 (ref 3.4–10.8)
WBC NRBC COR # BLD AUTO: 18 10*3/MM3 (ref 3.4–10.8)
WBC NRBC COR # BLD AUTO: 18.03 10*3/MM3 (ref 3.4–10.8)
WBC NRBC COR # BLD AUTO: 18.07 10*3/MM3 (ref 3.4–10.8)
WBC NRBC COR # BLD AUTO: 19.73 10*3/MM3 (ref 3.4–10.8)
WBC NRBC COR # BLD AUTO: 20.38 10*3/MM3 (ref 3.4–10.8)
WBC NRBC COR # BLD AUTO: 21.06 10*3/MM3 (ref 3.4–10.8)
WBC NRBC COR # BLD AUTO: 22.95 10*3/MM3 (ref 3.4–10.8)
WBC NRBC COR # BLD AUTO: 26.27 10*3/MM3 (ref 3.4–10.8)
WBC NRBC COR # BLD AUTO: 27.35 10*3/MM3 (ref 3.4–10.8)
WBC NRBC COR # BLD AUTO: 28.07 10*3/MM3 (ref 3.4–10.8)
WBC NRBC COR # BLD AUTO: 29.29 10*3/MM3 (ref 3.4–10.8)
WBC NRBC COR # BLD AUTO: 31.65 10*3/MM3 (ref 3.4–10.8)
WBC NRBC COR # BLD AUTO: 31.73 10*3/MM3 (ref 3.4–10.8)
WBC NRBC COR # BLD AUTO: 32.09 10*3/MM3 (ref 3.4–10.8)
WBC NRBC COR # BLD AUTO: 35.15 10*3/MM3 (ref 3.4–10.8)
WBC NRBC COR # BLD AUTO: 37.3 10*3/MM3 (ref 3.4–10.8)

## 2025-01-01 PROCEDURE — 99232 SBSQ HOSP IP/OBS MODERATE 35: CPT | Performed by: PSYCHIATRY & NEUROLOGY

## 2025-01-01 PROCEDURE — 82375 ASSAY CARBOXYHB QUANT: CPT

## 2025-01-01 PROCEDURE — 63710000001 INSULIN REGULAR HUMAN PER 5 UNITS: Performed by: INTERNAL MEDICINE

## 2025-01-01 PROCEDURE — 94799 UNLISTED PULMONARY SVC/PX: CPT

## 2025-01-01 PROCEDURE — 25010000002 HEPARIN (PORCINE) PER 1000 UNITS: Performed by: INTERNAL MEDICINE

## 2025-01-01 PROCEDURE — 94664 DEMO&/EVAL PT USE INHALER: CPT

## 2025-01-01 PROCEDURE — 99291 CRITICAL CARE FIRST HOUR: CPT | Performed by: INTERNAL MEDICINE

## 2025-01-01 PROCEDURE — 25010000002 HYDROCORTISONE SOD SUC (PF) 100 MG RECONSTITUTED SOLUTION: Performed by: INTERNAL MEDICINE

## 2025-01-01 PROCEDURE — 86901 BLOOD TYPING SEROLOGIC RH(D): CPT | Performed by: INTERNAL MEDICINE

## 2025-01-01 PROCEDURE — 83050 HGB METHEMOGLOBIN QUAN: CPT

## 2025-01-01 PROCEDURE — 99232 SBSQ HOSP IP/OBS MODERATE 35: CPT | Performed by: INTERNAL MEDICINE

## 2025-01-01 PROCEDURE — 99223 1ST HOSP IP/OBS HIGH 75: CPT | Performed by: PSYCHIATRY & NEUROLOGY

## 2025-01-01 PROCEDURE — 94003 VENT MGMT INPAT SUBQ DAY: CPT

## 2025-01-01 PROCEDURE — 82948 REAGENT STRIP/BLOOD GLUCOSE: CPT

## 2025-01-01 PROCEDURE — 83605 ASSAY OF LACTIC ACID: CPT | Performed by: INTERNAL MEDICINE

## 2025-01-01 PROCEDURE — 25010000002 DOPAMINE PER 40 MG: Performed by: INTERNAL MEDICINE

## 2025-01-01 PROCEDURE — 99222 1ST HOSP IP/OBS MODERATE 55: CPT | Performed by: INTERNAL MEDICINE

## 2025-01-01 PROCEDURE — 94761 N-INVAS EAR/PLS OXIMETRY MLT: CPT

## 2025-01-01 PROCEDURE — 85730 THROMBOPLASTIN TIME PARTIAL: CPT | Performed by: INTERNAL MEDICINE

## 2025-01-01 PROCEDURE — 25810000003 SODIUM CHLORIDE 0.9 % SOLUTION 250 ML FLEX CONT

## 2025-01-01 PROCEDURE — 99233 SBSQ HOSP IP/OBS HIGH 50: CPT | Performed by: STUDENT IN AN ORGANIZED HEALTH CARE EDUCATION/TRAINING PROGRAM

## 2025-01-01 PROCEDURE — 84132 ASSAY OF SERUM POTASSIUM: CPT | Performed by: INTERNAL MEDICINE

## 2025-01-01 PROCEDURE — 86900 BLOOD TYPING SEROLOGIC ABO: CPT

## 2025-01-01 PROCEDURE — 93005 ELECTROCARDIOGRAM TRACING: CPT | Performed by: INTERNAL MEDICINE

## 2025-01-01 PROCEDURE — 36600 WITHDRAWAL OF ARTERIAL BLOOD: CPT

## 2025-01-01 PROCEDURE — 83735 ASSAY OF MAGNESIUM: CPT

## 2025-01-01 PROCEDURE — 25010000002 MIDAZOLAM PER 1 MG: Performed by: INTERNAL MEDICINE

## 2025-01-01 PROCEDURE — 84100 ASSAY OF PHOSPHORUS: CPT | Performed by: INTERNAL MEDICINE

## 2025-01-01 PROCEDURE — 82805 BLOOD GASES W/O2 SATURATION: CPT

## 2025-01-01 PROCEDURE — 87070 CULTURE OTHR SPECIMN AEROBIC: CPT | Performed by: INTERNAL MEDICINE

## 2025-01-01 PROCEDURE — 0BH17EZ INSERTION OF ENDOTRACHEAL AIRWAY INTO TRACHEA, VIA NATURAL OR ARTIFICIAL OPENING: ICD-10-PCS | Performed by: INTERNAL MEDICINE

## 2025-01-01 PROCEDURE — 85025 COMPLETE CBC W/AUTO DIFF WBC: CPT | Performed by: INTERNAL MEDICINE

## 2025-01-01 PROCEDURE — 36620 INSERTION CATHETER ARTERY: CPT

## 2025-01-01 PROCEDURE — 99222 1ST HOSP IP/OBS MODERATE 55: CPT | Performed by: STUDENT IN AN ORGANIZED HEALTH CARE EDUCATION/TRAINING PROGRAM

## 2025-01-01 PROCEDURE — 25010000002 HEPARIN (PORCINE) PER 1000 UNITS

## 2025-01-01 PROCEDURE — 25010000002 PROPOFOL 10 MG/ML EMULSION: Performed by: INTERNAL MEDICINE

## 2025-01-01 PROCEDURE — 80053 COMPREHEN METABOLIC PANEL: CPT | Performed by: INTERNAL MEDICINE

## 2025-01-01 PROCEDURE — 85027 COMPLETE CBC AUTOMATED: CPT | Performed by: INTERNAL MEDICINE

## 2025-01-01 PROCEDURE — 63710000001 INSULIN GLARGINE PER 5 UNITS: Performed by: INTERNAL MEDICINE

## 2025-01-01 PROCEDURE — 85520 HEPARIN ASSAY: CPT

## 2025-01-01 PROCEDURE — 93010 ELECTROCARDIOGRAM REPORT: CPT | Performed by: INTERNAL MEDICINE

## 2025-01-01 PROCEDURE — 63710000001 INSULIN REGULAR HUMAN PER 5 UNITS: Performed by: PHYSICIAN ASSISTANT

## 2025-01-01 PROCEDURE — 25010000002 ALBUMIN HUMAN 25% PER 50 ML

## 2025-01-01 PROCEDURE — 81001 URINALYSIS AUTO W/SCOPE: CPT | Performed by: INTERNAL MEDICINE

## 2025-01-01 PROCEDURE — 85014 HEMATOCRIT: CPT

## 2025-01-01 PROCEDURE — 25010000002 LIDOCAINE 1 % SOLUTION: Performed by: STUDENT IN AN ORGANIZED HEALTH CARE EDUCATION/TRAINING PROGRAM

## 2025-01-01 PROCEDURE — 0F9430Z DRAINAGE OF GALLBLADDER WITH DRAINAGE DEVICE, PERCUTANEOUS APPROACH: ICD-10-PCS | Performed by: STUDENT IN AN ORGANIZED HEALTH CARE EDUCATION/TRAINING PROGRAM

## 2025-01-01 PROCEDURE — 84134 ASSAY OF PREALBUMIN: CPT

## 2025-01-01 PROCEDURE — 70551 MRI BRAIN STEM W/O DYE: CPT

## 2025-01-01 PROCEDURE — 80053 COMPREHEN METABOLIC PANEL: CPT

## 2025-01-01 PROCEDURE — 80202 ASSAY OF VANCOMYCIN: CPT

## 2025-01-01 PROCEDURE — 93005 ELECTROCARDIOGRAM TRACING: CPT

## 2025-01-01 PROCEDURE — 36430 TRANSFUSION BLD/BLD COMPNT: CPT

## 2025-01-01 PROCEDURE — 94690 O2 UPTK REST INDIRECT: CPT | Performed by: INTERNAL MEDICINE

## 2025-01-01 PROCEDURE — 83735 ASSAY OF MAGNESIUM: CPT | Performed by: INTERNAL MEDICINE

## 2025-01-01 PROCEDURE — 25010000002 PIPERACILLIN SOD-TAZOBACTAM PER 1 G: Performed by: INTERNAL MEDICINE

## 2025-01-01 PROCEDURE — 25010000002 MAGNESIUM SULFATE PER 500 MG OF MAGNESIUM: Performed by: INTERNAL MEDICINE

## 2025-01-01 PROCEDURE — 25010000002 CEFTRIAXONE PER 250 MG: Performed by: INTERNAL MEDICINE

## 2025-01-01 PROCEDURE — 71045 X-RAY EXAM CHEST 1 VIEW: CPT

## 2025-01-01 PROCEDURE — 84145 PROCALCITONIN (PCT): CPT | Performed by: PHYSICIAN ASSISTANT

## 2025-01-01 PROCEDURE — 03HY32Z INSERTION OF MONITORING DEVICE INTO UPPER ARTERY, PERCUTANEOUS APPROACH: ICD-10-PCS

## 2025-01-01 PROCEDURE — 25010000002 AMIODARONE IN DEXTROSE 5% 360-4.14 MG/200ML-% SOLUTION

## 2025-01-01 PROCEDURE — P9016 RBC LEUKOCYTES REDUCED: HCPCS

## 2025-01-01 PROCEDURE — 85730 THROMBOPLASTIN TIME PARTIAL: CPT | Performed by: PHYSICIAN ASSISTANT

## 2025-01-01 PROCEDURE — 25010000002 DIGOXIN PER 500 MCG: Performed by: INTERNAL MEDICINE

## 2025-01-01 PROCEDURE — 25010000002 POTASSIUM CHLORIDE PER 2 MEQ: Performed by: INTERNAL MEDICINE

## 2025-01-01 PROCEDURE — 82330 ASSAY OF CALCIUM: CPT | Performed by: INTERNAL MEDICINE

## 2025-01-01 PROCEDURE — 92950 HEART/LUNG RESUSCITATION CPR: CPT

## 2025-01-01 PROCEDURE — 95720 EEG PHY/QHP EA INCR W/VEEG: CPT | Performed by: PSYCHIATRY & NEUROLOGY

## 2025-01-01 PROCEDURE — 84100 ASSAY OF PHOSPHORUS: CPT

## 2025-01-01 PROCEDURE — 83605 ASSAY OF LACTIC ACID: CPT

## 2025-01-01 PROCEDURE — 85520 HEPARIN ASSAY: CPT | Performed by: PHYSICIAN ASSISTANT

## 2025-01-01 PROCEDURE — 25010000002 PHENYLEPHRINE 10 MG/ML SOLUTION 5 ML VIAL

## 2025-01-01 PROCEDURE — 99233 SBSQ HOSP IP/OBS HIGH 50: CPT | Performed by: INTERNAL MEDICINE

## 2025-01-01 PROCEDURE — 76705 ECHO EXAM OF ABDOMEN: CPT

## 2025-01-01 PROCEDURE — 87205 SMEAR GRAM STAIN: CPT | Performed by: INTERNAL MEDICINE

## 2025-01-01 PROCEDURE — 84145 PROCALCITONIN (PCT): CPT | Performed by: INTERNAL MEDICINE

## 2025-01-01 PROCEDURE — 84145 PROCALCITONIN (PCT): CPT

## 2025-01-01 PROCEDURE — 95816 EEG AWAKE AND DROWSY: CPT

## 2025-01-01 PROCEDURE — 85384 FIBRINOGEN ACTIVITY: CPT | Performed by: INTERNAL MEDICINE

## 2025-01-01 PROCEDURE — 86140 C-REACTIVE PROTEIN: CPT

## 2025-01-01 PROCEDURE — 25010000002 AMIODARONE IN DEXTROSE 5% 150-4.21 MG/100ML-% SOLUTION: Performed by: INTERNAL MEDICINE

## 2025-01-01 PROCEDURE — 25010000002 FENTANYL 10 MCG/1 ML NS: Performed by: INTERNAL MEDICINE

## 2025-01-01 PROCEDURE — 87102 FUNGUS ISOLATION CULTURE: CPT | Performed by: INTERNAL MEDICINE

## 2025-01-01 PROCEDURE — 25010000002 AMIODARONE IN DEXTROSE 5% 360-4.14 MG/200ML-% SOLUTION: Performed by: INTERNAL MEDICINE

## 2025-01-01 PROCEDURE — 25010000002 VANCOMYCIN 1 G RECONSTITUTED SOLUTION 1 EACH VIAL

## 2025-01-01 PROCEDURE — 87147 CULTURE TYPE IMMUNOLOGIC: CPT | Performed by: INTERNAL MEDICINE

## 2025-01-01 PROCEDURE — 87040 BLOOD CULTURE FOR BACTERIA: CPT | Performed by: INTERNAL MEDICINE

## 2025-01-01 PROCEDURE — 87116 MYCOBACTERIA CULTURE: CPT | Performed by: INTERNAL MEDICINE

## 2025-01-01 PROCEDURE — 94002 VENT MGMT INPAT INIT DAY: CPT

## 2025-01-01 PROCEDURE — 87040 BLOOD CULTURE FOR BACTERIA: CPT

## 2025-01-01 PROCEDURE — 83735 ASSAY OF MAGNESIUM: CPT | Performed by: PHYSICIAN ASSISTANT

## 2025-01-01 PROCEDURE — 86850 RBC ANTIBODY SCREEN: CPT | Performed by: INTERNAL MEDICINE

## 2025-01-01 PROCEDURE — 99232 SBSQ HOSP IP/OBS MODERATE 35: CPT

## 2025-01-01 PROCEDURE — 85610 PROTHROMBIN TIME: CPT | Performed by: INTERNAL MEDICINE

## 2025-01-01 PROCEDURE — 84484 ASSAY OF TROPONIN QUANT: CPT

## 2025-01-01 PROCEDURE — 25010000002 HEPARIN (PORCINE) 25000-0.45 UT/250ML-% SOLUTION

## 2025-01-01 PROCEDURE — 86901 BLOOD TYPING SEROLOGIC RH(D): CPT

## 2025-01-01 PROCEDURE — 87206 SMEAR FLUORESCENT/ACID STAI: CPT | Performed by: INTERNAL MEDICINE

## 2025-01-01 PROCEDURE — 87075 CULTR BACTERIA EXCEPT BLOOD: CPT | Performed by: INTERNAL MEDICINE

## 2025-01-01 PROCEDURE — C1769 GUIDE WIRE: HCPCS

## 2025-01-01 PROCEDURE — 87205 SMEAR GRAM STAIN: CPT

## 2025-01-01 PROCEDURE — 70450 CT HEAD/BRAIN W/O DYE: CPT

## 2025-01-01 PROCEDURE — 80048 BASIC METABOLIC PNL TOTAL CA: CPT | Performed by: INTERNAL MEDICINE

## 2025-01-01 PROCEDURE — 85025 COMPLETE CBC W/AUTO DIFF WBC: CPT | Performed by: PHYSICIAN ASSISTANT

## 2025-01-01 PROCEDURE — 25010000002 EPINEPHRINE 1 MG/10ML SOLUTION PREFILLED SYRINGE: Performed by: INTERNAL MEDICINE

## 2025-01-01 PROCEDURE — 25010000002 FENTANYL 10 MCG/1 ML NS

## 2025-01-01 PROCEDURE — 82330 ASSAY OF CALCIUM: CPT | Performed by: PHYSICIAN ASSISTANT

## 2025-01-01 PROCEDURE — 80076 HEPATIC FUNCTION PANEL: CPT

## 2025-01-01 PROCEDURE — 94640 AIRWAY INHALATION TREATMENT: CPT

## 2025-01-01 PROCEDURE — 25010000002 MIDAZOLAM PER 1 MG

## 2025-01-01 PROCEDURE — 85027 COMPLETE CBC AUTOMATED: CPT

## 2025-01-01 PROCEDURE — 87040 BLOOD CULTURE FOR BACTERIA: CPT | Performed by: NURSE PRACTITIONER

## 2025-01-01 PROCEDURE — 25010000002 METHYLNALTREXONE 12 MG/0.6ML SOLUTION: Performed by: INTERNAL MEDICINE

## 2025-01-01 PROCEDURE — 83880 ASSAY OF NATRIURETIC PEPTIDE: CPT | Performed by: INTERNAL MEDICINE

## 2025-01-01 PROCEDURE — 82140 ASSAY OF AMMONIA: CPT | Performed by: INTERNAL MEDICINE

## 2025-01-01 PROCEDURE — 85520 HEPARIN ASSAY: CPT | Performed by: INTERNAL MEDICINE

## 2025-01-01 PROCEDURE — 25010000002 BUMETANIDE PER 0.5 MG: Performed by: INTERNAL MEDICINE

## 2025-01-01 PROCEDURE — 86923 COMPATIBILITY TEST ELECTRIC: CPT

## 2025-01-01 PROCEDURE — 25010000002 GLYCOPYRROLATE 0.2 MG/ML SOLUTION: Performed by: INTERNAL MEDICINE

## 2025-01-01 PROCEDURE — 83880 ASSAY OF NATRIURETIC PEPTIDE: CPT

## 2025-01-01 PROCEDURE — 85018 HEMOGLOBIN: CPT

## 2025-01-01 PROCEDURE — 25010000002 AMIODARONE IN DEXTROSE 5% 150-4.21 MG/100ML-% SOLUTION

## 2025-01-01 PROCEDURE — 87086 URINE CULTURE/COLONY COUNT: CPT | Performed by: INTERNAL MEDICINE

## 2025-01-01 PROCEDURE — 86900 BLOOD TYPING SEROLOGIC ABO: CPT | Performed by: INTERNAL MEDICINE

## 2025-01-01 PROCEDURE — 74176 CT ABD & PELVIS W/O CONTRAST: CPT

## 2025-01-01 PROCEDURE — 80053 COMPREHEN METABOLIC PANEL: CPT | Performed by: PHYSICIAN ASSISTANT

## 2025-01-01 PROCEDURE — 5A12012 PERFORMANCE OF CARDIAC OUTPUT, SINGLE, MANUAL: ICD-10-PCS | Performed by: INTERNAL MEDICINE

## 2025-01-01 PROCEDURE — 95816 EEG AWAKE AND DROWSY: CPT | Performed by: PSYCHIATRY & NEUROLOGY

## 2025-01-01 PROCEDURE — 85610 PROTHROMBIN TIME: CPT | Performed by: PHYSICIAN ASSISTANT

## 2025-01-01 PROCEDURE — 85025 COMPLETE CBC W/AUTO DIFF WBC: CPT

## 2025-01-01 PROCEDURE — 87070 CULTURE OTHR SPECIMN AEROBIC: CPT

## 2025-01-01 PROCEDURE — 83690 ASSAY OF LIPASE: CPT

## 2025-01-01 PROCEDURE — 5A1955Z RESPIRATORY VENTILATION, GREATER THAN 96 CONSECUTIVE HOURS: ICD-10-PCS | Performed by: INTERNAL MEDICINE

## 2025-01-01 PROCEDURE — 94690 O2 UPTK REST INDIRECT: CPT

## 2025-01-01 PROCEDURE — 85610 PROTHROMBIN TIME: CPT

## 2025-01-01 PROCEDURE — C1729 CATH, DRAINAGE: HCPCS

## 2025-01-01 PROCEDURE — 74018 RADEX ABDOMEN 1 VIEW: CPT

## 2025-01-01 PROCEDURE — 95714 VEEG EA 12-26 HR UNMNTR: CPT

## 2025-01-01 PROCEDURE — 25010000002 HYDROMORPHONE 1 MG/ML SOLUTION: Performed by: INTERNAL MEDICINE

## 2025-01-01 PROCEDURE — 86850 RBC ANTIBODY SCREEN: CPT

## 2025-01-01 PROCEDURE — 25010000002 FUROSEMIDE PER 20 MG: Performed by: INTERNAL MEDICINE

## 2025-01-01 PROCEDURE — 82150 ASSAY OF AMYLASE: CPT

## 2025-01-01 PROCEDURE — 82140 ASSAY OF AMMONIA: CPT

## 2025-01-01 PROCEDURE — 85007 BL SMEAR W/DIFF WBC COUNT: CPT

## 2025-01-01 PROCEDURE — P9047 ALBUMIN (HUMAN), 25%, 50ML: HCPCS

## 2025-01-01 PROCEDURE — 85014 HEMATOCRIT: CPT | Performed by: INTERNAL MEDICINE

## 2025-01-01 PROCEDURE — 92950 HEART/LUNG RESUSCITATION CPR: CPT | Performed by: INTERNAL MEDICINE

## 2025-01-01 PROCEDURE — 85018 HEMOGLOBIN: CPT | Performed by: INTERNAL MEDICINE

## 2025-01-01 PROCEDURE — 25010000002 FENTANYL CITRATE (PF) 50 MCG/ML SOLUTION

## 2025-01-01 PROCEDURE — 87154 CUL TYP ID BLD PTHGN 6+ TRGT: CPT | Performed by: INTERNAL MEDICINE

## 2025-01-01 PROCEDURE — 82533 TOTAL CORTISOL: CPT | Performed by: INTERNAL MEDICINE

## 2025-01-01 PROCEDURE — 84478 ASSAY OF TRIGLYCERIDES: CPT

## 2025-01-01 PROCEDURE — 25810000003 SODIUM CHLORIDE 0.9 % SOLUTION: Performed by: INTERNAL MEDICINE

## 2025-01-01 PROCEDURE — 82330 ASSAY OF CALCIUM: CPT

## 2025-01-01 RX ORDER — HEPARIN SODIUM 10000 [USP'U]/100ML
16 INJECTION, SOLUTION INTRAVENOUS
Status: DISCONTINUED | OUTPATIENT
Start: 2025-01-01 | End: 2025-01-01

## 2025-01-01 RX ORDER — DEXTROSE MONOHYDRATE 25 G/50ML
50 INJECTION, SOLUTION INTRAVENOUS ONCE
Status: COMPLETED | OUTPATIENT
Start: 2025-01-01 | End: 2025-01-01

## 2025-01-01 RX ORDER — MIDAZOLAM HYDROCHLORIDE 1 MG/ML
2 INJECTION, SOLUTION INTRAMUSCULAR; INTRAVENOUS ONCE
Status: COMPLETED | OUTPATIENT
Start: 2025-01-01 | End: 2025-01-01

## 2025-01-01 RX ORDER — LIDOCAINE HYDROCHLORIDE 10 MG/ML
10 INJECTION, SOLUTION INFILTRATION; PERINEURAL ONCE
Status: DISCONTINUED | OUTPATIENT
Start: 2025-01-01 | End: 2025-01-01

## 2025-01-01 RX ORDER — POLYETHYLENE GLYCOL 3350 17 G/17G
17 POWDER, FOR SOLUTION ORAL DAILY PRN
Status: DISCONTINUED | OUTPATIENT
Start: 2025-01-01 | End: 2025-01-01

## 2025-01-01 RX ORDER — POTASSIUM CHLORIDE 29.8 MG/ML
20 INJECTION INTRAVENOUS ONCE
Status: COMPLETED | OUTPATIENT
Start: 2025-01-01 | End: 2025-01-01

## 2025-01-01 RX ORDER — INDOMETHACIN 25 MG/1
50 CAPSULE ORAL ONCE
Status: COMPLETED | OUTPATIENT
Start: 2025-01-01 | End: 2025-01-01

## 2025-01-01 RX ORDER — IPRATROPIUM BROMIDE AND ALBUTEROL SULFATE 2.5; .5 MG/3ML; MG/3ML
3 SOLUTION RESPIRATORY (INHALATION)
Status: DISCONTINUED | OUTPATIENT
Start: 2025-01-01 | End: 2025-01-01

## 2025-01-01 RX ORDER — ESCITALOPRAM OXALATE 10 MG/1
10 TABLET ORAL DAILY
Status: DISCONTINUED | OUTPATIENT
Start: 2025-01-01 | End: 2025-01-01

## 2025-01-01 RX ORDER — NICOTINE POLACRILEX 4 MG
15 LOZENGE BUCCAL
Status: DISCONTINUED | OUTPATIENT
Start: 2025-01-01 | End: 2025-01-01

## 2025-01-01 RX ORDER — HYDROCORTISONE SODIUM SUCCINATE 100 MG/2ML
50 INJECTION INTRAMUSCULAR; INTRAVENOUS EVERY 12 HOURS
Status: DISCONTINUED | OUTPATIENT
Start: 2025-01-01 | End: 2025-01-01

## 2025-01-01 RX ORDER — POTASSIUM CHLORIDE 1.5 G/1.58G
20 POWDER, FOR SOLUTION ORAL ONCE
Status: COMPLETED | OUTPATIENT
Start: 2025-01-01 | End: 2025-01-01

## 2025-01-01 RX ORDER — FUROSEMIDE 10 MG/ML
40 INJECTION INTRAMUSCULAR; INTRAVENOUS ONCE
Status: COMPLETED | OUTPATIENT
Start: 2025-01-01 | End: 2025-01-01

## 2025-01-01 RX ORDER — POTASSIUM CHLORIDE 29.8 MG/ML
20 INJECTION INTRAVENOUS
Status: COMPLETED | OUTPATIENT
Start: 2025-01-01 | End: 2025-01-01

## 2025-01-01 RX ORDER — FENTANYL CITRATE 50 UG/ML
INJECTION, SOLUTION INTRAMUSCULAR; INTRAVENOUS
Status: COMPLETED
Start: 2025-01-01 | End: 2025-01-01

## 2025-01-01 RX ORDER — MAGNESIUM SULFATE HEPTAHYDRATE 500 MG/ML
INJECTION, SOLUTION INTRAMUSCULAR; INTRAVENOUS
Status: COMPLETED | OUTPATIENT
Start: 2025-01-01 | End: 2025-01-01

## 2025-01-01 RX ORDER — MIDAZOLAM HYDROCHLORIDE 1 MG/ML
2 INJECTION, SOLUTION INTRAMUSCULAR; INTRAVENOUS ONCE AS NEEDED
Status: COMPLETED | OUTPATIENT
Start: 2025-01-01 | End: 2025-01-01

## 2025-01-01 RX ORDER — AMIODARONE HYDROCHLORIDE 200 MG/1
200 TABLET ORAL DAILY
Status: DISCONTINUED | OUTPATIENT
Start: 2025-08-13 | End: 2025-01-01

## 2025-01-01 RX ORDER — PRASUGREL 10 MG/1
10 TABLET, FILM COATED ORAL DAILY
Status: DISCONTINUED | OUTPATIENT
Start: 2025-01-01 | End: 2025-01-01

## 2025-01-01 RX ORDER — NITROGLYCERIN 0.4 MG/1
0.4 TABLET SUBLINGUAL
Status: DISCONTINUED | OUTPATIENT
Start: 2025-01-01 | End: 2025-01-01

## 2025-01-01 RX ORDER — BISACODYL 10 MG
10 SUPPOSITORY, RECTAL RECTAL ONCE
Status: COMPLETED | OUTPATIENT
Start: 2025-01-01 | End: 2025-01-01

## 2025-01-01 RX ORDER — MIDAZOLAM HYDROCHLORIDE 1 MG/ML
0.5 INJECTION, SOLUTION INTRAMUSCULAR; INTRAVENOUS
Status: DISCONTINUED | OUTPATIENT
Start: 2025-01-01 | End: 2025-01-01

## 2025-01-01 RX ORDER — SODIUM PHOSPHATE,MONO-DIBASIC 19G-7G/118
1 ENEMA (ML) RECTAL ONCE
Status: COMPLETED | OUTPATIENT
Start: 2025-01-01 | End: 2025-01-01

## 2025-01-01 RX ORDER — PROPOFOL 10 MG/ML
VIAL (ML) INTRAVENOUS
Status: ACTIVE
Start: 2025-01-01 | End: 2025-01-01

## 2025-01-01 RX ORDER — HYDROCORTISONE SODIUM SUCCINATE 100 MG/2ML
25 INJECTION INTRAMUSCULAR; INTRAVENOUS EVERY 12 HOURS
Status: DISCONTINUED | OUTPATIENT
Start: 2025-01-01 | End: 2025-01-01

## 2025-01-01 RX ORDER — AMIODARONE HYDROCHLORIDE 200 MG/1
200 TABLET ORAL EVERY 12 HOURS
Status: DISCONTINUED | OUTPATIENT
Start: 2025-07-30 | End: 2025-01-01

## 2025-01-01 RX ORDER — BISACODYL 5 MG/1
5 TABLET, DELAYED RELEASE ORAL DAILY PRN
Status: DISCONTINUED | OUTPATIENT
Start: 2025-01-01 | End: 2025-01-01

## 2025-01-01 RX ORDER — NOREPINEPHRINE BITARTRATE 0.03 MG/ML
.02-.3 INJECTION, SOLUTION INTRAVENOUS
Status: DISCONTINUED | OUTPATIENT
Start: 2025-01-01 | End: 2025-01-01

## 2025-01-01 RX ORDER — ASPIRIN 81 MG/1
81 TABLET, CHEWABLE ORAL DAILY
Status: DISCONTINUED | OUTPATIENT
Start: 2025-01-01 | End: 2025-01-01

## 2025-01-01 RX ORDER — SODIUM CHLORIDE 9 MG/ML
40 INJECTION, SOLUTION INTRAVENOUS AS NEEDED
Status: DISCONTINUED | OUTPATIENT
Start: 2025-01-01 | End: 2025-01-01

## 2025-01-01 RX ORDER — MIDAZOLAM HYDROCHLORIDE 1 MG/ML
2 INJECTION, SOLUTION INTRAMUSCULAR; INTRAVENOUS
Status: DISCONTINUED | OUTPATIENT
Start: 2025-01-01 | End: 2025-01-01 | Stop reason: HOSPADM

## 2025-01-01 RX ORDER — DIGOXIN 0.25 MG/ML
250 INJECTION INTRAMUSCULAR; INTRAVENOUS ONCE
Status: COMPLETED | OUTPATIENT
Start: 2025-01-01 | End: 2025-01-01

## 2025-01-01 RX ORDER — AMOXICILLIN 250 MG
2 CAPSULE ORAL 2 TIMES DAILY
Status: DISCONTINUED | OUTPATIENT
Start: 2025-01-01 | End: 2025-01-01

## 2025-01-01 RX ORDER — HYDRALAZINE HYDROCHLORIDE 20 MG/ML
10 INJECTION INTRAMUSCULAR; INTRAVENOUS EVERY 4 HOURS PRN
Status: DISCONTINUED | OUTPATIENT
Start: 2025-01-01 | End: 2025-01-01

## 2025-01-01 RX ORDER — MIDAZOLAM HYDROCHLORIDE 1 MG/ML
2 INJECTION, SOLUTION INTRAMUSCULAR; INTRAVENOUS ONCE
Status: DISCONTINUED | OUTPATIENT
Start: 2025-01-01 | End: 2025-01-01 | Stop reason: HOSPADM

## 2025-01-01 RX ORDER — IPRATROPIUM BROMIDE AND ALBUTEROL SULFATE 2.5; .5 MG/3ML; MG/3ML
3 SOLUTION RESPIRATORY (INHALATION) EVERY 4 HOURS PRN
Status: DISCONTINUED | OUTPATIENT
Start: 2025-01-01 | End: 2025-01-01

## 2025-01-01 RX ORDER — VANCOMYCIN 1.75 GRAM/500 ML IN 0.9 % SODIUM CHLORIDE INTRAVENOUS
25 ONCE
Status: COMPLETED | OUTPATIENT
Start: 2025-01-01 | End: 2025-01-01

## 2025-01-01 RX ORDER — HEPARIN SODIUM 1000 [USP'U]/ML
50 INJECTION, SOLUTION INTRAVENOUS; SUBCUTANEOUS AS NEEDED
Status: DISCONTINUED | OUTPATIENT
Start: 2025-01-01 | End: 2025-01-01 | Stop reason: DRUGHIGH

## 2025-01-01 RX ORDER — HEPARIN SODIUM 1000 [USP'U]/ML
25 INJECTION, SOLUTION INTRAVENOUS; SUBCUTANEOUS AS NEEDED
Status: DISCONTINUED | OUTPATIENT
Start: 2025-01-01 | End: 2025-01-01 | Stop reason: DRUGHIGH

## 2025-01-01 RX ORDER — ASPIRIN 81 MG/1
81 TABLET ORAL DAILY
Status: DISCONTINUED | OUTPATIENT
Start: 2025-01-01 | End: 2025-01-01

## 2025-01-01 RX ORDER — HYDROMORPHONE HYDROCHLORIDE 1 MG/ML
0.5 INJECTION, SOLUTION INTRAMUSCULAR; INTRAVENOUS; SUBCUTANEOUS
Status: CANCELLED | OUTPATIENT
Start: 2025-01-01 | End: 2025-07-31

## 2025-01-01 RX ORDER — DEXTROSE MONOHYDRATE 25 G/50ML
25 INJECTION, SOLUTION INTRAVENOUS
Status: DISCONTINUED | OUTPATIENT
Start: 2025-01-01 | End: 2025-01-01

## 2025-01-01 RX ORDER — HYDROMORPHONE HCL/0.9% NACL/PF 50 MG/50ML
.2-3 PREFILLED PUMP RESERVOIR INTRAVENOUS
Status: DISCONTINUED | OUTPATIENT
Start: 2025-01-01 | End: 2025-01-01 | Stop reason: HOSPADM

## 2025-01-01 RX ORDER — AMLODIPINE BESYLATE 5 MG/1
5 TABLET ORAL DAILY
Status: ON HOLD | COMMUNITY
End: 2025-01-01

## 2025-01-01 RX ORDER — ACETAMINOPHEN 160 MG/5ML
650 SOLUTION ORAL EVERY 6 HOURS PRN
Status: DISCONTINUED | OUTPATIENT
Start: 2025-01-01 | End: 2025-01-01

## 2025-01-01 RX ORDER — HEPARIN SODIUM 1000 [USP'U]/ML
2000 INJECTION, SOLUTION INTRAVENOUS; SUBCUTANEOUS ONCE
Status: COMPLETED | OUTPATIENT
Start: 2025-01-01 | End: 2025-01-01

## 2025-01-01 RX ORDER — QUETIAPINE FUMARATE 25 MG/1
25 TABLET, FILM COATED ORAL NIGHTLY
Status: DISCONTINUED | OUTPATIENT
Start: 2025-01-01 | End: 2025-01-01

## 2025-01-01 RX ORDER — BUDESONIDE 0.5 MG/2ML
0.5 INHALANT ORAL
Status: DISCONTINUED | OUTPATIENT
Start: 2025-01-01 | End: 2025-01-01

## 2025-01-01 RX ORDER — HYDROCORTISONE SODIUM SUCCINATE 100 MG/2ML
50 INJECTION INTRAMUSCULAR; INTRAVENOUS EVERY 6 HOURS
Status: DISCONTINUED | OUTPATIENT
Start: 2025-01-01 | End: 2025-01-01

## 2025-01-01 RX ORDER — GLYCOPYRROLATE 0.2 MG/ML
0.4 INJECTION INTRAMUSCULAR; INTRAVENOUS ONCE
Status: DISCONTINUED | OUTPATIENT
Start: 2025-01-01 | End: 2025-01-01 | Stop reason: HOSPADM

## 2025-01-01 RX ORDER — ALBUMIN (HUMAN) 12.5 G/50ML
25 SOLUTION INTRAVENOUS ONCE
Status: COMPLETED | OUTPATIENT
Start: 2025-01-01 | End: 2025-01-01

## 2025-01-01 RX ORDER — AMINO AC/PROTEIN HYDR/WHEY PRO 10G-100/30
45 LIQUID (ML) ORAL 3 TIMES DAILY
Status: DISCONTINUED | OUTPATIENT
Start: 2025-01-01 | End: 2025-01-01

## 2025-01-01 RX ORDER — ACETAMINOPHEN 325 MG/1
650 TABLET ORAL EVERY 4 HOURS PRN
Status: DISCONTINUED | OUTPATIENT
Start: 2025-01-01 | End: 2025-01-01

## 2025-01-01 RX ORDER — DOPAMINE HYDROCHLORIDE 160 MG/100ML
INJECTION, SOLUTION INTRAVENOUS
Status: ACTIVE
Start: 2025-01-01 | End: 2025-01-01

## 2025-01-01 RX ORDER — FENTANYL CITRATE 50 UG/ML
50 INJECTION, SOLUTION INTRAMUSCULAR; INTRAVENOUS ONCE
Status: COMPLETED | OUTPATIENT
Start: 2025-01-01 | End: 2025-01-01

## 2025-01-01 RX ORDER — GLYCOPYRROLATE 0.2 MG/ML
0.4 INJECTION INTRAMUSCULAR; INTRAVENOUS EVERY 4 HOURS PRN
Status: DISCONTINUED | OUTPATIENT
Start: 2025-01-01 | End: 2025-01-01 | Stop reason: HOSPADM

## 2025-01-01 RX ORDER — BISACODYL 10 MG
10 SUPPOSITORY, RECTAL RECTAL DAILY PRN
Status: DISCONTINUED | OUTPATIENT
Start: 2025-01-01 | End: 2025-01-01

## 2025-01-01 RX ORDER — HEPARIN SODIUM 5000 [USP'U]/ML
5000 INJECTION, SOLUTION INTRAVENOUS; SUBCUTANEOUS EVERY 8 HOURS SCHEDULED
Status: DISCONTINUED | OUTPATIENT
Start: 2025-01-01 | End: 2025-01-01

## 2025-01-01 RX ORDER — SODIUM CHLORIDE 0.9 % (FLUSH) 0.9 %
10 SYRINGE (ML) INJECTION EVERY 12 HOURS SCHEDULED
Status: DISCONTINUED | OUTPATIENT
Start: 2025-01-01 | End: 2025-01-01

## 2025-01-01 RX ORDER — DEXMEDETOMIDINE HYDROCHLORIDE 4 UG/ML
.2-1.5 INJECTION, SOLUTION INTRAVENOUS
Status: DISCONTINUED | OUTPATIENT
Start: 2025-01-01 | End: 2025-01-01

## 2025-01-01 RX ORDER — LIDOCAINE HYDROCHLORIDE 10 MG/ML
10 INJECTION, SOLUTION EPIDURAL; INFILTRATION; INTRACAUDAL; PERINEURAL ONCE
Status: DISCONTINUED | OUTPATIENT
Start: 2025-01-01 | End: 2025-01-01

## 2025-01-01 RX ORDER — CLOPIDOGREL BISULFATE 75 MG/1
75 TABLET ORAL DAILY
COMMUNITY

## 2025-01-01 RX ORDER — OXYCODONE HCL 5 MG/5 ML
5 SOLUTION, ORAL ORAL EVERY 6 HOURS SCHEDULED
Refills: 0 | Status: COMPLETED | OUTPATIENT
Start: 2025-01-01 | End: 2025-01-01

## 2025-01-01 RX ORDER — DOPAMINE HYDROCHLORIDE 160 MG/100ML
2-20 INJECTION, SOLUTION INTRAVENOUS
Status: DISCONTINUED | OUTPATIENT
Start: 2025-01-01 | End: 2025-01-01

## 2025-01-01 RX ORDER — ONDANSETRON 2 MG/ML
4 INJECTION INTRAMUSCULAR; INTRAVENOUS EVERY 6 HOURS PRN
Status: DISCONTINUED | OUTPATIENT
Start: 2025-01-01 | End: 2025-01-01 | Stop reason: HOSPADM

## 2025-01-01 RX ORDER — MIDAZOLAM HYDROCHLORIDE 1 MG/ML
1 INJECTION, SOLUTION INTRAMUSCULAR; INTRAVENOUS
Status: DISCONTINUED | OUTPATIENT
Start: 2025-01-01 | End: 2025-01-01

## 2025-01-01 RX ORDER — PANTOPRAZOLE SODIUM 40 MG/10ML
40 INJECTION, POWDER, LYOPHILIZED, FOR SOLUTION INTRAVENOUS
Status: DISCONTINUED | OUTPATIENT
Start: 2025-01-01 | End: 2025-01-01

## 2025-01-01 RX ORDER — BUMETANIDE 0.25 MG/ML
2 INJECTION, SOLUTION INTRAMUSCULAR; INTRAVENOUS ONCE
Status: COMPLETED | OUTPATIENT
Start: 2025-01-01 | End: 2025-01-01

## 2025-01-01 RX ORDER — ONDANSETRON 4 MG/1
4 TABLET, ORALLY DISINTEGRATING ORAL EVERY 6 HOURS PRN
Status: DISCONTINUED | OUTPATIENT
Start: 2025-01-01 | End: 2025-01-01

## 2025-01-01 RX ORDER — ACETAMINOPHEN 650 MG/1
650 SUPPOSITORY RECTAL EVERY 4 HOURS PRN
Status: DISCONTINUED | OUTPATIENT
Start: 2025-01-01 | End: 2025-01-01 | Stop reason: HOSPADM

## 2025-01-01 RX ORDER — SODIUM CHLORIDE 0.9 % (FLUSH) 0.9 %
10 SYRINGE (ML) INJECTION AS NEEDED
Status: DISCONTINUED | OUTPATIENT
Start: 2025-01-01 | End: 2025-01-01

## 2025-01-01 RX ORDER — ATORVASTATIN CALCIUM 40 MG/1
80 TABLET, FILM COATED ORAL DAILY
Status: DISCONTINUED | OUTPATIENT
Start: 2025-01-01 | End: 2025-01-01 | Stop reason: HOSPADM

## 2025-01-01 RX ORDER — AMIODARONE HYDROCHLORIDE 200 MG/1
200 TABLET ORAL ONCE
Status: DISCONTINUED | OUTPATIENT
Start: 2025-01-01 | End: 2025-01-01

## 2025-01-01 RX ORDER — LEVOTHYROXINE SODIUM 50 UG/1
50 TABLET ORAL DAILY
Status: DISCONTINUED | OUTPATIENT
Start: 2025-01-01 | End: 2025-01-01

## 2025-01-01 RX ORDER — AMIODARONE HYDROCHLORIDE 200 MG/1
200 TABLET ORAL EVERY 8 HOURS
Status: DISCONTINUED | OUTPATIENT
Start: 2025-01-01 | End: 2025-01-01

## 2025-01-01 RX ORDER — IBUPROFEN 600 MG/1
1 TABLET ORAL
Status: DISCONTINUED | OUTPATIENT
Start: 2025-01-01 | End: 2025-01-01

## 2025-01-01 RX ADMIN — IPRATROPIUM BROMIDE AND ALBUTEROL SULFATE 3 ML: .5; 3 SOLUTION RESPIRATORY (INHALATION) at 19:00

## 2025-01-01 RX ADMIN — Medication 100 MCG/HR: at 16:12

## 2025-01-01 RX ADMIN — PRASUGREL 10 MG: 10 TABLET, FILM COATED ORAL at 08:28

## 2025-01-01 RX ADMIN — BUDESONIDE 0.5 MG: 0.5 INHALANT RESPIRATORY (INHALATION) at 19:00

## 2025-01-01 RX ADMIN — HUMAN INSULIN 5 UNITS: 100 INJECTION, SOLUTION SUBCUTANEOUS at 05:22

## 2025-01-01 RX ADMIN — HUMAN INSULIN 5 UNITS: 100 INJECTION, SOLUTION SUBCUTANEOUS at 12:20

## 2025-01-01 RX ADMIN — PROPOFOL 20 MCG/KG/MIN: 10 INJECTION, EMULSION INTRAVENOUS at 05:50

## 2025-01-01 RX ADMIN — HUMAN INSULIN 5 UNITS: 100 INJECTION, SOLUTION SUBCUTANEOUS at 18:49

## 2025-01-01 RX ADMIN — ESCITALOPRAM 10 MG: 10 TABLET, FILM COATED ORAL at 08:29

## 2025-01-01 RX ADMIN — IPRATROPIUM BROMIDE AND ALBUTEROL SULFATE 3 ML: .5; 3 SOLUTION RESPIRATORY (INHALATION) at 11:13

## 2025-01-01 RX ADMIN — PIPERACILLIN AND TAZOBACTAM 4.5 G: 4; .5 INJECTION, POWDER, FOR SOLUTION INTRAVENOUS at 17:55

## 2025-01-01 RX ADMIN — POLYETHYLENE GLYCOL 3350 17 G: 17 POWDER, FOR SOLUTION ORAL at 12:20

## 2025-01-01 RX ADMIN — ESCITALOPRAM 10 MG: 10 TABLET, FILM COATED ORAL at 08:39

## 2025-01-01 RX ADMIN — BUDESONIDE 0.5 MG: 0.5 INHALANT RESPIRATORY (INHALATION) at 07:16

## 2025-01-01 RX ADMIN — HUMAN INSULIN 3 UNITS: 100 INJECTION, SOLUTION SUBCUTANEOUS at 23:22

## 2025-01-01 RX ADMIN — SENNOSIDES AND DOCUSATE SODIUM 2 TABLET: 50; 8.6 TABLET ORAL at 21:19

## 2025-01-01 RX ADMIN — Medication 10 ML: at 20:08

## 2025-01-01 RX ADMIN — BUDESONIDE 0.5 MG: 0.5 INHALANT RESPIRATORY (INHALATION) at 19:23

## 2025-01-01 RX ADMIN — HUMAN INSULIN 8 UNITS: 100 INJECTION, SOLUTION SUBCUTANEOUS at 05:41

## 2025-01-01 RX ADMIN — LEVOTHYROXINE SODIUM 50 MCG: 50 TABLET ORAL at 08:51

## 2025-01-01 RX ADMIN — PROPOFOL 40 MCG/KG/MIN: 10 INJECTION, EMULSION INTRAVENOUS at 11:36

## 2025-01-01 RX ADMIN — PRASUGREL 10 MG: 10 TABLET, FILM COATED ORAL at 08:51

## 2025-01-01 RX ADMIN — IPRATROPIUM BROMIDE AND ALBUTEROL SULFATE 3 ML: .5; 3 SOLUTION RESPIRATORY (INHALATION) at 07:37

## 2025-01-01 RX ADMIN — POLYETHYLENE GLYCOL 3350 17 G: 17 POWDER, FOR SOLUTION ORAL at 01:16

## 2025-01-01 RX ADMIN — AMIODARONE HYDROCHLORIDE 0.5 MG/MIN: 1.8 INJECTION, SOLUTION INTRAVENOUS at 00:21

## 2025-01-01 RX ADMIN — HUMAN INSULIN 5 UNITS: 100 INJECTION, SOLUTION SUBCUTANEOUS at 12:23

## 2025-01-01 RX ADMIN — CEFTRIAXONE 2000 MG: 2 INJECTION, POWDER, FOR SOLUTION INTRAMUSCULAR; INTRAVENOUS at 02:30

## 2025-01-01 RX ADMIN — Medication 10 ML: at 08:49

## 2025-01-01 RX ADMIN — HEPARIN SODIUM 5000 UNITS: 5000 INJECTION INTRAVENOUS; SUBCUTANEOUS at 23:17

## 2025-01-01 RX ADMIN — ACETAMINOPHEN 650 MG: 650 SOLUTION ORAL at 10:04

## 2025-01-01 RX ADMIN — SENNOSIDES AND DOCUSATE SODIUM 2 TABLET: 50; 8.6 TABLET ORAL at 09:49

## 2025-01-01 RX ADMIN — NALOXEGOL OXALATE 25 MG: 25 TABLET, FILM COATED ORAL at 08:12

## 2025-01-01 RX ADMIN — Medication 100 MCG/HR: at 08:38

## 2025-01-01 RX ADMIN — DEXMEDETOMIDINE HYDROCHLORIDE 0.2 MCG/KG/HR: 4 INJECTION, SOLUTION INTRAVENOUS at 15:54

## 2025-01-01 RX ADMIN — DEXMEDETOMIDINE HYDROCHLORIDE 1.5 MCG/KG/HR: 4 INJECTION, SOLUTION INTRAVENOUS at 18:38

## 2025-01-01 RX ADMIN — PANTOPRAZOLE SODIUM 40 MG: 40 INJECTION, POWDER, FOR SOLUTION INTRAVENOUS at 08:50

## 2025-01-01 RX ADMIN — HEPARIN SODIUM 5000 UNITS: 5000 INJECTION INTRAVENOUS; SUBCUTANEOUS at 21:00

## 2025-01-01 RX ADMIN — MAGNESIUM SULFATE HEPTAHYDRATE 1 G: 500 INJECTION, SOLUTION INTRAMUSCULAR; INTRAVENOUS at 20:49

## 2025-01-01 RX ADMIN — QUETIAPINE FUMARATE 25 MG: 25 TABLET ORAL at 21:24

## 2025-01-01 RX ADMIN — HUMAN INSULIN 4 UNITS: 100 INJECTION, SOLUTION SUBCUTANEOUS at 05:19

## 2025-01-01 RX ADMIN — Medication 10 ML: at 08:55

## 2025-01-01 RX ADMIN — HUMAN INSULIN 8 UNITS: 100 INJECTION, SOLUTION SUBCUTANEOUS at 00:11

## 2025-01-01 RX ADMIN — HEPARIN SODIUM 5000 UNITS: 5000 INJECTION INTRAVENOUS; SUBCUTANEOUS at 14:15

## 2025-01-01 RX ADMIN — PROPOFOL 40 MCG/KG/MIN: 10 INJECTION, EMULSION INTRAVENOUS at 00:14

## 2025-01-01 RX ADMIN — ASPIRIN 81 MG: 81 TABLET, CHEWABLE ORAL at 08:29

## 2025-01-01 RX ADMIN — IPRATROPIUM BROMIDE AND ALBUTEROL SULFATE 3 ML: .5; 3 SOLUTION RESPIRATORY (INHALATION) at 08:11

## 2025-01-01 RX ADMIN — HEPARIN SODIUM 5000 UNITS: 5000 INJECTION INTRAVENOUS; SUBCUTANEOUS at 21:41

## 2025-01-01 RX ADMIN — HUMAN INSULIN 10 UNITS: 100 INJECTION, SOLUTION SUBCUTANEOUS at 11:48

## 2025-01-01 RX ADMIN — IPRATROPIUM BROMIDE AND ALBUTEROL SULFATE 3 ML: .5; 3 SOLUTION RESPIRATORY (INHALATION) at 15:40

## 2025-01-01 RX ADMIN — HEPARIN SODIUM 5000 UNITS: 5000 INJECTION INTRAVENOUS; SUBCUTANEOUS at 05:21

## 2025-01-01 RX ADMIN — EPINEPHRINE 1 MG: 0.1 INJECTION INTRAVENOUS at 20:47

## 2025-01-01 RX ADMIN — PROPOFOL 35 MCG/KG/MIN: 10 INJECTION, EMULSION INTRAVENOUS at 14:15

## 2025-01-01 RX ADMIN — PROPOFOL 40 MCG/KG/MIN: 10 INJECTION, EMULSION INTRAVENOUS at 18:27

## 2025-01-01 RX ADMIN — AMIODARONE HYDROCHLORIDE 0.5 MG/MIN: 1.8 INJECTION, SOLUTION INTRAVENOUS at 12:17

## 2025-01-01 RX ADMIN — ESCITALOPRAM 10 MG: 10 TABLET, FILM COATED ORAL at 08:44

## 2025-01-01 RX ADMIN — ASPIRIN 81 MG: 81 TABLET, CHEWABLE ORAL at 09:39

## 2025-01-01 RX ADMIN — BUDESONIDE 0.5 MG: 0.5 INHALANT RESPIRATORY (INHALATION) at 07:14

## 2025-01-01 RX ADMIN — Medication 10 ML: at 20:52

## 2025-01-01 RX ADMIN — ACETAMINOPHEN 650 MG: 650 SOLUTION ORAL at 20:05

## 2025-01-01 RX ADMIN — ESCITALOPRAM 10 MG: 10 TABLET, FILM COATED ORAL at 08:18

## 2025-01-01 RX ADMIN — HEPARIN SODIUM 5000 UNITS: 5000 INJECTION INTRAVENOUS; SUBCUTANEOUS at 16:56

## 2025-01-01 RX ADMIN — IPRATROPIUM BROMIDE AND ALBUTEROL SULFATE 3 ML: .5; 3 SOLUTION RESPIRATORY (INHALATION) at 19:01

## 2025-01-01 RX ADMIN — DEXMEDETOMIDINE HYDROCHLORIDE 1 MCG/KG/HR: 4 INJECTION, SOLUTION INTRAVENOUS at 23:17

## 2025-01-01 RX ADMIN — BISACODYL 10 MG: 10 SUPPOSITORY RECTAL at 08:59

## 2025-01-01 RX ADMIN — IPRATROPIUM BROMIDE AND ALBUTEROL SULFATE 3 ML: .5; 3 SOLUTION RESPIRATORY (INHALATION) at 07:13

## 2025-01-01 RX ADMIN — Medication 45 ML: at 20:55

## 2025-01-01 RX ADMIN — MIDAZOLAM HYDROCHLORIDE 0.5 MG: 1 INJECTION, SOLUTION INTRAMUSCULAR; INTRAVENOUS at 14:21

## 2025-01-01 RX ADMIN — HUMAN INSULIN 3 UNITS: 100 INJECTION, SOLUTION SUBCUTANEOUS at 05:49

## 2025-01-01 RX ADMIN — AMIODARONE HYDROCHLORIDE 0.5 MG/MIN: 1.8 INJECTION, SOLUTION INTRAVENOUS at 15:31

## 2025-01-01 RX ADMIN — AMIODARONE HYDROCHLORIDE 150 MG: 1.5 INJECTION, SOLUTION INTRAVENOUS at 14:11

## 2025-01-01 RX ADMIN — IPRATROPIUM BROMIDE AND ALBUTEROL SULFATE 3 ML: .5; 3 SOLUTION RESPIRATORY (INHALATION) at 07:16

## 2025-01-01 RX ADMIN — HEPARIN SODIUM 5000 UNITS: 5000 INJECTION INTRAVENOUS; SUBCUTANEOUS at 21:45

## 2025-01-01 RX ADMIN — PROPOFOL 30 MCG/KG/MIN: 10 INJECTION, EMULSION INTRAVENOUS at 14:54

## 2025-01-01 RX ADMIN — HUMAN INSULIN 2 UNITS: 100 INJECTION, SOLUTION SUBCUTANEOUS at 17:19

## 2025-01-01 RX ADMIN — Medication 10 ML: at 09:40

## 2025-01-01 RX ADMIN — HUMAN INSULIN 5 UNITS: 100 INJECTION, SOLUTION SUBCUTANEOUS at 18:07

## 2025-01-01 RX ADMIN — PROPOFOL 35 MCG/KG/MIN: 10 INJECTION, EMULSION INTRAVENOUS at 02:37

## 2025-01-01 RX ADMIN — GLYCOPYRROLATE 0.4 MG: 0.2 INJECTION, SOLUTION INTRAMUSCULAR; INTRAVENOUS at 04:20

## 2025-01-01 RX ADMIN — FUROSEMIDE 40 MG: 10 INJECTION, SOLUTION INTRAMUSCULAR; INTRAVENOUS at 11:45

## 2025-01-01 RX ADMIN — PROPOFOL 30 MCG/KG/MIN: 10 INJECTION, EMULSION INTRAVENOUS at 22:10

## 2025-01-01 RX ADMIN — HUMAN INSULIN 20 UNITS: 100 INJECTION, SOLUTION SUBCUTANEOUS at 06:54

## 2025-01-01 RX ADMIN — PROPOFOL 40 MCG/KG/MIN: 10 INJECTION, EMULSION INTRAVENOUS at 05:12

## 2025-01-01 RX ADMIN — PIPERACILLIN AND TAZOBACTAM 3.38 G: 3; .375 INJECTION, POWDER, LYOPHILIZED, FOR SOLUTION INTRAVENOUS at 02:24

## 2025-01-01 RX ADMIN — LEVOTHYROXINE SODIUM 50 MCG: 50 TABLET ORAL at 09:39

## 2025-01-01 RX ADMIN — BUDESONIDE 0.5 MG: 0.5 INHALANT RESPIRATORY (INHALATION) at 07:58

## 2025-01-01 RX ADMIN — HYDROCORTISONE SODIUM SUCCINATE 50 MG: 100 INJECTION, POWDER, FOR SOLUTION INTRAMUSCULAR; INTRAVENOUS at 05:21

## 2025-01-01 RX ADMIN — PROPOFOL 35 MCG/KG/MIN: 10 INJECTION, EMULSION INTRAVENOUS at 21:10

## 2025-01-01 RX ADMIN — ASPIRIN 81 MG: 81 TABLET, CHEWABLE ORAL at 08:12

## 2025-01-01 RX ADMIN — SENNOSIDES AND DOCUSATE SODIUM 2 TABLET: 50; 8.6 TABLET ORAL at 20:06

## 2025-01-01 RX ADMIN — POTASSIUM CHLORIDE 20 MEQ: 29.8 INJECTION, SOLUTION INTRAVENOUS at 18:51

## 2025-01-01 RX ADMIN — NALOXEGOL OXALATE 25 MG: 25 TABLET, FILM COATED ORAL at 08:29

## 2025-01-01 RX ADMIN — PIPERACILLIN AND TAZOBACTAM 4.5 G: 4; .5 INJECTION, POWDER, FOR SOLUTION INTRAVENOUS at 01:38

## 2025-01-01 RX ADMIN — AMIODARONE HYDROCHLORIDE 150 MG: 1.5 INJECTION, SOLUTION INTRAVENOUS at 04:24

## 2025-01-01 RX ADMIN — OXYCODONE HYDROCHLORIDE 5 MG: 5 SOLUTION ORAL at 18:44

## 2025-01-01 RX ADMIN — HUMAN INSULIN 8 UNITS: 100 INJECTION, SOLUTION SUBCUTANEOUS at 05:19

## 2025-01-01 RX ADMIN — IPRATROPIUM BROMIDE AND ALBUTEROL SULFATE 3 ML: .5; 3 SOLUTION RESPIRATORY (INHALATION) at 16:01

## 2025-01-01 RX ADMIN — PHENYLEPHRINE HYDROCHLORIDE 2 MCG/KG/MIN: 10 INJECTION INTRAVENOUS at 10:26

## 2025-01-01 RX ADMIN — HUMAN INSULIN 2 UNITS: 100 INJECTION, SOLUTION SUBCUTANEOUS at 05:22

## 2025-01-01 RX ADMIN — SENNOSIDES AND DOCUSATE SODIUM 2 TABLET: 50; 8.6 TABLET ORAL at 08:51

## 2025-01-01 RX ADMIN — HUMAN INSULIN 4 UNITS: 100 INJECTION, SOLUTION SUBCUTANEOUS at 05:34

## 2025-01-01 RX ADMIN — IPRATROPIUM BROMIDE AND ALBUTEROL SULFATE 3 ML: .5; 3 SOLUTION RESPIRATORY (INHALATION) at 07:11

## 2025-01-01 RX ADMIN — PANTOPRAZOLE SODIUM 40 MG: 40 INJECTION, POWDER, FOR SOLUTION INTRAVENOUS at 08:03

## 2025-01-01 RX ADMIN — LEVOTHYROXINE SODIUM 50 MCG: 50 TABLET ORAL at 09:59

## 2025-01-01 RX ADMIN — DEXMEDETOMIDINE HYDROCHLORIDE 1 MCG/KG/HR: 4 INJECTION, SOLUTION INTRAVENOUS at 09:30

## 2025-01-01 RX ADMIN — HYDROCORTISONE SODIUM SUCCINATE 50 MG: 100 INJECTION, POWDER, FOR SOLUTION INTRAMUSCULAR; INTRAVENOUS at 05:27

## 2025-01-01 RX ADMIN — HUMAN INSULIN 16 UNITS: 100 INJECTION, SOLUTION SUBCUTANEOUS at 12:19

## 2025-01-01 RX ADMIN — INSULIN GLARGINE 20 UNITS: 100 INJECTION, SOLUTION SUBCUTANEOUS at 08:40

## 2025-01-01 RX ADMIN — HUMAN INSULIN 2 UNITS: 100 INJECTION, SOLUTION SUBCUTANEOUS at 23:49

## 2025-01-01 RX ADMIN — HUMAN INSULIN 12 UNITS: 100 INJECTION, SOLUTION SUBCUTANEOUS at 00:10

## 2025-01-01 RX ADMIN — IPRATROPIUM BROMIDE AND ALBUTEROL SULFATE 3 ML: .5; 3 SOLUTION RESPIRATORY (INHALATION) at 17:10

## 2025-01-01 RX ADMIN — BUDESONIDE 0.5 MG: 0.5 INHALANT RESPIRATORY (INHALATION) at 19:12

## 2025-01-01 RX ADMIN — SENNOSIDES AND DOCUSATE SODIUM 2 TABLET: 50; 8.6 TABLET ORAL at 10:28

## 2025-01-01 RX ADMIN — HEPARIN SODIUM 5000 UNITS: 5000 INJECTION INTRAVENOUS; SUBCUTANEOUS at 21:22

## 2025-01-01 RX ADMIN — HEPARIN SODIUM 5000 UNITS: 5000 INJECTION INTRAVENOUS; SUBCUTANEOUS at 05:12

## 2025-01-01 RX ADMIN — HUMAN INSULIN 8 UNITS: 100 INJECTION, SOLUTION SUBCUTANEOUS at 05:57

## 2025-01-01 RX ADMIN — IPRATROPIUM BROMIDE AND ALBUTEROL SULFATE 3 ML: .5; 3 SOLUTION RESPIRATORY (INHALATION) at 18:59

## 2025-01-01 RX ADMIN — PHENYLEPHRINE HYDROCHLORIDE 2 MCG/KG/MIN: 10 INJECTION INTRAVENOUS at 10:56

## 2025-01-01 RX ADMIN — LEVOTHYROXINE SODIUM 50 MCG: 50 TABLET ORAL at 08:31

## 2025-01-01 RX ADMIN — HEPARIN SODIUM 5000 UNITS: 5000 INJECTION INTRAVENOUS; SUBCUTANEOUS at 05:40

## 2025-01-01 RX ADMIN — PROPOFOL 35 MCG/KG/MIN: 10 INJECTION, EMULSION INTRAVENOUS at 21:21

## 2025-01-01 RX ADMIN — IPRATROPIUM BROMIDE AND ALBUTEROL SULFATE 3 ML: .5; 3 SOLUTION RESPIRATORY (INHALATION) at 19:10

## 2025-01-01 RX ADMIN — PROPOFOL 15 MCG/KG/MIN: 10 INJECTION, EMULSION INTRAVENOUS at 18:05

## 2025-01-01 RX ADMIN — AMIODARONE HYDROCHLORIDE 1 MG/MIN: 1.8 INJECTION, SOLUTION INTRAVENOUS at 10:56

## 2025-01-01 RX ADMIN — HUMAN INSULIN 4 UNITS: 100 INJECTION, SOLUTION SUBCUTANEOUS at 06:46

## 2025-01-01 RX ADMIN — LEVOTHYROXINE SODIUM 50 MCG: 50 TABLET ORAL at 09:30

## 2025-01-01 RX ADMIN — HYDROCORTISONE SODIUM SUCCINATE 50 MG: 100 INJECTION, POWDER, FOR SOLUTION INTRAMUSCULAR; INTRAVENOUS at 00:14

## 2025-01-01 RX ADMIN — SODIUM BICARBONATE 50 MEQ: 84 INJECTION INTRAVENOUS at 21:05

## 2025-01-01 RX ADMIN — PANTOPRAZOLE SODIUM 40 MG: 40 INJECTION, POWDER, FOR SOLUTION INTRAVENOUS at 08:18

## 2025-01-01 RX ADMIN — QUETIAPINE FUMARATE 25 MG: 25 TABLET ORAL at 21:46

## 2025-01-01 RX ADMIN — IPRATROPIUM BROMIDE AND ALBUTEROL SULFATE 3 ML: .5; 3 SOLUTION RESPIRATORY (INHALATION) at 12:25

## 2025-01-01 RX ADMIN — ESCITALOPRAM 10 MG: 10 TABLET, FILM COATED ORAL at 08:12

## 2025-01-01 RX ADMIN — MUPIROCIN 1 APPLICATION: 20 OINTMENT TOPICAL at 21:55

## 2025-01-01 RX ADMIN — INSULIN GLARGINE 20 UNITS: 100 INJECTION, SOLUTION SUBCUTANEOUS at 09:30

## 2025-01-01 RX ADMIN — FENTANYL CITRATE 50 MCG: 50 INJECTION, SOLUTION INTRAMUSCULAR; INTRAVENOUS at 21:03

## 2025-01-01 RX ADMIN — ASPIRIN 81 MG: 81 TABLET, CHEWABLE ORAL at 08:31

## 2025-01-01 RX ADMIN — Medication 45 ML: at 20:52

## 2025-01-01 RX ADMIN — ESCITALOPRAM 10 MG: 10 TABLET, FILM COATED ORAL at 08:31

## 2025-01-01 RX ADMIN — HEPARIN SODIUM 5000 UNITS: 5000 INJECTION INTRAVENOUS; SUBCUTANEOUS at 14:13

## 2025-01-01 RX ADMIN — Medication 10 ML: at 20:28

## 2025-01-01 RX ADMIN — HEPARIN SODIUM 2000 UNITS: 1000 INJECTION INTRAVENOUS; SUBCUTANEOUS at 05:49

## 2025-01-01 RX ADMIN — Medication 10 ML: at 08:12

## 2025-01-01 RX ADMIN — DOPAMINE HYDROCHLORIDE 14 MCG/KG/MIN: 160 INJECTION, SOLUTION INTRAVENOUS at 05:51

## 2025-01-01 RX ADMIN — SENNOSIDES AND DOCUSATE SODIUM 2 TABLET: 50; 8.6 TABLET ORAL at 21:10

## 2025-01-01 RX ADMIN — BUDESONIDE 0.5 MG: 0.5 INHALANT RESPIRATORY (INHALATION) at 19:13

## 2025-01-01 RX ADMIN — Medication 10 ML: at 21:24

## 2025-01-01 RX ADMIN — HEPARIN SODIUM 5000 UNITS: 5000 INJECTION INTRAVENOUS; SUBCUTANEOUS at 21:10

## 2025-01-01 RX ADMIN — HUMAN INSULIN 5 UNITS: 100 INJECTION, SOLUTION SUBCUTANEOUS at 11:34

## 2025-01-01 RX ADMIN — IPRATROPIUM BROMIDE AND ALBUTEROL SULFATE 3 ML: .5; 3 SOLUTION RESPIRATORY (INHALATION) at 07:45

## 2025-01-01 RX ADMIN — DIGOXIN 250 MCG: 0.25 INJECTION INTRAMUSCULAR; INTRAVENOUS at 14:06

## 2025-01-01 RX ADMIN — SENNOSIDES AND DOCUSATE SODIUM 2 TABLET: 50; 8.6 TABLET ORAL at 09:39

## 2025-01-01 RX ADMIN — ASPIRIN 81 MG: 81 TABLET, CHEWABLE ORAL at 08:03

## 2025-01-01 RX ADMIN — HEPARIN SODIUM 16 UNITS/KG/HR: 10000 INJECTION, SOLUTION INTRAVENOUS at 16:29

## 2025-01-01 RX ADMIN — HEPARIN SODIUM 5000 UNITS: 5000 INJECTION INTRAVENOUS; SUBCUTANEOUS at 14:32

## 2025-01-01 RX ADMIN — HEPARIN SODIUM 5000 UNITS: 5000 INJECTION INTRAVENOUS; SUBCUTANEOUS at 15:53

## 2025-01-01 RX ADMIN — PANTOPRAZOLE SODIUM 40 MG: 40 INJECTION, POWDER, FOR SOLUTION INTRAVENOUS at 09:54

## 2025-01-01 RX ADMIN — HUMAN INSULIN 12 UNITS: 100 INJECTION, SOLUTION SUBCUTANEOUS at 23:54

## 2025-01-01 RX ADMIN — MUPIROCIN 1 APPLICATION: 20 OINTMENT TOPICAL at 20:00

## 2025-01-01 RX ADMIN — HEPARIN SODIUM 16 UNITS/KG/HR: 10000 INJECTION, SOLUTION INTRAVENOUS at 18:10

## 2025-01-01 RX ADMIN — INSULIN GLARGINE 10 UNITS: 100 INJECTION, SOLUTION SUBCUTANEOUS at 14:56

## 2025-01-01 RX ADMIN — QUETIAPINE FUMARATE 25 MG: 25 TABLET ORAL at 21:29

## 2025-01-01 RX ADMIN — MUPIROCIN 1 APPLICATION: 20 OINTMENT TOPICAL at 08:03

## 2025-01-01 RX ADMIN — HUMAN INSULIN 3 UNITS: 100 INJECTION, SOLUTION SUBCUTANEOUS at 05:33

## 2025-01-01 RX ADMIN — IPRATROPIUM BROMIDE AND ALBUTEROL SULFATE 3 ML: .5; 3 SOLUTION RESPIRATORY (INHALATION) at 19:13

## 2025-01-01 RX ADMIN — MUPIROCIN 1 APPLICATION: 20 OINTMENT TOPICAL at 20:02

## 2025-01-01 RX ADMIN — Medication 50 MCG/HR: at 21:10

## 2025-01-01 RX ADMIN — PRASUGREL 10 MG: 10 TABLET, FILM COATED ORAL at 09:50

## 2025-01-01 RX ADMIN — DEXMEDETOMIDINE HYDROCHLORIDE 1.5 MCG/KG/HR: 4 INJECTION, SOLUTION INTRAVENOUS at 14:12

## 2025-01-01 RX ADMIN — SENNOSIDES AND DOCUSATE SODIUM 2 TABLET: 50; 8.6 TABLET ORAL at 20:28

## 2025-01-01 RX ADMIN — Medication 45 ML: at 21:20

## 2025-01-01 RX ADMIN — HUMAN INSULIN 3 UNITS: 100 INJECTION, SOLUTION SUBCUTANEOUS at 23:44

## 2025-01-01 RX ADMIN — HUMAN INSULIN 5 UNITS: 100 INJECTION, SOLUTION SUBCUTANEOUS at 00:24

## 2025-01-01 RX ADMIN — PROPOFOL 30 MCG/KG/MIN: 10 INJECTION, EMULSION INTRAVENOUS at 07:59

## 2025-01-01 RX ADMIN — ACETAMINOPHEN 650 MG: 650 SOLUTION ORAL at 02:39

## 2025-01-01 RX ADMIN — POTASSIUM CHLORIDE 20 MEQ: 29.8 INJECTION, SOLUTION INTRAVENOUS at 05:24

## 2025-01-01 RX ADMIN — Medication 10 ML: at 09:51

## 2025-01-01 RX ADMIN — HUMAN INSULIN 8 UNITS: 100 INJECTION, SOLUTION SUBCUTANEOUS at 23:51

## 2025-01-01 RX ADMIN — HUMAN INSULIN 12 UNITS: 100 INJECTION, SOLUTION SUBCUTANEOUS at 18:28

## 2025-01-01 RX ADMIN — ESCITALOPRAM 10 MG: 10 TABLET, FILM COATED ORAL at 09:45

## 2025-01-01 RX ADMIN — POTASSIUM CHLORIDE 20 MEQ: 29.8 INJECTION, SOLUTION INTRAVENOUS at 05:41

## 2025-01-01 RX ADMIN — PIPERACILLIN AND TAZOBACTAM 4.5 G: 4; .5 INJECTION, POWDER, FOR SOLUTION INTRAVENOUS at 18:51

## 2025-01-01 RX ADMIN — IPRATROPIUM BROMIDE AND ALBUTEROL SULFATE 3 ML: .5; 3 SOLUTION RESPIRATORY (INHALATION) at 11:30

## 2025-01-01 RX ADMIN — IPRATROPIUM BROMIDE AND ALBUTEROL SULFATE 3 ML: .5; 3 SOLUTION RESPIRATORY (INHALATION) at 12:48

## 2025-01-01 RX ADMIN — HUMAN INSULIN 5 UNITS: 100 INJECTION, SOLUTION SUBCUTANEOUS at 17:39

## 2025-01-01 RX ADMIN — HUMAN INSULIN 8 UNITS: 100 INJECTION, SOLUTION SUBCUTANEOUS at 06:47

## 2025-01-01 RX ADMIN — SENNOSIDES AND DOCUSATE SODIUM 2 TABLET: 50; 8.6 TABLET ORAL at 21:22

## 2025-01-01 RX ADMIN — SODIUM PHOSPHATE, DIBASIC AND SODIUM PHOSPHATE, MONOBASIC 1 ENEMA: 7; 19 ENEMA RECTAL at 15:27

## 2025-01-01 RX ADMIN — POTASSIUM CHLORIDE 20 MEQ: 1.5 POWDER, FOR SOLUTION ORAL at 08:17

## 2025-01-01 RX ADMIN — Medication 10 ML: at 21:20

## 2025-01-01 RX ADMIN — SENNOSIDES AND DOCUSATE SODIUM 2 TABLET: 50; 8.6 TABLET ORAL at 08:40

## 2025-01-01 RX ADMIN — HUMAN INSULIN 8 UNITS: 100 INJECTION, SOLUTION SUBCUTANEOUS at 17:19

## 2025-01-01 RX ADMIN — IPRATROPIUM BROMIDE AND ALBUTEROL SULFATE 3 ML: .5; 3 SOLUTION RESPIRATORY (INHALATION) at 15:57

## 2025-01-01 RX ADMIN — PANTOPRAZOLE SODIUM 40 MG: 40 INJECTION, POWDER, FOR SOLUTION INTRAVENOUS at 08:40

## 2025-01-01 RX ADMIN — IPRATROPIUM BROMIDE AND ALBUTEROL SULFATE 3 ML: .5; 3 SOLUTION RESPIRATORY (INHALATION) at 15:13

## 2025-01-01 RX ADMIN — PROPOFOL 20 MCG/KG/MIN: 10 INJECTION, EMULSION INTRAVENOUS at 15:34

## 2025-01-01 RX ADMIN — DEXMEDETOMIDINE HYDROCHLORIDE 0.4 MCG/KG/HR: 4 INJECTION, SOLUTION INTRAVENOUS at 16:00

## 2025-01-01 RX ADMIN — PANTOPRAZOLE SODIUM 40 MG: 40 INJECTION, POWDER, FOR SOLUTION INTRAVENOUS at 08:31

## 2025-01-01 RX ADMIN — BUMETANIDE 2 MG: 0.25 INJECTION INTRAMUSCULAR; INTRAVENOUS at 14:06

## 2025-01-01 RX ADMIN — HEPARIN SODIUM 5000 UNITS: 5000 INJECTION INTRAVENOUS; SUBCUTANEOUS at 06:44

## 2025-01-01 RX ADMIN — HUMAN INSULIN 8 UNITS: 100 INJECTION, SOLUTION SUBCUTANEOUS at 23:38

## 2025-01-01 RX ADMIN — HEPARIN SODIUM 15 UNITS/KG/HR: 10000 INJECTION, SOLUTION INTRAVENOUS at 18:45

## 2025-01-01 RX ADMIN — PIPERACILLIN AND TAZOBACTAM 3.38 G: 3; .375 INJECTION, POWDER, LYOPHILIZED, FOR SOLUTION INTRAVENOUS at 09:30

## 2025-01-01 RX ADMIN — HUMAN INSULIN 8 UNITS: 100 INJECTION, SOLUTION SUBCUTANEOUS at 00:16

## 2025-01-01 RX ADMIN — BUDESONIDE 0.5 MG: 0.5 INHALANT RESPIRATORY (INHALATION) at 08:17

## 2025-01-01 RX ADMIN — HUMAN INSULIN 12 UNITS: 100 INJECTION, SOLUTION SUBCUTANEOUS at 23:44

## 2025-01-01 RX ADMIN — OXYCODONE HYDROCHLORIDE 5 MG: 5 SOLUTION ORAL at 05:33

## 2025-01-01 RX ADMIN — Medication 150 MCG/HR: at 23:28

## 2025-01-01 RX ADMIN — IPRATROPIUM BROMIDE AND ALBUTEROL SULFATE 3 ML: .5; 3 SOLUTION RESPIRATORY (INHALATION) at 07:02

## 2025-01-01 RX ADMIN — HUMAN INSULIN 8 UNITS: 100 INJECTION, SOLUTION SUBCUTANEOUS at 15:11

## 2025-01-01 RX ADMIN — LEVOTHYROXINE SODIUM 50 MCG: 50 TABLET ORAL at 08:44

## 2025-01-01 RX ADMIN — HEPARIN SODIUM 5000 UNITS: 5000 INJECTION INTRAVENOUS; SUBCUTANEOUS at 22:47

## 2025-01-01 RX ADMIN — HEPARIN SODIUM 5000 UNITS: 5000 INJECTION INTRAVENOUS; SUBCUTANEOUS at 17:20

## 2025-01-01 RX ADMIN — DOPAMINE HYDROCHLORIDE 15 MCG/KG/MIN: 160 INJECTION, SOLUTION INTRAVENOUS at 23:38

## 2025-01-01 RX ADMIN — BUDESONIDE 0.5 MG: 0.5 INHALANT RESPIRATORY (INHALATION) at 08:13

## 2025-01-01 RX ADMIN — DIGOXIN 250 MCG: 250 INJECTION, SOLUTION INTRAMUSCULAR; INTRAVENOUS; PARENTERAL at 09:21

## 2025-01-01 RX ADMIN — HUMAN INSULIN 8 UNITS: 100 INJECTION, SOLUTION SUBCUTANEOUS at 17:39

## 2025-01-01 RX ADMIN — PRASUGREL 10 MG: 10 TABLET, FILM COATED ORAL at 09:34

## 2025-01-01 RX ADMIN — IPRATROPIUM BROMIDE AND ALBUTEROL SULFATE 3 ML: .5; 3 SOLUTION RESPIRATORY (INHALATION) at 15:49

## 2025-01-01 RX ADMIN — OXYCODONE HYDROCHLORIDE 5 MG: 5 SOLUTION ORAL at 00:10

## 2025-01-01 RX ADMIN — INSULIN GLARGINE 5 UNITS: 100 INJECTION, SOLUTION SUBCUTANEOUS at 11:31

## 2025-01-01 RX ADMIN — HUMAN INSULIN 8 UNITS: 100 INJECTION, SOLUTION SUBCUTANEOUS at 23:30

## 2025-01-01 RX ADMIN — Medication 45 ML: at 12:56

## 2025-01-01 RX ADMIN — LEVOTHYROXINE SODIUM 50 MCG: 50 TABLET ORAL at 10:12

## 2025-01-01 RX ADMIN — OXYCODONE HYDROCHLORIDE 5 MG: 5 SOLUTION ORAL at 06:44

## 2025-01-01 RX ADMIN — HYDROCORTISONE SODIUM SUCCINATE 50 MG: 100 INJECTION, POWDER, FOR SOLUTION INTRAMUSCULAR; INTRAVENOUS at 18:02

## 2025-01-01 RX ADMIN — BUDESONIDE 0.5 MG: 0.5 INHALANT RESPIRATORY (INHALATION) at 19:10

## 2025-01-01 RX ADMIN — HEPARIN SODIUM 5000 UNITS: 5000 INJECTION INTRAVENOUS; SUBCUTANEOUS at 06:03

## 2025-01-01 RX ADMIN — PIPERACILLIN AND TAZOBACTAM 3.38 G: 3; .375 INJECTION, POWDER, LYOPHILIZED, FOR SOLUTION INTRAVENOUS at 17:57

## 2025-01-01 RX ADMIN — SENNOSIDES AND DOCUSATE SODIUM 2 TABLET: 50; 8.6 TABLET ORAL at 21:46

## 2025-01-01 RX ADMIN — Medication 10 ML: at 21:08

## 2025-01-01 RX ADMIN — POTASSIUM CHLORIDE 20 MEQ: 29.8 INJECTION, SOLUTION INTRAVENOUS at 09:52

## 2025-01-01 RX ADMIN — HUMAN INSULIN 8 UNITS: 100 INJECTION, SOLUTION SUBCUTANEOUS at 12:20

## 2025-01-01 RX ADMIN — ASPIRIN 81 MG: 81 TABLET, CHEWABLE ORAL at 09:59

## 2025-01-01 RX ADMIN — INSULIN GLARGINE 20 UNITS: 100 INJECTION, SOLUTION SUBCUTANEOUS at 09:53

## 2025-01-01 RX ADMIN — HUMAN INSULIN 5 UNITS: 100 INJECTION, SOLUTION SUBCUTANEOUS at 14:55

## 2025-01-01 RX ADMIN — Medication 10 ML: at 20:02

## 2025-01-01 RX ADMIN — Medication 10 ML: at 09:43

## 2025-01-01 RX ADMIN — ESCITALOPRAM 10 MG: 10 TABLET, FILM COATED ORAL at 08:52

## 2025-01-01 RX ADMIN — Medication 50 MCG/HR: at 21:29

## 2025-01-01 RX ADMIN — INSULIN GLARGINE 20 UNITS: 100 INJECTION, SOLUTION SUBCUTANEOUS at 08:17

## 2025-01-01 RX ADMIN — ASPIRIN 81 MG: 81 TABLET, CHEWABLE ORAL at 08:17

## 2025-01-01 RX ADMIN — Medication 45 ML: at 15:57

## 2025-01-01 RX ADMIN — HUMAN INSULIN 3 UNITS: 100 INJECTION, SOLUTION SUBCUTANEOUS at 11:49

## 2025-01-01 RX ADMIN — ALBUMIN (HUMAN) 25 G: 0.25 INJECTION, SOLUTION INTRAVENOUS at 13:02

## 2025-01-01 RX ADMIN — SENNOSIDES AND DOCUSATE SODIUM 2 TABLET: 50; 8.6 TABLET ORAL at 20:15

## 2025-01-01 RX ADMIN — INSULIN GLARGINE 15 UNITS: 100 INJECTION, SOLUTION SUBCUTANEOUS at 08:51

## 2025-01-01 RX ADMIN — POTASSIUM CHLORIDE 20 MEQ: 1.5 POWDER, FOR SOLUTION ORAL at 18:42

## 2025-01-01 RX ADMIN — POLYETHYLENE GLYCOL 3350 17 G: 17 POWDER, FOR SOLUTION ORAL at 08:51

## 2025-01-01 RX ADMIN — OXYCODONE HYDROCHLORIDE 5 MG: 5 SOLUTION ORAL at 05:40

## 2025-01-01 RX ADMIN — DEXMEDETOMIDINE HYDROCHLORIDE 0.5 MCG/KG/HR: 4 INJECTION, SOLUTION INTRAVENOUS at 09:06

## 2025-01-01 RX ADMIN — QUETIAPINE FUMARATE 25 MG: 25 TABLET ORAL at 21:10

## 2025-01-01 RX ADMIN — HUMAN INSULIN 12 UNITS: 100 INJECTION, SOLUTION SUBCUTANEOUS at 19:12

## 2025-01-01 RX ADMIN — PRASUGREL 10 MG: 10 TABLET, FILM COATED ORAL at 08:12

## 2025-01-01 RX ADMIN — ASPIRIN 81 MG: 81 TABLET, CHEWABLE ORAL at 10:36

## 2025-01-01 RX ADMIN — Medication 45 ML: at 10:37

## 2025-01-01 RX ADMIN — SENNOSIDES AND DOCUSATE SODIUM 2 TABLET: 50; 8.6 TABLET ORAL at 09:45

## 2025-01-01 RX ADMIN — ESCITALOPRAM 10 MG: 10 TABLET, FILM COATED ORAL at 09:29

## 2025-01-01 RX ADMIN — PHENYLEPHRINE HYDROCHLORIDE 1.5 MCG/KG/MIN: 10 INJECTION INTRAVENOUS at 03:13

## 2025-01-01 RX ADMIN — PIPERACILLIN AND TAZOBACTAM 4.5 G: 4; .5 INJECTION, POWDER, FOR SOLUTION INTRAVENOUS at 03:30

## 2025-01-01 RX ADMIN — PRASUGREL 10 MG: 10 TABLET, FILM COATED ORAL at 10:36

## 2025-01-01 RX ADMIN — Medication 100 MCG/HR: at 12:02

## 2025-01-01 RX ADMIN — PIPERACILLIN AND TAZOBACTAM 4.5 G: 4; .5 INJECTION, POWDER, FOR SOLUTION INTRAVENOUS at 18:06

## 2025-01-01 RX ADMIN — ESCITALOPRAM 10 MG: 10 TABLET, FILM COATED ORAL at 08:03

## 2025-01-01 RX ADMIN — OXYCODONE HYDROCHLORIDE 5 MG: 5 SOLUTION ORAL at 06:03

## 2025-01-01 RX ADMIN — HUMAN INSULIN 8 UNITS: 100 INJECTION, SOLUTION SUBCUTANEOUS at 23:33

## 2025-01-01 RX ADMIN — ASPIRIN 81 MG: 81 TABLET, CHEWABLE ORAL at 09:43

## 2025-01-01 RX ADMIN — PRASUGREL 10 MG: 10 TABLET, FILM COATED ORAL at 08:40

## 2025-01-01 RX ADMIN — PRASUGREL 10 MG: 10 TABLET, FILM COATED ORAL at 09:39

## 2025-01-01 RX ADMIN — HUMAN INSULIN 8 UNITS: 100 INJECTION, SOLUTION SUBCUTANEOUS at 11:37

## 2025-01-01 RX ADMIN — INSULIN GLARGINE 20 UNITS: 100 INJECTION, SOLUTION SUBCUTANEOUS at 09:21

## 2025-01-01 RX ADMIN — ASPIRIN 81 MG: 81 TABLET, CHEWABLE ORAL at 08:51

## 2025-01-01 RX ADMIN — METHYLNALTREXONE BROMIDE 12 MG: 12 INJECTION, SOLUTION SUBCUTANEOUS at 11:29

## 2025-01-01 RX ADMIN — OXYCODONE HYDROCHLORIDE 5 MG: 5 SOLUTION ORAL at 18:03

## 2025-01-01 RX ADMIN — AMIODARONE HYDROCHLORIDE 1 MG/MIN: 1.8 INJECTION, SOLUTION INTRAVENOUS at 16:07

## 2025-01-01 RX ADMIN — OXYCODONE HYDROCHLORIDE 5 MG: 5 SOLUTION ORAL at 23:30

## 2025-01-01 RX ADMIN — ASPIRIN 81 MG: 81 TABLET, CHEWABLE ORAL at 09:30

## 2025-01-01 RX ADMIN — SENNOSIDES AND DOCUSATE SODIUM 2 TABLET: 50; 8.6 TABLET ORAL at 20:52

## 2025-01-01 RX ADMIN — BUDESONIDE 0.5 MG: 0.5 INHALANT RESPIRATORY (INHALATION) at 06:59

## 2025-01-01 RX ADMIN — SENNOSIDES AND DOCUSATE SODIUM 2 TABLET: 50; 8.6 TABLET ORAL at 20:02

## 2025-01-01 RX ADMIN — Medication 150 MCG/HR: at 18:52

## 2025-01-01 RX ADMIN — PHENYLEPHRINE HYDROCHLORIDE 2 MCG/KG/MIN: 10 INJECTION INTRAVENOUS at 22:21

## 2025-01-01 RX ADMIN — IPRATROPIUM BROMIDE AND ALBUTEROL SULFATE 3 ML: .5; 3 SOLUTION RESPIRATORY (INHALATION) at 00:56

## 2025-01-01 RX ADMIN — INSULIN GLARGINE 15 UNITS: 100 INJECTION, SOLUTION SUBCUTANEOUS at 10:11

## 2025-01-01 RX ADMIN — POTASSIUM CHLORIDE 20 MEQ: 29.8 INJECTION, SOLUTION INTRAVENOUS at 06:46

## 2025-01-01 RX ADMIN — HUMAN INSULIN 10 UNITS: 100 INJECTION, SOLUTION SUBCUTANEOUS at 12:56

## 2025-01-01 RX ADMIN — PANTOPRAZOLE SODIUM 40 MG: 40 INJECTION, POWDER, FOR SOLUTION INTRAVENOUS at 09:31

## 2025-01-01 RX ADMIN — OXYCODONE HYDROCHLORIDE 5 MG: 5 SOLUTION ORAL at 12:46

## 2025-01-01 RX ADMIN — BUDESONIDE 0.5 MG: 0.5 INHALANT RESPIRATORY (INHALATION) at 18:49

## 2025-01-01 RX ADMIN — HUMAN INSULIN 8 UNITS: 100 INJECTION, SOLUTION SUBCUTANEOUS at 12:02

## 2025-01-01 RX ADMIN — OXYCODONE HYDROCHLORIDE 5 MG: 5 SOLUTION ORAL at 11:45

## 2025-01-01 RX ADMIN — INSULIN GLARGINE 20 UNITS: 100 INJECTION, SOLUTION SUBCUTANEOUS at 08:39

## 2025-01-01 RX ADMIN — Medication 10 ML: at 08:51

## 2025-01-01 RX ADMIN — NALOXEGOL OXALATE 25 MG: 25 TABLET, FILM COATED ORAL at 17:20

## 2025-01-01 RX ADMIN — IPRATROPIUM BROMIDE AND ALBUTEROL SULFATE 3 ML: .5; 3 SOLUTION RESPIRATORY (INHALATION) at 12:22

## 2025-01-01 RX ADMIN — DEXMEDETOMIDINE HYDROCHLORIDE 1 MCG/KG/HR: 4 INJECTION, SOLUTION INTRAVENOUS at 15:57

## 2025-01-01 RX ADMIN — HUMAN INSULIN 8 UNITS: 100 INJECTION, SOLUTION SUBCUTANEOUS at 12:17

## 2025-01-01 RX ADMIN — Medication 10 ML: at 09:14

## 2025-01-01 RX ADMIN — PANTOPRAZOLE SODIUM 40 MG: 40 INJECTION, POWDER, FOR SOLUTION INTRAVENOUS at 10:27

## 2025-01-01 RX ADMIN — AMIODARONE HYDROCHLORIDE 0.5 MG/MIN: 1.8 INJECTION, SOLUTION INTRAVENOUS at 10:37

## 2025-01-01 RX ADMIN — PRASUGREL 10 MG: 10 TABLET, FILM COATED ORAL at 08:33

## 2025-01-01 RX ADMIN — MIDAZOLAM HYDROCHLORIDE 0.5 MG: 1 INJECTION, SOLUTION INTRAMUSCULAR; INTRAVENOUS at 13:23

## 2025-01-01 RX ADMIN — HEPARIN SODIUM 5000 UNITS: 5000 INJECTION INTRAVENOUS; SUBCUTANEOUS at 21:07

## 2025-01-01 RX ADMIN — IPRATROPIUM BROMIDE AND ALBUTEROL SULFATE 3 ML: .5; 3 SOLUTION RESPIRATORY (INHALATION) at 19:05

## 2025-01-01 RX ADMIN — BUDESONIDE 0.5 MG: 0.5 INHALANT RESPIRATORY (INHALATION) at 07:02

## 2025-01-01 RX ADMIN — IPRATROPIUM BROMIDE AND ALBUTEROL SULFATE 3 ML: .5; 3 SOLUTION RESPIRATORY (INHALATION) at 19:12

## 2025-01-01 RX ADMIN — HUMAN INSULIN 12 UNITS: 100 INJECTION, SOLUTION SUBCUTANEOUS at 00:16

## 2025-01-01 RX ADMIN — HUMAN INSULIN 8 UNITS: 100 INJECTION, SOLUTION SUBCUTANEOUS at 05:27

## 2025-01-01 RX ADMIN — HUMAN INSULIN 13 UNITS: 100 INJECTION, SOLUTION SUBCUTANEOUS at 06:04

## 2025-01-01 RX ADMIN — ASPIRIN 81 MG: 81 TABLET, CHEWABLE ORAL at 08:44

## 2025-01-01 RX ADMIN — PHENYLEPHRINE HYDROCHLORIDE 0.5 MCG/KG/MIN: 10 INJECTION INTRAVENOUS at 22:08

## 2025-01-01 RX ADMIN — PHENYLEPHRINE HYDROCHLORIDE 0.7 MCG/KG/MIN: 10 INJECTION INTRAVENOUS at 02:31

## 2025-01-01 RX ADMIN — DEXMEDETOMIDINE HYDROCHLORIDE 1 MCG/KG/HR: 4 INJECTION, SOLUTION INTRAVENOUS at 22:18

## 2025-01-01 RX ADMIN — MIDAZOLAM HYDROCHLORIDE 0.5 MG: 1 INJECTION, SOLUTION INTRAMUSCULAR; INTRAVENOUS at 10:08

## 2025-01-01 RX ADMIN — PANTOPRAZOLE SODIUM 40 MG: 40 INJECTION, POWDER, FOR SOLUTION INTRAVENOUS at 08:12

## 2025-01-01 RX ADMIN — HYDROCORTISONE SODIUM SUCCINATE 50 MG: 100 INJECTION, POWDER, FOR SOLUTION INTRAMUSCULAR; INTRAVENOUS at 00:24

## 2025-01-01 RX ADMIN — HEPARIN SODIUM 5000 UNITS: 5000 INJECTION INTRAVENOUS; SUBCUTANEOUS at 05:18

## 2025-01-01 RX ADMIN — CEFTRIAXONE 2000 MG: 2 INJECTION, POWDER, FOR SOLUTION INTRAMUSCULAR; INTRAVENOUS at 03:00

## 2025-01-01 RX ADMIN — HUMAN INSULIN 8 UNITS: 100 INJECTION, SOLUTION SUBCUTANEOUS at 00:08

## 2025-01-01 RX ADMIN — BISACODYL 10 MG: 10 SUPPOSITORY RECTAL at 10:11

## 2025-01-01 RX ADMIN — PROPOFOL 40 MCG/KG/MIN: 10 INJECTION, EMULSION INTRAVENOUS at 02:59

## 2025-01-01 RX ADMIN — PHENYLEPHRINE HYDROCHLORIDE 1.3 MCG/KG/MIN: 10 INJECTION INTRAVENOUS at 05:26

## 2025-01-01 RX ADMIN — HEPARIN SODIUM 5000 UNITS: 5000 INJECTION INTRAVENOUS; SUBCUTANEOUS at 05:32

## 2025-01-01 RX ADMIN — PIPERACILLIN AND TAZOBACTAM 3.38 G: 3; .375 INJECTION, POWDER, LYOPHILIZED, FOR SOLUTION INTRAVENOUS at 17:55

## 2025-01-01 RX ADMIN — PRASUGREL 10 MG: 10 TABLET, FILM COATED ORAL at 09:30

## 2025-01-01 RX ADMIN — HUMAN INSULIN 5 UNITS: 100 INJECTION, SOLUTION SUBCUTANEOUS at 18:03

## 2025-01-01 RX ADMIN — ESCITALOPRAM 10 MG: 10 TABLET, FILM COATED ORAL at 09:39

## 2025-01-01 RX ADMIN — LEVOTHYROXINE SODIUM 50 MCG: 50 TABLET ORAL at 09:45

## 2025-01-01 RX ADMIN — MIDAZOLAM HYDROCHLORIDE 1 MG: 1 INJECTION, SOLUTION INTRAMUSCULAR; INTRAVENOUS at 13:39

## 2025-01-01 RX ADMIN — HYDROCORTISONE SODIUM SUCCINATE 50 MG: 100 INJECTION, POWDER, FOR SOLUTION INTRAMUSCULAR; INTRAVENOUS at 17:25

## 2025-01-01 RX ADMIN — IPRATROPIUM BROMIDE AND ALBUTEROL SULFATE 3 ML: .5; 3 SOLUTION RESPIRATORY (INHALATION) at 16:51

## 2025-01-01 RX ADMIN — HUMAN INSULIN 8 UNITS: 100 INJECTION, SOLUTION SUBCUTANEOUS at 06:44

## 2025-01-01 RX ADMIN — HEPARIN SODIUM 5000 UNITS: 5000 INJECTION INTRAVENOUS; SUBCUTANEOUS at 20:28

## 2025-01-01 RX ADMIN — OXYCODONE HYDROCHLORIDE 5 MG: 5 SOLUTION ORAL at 23:45

## 2025-01-01 RX ADMIN — ASPIRIN 81 MG: 81 TABLET, CHEWABLE ORAL at 09:50

## 2025-01-01 RX ADMIN — PANTOPRAZOLE SODIUM 40 MG: 40 INJECTION, POWDER, FOR SOLUTION INTRAVENOUS at 09:21

## 2025-01-01 RX ADMIN — HYDROCORTISONE SODIUM SUCCINATE 50 MG: 100 INJECTION, POWDER, FOR SOLUTION INTRAMUSCULAR; INTRAVENOUS at 12:20

## 2025-01-01 RX ADMIN — HEPARIN SODIUM 5000 UNITS: 5000 INJECTION INTRAVENOUS; SUBCUTANEOUS at 21:19

## 2025-01-01 RX ADMIN — HYDROCORTISONE SODIUM SUCCINATE 50 MG: 100 INJECTION, POWDER, FOR SOLUTION INTRAMUSCULAR; INTRAVENOUS at 12:16

## 2025-01-01 RX ADMIN — Medication 50 MCG/HR: at 21:18

## 2025-01-01 RX ADMIN — HUMAN INSULIN 20 UNITS: 100 INJECTION, SOLUTION SUBCUTANEOUS at 12:24

## 2025-01-01 RX ADMIN — PIPERACILLIN AND TAZOBACTAM 4.5 G: 4; .5 INJECTION, POWDER, FOR SOLUTION INTRAVENOUS at 09:42

## 2025-01-01 RX ADMIN — IPRATROPIUM BROMIDE AND ALBUTEROL SULFATE 3 ML: .5; 3 SOLUTION RESPIRATORY (INHALATION) at 08:25

## 2025-01-01 RX ADMIN — BUDESONIDE 0.5 MG: 0.5 INHALANT RESPIRATORY (INHALATION) at 18:59

## 2025-01-01 RX ADMIN — HEPARIN SODIUM 5000 UNITS: 5000 INJECTION INTRAVENOUS; SUBCUTANEOUS at 13:41

## 2025-01-01 RX ADMIN — SENNOSIDES AND DOCUSATE SODIUM 2 TABLET: 50; 8.6 TABLET ORAL at 09:59

## 2025-01-01 RX ADMIN — BUDESONIDE 0.5 MG: 0.5 INHALANT RESPIRATORY (INHALATION) at 07:35

## 2025-01-01 RX ADMIN — HUMAN INSULIN 5 UNITS: 100 INJECTION, SOLUTION SUBCUTANEOUS at 06:03

## 2025-01-01 RX ADMIN — PIPERACILLIN AND TAZOBACTAM 4.5 G: 4; .5 INJECTION, POWDER, FOR SOLUTION INTRAVENOUS at 19:32

## 2025-01-01 RX ADMIN — CEFTRIAXONE 2000 MG: 2 INJECTION, POWDER, FOR SOLUTION INTRAMUSCULAR; INTRAVENOUS at 02:58

## 2025-01-01 RX ADMIN — BUDESONIDE 0.5 MG: 0.5 INHALANT RESPIRATORY (INHALATION) at 21:08

## 2025-01-01 RX ADMIN — PIPERACILLIN AND TAZOBACTAM 4.5 G: 4; .5 INJECTION, POWDER, FOR SOLUTION INTRAVENOUS at 09:57

## 2025-01-01 RX ADMIN — MUPIROCIN 1 APPLICATION: 20 OINTMENT TOPICAL at 09:50

## 2025-01-01 RX ADMIN — HEPARIN SODIUM 5000 UNITS: 5000 INJECTION INTRAVENOUS; SUBCUTANEOUS at 15:55

## 2025-01-01 RX ADMIN — PHENYLEPHRINE HYDROCHLORIDE 2 MCG/KG/MIN: 10 INJECTION INTRAVENOUS at 17:29

## 2025-01-01 RX ADMIN — HUMAN INSULIN 3 UNITS: 100 INJECTION, SOLUTION SUBCUTANEOUS at 18:43

## 2025-01-01 RX ADMIN — ASPIRIN 81 MG: 81 TABLET, CHEWABLE ORAL at 09:47

## 2025-01-01 RX ADMIN — LEVOTHYROXINE SODIUM 50 MCG: 50 TABLET ORAL at 08:40

## 2025-01-01 RX ADMIN — PROPOFOL 50 MCG/KG/MIN: 10 INJECTION, EMULSION INTRAVENOUS at 22:48

## 2025-01-01 RX ADMIN — HUMAN INSULIN 8 UNITS: 100 INJECTION, SOLUTION SUBCUTANEOUS at 00:15

## 2025-01-01 RX ADMIN — HEPARIN SODIUM 5000 UNITS: 5000 INJECTION INTRAVENOUS; SUBCUTANEOUS at 16:07

## 2025-01-01 RX ADMIN — SODIUM BICARBONATE 50 MEQ: 84 INJECTION INTRAVENOUS at 03:53

## 2025-01-01 RX ADMIN — HYDROCORTISONE SODIUM SUCCINATE 25 MG: 100 INJECTION, POWDER, FOR SOLUTION INTRAMUSCULAR; INTRAVENOUS at 19:11

## 2025-01-01 RX ADMIN — SENNOSIDES AND DOCUSATE SODIUM 2 TABLET: 50; 8.6 TABLET ORAL at 20:55

## 2025-01-01 RX ADMIN — IPRATROPIUM BROMIDE AND ALBUTEROL SULFATE 3 ML: .5; 3 SOLUTION RESPIRATORY (INHALATION) at 11:17

## 2025-01-01 RX ADMIN — HYDROCORTISONE SODIUM SUCCINATE 50 MG: 100 INJECTION, POWDER, FOR SOLUTION INTRAMUSCULAR; INTRAVENOUS at 23:38

## 2025-01-01 RX ADMIN — HYDROCORTISONE SODIUM SUCCINATE 50 MG: 100 INJECTION, POWDER, FOR SOLUTION INTRAMUSCULAR; INTRAVENOUS at 05:57

## 2025-01-01 RX ADMIN — HUMAN INSULIN 3 UNITS: 100 INJECTION, SOLUTION SUBCUTANEOUS at 23:46

## 2025-01-01 RX ADMIN — HUMAN INSULIN 8 UNITS: 100 INJECTION, SOLUTION SUBCUTANEOUS at 12:49

## 2025-01-01 RX ADMIN — PROPOFOL 40 MCG/KG/MIN: 10 INJECTION, EMULSION INTRAVENOUS at 18:48

## 2025-01-01 RX ADMIN — PIPERACILLIN AND TAZOBACTAM 3.38 G: 3; .375 INJECTION, POWDER, LYOPHILIZED, FOR SOLUTION INTRAVENOUS at 23:30

## 2025-01-01 RX ADMIN — OXYCODONE HYDROCHLORIDE 5 MG: 5 SOLUTION ORAL at 12:12

## 2025-01-01 RX ADMIN — PRASUGREL 10 MG: 10 TABLET, FILM COATED ORAL at 08:10

## 2025-01-01 RX ADMIN — HUMAN INSULIN 8 UNITS: 100 INJECTION, SOLUTION SUBCUTANEOUS at 23:47

## 2025-01-01 RX ADMIN — PROPOFOL 35 MCG/KG/MIN: 10 INJECTION, EMULSION INTRAVENOUS at 15:33

## 2025-01-01 RX ADMIN — VANCOMYCIN HYDROCHLORIDE 1000 MG: 1 INJECTION, POWDER, LYOPHILIZED, FOR SOLUTION INTRAVENOUS at 08:31

## 2025-01-01 RX ADMIN — CEFTRIAXONE 2000 MG: 2 INJECTION, POWDER, FOR SOLUTION INTRAMUSCULAR; INTRAVENOUS at 02:43

## 2025-01-01 RX ADMIN — LEVOTHYROXINE SODIUM 50 MCG: 50 TABLET ORAL at 08:29

## 2025-01-01 RX ADMIN — POTASSIUM CHLORIDE 20 MEQ: 29.8 INJECTION, SOLUTION INTRAVENOUS at 06:52

## 2025-01-01 RX ADMIN — MIDAZOLAM HYDROCHLORIDE 2 MG: 1 INJECTION, SOLUTION INTRAMUSCULAR; INTRAVENOUS at 17:54

## 2025-01-01 RX ADMIN — IPRATROPIUM BROMIDE AND ALBUTEROL SULFATE 3 ML: .5; 3 SOLUTION RESPIRATORY (INHALATION) at 11:34

## 2025-01-01 RX ADMIN — BISACODYL 10 MG: 10 SUPPOSITORY RECTAL at 09:30

## 2025-01-01 RX ADMIN — HEPARIN SODIUM 5000 UNITS: 5000 INJECTION INTRAVENOUS; SUBCUTANEOUS at 06:42

## 2025-01-01 RX ADMIN — HYDROCORTISONE SODIUM SUCCINATE 50 MG: 100 INJECTION, POWDER, FOR SOLUTION INTRAMUSCULAR; INTRAVENOUS at 05:13

## 2025-01-01 RX ADMIN — SENNOSIDES AND DOCUSATE SODIUM 2 TABLET: 50; 8.6 TABLET ORAL at 08:44

## 2025-01-01 RX ADMIN — Medication 1 MG/HR: at 17:59

## 2025-01-01 RX ADMIN — PROPOFOL 40 MCG/KG/MIN: 10 INJECTION, EMULSION INTRAVENOUS at 10:33

## 2025-01-01 RX ADMIN — METHYLNALTREXONE BROMIDE 12 MG: 12 INJECTION, SOLUTION SUBCUTANEOUS at 13:12

## 2025-01-01 RX ADMIN — SENNOSIDES AND DOCUSATE SODIUM 2 TABLET: 50; 8.6 TABLET ORAL at 21:30

## 2025-01-01 RX ADMIN — HEPARIN SODIUM 16 UNITS/KG/HR: 10000 INJECTION, SOLUTION INTRAVENOUS at 12:47

## 2025-01-01 RX ADMIN — HEPARIN SODIUM 5000 UNITS: 5000 INJECTION INTRAVENOUS; SUBCUTANEOUS at 15:30

## 2025-01-01 RX ADMIN — SENNOSIDES AND DOCUSATE SODIUM 2 TABLET: 50; 8.6 TABLET ORAL at 21:55

## 2025-01-01 RX ADMIN — OXYCODONE HYDROCHLORIDE 5 MG: 5 SOLUTION ORAL at 12:02

## 2025-01-01 RX ADMIN — PIPERACILLIN AND TAZOBACTAM 4.5 G: 4; .5 INJECTION, POWDER, FOR SOLUTION INTRAVENOUS at 02:30

## 2025-01-01 RX ADMIN — OXYCODONE HYDROCHLORIDE 5 MG: 5 SOLUTION ORAL at 09:47

## 2025-01-01 RX ADMIN — HYDROCORTISONE SODIUM SUCCINATE 50 MG: 100 INJECTION, POWDER, FOR SOLUTION INTRAMUSCULAR; INTRAVENOUS at 17:20

## 2025-01-01 RX ADMIN — PHENYLEPHRINE HYDROCHLORIDE 1.8 MCG/KG/MIN: 10 INJECTION INTRAVENOUS at 17:05

## 2025-01-01 RX ADMIN — Medication 45 ML: at 17:20

## 2025-01-01 RX ADMIN — PIPERACILLIN AND TAZOBACTAM 3.38 G: 3; .375 INJECTION, POWDER, FOR SOLUTION INTRAVENOUS at 12:26

## 2025-01-01 RX ADMIN — AMIODARONE HYDROCHLORIDE 0.5 MG/MIN: 1.8 INJECTION, SOLUTION INTRAVENOUS at 20:16

## 2025-01-01 RX ADMIN — PROPOFOL 40 MCG/KG/MIN: 10 INJECTION, EMULSION INTRAVENOUS at 05:50

## 2025-01-01 RX ADMIN — BUDESONIDE 0.5 MG: 0.5 INHALANT RESPIRATORY (INHALATION) at 07:44

## 2025-01-01 RX ADMIN — ESCITALOPRAM 10 MG: 10 TABLET, FILM COATED ORAL at 09:49

## 2025-01-01 RX ADMIN — PROPOFOL 40 MCG/KG/MIN: 10 INJECTION, EMULSION INTRAVENOUS at 23:41

## 2025-01-01 RX ADMIN — HUMAN INSULIN 8 UNITS: 100 INJECTION, SOLUTION SUBCUTANEOUS at 17:57

## 2025-01-01 RX ADMIN — AMIODARONE HYDROCHLORIDE 1 MG/MIN: 1.8 INJECTION, SOLUTION INTRAVENOUS at 09:41

## 2025-01-01 RX ADMIN — INSULIN GLARGINE 15 UNITS: 100 INJECTION, SOLUTION SUBCUTANEOUS at 08:29

## 2025-01-01 RX ADMIN — ESCITALOPRAM 10 MG: 10 TABLET, FILM COATED ORAL at 09:30

## 2025-01-01 RX ADMIN — IPRATROPIUM BROMIDE AND ALBUTEROL SULFATE 3 ML: .5; 3 SOLUTION RESPIRATORY (INHALATION) at 07:58

## 2025-01-01 RX ADMIN — SENNOSIDES AND DOCUSATE SODIUM 2 TABLET: 50; 8.6 TABLET ORAL at 08:31

## 2025-01-01 RX ADMIN — SENNOSIDES AND DOCUSATE SODIUM 2 TABLET: 50; 8.6 TABLET ORAL at 21:07

## 2025-01-01 RX ADMIN — PIPERACILLIN AND TAZOBACTAM 3.38 G: 3; .375 INJECTION, POWDER, LYOPHILIZED, FOR SOLUTION INTRAVENOUS at 10:17

## 2025-01-01 RX ADMIN — LEVOTHYROXINE SODIUM 50 MCG: 50 TABLET ORAL at 08:12

## 2025-01-01 RX ADMIN — OXYCODONE HYDROCHLORIDE 5 MG: 5 SOLUTION ORAL at 19:11

## 2025-01-01 RX ADMIN — ESCITALOPRAM 10 MG: 10 TABLET, FILM COATED ORAL at 08:40

## 2025-01-01 RX ADMIN — HUMAN INSULIN 8 UNITS: 100 INJECTION, SOLUTION SUBCUTANEOUS at 19:31

## 2025-01-01 RX ADMIN — HYDROCORTISONE SODIUM SUCCINATE 50 MG: 100 INJECTION, POWDER, FOR SOLUTION INTRAMUSCULAR; INTRAVENOUS at 23:22

## 2025-01-01 RX ADMIN — PRASUGREL 10 MG: 10 TABLET, FILM COATED ORAL at 09:46

## 2025-01-01 RX ADMIN — HUMAN INSULIN 3 UNITS: 100 INJECTION, SOLUTION SUBCUTANEOUS at 00:14

## 2025-01-01 RX ADMIN — HYDROMORPHONE HYDROCHLORIDE 1 MG: 1 INJECTION, SOLUTION INTRAMUSCULAR; INTRAVENOUS; SUBCUTANEOUS at 17:54

## 2025-01-01 RX ADMIN — Medication 100 MCG/HR: at 17:35

## 2025-01-01 RX ADMIN — PHENYLEPHRINE HYDROCHLORIDE 0.5 MCG/KG/MIN: 10 INJECTION INTRAVENOUS at 13:16

## 2025-01-01 RX ADMIN — PHENYLEPHRINE HYDROCHLORIDE 1.6 MCG/KG/MIN: 10 INJECTION INTRAVENOUS at 22:53

## 2025-01-01 RX ADMIN — SENNOSIDES AND DOCUSATE SODIUM 2 TABLET: 50; 8.6 TABLET ORAL at 09:30

## 2025-01-01 RX ADMIN — HUMAN INSULIN 24 UNITS: 100 INJECTION, SOLUTION SUBCUTANEOUS at 18:12

## 2025-01-01 RX ADMIN — DOPAMINE HYDROCHLORIDE 3 MCG/KG/MIN: 160 INJECTION, SOLUTION INTRAVENOUS at 05:01

## 2025-01-01 RX ADMIN — HUMAN INSULIN 5 UNITS: 100 INJECTION, SOLUTION SUBCUTANEOUS at 23:55

## 2025-01-01 RX ADMIN — LIDOCAINE HYDROCHLORIDE 10 ML: 10 INJECTION, SOLUTION INFILTRATION; PERINEURAL at 14:16

## 2025-01-01 RX ADMIN — ESCITALOPRAM 10 MG: 10 TABLET, FILM COATED ORAL at 10:12

## 2025-01-01 RX ADMIN — ASPIRIN 81 MG: 81 TABLET, CHEWABLE ORAL at 08:40

## 2025-01-01 RX ADMIN — HUMAN INSULIN 16 UNITS: 100 INJECTION, SOLUTION SUBCUTANEOUS at 06:44

## 2025-01-01 RX ADMIN — PROPOFOL 35 MCG/KG/MIN: 10 INJECTION, EMULSION INTRAVENOUS at 08:12

## 2025-01-01 RX ADMIN — PANTOPRAZOLE SODIUM 40 MG: 40 INJECTION, POWDER, FOR SOLUTION INTRAVENOUS at 09:30

## 2025-01-01 RX ADMIN — SENNOSIDES AND DOCUSATE SODIUM 2 TABLET: 50; 8.6 TABLET ORAL at 08:28

## 2025-01-01 RX ADMIN — MIDAZOLAM HYDROCHLORIDE 0.5 MG: 1 INJECTION, SOLUTION INTRAMUSCULAR; INTRAVENOUS at 12:08

## 2025-01-01 RX ADMIN — IPRATROPIUM BROMIDE AND ALBUTEROL SULFATE 3 ML: .5; 3 SOLUTION RESPIRATORY (INHALATION) at 13:16

## 2025-01-01 RX ADMIN — HEPARIN SODIUM 5000 UNITS: 5000 INJECTION INTRAVENOUS; SUBCUTANEOUS at 15:12

## 2025-01-01 RX ADMIN — PRASUGREL 10 MG: 10 TABLET, FILM COATED ORAL at 08:39

## 2025-01-01 RX ADMIN — PRASUGREL 10 MG: 10 TABLET, FILM COATED ORAL at 15:25

## 2025-01-01 RX ADMIN — BUDESONIDE 0.5 MG: 0.5 INHALANT RESPIRATORY (INHALATION) at 19:15

## 2025-01-01 RX ADMIN — INSULIN GLARGINE 10 UNITS: 100 INJECTION, SOLUTION SUBCUTANEOUS at 08:03

## 2025-01-01 RX ADMIN — SENNOSIDES AND DOCUSATE SODIUM 2 TABLET: 50; 8.6 TABLET ORAL at 20:05

## 2025-01-01 RX ADMIN — HYDROCORTISONE SODIUM SUCCINATE 50 MG: 100 INJECTION, POWDER, FOR SOLUTION INTRAMUSCULAR; INTRAVENOUS at 14:54

## 2025-01-01 RX ADMIN — PANTOPRAZOLE SODIUM 40 MG: 40 INJECTION, POWDER, FOR SOLUTION INTRAVENOUS at 08:51

## 2025-01-01 RX ADMIN — PANTOPRAZOLE SODIUM 40 MG: 40 INJECTION, POWDER, FOR SOLUTION INTRAVENOUS at 08:29

## 2025-01-01 RX ADMIN — ESCITALOPRAM 10 MG: 10 TABLET, FILM COATED ORAL at 08:51

## 2025-01-01 RX ADMIN — IPRATROPIUM BROMIDE AND ALBUTEROL SULFATE 3 ML: .5; 3 SOLUTION RESPIRATORY (INHALATION) at 18:49

## 2025-01-01 RX ADMIN — INSULIN GLARGINE 20 UNITS: 100 INJECTION, SOLUTION SUBCUTANEOUS at 08:31

## 2025-01-01 RX ADMIN — HUMAN INSULIN 8 UNITS: 100 INJECTION, SOLUTION SUBCUTANEOUS at 06:01

## 2025-01-01 RX ADMIN — QUETIAPINE FUMARATE 25 MG: 25 TABLET ORAL at 20:05

## 2025-01-01 RX ADMIN — PHENYLEPHRINE HYDROCHLORIDE 2 MCG/KG/MIN: 10 INJECTION INTRAVENOUS at 04:15

## 2025-01-01 RX ADMIN — AMIODARONE HYDROCHLORIDE 150 MG: 1.5 INJECTION, SOLUTION INTRAVENOUS at 09:25

## 2025-01-01 RX ADMIN — IPRATROPIUM BROMIDE AND ALBUTEROL SULFATE 3 ML: .5; 3 SOLUTION RESPIRATORY (INHALATION) at 21:08

## 2025-01-01 RX ADMIN — LEVOTHYROXINE SODIUM 50 MCG: 50 TABLET ORAL at 09:49

## 2025-01-01 RX ADMIN — HYDROCORTISONE SODIUM SUCCINATE 50 MG: 100 INJECTION, POWDER, FOR SOLUTION INTRAMUSCULAR; INTRAVENOUS at 18:49

## 2025-01-01 RX ADMIN — PROPOFOL 35 MCG/KG/MIN: 10 INJECTION, EMULSION INTRAVENOUS at 06:37

## 2025-01-01 RX ADMIN — BUDESONIDE 0.5 MG: 0.5 INHALANT RESPIRATORY (INHALATION) at 08:32

## 2025-01-01 RX ADMIN — IPRATROPIUM BROMIDE AND ALBUTEROL SULFATE 3 ML: .5; 3 SOLUTION RESPIRATORY (INHALATION) at 13:42

## 2025-01-01 RX ADMIN — OXYCODONE HYDROCHLORIDE 5 MG: 5 SOLUTION ORAL at 17:39

## 2025-01-01 RX ADMIN — MIDAZOLAM HYDROCHLORIDE 2 MG: 1 INJECTION, SOLUTION INTRAMUSCULAR; INTRAVENOUS at 12:13

## 2025-01-01 RX ADMIN — PROPOFOL 40 MCG/KG/MIN: 10 INJECTION, EMULSION INTRAVENOUS at 04:31

## 2025-01-01 RX ADMIN — PROPOFOL 35 MCG/KG/MIN: 10 INJECTION, EMULSION INTRAVENOUS at 02:16

## 2025-01-01 RX ADMIN — PIPERACILLIN AND TAZOBACTAM 4.5 G: 4; .5 INJECTION, POWDER, FOR SOLUTION INTRAVENOUS at 09:49

## 2025-01-01 RX ADMIN — LEVOTHYROXINE SODIUM 50 MCG: 50 TABLET ORAL at 08:03

## 2025-01-01 RX ADMIN — HEPARIN SODIUM 5000 UNITS: 5000 INJECTION INTRAVENOUS; SUBCUTANEOUS at 21:24

## 2025-01-01 RX ADMIN — HUMAN INSULIN 3 UNITS: 100 INJECTION, SOLUTION SUBCUTANEOUS at 12:11

## 2025-01-01 RX ADMIN — PROPOFOL 40 MCG/KG/MIN: 10 INJECTION, EMULSION INTRAVENOUS at 23:04

## 2025-01-01 RX ADMIN — PROPOFOL 35 MCG/KG/MIN: 10 INJECTION, EMULSION INTRAVENOUS at 02:33

## 2025-01-01 RX ADMIN — INSULIN GLARGINE 10 UNITS: 100 INJECTION, SOLUTION SUBCUTANEOUS at 08:12

## 2025-01-01 RX ADMIN — SODIUM CHLORIDE 250 ML: 9 INJECTION, SOLUTION INTRAVENOUS at 16:56

## 2025-01-01 RX ADMIN — DOPAMINE HYDROCHLORIDE 3 MCG/KG/MIN: 160 INJECTION, SOLUTION INTRAVENOUS at 22:49

## 2025-01-01 RX ADMIN — HYDROCORTISONE SODIUM SUCCINATE 25 MG: 100 INJECTION, POWDER, FOR SOLUTION INTRAMUSCULAR; INTRAVENOUS at 05:40

## 2025-01-01 RX ADMIN — HUMAN INSULIN 4 UNITS: 100 INJECTION, SOLUTION SUBCUTANEOUS at 12:12

## 2025-01-01 RX ADMIN — ACETAMINOPHEN 650 MG: 650 SOLUTION ORAL at 08:47

## 2025-01-01 RX ADMIN — MIDAZOLAM HYDROCHLORIDE 0.5 MG: 1 INJECTION, SOLUTION INTRAMUSCULAR; INTRAVENOUS at 14:42

## 2025-01-01 RX ADMIN — DEXMEDETOMIDINE HYDROCHLORIDE 0.8 MCG/KG/HR: 4 INJECTION, SOLUTION INTRAVENOUS at 04:01

## 2025-01-01 RX ADMIN — PRASUGREL 10 MG: 10 TABLET, FILM COATED ORAL at 09:57

## 2025-01-01 RX ADMIN — Medication 10 ML: at 20:06

## 2025-01-01 RX ADMIN — MIDAZOLAM HYDROCHLORIDE 0.5 MG: 1 INJECTION, SOLUTION INTRAMUSCULAR; INTRAVENOUS at 11:32

## 2025-01-01 RX ADMIN — NOREPINEPHRINE BITARTRATE 0.02 MCG/KG/MIN: 0.03 INJECTION, SOLUTION INTRAVENOUS at 20:58

## 2025-01-01 RX ADMIN — IPRATROPIUM BROMIDE AND ALBUTEROL SULFATE 3 ML: .5; 3 SOLUTION RESPIRATORY (INHALATION) at 15:23

## 2025-01-01 RX ADMIN — BUDESONIDE 0.5 MG: 0.5 INHALANT RESPIRATORY (INHALATION) at 19:01

## 2025-01-01 RX ADMIN — MUPIROCIN 1 APPLICATION: 20 OINTMENT TOPICAL at 08:51

## 2025-01-01 RX ADMIN — VANCOMYCIN HYDROCHLORIDE 1000 MG: 1 INJECTION, POWDER, LYOPHILIZED, FOR SOLUTION INTRAVENOUS at 09:49

## 2025-01-01 RX ADMIN — ESCITALOPRAM OXALATE 10 MG: 10 TABLET ORAL at 21:03

## 2025-01-01 RX ADMIN — DOPAMINE HYDROCHLORIDE 3 MCG/KG/MIN: 160 INJECTION, SOLUTION INTRAVENOUS at 06:20

## 2025-01-01 RX ADMIN — Medication 45 ML: at 18:00

## 2025-01-01 RX ADMIN — DOPAMINE HYDROCHLORIDE 12 MCG/KG/MIN: 160 INJECTION, SOLUTION INTRAVENOUS at 13:37

## 2025-01-01 RX ADMIN — AMIODARONE HYDROCHLORIDE 1 MG/MIN: 1.8 INJECTION, SOLUTION INTRAVENOUS at 04:22

## 2025-01-01 RX ADMIN — LEVOTHYROXINE SODIUM 50 MCG: 50 TABLET ORAL at 08:17

## 2025-01-01 RX ADMIN — MIDAZOLAM HYDROCHLORIDE 2 MG: 1 INJECTION, SOLUTION INTRAMUSCULAR; INTRAVENOUS at 15:58

## 2025-01-01 RX ADMIN — DEXMEDETOMIDINE HYDROCHLORIDE 0.7 MCG/KG/HR: 4 INJECTION, SOLUTION INTRAVENOUS at 16:59

## 2025-01-01 RX ADMIN — MUPIROCIN 1 APPLICATION: 20 OINTMENT TOPICAL at 21:03

## 2025-01-01 RX ADMIN — HUMAN INSULIN 8 UNITS: 100 INJECTION, SOLUTION SUBCUTANEOUS at 18:43

## 2025-01-01 RX ADMIN — Medication 100 MCG/HR: at 16:14

## 2025-01-01 RX ADMIN — DEXMEDETOMIDINE HYDROCHLORIDE 0.7 MCG/KG/HR: 4 INJECTION, SOLUTION INTRAVENOUS at 02:31

## 2025-01-01 RX ADMIN — IPRATROPIUM BROMIDE AND ALBUTEROL SULFATE 3 ML: .5; 3 SOLUTION RESPIRATORY (INHALATION) at 07:25

## 2025-01-01 RX ADMIN — PANTOPRAZOLE SODIUM 40 MG: 40 INJECTION, POWDER, FOR SOLUTION INTRAVENOUS at 09:50

## 2025-01-01 RX ADMIN — HUMAN INSULIN 4 UNITS: 100 INJECTION, SOLUTION SUBCUTANEOUS at 12:43

## 2025-01-01 RX ADMIN — PIPERACILLIN AND TAZOBACTAM 4.5 G: 4; .5 INJECTION, POWDER, FOR SOLUTION INTRAVENOUS at 01:53

## 2025-01-01 RX ADMIN — MUPIROCIN 1 APPLICATION: 20 OINTMENT TOPICAL at 08:44

## 2025-01-01 RX ADMIN — Medication 45 ML: at 08:30

## 2025-01-01 RX ADMIN — HEPARIN SODIUM 5000 UNITS: 5000 INJECTION INTRAVENOUS; SUBCUTANEOUS at 05:41

## 2025-01-01 RX ADMIN — HEPARIN SODIUM 5000 UNITS: 5000 INJECTION INTRAVENOUS; SUBCUTANEOUS at 05:24

## 2025-01-01 RX ADMIN — AMIODARONE HYDROCHLORIDE 1 MG/MIN: 1.8 INJECTION, SOLUTION INTRAVENOUS at 21:33

## 2025-01-01 RX ADMIN — PIPERACILLIN AND TAZOBACTAM 4.5 G: 4; .5 INJECTION, POWDER, FOR SOLUTION INTRAVENOUS at 09:31

## 2025-01-01 RX ADMIN — IPRATROPIUM BROMIDE AND ALBUTEROL SULFATE 3 ML: .5; 3 SOLUTION RESPIRATORY (INHALATION) at 19:15

## 2025-01-01 RX ADMIN — AMIODARONE HYDROCHLORIDE 0.5 MG/MIN: 1.8 INJECTION, SOLUTION INTRAVENOUS at 09:29

## 2025-01-01 RX ADMIN — MIDAZOLAM HYDROCHLORIDE 1 MG: 1 INJECTION, SOLUTION INTRAMUSCULAR; INTRAVENOUS at 18:07

## 2025-01-01 RX ADMIN — HUMAN INSULIN 8 UNITS: 100 INJECTION, SOLUTION SUBCUTANEOUS at 17:55

## 2025-01-01 RX ADMIN — CEFTRIAXONE 2000 MG: 2 INJECTION, POWDER, FOR SOLUTION INTRAMUSCULAR; INTRAVENOUS at 02:55

## 2025-01-01 RX ADMIN — MUPIROCIN 1 APPLICATION: 20 OINTMENT TOPICAL at 09:39

## 2025-01-01 RX ADMIN — PIPERACILLIN AND TAZOBACTAM 4.5 G: 4; .5 INJECTION, POWDER, FOR SOLUTION INTRAVENOUS at 10:00

## 2025-01-01 RX ADMIN — HYDROCORTISONE SODIUM SUCCINATE 50 MG: 100 INJECTION, POWDER, FOR SOLUTION INTRAMUSCULAR; INTRAVENOUS at 17:46

## 2025-01-01 RX ADMIN — HEPARIN SODIUM 5000 UNITS: 5000 INJECTION INTRAVENOUS; SUBCUTANEOUS at 06:54

## 2025-01-01 RX ADMIN — IPRATROPIUM BROMIDE AND ALBUTEROL SULFATE 3 ML: .5; 3 SOLUTION RESPIRATORY (INHALATION) at 11:57

## 2025-01-01 RX ADMIN — OXYCODONE HYDROCHLORIDE 5 MG: 5 SOLUTION ORAL at 23:54

## 2025-01-01 RX ADMIN — PROPOFOL 35 MCG/KG/MIN: 10 INJECTION, EMULSION INTRAVENOUS at 15:55

## 2025-01-01 RX ADMIN — MIDAZOLAM HYDROCHLORIDE 1 MG: 1 INJECTION, SOLUTION INTRAMUSCULAR; INTRAVENOUS at 23:18

## 2025-01-01 RX ADMIN — HUMAN INSULIN 3 UNITS: 100 INJECTION, SOLUTION SUBCUTANEOUS at 05:40

## 2025-01-01 RX ADMIN — PIPERACILLIN AND TAZOBACTAM 3.38 G: 3; .375 INJECTION, POWDER, LYOPHILIZED, FOR SOLUTION INTRAVENOUS at 18:03

## 2025-01-01 RX ADMIN — Medication 10 ML: at 10:28

## 2025-01-01 RX ADMIN — HUMAN INSULIN 24 UNITS: 100 INJECTION, SOLUTION SUBCUTANEOUS at 23:29

## 2025-01-01 RX ADMIN — Medication 45 ML: at 08:13

## 2025-01-01 RX ADMIN — VANCOMYCIN HYDROCHLORIDE 1000 MG: 1 INJECTION, POWDER, LYOPHILIZED, FOR SOLUTION INTRAVENOUS at 09:30

## 2025-01-01 RX ADMIN — DEXMEDETOMIDINE HYDROCHLORIDE 1.2 MCG/KG/HR: 4 INJECTION, SOLUTION INTRAVENOUS at 23:29

## 2025-01-01 RX ADMIN — IPRATROPIUM BROMIDE AND ALBUTEROL SULFATE 3 ML: .5; 3 SOLUTION RESPIRATORY (INHALATION) at 08:13

## 2025-01-01 RX ADMIN — IPRATROPIUM BROMIDE AND ALBUTEROL SULFATE 3 ML: .5; 3 SOLUTION RESPIRATORY (INHALATION) at 11:25

## 2025-01-01 RX ADMIN — Medication 1750 MG: at 12:08

## 2025-01-01 RX ADMIN — PROPOFOL 40 MCG/KG/MIN: 10 INJECTION, EMULSION INTRAVENOUS at 14:54

## 2025-01-01 RX ADMIN — OXYCODONE HYDROCHLORIDE 5 MG: 5 SOLUTION ORAL at 17:05

## 2025-01-01 RX ADMIN — OXYCODONE HYDROCHLORIDE 5 MG: 5 SOLUTION ORAL at 11:31

## 2025-01-01 RX ADMIN — HUMAN INSULIN 8 UNITS: 100 INJECTION, SOLUTION SUBCUTANEOUS at 05:13

## 2025-01-01 RX ADMIN — SENNOSIDES AND DOCUSATE SODIUM 2 TABLET: 50; 8.6 TABLET ORAL at 09:29

## 2025-01-01 RX ADMIN — POTASSIUM CHLORIDE 20 MEQ: 29.8 INJECTION, SOLUTION INTRAVENOUS at 20:15

## 2025-01-01 RX ADMIN — PIPERACILLIN AND TAZOBACTAM 3.38 G: 3; .375 INJECTION, POWDER, LYOPHILIZED, FOR SOLUTION INTRAVENOUS at 02:20

## 2025-01-01 RX ADMIN — DEXMEDETOMIDINE HYDROCHLORIDE 1.5 MCG/KG/HR: 4 INJECTION, SOLUTION INTRAVENOUS at 12:21

## 2025-01-01 RX ADMIN — POLYETHYLENE GLYCOL 3350 17 G: 17 POWDER, FOR SOLUTION ORAL at 08:12

## 2025-01-01 RX ADMIN — BUDESONIDE 0.5 MG: 0.5 INHALANT RESPIRATORY (INHALATION) at 07:53

## 2025-01-01 RX ADMIN — PRASUGREL 10 MG: 10 TABLET, FILM COATED ORAL at 08:44

## 2025-01-01 RX ADMIN — HEPARIN SODIUM 5000 UNITS: 5000 INJECTION INTRAVENOUS; SUBCUTANEOUS at 14:06

## 2025-01-01 RX ADMIN — HUMAN INSULIN 5 UNITS: 100 INJECTION, SOLUTION SUBCUTANEOUS at 12:49

## 2025-01-01 RX ADMIN — HUMAN INSULIN 3 UNITS: 100 INJECTION, SOLUTION SUBCUTANEOUS at 19:11

## 2025-01-01 RX ADMIN — IPRATROPIUM BROMIDE AND ALBUTEROL SULFATE 3 ML: .5; 3 SOLUTION RESPIRATORY (INHALATION) at 06:59

## 2025-01-01 RX ADMIN — MIDAZOLAM HYDROCHLORIDE 2 MG: 1 INJECTION, SOLUTION INTRAMUSCULAR; INTRAVENOUS at 05:31

## 2025-01-01 RX ADMIN — QUETIAPINE FUMARATE 25 MG: 25 TABLET ORAL at 20:15

## 2025-01-01 RX ADMIN — SENNOSIDES AND DOCUSATE SODIUM 2 TABLET: 50; 8.6 TABLET ORAL at 08:17

## 2025-01-01 RX ADMIN — HUMAN INSULIN 3 UNITS: 100 INJECTION, SOLUTION SUBCUTANEOUS at 17:45

## 2025-01-01 RX ADMIN — PANTOPRAZOLE SODIUM 40 MG: 40 INJECTION, POWDER, FOR SOLUTION INTRAVENOUS at 09:39

## 2025-01-01 RX ADMIN — PROPOFOL 40 MCG/KG/MIN: 10 INJECTION, EMULSION INTRAVENOUS at 18:03

## 2025-01-01 RX ADMIN — HYDROCORTISONE SODIUM SUCCINATE 50 MG: 100 INJECTION, POWDER, FOR SOLUTION INTRAMUSCULAR; INTRAVENOUS at 05:49

## 2025-01-01 RX ADMIN — GLYCOPYRROLATE 0.4 MG: 0.2 INJECTION, SOLUTION INTRAMUSCULAR; INTRAVENOUS at 12:13

## 2025-01-01 RX ADMIN — ESCITALOPRAM 10 MG: 10 TABLET, FILM COATED ORAL at 09:59

## 2025-01-01 RX ADMIN — DEXTROSE MONOHYDRATE 50 ML: 25 INJECTION, SOLUTION INTRAVENOUS at 12:46

## 2025-01-01 RX ADMIN — IPRATROPIUM BROMIDE AND ALBUTEROL SULFATE 3 ML: .5; 3 SOLUTION RESPIRATORY (INHALATION) at 12:24

## 2025-01-01 RX ADMIN — VANCOMYCIN HYDROCHLORIDE 1000 MG: 1 INJECTION, POWDER, LYOPHILIZED, FOR SOLUTION INTRAVENOUS at 09:58

## 2025-01-01 RX ADMIN — SENNOSIDES AND DOCUSATE SODIUM 2 TABLET: 50; 8.6 TABLET ORAL at 20:00

## 2025-01-01 RX ADMIN — PIPERACILLIN AND TAZOBACTAM 3.38 G: 3; .375 INJECTION, POWDER, LYOPHILIZED, FOR SOLUTION INTRAVENOUS at 09:47

## 2025-01-01 RX ADMIN — HUMAN INSULIN 4 UNITS: 100 INJECTION, SOLUTION SUBCUTANEOUS at 00:08

## 2025-01-01 RX ADMIN — MUPIROCIN 1 APPLICATION: 20 OINTMENT TOPICAL at 20:06

## 2025-01-01 RX ADMIN — PIPERACILLIN AND TAZOBACTAM 4.5 G: 4; .5 INJECTION, POWDER, FOR SOLUTION INTRAVENOUS at 02:10

## 2025-01-01 RX ADMIN — PHENYLEPHRINE HYDROCHLORIDE 1 MCG/KG/MIN: 10 INJECTION INTRAVENOUS at 20:40

## 2025-01-01 RX ADMIN — HEPARIN SODIUM 5000 UNITS: 5000 INJECTION INTRAVENOUS; SUBCUTANEOUS at 05:34

## 2025-01-01 RX ADMIN — HUMAN INSULIN 12 UNITS: 100 INJECTION, SOLUTION SUBCUTANEOUS at 12:13

## 2025-01-01 RX ADMIN — IPRATROPIUM BROMIDE AND ALBUTEROL SULFATE 3 ML: .5; 3 SOLUTION RESPIRATORY (INHALATION) at 17:09

## 2025-01-01 RX ADMIN — HEPARIN SODIUM 5000 UNITS: 5000 INJECTION INTRAVENOUS; SUBCUTANEOUS at 15:54

## 2025-01-01 RX ADMIN — HYDROCORTISONE SODIUM SUCCINATE 50 MG: 100 INJECTION, POWDER, FOR SOLUTION INTRAMUSCULAR; INTRAVENOUS at 11:34

## 2025-01-01 RX ADMIN — SENNOSIDES AND DOCUSATE SODIUM 2 TABLET: 50; 8.6 TABLET ORAL at 21:23

## 2025-01-01 RX ADMIN — MIDAZOLAM HYDROCHLORIDE 2 MG: 1 INJECTION, SOLUTION INTRAMUSCULAR; INTRAVENOUS at 13:09

## 2025-01-01 RX ADMIN — AMIODARONE HYDROCHLORIDE 1 MG/MIN: 1.8 INJECTION, SOLUTION INTRAVENOUS at 14:41

## 2025-01-01 RX ADMIN — MIDAZOLAM HYDROCHLORIDE 0.5 MG: 1 INJECTION, SOLUTION INTRAMUSCULAR; INTRAVENOUS at 06:55

## 2025-01-01 RX ADMIN — HUMAN INSULIN 8 UNITS: 100 INJECTION, SOLUTION SUBCUTANEOUS at 06:54

## 2025-01-01 RX ADMIN — PRASUGREL 10 MG: 10 TABLET, FILM COATED ORAL at 09:59

## 2025-01-01 RX ADMIN — Medication 10 ML: at 20:56

## 2025-01-01 RX ADMIN — BUDESONIDE 0.5 MG: 0.5 INHALANT RESPIRATORY (INHALATION) at 07:11

## 2025-01-01 RX ADMIN — PANTOPRAZOLE SODIUM 40 MG: 40 INJECTION, POWDER, FOR SOLUTION INTRAVENOUS at 08:44

## 2025-01-01 RX ADMIN — DEXMEDETOMIDINE HYDROCHLORIDE 1 MCG/KG/HR: 4 INJECTION, SOLUTION INTRAVENOUS at 03:13

## 2025-01-01 RX ADMIN — AMIODARONE HYDROCHLORIDE 1 MG/MIN: 1.8 INJECTION, SOLUTION INTRAVENOUS at 03:38

## 2025-01-01 RX ADMIN — SENNOSIDES AND DOCUSATE SODIUM 2 TABLET: 50; 8.6 TABLET ORAL at 08:03

## 2025-01-01 RX ADMIN — SENNOSIDES AND DOCUSATE SODIUM 2 TABLET: 50; 8.6 TABLET ORAL at 08:12

## 2025-01-01 RX ADMIN — OXYCODONE HYDROCHLORIDE 5 MG: 5 SOLUTION ORAL at 23:46

## 2025-01-01 RX ADMIN — HUMAN INSULIN 10 UNITS: 100 INJECTION, SOLUTION SUBCUTANEOUS at 11:47

## 2025-01-01 RX ADMIN — HUMAN INSULIN 8 UNITS: 100 INJECTION, SOLUTION SUBCUTANEOUS at 12:43

## 2025-01-01 RX ADMIN — DOPAMINE HYDROCHLORIDE 3 MCG/KG/MIN: 160 INJECTION, SOLUTION INTRAVENOUS at 15:28

## 2025-01-01 RX ADMIN — IPRATROPIUM BROMIDE AND ALBUTEROL SULFATE 3 ML: .5; 3 SOLUTION RESPIRATORY (INHALATION) at 16:09

## 2025-02-28 ENCOUNTER — HOSPITAL ENCOUNTER (EMERGENCY)
Facility: HOSPITAL | Age: 70
Discharge: HOME | End: 2025-02-28
Attending: STUDENT IN AN ORGANIZED HEALTH CARE EDUCATION/TRAINING PROGRAM
Payer: MEDICARE

## 2025-02-28 ENCOUNTER — APPOINTMENT (OUTPATIENT)
Dept: CT IMAGING | Facility: HOSPITAL | Age: 70
End: 2025-02-28
Payer: MEDICARE

## 2025-02-28 VITALS
WEIGHT: 165 LBS | RESPIRATION RATE: 18 BRPM | TEMPERATURE: 98.7 F | HEART RATE: 73 BPM | BODY MASS INDEX: 23.1 KG/M2 | HEIGHT: 71 IN | DIASTOLIC BLOOD PRESSURE: 79 MMHG | SYSTOLIC BLOOD PRESSURE: 178 MMHG | OXYGEN SATURATION: 99 %

## 2025-02-28 DIAGNOSIS — C67.9 BLADDER CARCINOMA: Primary | ICD-10-CM

## 2025-02-28 DIAGNOSIS — N30.90 CYSTITIS: ICD-10-CM

## 2025-02-28 DIAGNOSIS — R31.9 HEMATURIA, UNSPECIFIED TYPE: ICD-10-CM

## 2025-02-28 LAB
ALBUMIN SERPL-MCNC: 4 G/DL (ref 3.5–5.2)
ALBUMIN/GLOB SERPL: 1.1 G/DL
ALP SERPL-CCNC: 134 U/L (ref 39–117)
ALT SERPL W P-5'-P-CCNC: 9 U/L (ref 1–41)
ANION GAP SERPL CALCULATED.3IONS-SCNC: 10.3 MMOL/L (ref 5–15)
AST SERPL-CCNC: 16 U/L (ref 1–40)
BACTERIA UR QL AUTO: ABNORMAL /HPF
BASOPHILS # BLD AUTO: 0.06 10*3/MM3 (ref 0–0.2)
BASOPHILS NFR BLD AUTO: 0.5 % (ref 0–1.5)
BILIRUB SERPL-MCNC: 0.3 MG/DL (ref 0–1.2)
BILIRUB UR QL STRIP: NEGATIVE
BUN SERPL-MCNC: 29 MG/DL (ref 8–23)
BUN/CREAT SERPL: 26.4 (ref 7–25)
CALCIUM SPEC-SCNC: 9.4 MG/DL (ref 8.6–10.5)
CHLORIDE SERPL-SCNC: 101 MMOL/L (ref 98–107)
CLARITY UR: CLEAR
CO2 SERPL-SCNC: 26.7 MMOL/L (ref 22–29)
COLOR UR: ABNORMAL
CREAT SERPL-MCNC: 1.1 MG/DL (ref 0.76–1.27)
DEPRECATED RDW RBC AUTO: 42.1 FL (ref 37–54)
EGFRCR SERPLBLD CKD-EPI 2021: 72.2 ML/MIN/1.73
EOSINOPHIL # BLD AUTO: 0.5 10*3/MM3 (ref 0–0.4)
EOSINOPHIL NFR BLD AUTO: 4.3 % (ref 0.3–6.2)
ERYTHROCYTE [DISTWIDTH] IN BLOOD BY AUTOMATED COUNT: 12.7 % (ref 12.3–15.4)
GLOBULIN UR ELPH-MCNC: 3.5 GM/DL
GLUCOSE SERPL-MCNC: 246 MG/DL (ref 65–99)
GLUCOSE UR STRIP-MCNC: ABNORMAL MG/DL
HCT VFR BLD AUTO: 34.8 % (ref 37.5–51)
HGB BLD-MCNC: 11.3 G/DL (ref 13–17.7)
HGB UR QL STRIP.AUTO: ABNORMAL
HOLD SPECIMEN: NORMAL
HOLD SPECIMEN: NORMAL
HYALINE CASTS UR QL AUTO: ABNORMAL /LPF
IMM GRANULOCYTES # BLD AUTO: 0.04 10*3/MM3 (ref 0–0.05)
IMM GRANULOCYTES NFR BLD AUTO: 0.3 % (ref 0–0.5)
INR PPP: 0.92 (ref 0.9–1.1)
KETONES UR QL STRIP: NEGATIVE
LEUKOCYTE ESTERASE UR QL STRIP.AUTO: ABNORMAL
LYMPHOCYTES # BLD AUTO: 3.02 10*3/MM3 (ref 0.7–3.1)
LYMPHOCYTES NFR BLD AUTO: 26 % (ref 19.6–45.3)
MCH RBC QN AUTO: 29.8 PG (ref 26.6–33)
MCHC RBC AUTO-ENTMCNC: 32.5 G/DL (ref 31.5–35.7)
MCV RBC AUTO: 91.8 FL (ref 79–97)
MONOCYTES # BLD AUTO: 0.79 10*3/MM3 (ref 0.1–0.9)
MONOCYTES NFR BLD AUTO: 6.8 % (ref 5–12)
NEUTROPHILS NFR BLD AUTO: 62.1 % (ref 42.7–76)
NEUTROPHILS NFR BLD AUTO: 7.19 10*3/MM3 (ref 1.7–7)
NITRITE UR QL STRIP: NEGATIVE
NRBC BLD AUTO-RTO: 0 /100 WBC (ref 0–0.2)
PH UR STRIP.AUTO: 6.5 [PH] (ref 5–8)
PLATELET # BLD AUTO: 296 10*3/MM3 (ref 140–450)
PMV BLD AUTO: 9.3 FL (ref 6–12)
POTASSIUM SERPL-SCNC: 4.3 MMOL/L (ref 3.5–5.2)
PROT SERPL-MCNC: 7.5 G/DL (ref 6–8.5)
PROT UR QL STRIP: ABNORMAL
PROTHROMBIN TIME: 12.5 SECONDS (ref 11.6–15.1)
RBC # BLD AUTO: 3.79 10*6/MM3 (ref 4.14–5.8)
RBC # UR STRIP: ABNORMAL /HPF
REF LAB TEST METHOD: ABNORMAL
SODIUM SERPL-SCNC: 138 MMOL/L (ref 136–145)
SP GR UR STRIP: 1.01 (ref 1–1.03)
SQUAMOUS #/AREA URNS HPF: ABNORMAL /HPF
UROBILINOGEN UR QL STRIP: ABNORMAL
WBC # UR STRIP: ABNORMAL /HPF
WBC NRBC COR # BLD AUTO: 11.6 10*3/MM3 (ref 3.4–10.8)
WHOLE BLOOD HOLD COAG: NORMAL
WHOLE BLOOD HOLD SPECIMEN: NORMAL

## 2025-02-28 PROCEDURE — 99284 EMERGENCY DEPT VISIT MOD MDM: CPT

## 2025-02-28 PROCEDURE — 36415 COLL VENOUS BLD VENIPUNCTURE: CPT

## 2025-02-28 PROCEDURE — 74176 CT ABD & PELVIS W/O CONTRAST: CPT

## 2025-02-28 PROCEDURE — 87086 URINE CULTURE/COLONY COUNT: CPT | Performed by: STUDENT IN AN ORGANIZED HEALTH CARE EDUCATION/TRAINING PROGRAM

## 2025-02-28 PROCEDURE — 85025 COMPLETE CBC W/AUTO DIFF WBC: CPT | Performed by: STUDENT IN AN ORGANIZED HEALTH CARE EDUCATION/TRAINING PROGRAM

## 2025-02-28 PROCEDURE — 74176 CT ABD & PELVIS W/O CONTRAST: CPT | Performed by: RADIOLOGY

## 2025-02-28 PROCEDURE — 80053 COMPREHEN METABOLIC PANEL: CPT | Performed by: STUDENT IN AN ORGANIZED HEALTH CARE EDUCATION/TRAINING PROGRAM

## 2025-02-28 PROCEDURE — 96372 THER/PROPH/DIAG INJ SC/IM: CPT

## 2025-02-28 PROCEDURE — 85610 PROTHROMBIN TIME: CPT | Performed by: STUDENT IN AN ORGANIZED HEALTH CARE EDUCATION/TRAINING PROGRAM

## 2025-02-28 PROCEDURE — 81001 URINALYSIS AUTO W/SCOPE: CPT | Performed by: STUDENT IN AN ORGANIZED HEALTH CARE EDUCATION/TRAINING PROGRAM

## 2025-02-28 PROCEDURE — 25010000002 LIDOCAINE PF 1% 1 % SOLUTION 2 ML VIAL: Performed by: STUDENT IN AN ORGANIZED HEALTH CARE EDUCATION/TRAINING PROGRAM

## 2025-02-28 PROCEDURE — 25010000002 CEFTRIAXONE PER 250 MG: Performed by: STUDENT IN AN ORGANIZED HEALTH CARE EDUCATION/TRAINING PROGRAM

## 2025-02-28 RX ORDER — CEFDINIR 300 MG/1
300 CAPSULE ORAL 2 TIMES DAILY
Qty: 14 CAPSULE | Refills: 0 | Status: SHIPPED | OUTPATIENT
Start: 2025-02-28

## 2025-02-28 RX ADMIN — LIDOCAINE HYDROCHLORIDE 1 G: 10 INJECTION, SOLUTION EPIDURAL; INFILTRATION; INTRACAUDAL; PERINEURAL at 04:07

## 2025-02-28 NOTE — DISCHARGE INSTRUCTIONS
As discussed today you were stable for discharge, however Dr. Bowles will see you today for prompt follow up for possible concerns of bladder mas vs cancer. Please return to the emergency room for concerns of worsening bleeding, fever, pain, shortness of breath, as well as bladder distention or inability to pee

## 2025-02-28 NOTE — ED PROVIDER NOTES
Subjective   History of Present Illness  The patient presents to the ED with complaints of right flank pain and hematuria. The pain has been present for a couple of days, occurring intermittently. Patient reports a similar episode years ago which was attributed to an infection according to their primary care physician. The patient denies chest pain, shortness of breath, fever, nausea, or vomiting. Past medical history is notable for previous use of blood thinners, though patient is not currently on anticoagulation. Patient otherwise denies any chronic medical conditions.        Review of Systems   All other systems reviewed and are negative.      Past Medical History:   Diagnosis Date    Anxiety and depression     Arthritis     COPD (chronic obstructive pulmonary disease)     Coronary artery disease     Diabetes mellitus     Disease of thyroid gland     Dyslipidemia     History of transfusion     Hypertension     Seizure disorder     Pt states last seizure x1 mo.       Allergies   Allergen Reactions    Other        Past Surgical History:   Procedure Laterality Date    AMPUTATION DIGIT Right 9/13/2017    Procedure: RIGHT 1ST TOE AMPUTATION ;  Surgeon: Lee Lee MD;  Location: [facility];  Service:     CARDIAC SURGERY      CIRCUMCISION      CORONARY ARTERY BYPASS GRAFT      HERNIA REPAIR      X2    CO INCISION & DRAINAGE LEG/ANKLE ABSCESS/HEMATOMA Right 5/24/2017    Procedure: Debridement of right first toe;  Surgeon: Ronald Perdue MD;  Location: [facility];  Service: General    TOE SURGERY Left     debridement       Family History   Problem Relation Age of Onset    Diabetes Mother     Hypertension Mother     Diabetes Father        Social History     Socioeconomic History    Marital status:    Tobacco Use    Smoking status: Former     Current packs/day: 0.00     Average packs/day: 2.0 packs/day for 45.0 years (90.0 ttl pk-yrs)     Types: Cigarettes     Start date: 1965     Quit date: 2010     Years since  quitting: 15.1    Smokeless tobacco: Never    Tobacco comments:     Trying to quit   Vaping Use    Vaping status: Never Used   Substance and Sexual Activity    Alcohol use: No    Drug use: No    Sexual activity: Defer           Objective   Physical Exam  Constitutional:  General: Patient is not in acute distress.  Appearance: Patient is not diaphoretic.    HENT:  Head: Normocephalic and atraumatic.  Right Ear: External ear normal.  Left Ear: External ear normal.  Nose: Nose normal.    Eyes:  General: No scleral icterus.  Conjunctiva/sclera: Conjunctivae normal.    Cardiovascular:  Rate and Rhythm: Normal rate and regular rhythm.  Heart sounds: No friction rub.    Pulmonary:  Effort: Pulmonary effort is normal. No respiratory distress.  Breath sounds: **Normal breath sounds on auscultation**. No stridor. No wheezing.    Abdominal:  Palpations: Abdomen is soft.  Tenderness: No CVA tenderness    Musculoskeletal:  General: No deformity.    Skin:  General: Skin is warm and dry. No rashes present. Normal color. Normal turgor.    Neurological:  General: No focal deficit present.  Mental Status: Alert and oriented      Procedures           ED Course  ED Course as of 02/28/25 0729   Fri Feb 28, 2025   0332 CBC stable.  [SG]   0333 UA concerning for possible UTI. Ceftriaxone ordered. Mild elevation of WBC. No fever. No TOOTIE. CT negative for kidney stone. Concerns for primary bladder carcinoma vs hematoma of bladder. Call out to Urology was placed.  [SG]   0346 I spoke to Dr. Upton from Urology who recommends discharge with follow up in his clinic today. PT is stable, with no active bleeding. No shortness of breath, fatigued , LOC, or concerns for symptomatic anemia. Hemoglobin at baseline from before. No concerns for urinary retention.  [SG]      ED Course User Index  [SG] Abel Riojas MD                                                       Medical Decision Making  Patient presents with right flank pain and hematuria  concerning for nephrolithiasis.    Laboratory studies:  - UA positive for hematuria  - WBC mildly elevated  - No significant TOOTIE    Imaging studies:  - CT abdomen without contrast ordered to rule out kidney stones    Patient declined pain medication when offered. Patient is hemodynamically stable throughout ED course.    Problems Addressed:  Bladder carcinoma: complicated acute illness or injury  Cystitis: complicated acute illness or injury  Hematuria, unspecified type: complicated acute illness or injury    Amount and/or Complexity of Data Reviewed  Labs: ordered.  Radiology: ordered.    Risk  Prescription drug management.        Final diagnoses:   Bladder carcinoma   Hematuria, unspecified type   Cystitis       ED Disposition  ED Disposition       ED Disposition   Discharge    Condition   Stable    Comment   --               Melvin Bowles MD  [address] [id number]  [phone]    Today           Medication List        Changed      * cefdinir 300 MG capsule  Commonly known as: OMNICEF  Take 1 capsule by mouth 2 (Two) Times a Day.  What changed: Another medication with the same name was added. Make sure you understand how and when to take each.     * cefdinir 300 MG capsule  Commonly known as: OMNICEF  Take 1 capsule by mouth 2 (Two) Times a Day.  What changed: You were already taking a medication with the same name, and this prescription was added. Make sure you understand how and when to take each.           * This list has 2 medication(s) that are the same as other medications prescribed for you. Read the directions carefully, and ask your doctor or other care provider to review them with you.                   Where to Get Your Medications        These medications were sent to [facility] [address] [place] [address] - [phone] [facility] [phone]   [address] [facility] [address]      Phone: [phone]   cefdinir 300 MG capsule            Abel Riojas  MD HOLLY  02/28/25 0729

## 2025-03-01 LAB — BACTERIA SPEC AEROBE CULT: NORMAL

## 2025-03-03 ENCOUNTER — HOSPITAL ENCOUNTER (EMERGENCY)
Facility: HOSPITAL | Age: 70
Discharge: HOME | End: 2025-03-04
Attending: STUDENT IN AN ORGANIZED HEALTH CARE EDUCATION/TRAINING PROGRAM | Admitting: STUDENT IN AN ORGANIZED HEALTH CARE EDUCATION/TRAINING PROGRAM
Payer: MEDICARE

## 2025-03-03 DIAGNOSIS — N32.89 BLADDER MASS: Primary | ICD-10-CM

## 2025-03-03 PROCEDURE — 99283 EMERGENCY DEPT VISIT LOW MDM: CPT

## 2025-03-03 RX ORDER — SODIUM CHLORIDE 0.9 % (FLUSH) 0.9 %
10 SYRINGE (ML) INJECTION AS NEEDED
Status: DISCONTINUED | OUTPATIENT
Start: 2025-03-03 | End: 2025-03-04 | Stop reason: HOSPADM

## 2025-03-04 VITALS
HEIGHT: 71 IN | DIASTOLIC BLOOD PRESSURE: 92 MMHG | SYSTOLIC BLOOD PRESSURE: 171 MMHG | OXYGEN SATURATION: 100 % | WEIGHT: 160 LBS | HEART RATE: 83 BPM | BODY MASS INDEX: 22.4 KG/M2 | TEMPERATURE: 98 F | RESPIRATION RATE: 18 BRPM

## 2025-03-04 LAB
ALBUMIN SERPL-MCNC: 3.7 G/DL (ref 3.5–5.2)
ALBUMIN/GLOB SERPL: 1.1 G/DL
ALP SERPL-CCNC: 132 U/L (ref 39–117)
ALT SERPL W P-5'-P-CCNC: 10 U/L (ref 1–41)
ANION GAP SERPL CALCULATED.3IONS-SCNC: 10.3 MMOL/L (ref 5–15)
AST SERPL-CCNC: 17 U/L (ref 1–40)
BACTERIA UR QL AUTO: ABNORMAL /HPF
BASOPHILS # BLD AUTO: 0.06 10*3/MM3 (ref 0–0.2)
BASOPHILS NFR BLD AUTO: 0.5 % (ref 0–1.5)
BILIRUB SERPL-MCNC: 0.3 MG/DL (ref 0–1.2)
BILIRUB UR QL STRIP: NEGATIVE
BUN SERPL-MCNC: 30 MG/DL (ref 8–23)
BUN/CREAT SERPL: 25.2 (ref 7–25)
CALCIUM SPEC-SCNC: 9.2 MG/DL (ref 8.6–10.5)
CHLORIDE SERPL-SCNC: 102 MMOL/L (ref 98–107)
CLARITY UR: ABNORMAL
CO2 SERPL-SCNC: 25.7 MMOL/L (ref 22–29)
COLOR UR: ABNORMAL
CREAT SERPL-MCNC: 1.19 MG/DL (ref 0.76–1.27)
DEPRECATED RDW RBC AUTO: 42.5 FL (ref 37–54)
EGFRCR SERPLBLD CKD-EPI 2021: 65.7 ML/MIN/1.73
EOSINOPHIL # BLD AUTO: 0.45 10*3/MM3 (ref 0–0.4)
EOSINOPHIL NFR BLD AUTO: 3.4 % (ref 0.3–6.2)
ERYTHROCYTE [DISTWIDTH] IN BLOOD BY AUTOMATED COUNT: 12.9 % (ref 12.3–15.4)
GLOBULIN UR ELPH-MCNC: 3.4 GM/DL
GLUCOSE SERPL-MCNC: 155 MG/DL (ref 65–99)
GLUCOSE UR STRIP-MCNC: NEGATIVE MG/DL
HCT VFR BLD AUTO: 33.6 % (ref 37.5–51)
HGB BLD-MCNC: 11 G/DL (ref 13–17.7)
HGB UR QL STRIP.AUTO: ABNORMAL
HOLD SPECIMEN: NORMAL
HOLD SPECIMEN: NORMAL
HYALINE CASTS UR QL AUTO: ABNORMAL /LPF
IMM GRANULOCYTES # BLD AUTO: 0.06 10*3/MM3 (ref 0–0.05)
IMM GRANULOCYTES NFR BLD AUTO: 0.5 % (ref 0–0.5)
KETONES UR QL STRIP: ABNORMAL
LEUKOCYTE ESTERASE UR QL STRIP.AUTO: ABNORMAL
LYMPHOCYTES # BLD AUTO: 3.24 10*3/MM3 (ref 0.7–3.1)
LYMPHOCYTES NFR BLD AUTO: 24.8 % (ref 19.6–45.3)
MAGNESIUM SERPL-MCNC: 2 MG/DL (ref 1.6–2.4)
MCH RBC QN AUTO: 29.6 PG (ref 26.6–33)
MCHC RBC AUTO-ENTMCNC: 32.7 G/DL (ref 31.5–35.7)
MCV RBC AUTO: 90.6 FL (ref 79–97)
MONOCYTES # BLD AUTO: 0.8 10*3/MM3 (ref 0.1–0.9)
MONOCYTES NFR BLD AUTO: 6.1 % (ref 5–12)
NEUTROPHILS NFR BLD AUTO: 64.7 % (ref 42.7–76)
NEUTROPHILS NFR BLD AUTO: 8.48 10*3/MM3 (ref 1.7–7)
NITRITE UR QL STRIP: NEGATIVE
NRBC BLD AUTO-RTO: 0 /100 WBC (ref 0–0.2)
PH UR STRIP.AUTO: 6 [PH] (ref 5–8)
PLATELET # BLD AUTO: 319 10*3/MM3 (ref 140–450)
PMV BLD AUTO: 9.9 FL (ref 6–12)
POTASSIUM SERPL-SCNC: 3.9 MMOL/L (ref 3.5–5.2)
PROT SERPL-MCNC: 7.1 G/DL (ref 6–8.5)
PROT UR QL STRIP: ABNORMAL
RBC # BLD AUTO: 3.71 10*6/MM3 (ref 4.14–5.8)
RBC # UR STRIP: ABNORMAL /HPF
REF LAB TEST METHOD: ABNORMAL
SODIUM SERPL-SCNC: 138 MMOL/L (ref 136–145)
SP GR UR STRIP: 1.02 (ref 1–1.03)
SQUAMOUS #/AREA URNS HPF: ABNORMAL /HPF
UROBILINOGEN UR QL STRIP: ABNORMAL
WBC # UR STRIP: ABNORMAL /HPF
WBC NRBC COR # BLD AUTO: 13.09 10*3/MM3 (ref 3.4–10.8)
WHOLE BLOOD HOLD COAG: NORMAL
WHOLE BLOOD HOLD SPECIMEN: NORMAL

## 2025-03-04 PROCEDURE — 87086 URINE CULTURE/COLONY COUNT: CPT | Performed by: PHYSICIAN ASSISTANT

## 2025-03-04 PROCEDURE — 83735 ASSAY OF MAGNESIUM: CPT | Performed by: PHYSICIAN ASSISTANT

## 2025-03-04 PROCEDURE — 81001 URINALYSIS AUTO W/SCOPE: CPT | Performed by: PHYSICIAN ASSISTANT

## 2025-03-04 PROCEDURE — 85025 COMPLETE CBC W/AUTO DIFF WBC: CPT | Performed by: PHYSICIAN ASSISTANT

## 2025-03-04 PROCEDURE — 36415 COLL VENOUS BLD VENIPUNCTURE: CPT

## 2025-03-04 PROCEDURE — 80053 COMPREHEN METABOLIC PANEL: CPT | Performed by: PHYSICIAN ASSISTANT

## 2025-03-04 NOTE — ED PROVIDER NOTES
Subjective   History of Present Illness  69-year-old male presents to the ER chief complaint of hematuria.  Patient has admitted that this is been going on for the last several days.  Recent ER visit shows the patient does have a bladder mass.  Patient states he went to his PCPs office today and they told him to come back to the ER.  Patient denies any changes since his previous ER visit.  Patient currently is on antibiotics.  States that he was unable to get in and see urology.        Review of Systems   Constitutional: Negative.  Negative for fever.   HENT: Negative.     Respiratory: Negative.     Cardiovascular: Negative.  Negative for chest pain.   Gastrointestinal: Negative.  Negative for abdominal pain.   Endocrine: Negative.    Genitourinary:  Positive for hematuria. Negative for dysuria.   Skin: Negative.    Neurological: Negative.    Psychiatric/Behavioral: Negative.     All other systems reviewed and are negative.      Past Medical History:   Diagnosis Date    Anxiety and depression     Arthritis     COPD (chronic obstructive pulmonary disease)     Coronary artery disease     Diabetes mellitus     Disease of thyroid gland     Dyslipidemia     History of transfusion     Hypertension     Seizure disorder     Pt states last seizure x1 mo.       Allergies   Allergen Reactions    Other        Past Surgical History:   Procedure Laterality Date    AMPUTATION DIGIT Right 9/13/2017    Procedure: RIGHT 1ST TOE AMPUTATION ;  Surgeon: Lee Lee MD;  Location: [facility] OR;  Service:     CARDIAC SURGERY      CIRCUMCISION      CORONARY ARTERY BYPASS GRAFT      HERNIA REPAIR      X2    ND INCISION & DRAINAGE LEG/ANKLE ABSCESS/HEMATOMA Right 5/24/2017    Procedure: Debridement of right first toe;  Surgeon: Ronald Perdue MD;  Location: [facility] OR;  Service: General    TOE SURGERY Left     debridement       Family History   Problem Relation Age of Onset    Diabetes Mother     Hypertension Mother     Diabetes Father         Social History     Socioeconomic History    Marital status:    Tobacco Use    Smoking status: Former     Current packs/day: 0.00     Average packs/day: 2.0 packs/day for 45.0 years (90.0 ttl pk-yrs)     Types: Cigarettes     Start date: 1965     Quit date: 2010     Years since quitting: 15.1    Smokeless tobacco: Never    Tobacco comments:     Trying to quit   Vaping Use    Vaping status: Never Used   Substance and Sexual Activity    Alcohol use: No    Drug use: No    Sexual activity: Defer           Objective   Physical Exam  Vitals and nursing note reviewed.   Constitutional:       General: He is not in acute distress.     Appearance: He is well-developed. He is not diaphoretic.   HENT:      Head: Normocephalic and atraumatic.      Right Ear: External ear normal.      Left Ear: External ear normal.      Nose: Nose normal.   Eyes:      Conjunctiva/sclera: Conjunctivae normal.   Neck:      Vascular: No JVD.      Trachea: No tracheal deviation.   Cardiovascular:      Rate and Rhythm: Normal rate.      Heart sounds: No murmur heard.  Pulmonary:      Effort: Pulmonary effort is normal. No respiratory distress.      Breath sounds: No wheezing.   Abdominal:      Palpations: Abdomen is soft.      Tenderness: There is no abdominal tenderness.   Musculoskeletal:         General: No deformity. Normal range of motion.      Cervical back: Normal range of motion and neck supple.   Skin:     General: Skin is warm and dry.      Coloration: Skin is not pale.      Findings: No erythema or rash.   Neurological:      Mental Status: He is alert and oriented to person, place, and time.      Cranial Nerves: No cranial nerve deficit.   Psychiatric:         Behavior: Behavior normal.         Thought Content: Thought content normal.         Procedures           ED Course  ED Course as of 03/04/25 0121   Tue Mar 04, 2025   0119 Point in time I do not see any reason to rescan patient.  There is a known bladder mass.  Patient is  able to urinate on his own.  There is obvious hematuria however patient does have the known bladder mass which is believed to be bladder carcinoma.  White count of 13,000 does not correlate with a higher white count that was noticed by PCP.  Patient will be discharged and encouraged to follow-up with urology. [RB]      ED Course User Index  [RB] Ronald Stone II, PA                                                       Medical Decision Making  Amount and/or Complexity of Data Reviewed  Labs: ordered.    Risk  Prescription drug management.        Final diagnoses:   Bladder mass       ED Disposition  ED Disposition       ED Disposition   Discharge    Condition   Stable    Comment   --               Kareem Bonilla, NIKITA  [address] [address] [address]  [phone]    Schedule an appointment as soon as possible for a visit            Medication List      No changes were made to your prescriptions during this visit.            Ronald Stone II, PA  03/04/25 0121

## 2025-03-05 ENCOUNTER — OFFICE VISIT (OUTPATIENT)
Dept: UROLOGY | Facility: CLINIC | Age: 70
End: 2025-03-05
Payer: MEDICARE

## 2025-03-05 ENCOUNTER — TELEPHONE (OUTPATIENT)
Dept: UROLOGY | Facility: CLINIC | Age: 70
End: 2025-03-05
Payer: MEDICARE

## 2025-03-05 VITALS
HEIGHT: 71 IN | DIASTOLIC BLOOD PRESSURE: 72 MMHG | HEART RATE: 92 BPM | BODY MASS INDEX: 23.35 KG/M2 | WEIGHT: 166.8 LBS | SYSTOLIC BLOOD PRESSURE: 144 MMHG

## 2025-03-05 DIAGNOSIS — N32.89 BLOOD CLOT IN BLADDER: ICD-10-CM

## 2025-03-05 DIAGNOSIS — R39.12 BENIGN PROSTATIC HYPERPLASIA WITH WEAK URINARY STREAM: ICD-10-CM

## 2025-03-05 DIAGNOSIS — D49.4 BLADDER TUMOR: Primary | ICD-10-CM

## 2025-03-05 DIAGNOSIS — N13.30 HYDRONEPHROSIS, RIGHT: ICD-10-CM

## 2025-03-05 DIAGNOSIS — N40.1 BENIGN PROSTATIC HYPERPLASIA WITH WEAK URINARY STREAM: ICD-10-CM

## 2025-03-05 LAB — BACTERIA SPEC AEROBE CULT: NO GROWTH

## 2025-03-05 PROCEDURE — 84153 ASSAY OF PSA TOTAL: CPT

## 2025-03-05 PROCEDURE — 84154 ASSAY OF PSA FREE: CPT

## 2025-03-05 RX ORDER — CLOPIDOGREL BISULFATE 75 MG/1
75 TABLET ORAL
COMMUNITY
Start: 2024-10-10 | End: 2025-03-05

## 2025-03-05 RX ORDER — AMLODIPINE BESYLATE 5 MG/1
5 TABLET ORAL DAILY
COMMUNITY

## 2025-03-05 RX ORDER — PANTOPRAZOLE SODIUM 40 MG/1
40 TABLET, DELAYED RELEASE ORAL
COMMUNITY
Start: 2024-10-10

## 2025-03-05 RX ORDER — TAMSULOSIN HYDROCHLORIDE 0.4 MG/1
1 CAPSULE ORAL DAILY
Qty: 90 CAPSULE | Refills: 3 | Status: SHIPPED | OUTPATIENT
Start: 2025-03-05

## 2025-03-05 NOTE — PROGRESS NOTES
"Chief Complaint:    Chief Complaint   Patient presents with    bladder mass       Vital Signs:   /72   Pulse 92   Ht 180.3 cm (70.98\")   Wt 75.7 kg (166 lb 12.8 oz)   BMI 23.27 kg/m²   Body mass index is 23.27 kg/m².      HPI:  Christo Clifton is a 69 y.o. male who presents today for initial evaluation     History of Present Illness  Mr. Clifton presents to the clinic today for follow-up for bladder mass.  He has been seen previously in office by Dr. Stevens for testicular pain and was diagnosed with epididymitis several years ago.  He has not been seen since.  Patient reports that he began to notice blood within his urine over the past 2 weeks and he was passing significant clots.  He did go to the emergency department on 2/28/2025 and had a CT scan abdomen pelvis at that time that showed a hyperdense material in the right side of the bladder concerning for blood clot/tumor.  There was some mild right-sided hydronephrosis down to the level hydroureteronephrosis down to the level of the urinary bladder most likely from obstruction from clot/tumor.  Left kidney did have a Bosniak type I renal cyst.  His kidney function at that time was unremarkable.  White blood cell count was slightly elevated 11,000.  Urine culture from that ER visit showed mixed falguni.  He was discharged on cefdinir and continues with this medication.  He was discharged from the hospital and advised to follow-up with urology in [facility] however was unable to make this drive.  He presented back to the emergency department on 3/3/2025 secondary to continuation of symptoms.  Repeat blood work at that time showed a stable H&H with slightly increase in his white blood cell count of 13,000.  Kidney function decreased slightly but remained within normal range.  Repeat urine culture is currently pending at this time.  Patient endorses some dysuria, frequency, and difficulty with urination when passing blood clots.  He did smoke for several " years and quit smoking for 15 years but has restarted smoking again.  He denies any known family history of prostate or bladder cancer.  He has last had a PSA in June 2023 that was 2.6.  His PSAs prior to that were roughly 2-2.5.      Past Medical History:  Past Medical History:   Diagnosis Date    Anxiety and depression     Arthritis     COPD (chronic obstructive pulmonary disease)     Coronary artery disease     Diabetes mellitus     Disease of thyroid gland     Dyslipidemia     History of transfusion     Hypertension     Seizure disorder     Pt states last seizure x1 mo.       Current Meds:  Current Outpatient Medications   Medication Sig Dispense Refill    amLODIPine (NORVASC) 5 MG tablet Take 1 tablet by mouth Daily.      aspirin 81 MG EC tablet Take 1 tablet by mouth Every Night.      atorvastatin (LIPITOR) 40 MG tablet Take 1 tablet by mouth Every Night. 90 tablet 3    cefdinir (OMNICEF) 300 MG capsule Take 1 capsule by mouth 2 (Two) Times a Day. 14 capsule 0    Dulaglutide (TRULICITY SC) Inject  under the skin into the appropriate area as directed 1 (One) Time Per Week.      Ergocalciferol (VITAMIN D2 PO) Take  by mouth.      escitalopram (LEXAPRO) 10 MG tablet Take 1 tablet by mouth Daily.      glipizide (GLUCOTROL) 5 MG tablet Take 1 tablet by mouth 2 (Two) Times a Day Before Meals.      ibuprofen (ADVIL,MOTRIN) 800 MG tablet Take 1 tablet by mouth Every 6 (Six) Hours As Needed for Mild Pain . 60 tablet 0    insulin aspart prot-insulin aspart (novoLOG 70/30) (70-30) 100 UNIT/ML injection Inject 60 Units under the skin into the appropriate area as directed 2 (Two) Times a Day With Meals.      levETIRAcetam (KEPPRA) 1000 MG tablet Take 1 tablet by mouth 2 (Two) Times a Day.      levothyroxine (SYNTHROID, LEVOTHROID) 50 MCG tablet Take 1 tablet by mouth Daily.      lisinopril (PRINIVIL,ZESTRIL) 40 MG tablet Take 1 tablet by mouth Daily.      metoprolol tartrate (LOPRESSOR) 25 MG tablet Take 1 tablet by mouth  2 (Two) Times a Day. 180 tablet 3    OMEGA-3 KRILL OIL PO Take 350 mg by mouth Daily.      pantoprazole (PROTONIX) 40 MG EC tablet 1 tablet.      tamsulosin (FLOMAX) 0.4 MG capsule 24 hr capsule Take 1 capsule by mouth Daily. 90 capsule 3     No current facility-administered medications for this visit.        Allergies:   Allergies   Allergen Reactions    Other         Past Surgical History:  Past Surgical History:   Procedure Laterality Date    AMPUTATION DIGIT Right 9/13/2017    Procedure: RIGHT 1ST TOE AMPUTATION ;  Surgeon: Lee Lee MD;  Location: [facility];  Service:     CARDIAC SURGERY      CIRCUMCISION      CORONARY ARTERY BYPASS GRAFT      HERNIA REPAIR      X2    CO INCISION & DRAINAGE LEG/ANKLE ABSCESS/HEMATOMA Right 5/24/2017    Procedure: Debridement of right first toe;  Surgeon: Ronald Perdue MD;  Location: [facility];  Service: General    TOE SURGERY Left     debridement       Social History:  Social History     Socioeconomic History    Marital status:    Tobacco Use    Smoking status: Former     Current packs/day: 0.00     Average packs/day: 2.0 packs/day for 45.0 years (90.0 ttl pk-yrs)     Types: Cigarettes     Start date: 1965     Quit date: 2010     Years since quitting: 15.1    Smokeless tobacco: Never    Tobacco comments:     Trying to quit   Vaping Use    Vaping status: Never Used   Substance and Sexual Activity    Alcohol use: No    Drug use: No    Sexual activity: Defer       Family History:  Family History   Problem Relation Age of Onset    Diabetes Mother     Hypertension Mother     Diabetes Father        Review of Systems:  Review of Systems   Constitutional:  Negative for fatigue, fever and unexpected weight change.   Respiratory:  Negative for chest tightness and shortness of breath.    Cardiovascular:  Negative for chest pain.   Gastrointestinal:  Negative for abdominal pain, constipation, diarrhea, nausea and vomiting.   Genitourinary:  Positive for dysuria, frequency  and hematuria. Negative for decreased urine volume, difficulty urinating, enuresis, flank pain, genital sores, penile discharge, penile pain, penile swelling, scrotal swelling, testicular pain and urgency.   Skin:  Negative for rash.   Psychiatric/Behavioral:  Negative for confusion and suicidal ideas.        Physical Exam:  Physical Exam  Constitutional:       General: He is not in acute distress.     Appearance: Normal appearance.   HENT:      Head: Normocephalic and atraumatic.      Nose: Nose normal.      Mouth/Throat:      Mouth: Mucous membranes are moist.   Eyes:      Conjunctiva/sclera: Conjunctivae normal.   Cardiovascular:      Rate and Rhythm: Normal rate.      Pulses: Normal pulses.   Pulmonary:      Effort: Pulmonary effort is normal.   Abdominal:      General: Bowel sounds are normal. There is no distension.      Tenderness: There is abdominal tenderness.   Musculoskeletal:         General: Normal range of motion.      Cervical back: Normal range of motion.   Skin:     General: Skin is warm.   Neurological:      General: No focal deficit present.      Mental Status: He is alert and oriented to person, place, and time.   Psychiatric:         Mood and Affect: Mood normal.         Behavior: Behavior normal.         Thought Content: Thought content normal.         Judgment: Judgment normal.           Recent Image (CT and/or KUB):   CT Abdomen and Pelvis: No results found for this or any previous visit.     CT Stone Protocol: No results found for this or any previous visit.     KUB: No results found for this or any previous visit.       Labs:  Brief Urine Lab Results  (Last result in the past 365 days)        Color   Clarity   Blood   Leuk Est   Nitrite   Protein   CREAT   Urine HCG        03/04/25 0042 Dark Yellow   Cloudy   Large (3+)   Small (1+)   Negative   >=300 mg/dL (3+)                 Admission on 03/03/2025, Discharged on 03/04/2025   Component Date Value Ref Range Status    Glucose 03/04/2025  155 (H)  65 - 99 mg/dL Final    BUN 03/04/2025 30 (H)  8 - 23 mg/dL Final    Creatinine 03/04/2025 1.19  0.76 - 1.27 mg/dL Final    Sodium 03/04/2025 138  136 - 145 mmol/L Final    Potassium 03/04/2025 3.9  3.5 - 5.2 mmol/L Final    Chloride 03/04/2025 102  98 - 107 mmol/L Final    CO2 03/04/2025 25.7  22.0 - 29.0 mmol/L Final    Calcium 03/04/2025 9.2  8.6 - 10.5 mg/dL Final    Total Protein 03/04/2025 7.1  6.0 - 8.5 g/dL Final    Albumin 03/04/2025 3.7  3.5 - 5.2 g/dL Final    ALT (SGPT) 03/04/2025 10  1 - 41 U/L Final    AST (SGOT) 03/04/2025 17  1 - 40 U/L Final    Alkaline Phosphatase 03/04/2025 132 (H)  39 - 117 U/L Final    Total Bilirubin 03/04/2025 0.3  0.0 - 1.2 mg/dL Final    Globulin 03/04/2025 3.4  gm/dL Final    A/G Ratio 03/04/2025 1.1  g/dL Final    BUN/Creatinine Ratio 03/04/2025 25.2 (H)  7.0 - 25.0 Final    Anion Gap 03/04/2025 10.3  5.0 - 15.0 mmol/L Final    eGFR 03/04/2025 65.7  >60.0 mL/min/1.73 Final    Color, UA 03/04/2025 Dark Yellow (A)  Yellow, Straw Final    Appearance, UA 03/04/2025 Cloudy (A)  Clear Final    pH, UA 03/04/2025 6.0  5.0 - 8.0 Final    Specific Gravity, UA 03/04/2025 1.020  1.005 - 1.030 Final    Glucose, UA 03/04/2025 Negative  Negative Final    Ketones, UA 03/04/2025 Trace (A)  Negative Final    Bilirubin, UA 03/04/2025 Negative  Negative Final    Blood, UA 03/04/2025 Large (3+) (A)  Negative Final    Protein, UA 03/04/2025 >=300 mg/dL (3+) (A)  Negative Final    Leuk Esterase, UA 03/04/2025 Small (1+) (A)  Negative Final    Nitrite, UA 03/04/2025 Negative  Negative Final    Urobilinogen, UA 03/04/2025 1.0 E.U./dL  0.2 - 1.0 E.U./dL Final    Magnesium 03/04/2025 2.0  1.6 - 2.4 mg/dL Final    WBC 03/04/2025 13.09 (H)  3.40 - 10.80 10*3/mm3 Final    RBC 03/04/2025 3.71 (L)  4.14 - 5.80 10*6/mm3 Final    Hemoglobin 03/04/2025 11.0 (L)  13.0 - 17.7 g/dL Final    Hematocrit 03/04/2025 33.6 (L)  37.5 - 51.0 % Final    MCV 03/04/2025 90.6  79.0 - 97.0 fL Final    MCH  03/04/2025 29.6  26.6 - 33.0 pg Final    MCHC 03/04/2025 32.7  31.5 - 35.7 g/dL Final    RDW 03/04/2025 12.9  12.3 - 15.4 % Final    RDW-SD 03/04/2025 42.5  37.0 - 54.0 fl Final    MPV 03/04/2025 9.9  6.0 - 12.0 fL Final    Platelets 03/04/2025 319  140 - 450 10*3/mm3 Final    Neutrophil % 03/04/2025 64.7  42.7 - 76.0 % Final    Lymphocyte % 03/04/2025 24.8  19.6 - 45.3 % Final    Monocyte % 03/04/2025 6.1  5.0 - 12.0 % Final    Eosinophil % 03/04/2025 3.4  0.3 - 6.2 % Final    Basophil % 03/04/2025 0.5  0.0 - 1.5 % Final    Immature Grans % 03/04/2025 0.5  0.0 - 0.5 % Final    Neutrophils, Absolute 03/04/2025 8.48 (H)  1.70 - 7.00 10*3/mm3 Final    Lymphocytes, Absolute 03/04/2025 3.24 (H)  0.70 - 3.10 10*3/mm3 Final    Monocytes, Absolute 03/04/2025 0.80  0.10 - 0.90 10*3/mm3 Final    Eosinophils, Absolute 03/04/2025 0.45 (H)  0.00 - 0.40 10*3/mm3 Final    Basophils, Absolute 03/04/2025 0.06  0.00 - 0.20 10*3/mm3 Final    Immature Grans, Absolute 03/04/2025 0.06 (H)  0.00 - 0.05 10*3/mm3 Final    nRBC 03/04/2025 0.0  0.0 - 0.2 /100 WBC Final    Extra Tube 03/04/2025 Hold for add-ons.   Final    Auto resulted.    Extra Tube 03/04/2025 hold for add-on   Final    Auto resulted    Extra Tube 03/04/2025 Hold for add-ons.   Final    Auto resulted.    Extra Tube 03/04/2025 Hold for add-ons.   Final    Auto resulted    RBC, UA 03/04/2025 Too Numerous to Count (A)  None Seen, 0-2 /HPF Final    WBC, UA 03/04/2025 Unable to determine due to loaded field (A)  None Seen, 0-2 /HPF Final    Bacteria,  03/04/2025 Unable to determine due to loaded field (A)  None Seen /HPF Final    Squamous Epithelial Cells,  03/04/2025 Unable to determine due to loaded field (A)  None Seen, 0-2 /HPF Final    Hyaline Casts,  03/04/2025 Unable to determine due to loaded field  None Seen /LPF Final    Methodology 03/04/2025 Manual Light Microscopy   Final   Admission on 02/28/2025, Discharged on 02/28/2025   Component Date Value Ref Range  Status    Color, UA 02/28/2025 Red (A)  Yellow, Straw Final    Appearance, UA 02/28/2025 Clear  Clear Final    pH, UA 02/28/2025 6.5  5.0 - 8.0 Final    Specific Gravity, UA 02/28/2025 1.014  1.005 - 1.030 Final    Glucose, UA 02/28/2025 100 mg/dL (Trace) (A)  Negative Final    Ketones, UA 02/28/2025 Negative  Negative Final    Bilirubin, UA 02/28/2025 Negative  Negative Final    Blood, UA 02/28/2025 Large (3+) (A)  Negative Final    Protein, UA 02/28/2025 >=300 mg/dL (3+) (A)  Negative Final    Leuk Esterase, UA 02/28/2025 Moderate (2+) (A)  Negative Final    Nitrite, UA 02/28/2025 Negative  Negative Final    Urobilinogen, UA 02/28/2025 1.0 E.U./dL  0.2 - 1.0 E.U./dL Final    Glucose 02/28/2025 246 (H)  65 - 99 mg/dL Final    BUN 02/28/2025 29 (H)  8 - 23 mg/dL Final    Creatinine 02/28/2025 1.10  0.76 - 1.27 mg/dL Final    Sodium 02/28/2025 138  136 - 145 mmol/L Final    Potassium 02/28/2025 4.3  3.5 - 5.2 mmol/L Final    Chloride 02/28/2025 101  98 - 107 mmol/L Final    CO2 02/28/2025 26.7  22.0 - 29.0 mmol/L Final    Calcium 02/28/2025 9.4  8.6 - 10.5 mg/dL Final    Total Protein 02/28/2025 7.5  6.0 - 8.5 g/dL Final    Albumin 02/28/2025 4.0  3.5 - 5.2 g/dL Final    ALT (SGPT) 02/28/2025 9  1 - 41 U/L Final    AST (SGOT) 02/28/2025 16  1 - 40 U/L Final    Alkaline Phosphatase 02/28/2025 134 (H)  39 - 117 U/L Final    Total Bilirubin 02/28/2025 0.3  0.0 - 1.2 mg/dL Final    Globulin 02/28/2025 3.5  gm/dL Final    A/G Ratio 02/28/2025 1.1  g/dL Final    BUN/Creatinine Ratio 02/28/2025 26.4 (H)  7.0 - 25.0 Final    Anion Gap 02/28/2025 10.3  5.0 - 15.0 mmol/L Final    eGFR 02/28/2025 72.2  >60.0 mL/min/1.73 Final    Protime 02/28/2025 12.5  11.6 - 15.1 Seconds Final    INR 02/28/2025 0.92  0.90 - 1.10 Final    Extra Tube 02/28/2025 Hold for add-ons.   Final    Auto resulted.    Extra Tube 02/28/2025 hold for add-on   Final    Auto resulted    Extra Tube 02/28/2025 Hold for add-ons.   Final    Auto resulted.     Extra Tube 02/28/2025 Hold for add-ons.   Final    Auto resulted    WBC 02/28/2025 11.60 (H)  3.40 - 10.80 10*3/mm3 Final    RBC 02/28/2025 3.79 (L)  4.14 - 5.80 10*6/mm3 Final    Hemoglobin 02/28/2025 11.3 (L)  13.0 - 17.7 g/dL Final    Hematocrit 02/28/2025 34.8 (L)  37.5 - 51.0 % Final    MCV 02/28/2025 91.8  79.0 - 97.0 fL Final    MCH 02/28/2025 29.8  26.6 - 33.0 pg Final    MCHC 02/28/2025 32.5  31.5 - 35.7 g/dL Final    RDW 02/28/2025 12.7  12.3 - 15.4 % Final    RDW-SD 02/28/2025 42.1  37.0 - 54.0 fl Final    MPV 02/28/2025 9.3  6.0 - 12.0 fL Final    Platelets 02/28/2025 296  140 - 450 10*3/mm3 Final    Neutrophil % 02/28/2025 62.1  42.7 - 76.0 % Final    Lymphocyte % 02/28/2025 26.0  19.6 - 45.3 % Final    Monocyte % 02/28/2025 6.8  5.0 - 12.0 % Final    Eosinophil % 02/28/2025 4.3  0.3 - 6.2 % Final    Basophil % 02/28/2025 0.5  0.0 - 1.5 % Final    Immature Grans % 02/28/2025 0.3  0.0 - 0.5 % Final    Neutrophils, Absolute 02/28/2025 7.19 (H)  1.70 - 7.00 10*3/mm3 Final    Lymphocytes, Absolute 02/28/2025 3.02  0.70 - 3.10 10*3/mm3 Final    Monocytes, Absolute 02/28/2025 0.79  0.10 - 0.90 10*3/mm3 Final    Eosinophils, Absolute 02/28/2025 0.50 (H)  0.00 - 0.40 10*3/mm3 Final    Basophils, Absolute 02/28/2025 0.06  0.00 - 0.20 10*3/mm3 Final    Immature Grans, Absolute 02/28/2025 0.04  0.00 - 0.05 10*3/mm3 Final    nRBC 02/28/2025 0.0  0.0 - 0.2 /100 WBC Final    RBC, UA 02/28/2025 Too Numerous to Count (A)  None Seen, 0-2 /HPF Final    WBC, UA 02/28/2025 Unable to determine due to loaded field (A)  None Seen, 0-2 /HPF Final    Bacteria, UA 02/28/2025 Unable to determine due to loaded field (A)  None Seen /HPF Final    Squamous Epithelial Cells, UA 02/28/2025 Unable to determine due to loaded field (A)  None Seen, 0-2 /HPF Final    Hyaline Casts, UA 02/28/2025 Unable to determine due to loaded field  None Seen /LPF Final    Methodology 02/28/2025 Manual Light Microscopy   Final    Urine Culture 02/28/2025  >100,000 CFU/mL Normal Urogenital Cristy   Final        Procedure: None  Procedures     I have reviewed and agree with the above PMH, PSH, FMH, social history, medications, allergies, and labs.     Assessment/Plan:   Problem List Items Addressed This Visit       Bladder tumor - Primary    Relevant Medications    tamsulosin (FLOMAX) 0.4 MG capsule 24 hr capsule    Other Relevant Orders    Case Request (Completed)    Blood clot in bladder    Relevant Medications    tamsulosin (FLOMAX) 0.4 MG capsule 24 hr capsule    Other Relevant Orders    Case Request (Completed)    Hydronephrosis, right     Other Visit Diagnoses       Benign prostatic hyperplasia with weak urinary stream        Relevant Medications    tamsulosin (FLOMAX) 0.4 MG capsule 24 hr capsule    Other Relevant Orders    PSA Total+% Free (Serial)            Health Maintenance:   Health Maintenance Due   Topic Date Due    DIABETIC FOOT EXAM  Never done    DIABETIC EYE EXAM  Never done    Pneumococcal Vaccine 50+ (1 of 2 - PCV) Never done    TDAP/TD VACCINES (1 - Tdap) Never done    ZOSTER VACCINE (1 of 2) Never done    HEPATITIS C SCREENING  Never done    URINE MICROALBUMIN-CREATININE RATIO (uACR)  10/26/2021    ANNUAL WELLNESS VISIT  06/08/2024    INFLUENZA VACCINE  07/01/2024    COVID-19 Vaccine (4 - 2024-25 season) 09/01/2024        Smoking Counseling: Former smoker.  Never used smokeless tobacco.  Counseling given.    Urine Incontinence: Patient reports that he is not currently experiencing any symptoms of urinary incontinence.    Patient was given instructions and counseling regarding his condition or for health maintenance advice. Please see specific information pulled into the AVS if appropriate.    Patient Education:   Bladder tumor/blood clot -discussed with the patient the pathophysiology of this condition in detail.  Did discuss causes which can include but not limited to bladder carcinoma, benign tumors, hemorrhagic cystitis, renal cell carcinomas,  nephrolithiasis, prostatic carcinoma, and other urological abnormalities.  Did advise patient given CT findings and recommended a cystoscopy with clot evacuation and transurethral resection of bladder tumor.  Discussed the nature of this procedure in detail with the patient.  Advised patient to remain n.p.o. after midnight and to discontinue aspirin.  Advised patient he will need a  for surgical intervention.  Advised patient he will most likely go home with a catheter in place and follow-up in 24 to 48 hours for removal and voiding trial.  I will send in Flomax for him to take as needed to help with difficulty with urination.  He verbalized understanding.  Right-sided hydronephrosis -discussed with the patient the pathophysiology of this condition in detail.  Hydronephrosis is secondary to outlet obstruction within the bladder from large clot/tumor.  Benign Prostate Hypertrophy (BPH): Discussed the pathophysiology of BPH and obstruction.  We discussed the static and dynamic effects of BPH as well as using 5 alpha reductase inhibitors versus alpha blockade.  We discussed the indications for transurethral surgery as well and/ or other therapeutic options available including all of the newer techniques.  Prostate was mildly enlarged on CT.  Will start him on Flomax 0.4 mg once daily.  Discussed the risk of this medication in detail including side effects of lightheadedness, dizziness, blurry vision, chest pain, shortness of breath.  Educated to discontinue if these occur.  PSA testing - I am recommending a prostate specific antigen blood test or PSA.  I discussed the pathophysiology of PSA testing indicating its use in the diagnosis and management of prostate cancer.  I discussed the normal range being 0 to 4, but more appropriately being much closer to 0 to 2 in a normal male.  I discussed the fact that after a certain age it is against recommendation to use PSA testing especially in view of numerous  comorbidities.  I discussed many of the things that can artificially raise PSA including put not limited to a recent infection, urinary tract infection, recent sexual intercourse, or even the type of movement such as manipulation of the prostate from riding a bicycle.  It was discussed that the most important use of PSA is the velocity measurement.  This refers to the change of PSA with time. I discussed that we look for greater than 20% rise over a year to help us make the prediction of prostate cancer.  I also discussed that in the case of prostate cancer indicating a radical prostatectomy, the PSA should be 0 and any rise indicates an early biochemical recurrence.  I will call patient with PSA results once available.  Otherwise we will see him back this Monday for surgical intervention.  Advised him to go to the nearest ER symptoms worsen.  He verbalized understanding.    Visit Diagnoses:    ICD-10-CM ICD-9-CM   1. Bladder tumor  D49.4 239.4   2. Blood clot in bladder  N32.89 596.7   3. Benign prostatic hyperplasia with weak urinary stream  N40.1 600.01    R39.12 788.62   4. Hydronephrosis, right  N13.30 591     A total of 45 minutes were spent coordinating this patient’s care in clinic today; 30 minutes of which were face-to-face with the patient, reviewing medical history and counseling on the current treatment and followup plan.  All questions were answered to patient's satisfaction.    Meds Ordered During Visit:  New Medications Ordered This Visit   Medications    tamsulosin (FLOMAX) 0.4 MG capsule 24 hr capsule     Sig: Take 1 capsule by mouth Daily.     Dispense:  90 capsule     Refill:  3       Follow Up Appointment: Cystoscopy with clot evacuation and TURBT this Monday  No follow-ups on file.      This document has been electronically signed by Kareem Bonilla PA-C   March 5, 2025 09:57 EST    Part of this note may be an electronic transcription/translation of spoken language to printed text using the  [facility] Dictation System.

## 2025-03-06 ENCOUNTER — TELEPHONE (OUTPATIENT)
Dept: UROLOGY | Facility: CLINIC | Age: 70
End: 2025-03-06
Payer: MEDICARE

## 2025-03-06 DIAGNOSIS — D49.4 BLADDER TUMOR: Primary | ICD-10-CM

## 2025-03-06 LAB
PSA FREE MFR SERPL: 28 %
PSA FREE SERPL-MCNC: 0.7 NG/ML
PSA SERPL-MCNC: 2.5 NG/ML (ref 0–4)

## 2025-03-06 NOTE — TELEPHONE ENCOUNTER
Tempted to call patient about PSA results however he did not answer.  Left voicemail advising PSA was roughly stable and recommended to keep surgical intervention this Monday for possible bladder tumor.  Advised to call back with any questions or concerns.

## 2025-03-06 NOTE — TELEPHONE ENCOUNTER
I called the pt and made him aware the [facility] called to let us know that his insurance is out of network and needs referred out. He told he someone was trying to find him a urologist. I told him we would help however we could and called him back and put in a urgent referral for him for Dr. Arthur Clifton.

## 2025-05-19 ENCOUNTER — OFFICE VISIT (OUTPATIENT)
Dept: CARDIOLOGY | Facility: CLINIC | Age: 70
End: 2025-05-19
Payer: MEDICARE

## 2025-05-19 VITALS
HEART RATE: 77 BPM | SYSTOLIC BLOOD PRESSURE: 115 MMHG | BODY MASS INDEX: 23.18 KG/M2 | DIASTOLIC BLOOD PRESSURE: 64 MMHG | WEIGHT: 165.6 LBS | RESPIRATION RATE: 16 BRPM | HEIGHT: 71 IN | OXYGEN SATURATION: 99 %

## 2025-05-19 DIAGNOSIS — Z71.6 TOBACCO ABUSE COUNSELING: ICD-10-CM

## 2025-05-19 DIAGNOSIS — I73.9 PERIPHERAL ARTERIAL DISEASE: ICD-10-CM

## 2025-05-19 DIAGNOSIS — I25.10 ASCVD (ARTERIOSCLEROTIC CARDIOVASCULAR DISEASE): Primary | ICD-10-CM

## 2025-05-19 DIAGNOSIS — F17.210 NICOTINE DEPENDENCE, CIGARETTES, UNCOMPLICATED: ICD-10-CM

## 2025-05-19 DIAGNOSIS — Z79.4 TYPE 2 DIABETES MELLITUS WITH DIABETIC PERIPHERAL ANGIOPATHY WITHOUT GANGRENE, WITH LONG-TERM CURRENT USE OF INSULIN: ICD-10-CM

## 2025-05-19 DIAGNOSIS — Z87.891 PERSONAL HISTORY OF TOBACCO USE, PRESENTING HAZARDS TO HEALTH: ICD-10-CM

## 2025-05-19 DIAGNOSIS — E11.51 TYPE 2 DIABETES MELLITUS WITH DIABETIC PERIPHERAL ANGIOPATHY WITHOUT GANGRENE, WITH LONG-TERM CURRENT USE OF INSULIN: ICD-10-CM

## 2025-05-19 DIAGNOSIS — R06.09 DYSPNEA ON EXERTION: ICD-10-CM

## 2025-05-19 DIAGNOSIS — I10 ESSENTIAL HYPERTENSION: ICD-10-CM

## 2025-05-19 DIAGNOSIS — E78.5 DYSLIPIDEMIA: ICD-10-CM

## 2025-05-19 DIAGNOSIS — Z72.0 TOBACCO ABUSE: ICD-10-CM

## 2025-05-19 RX ORDER — INSULIN GLARGINE 100 [IU]/ML
14 INJECTION, SOLUTION SUBCUTANEOUS DAILY
COMMUNITY

## 2025-05-19 RX ORDER — NICOTINE 21 MG/24HR
1 PATCH, TRANSDERMAL 24 HOURS TRANSDERMAL EVERY 24 HOURS
Qty: 30 PATCH | Refills: 1 | Status: SHIPPED | OUTPATIENT
Start: 2025-05-19

## 2025-05-19 NOTE — LETTER
May 20, 2025     MIKO Alves  [address] [facility] [address]    Patient: Christo Clifton   YOB: 1955   Date of Visit: 5/19/2025     Dear MIKO Alves:       Thank you for referring Christo Clifton to me for evaluation. Below are the relevant portions of my assessment and plan of care.    If you have questions, please do not hesitate to call me. I look forward to following Christo along with you.         Sincerely,        Wero Green MD        CC: No Recipients    Wero Green MD  05/20/25 0933  Sign when Signing Visit  Lesa Rae APRN  Christo Clifton  1955 05/19/2025    Patient Active Problem List   Diagnosis   • IDDM (insulin dependent diabetes mellitus)   • ASCVD (arteriosclerotic cardiovascular disease)   • S/P CABG x 3   • Essential hypertension   • Dyslipidemia   • Skin ulcer   • Post-operative state   • Preop cardiovascular exam   • Epididymitis   • Testalgia   • Cerebral aneurysm, nonruptured   • Aneurysm of carotid artery   • Anterior communicating artery aneurysm   • Bladder tumor   • Blood clot in bladder   • Hydronephrosis, right       Dear MIKO Alves:    Subjective    Christo Clifton is a 70 y.o. male with the problems as listed above, presents    Chief complaint: To reestablish his cardiac care and follow-up through our office in a patient with history of known CAD, s/p CABG.    History of Present Illness: Mr. Christo Clifton is a pleasant 70-year-old  male with history of known CAD, s/p CABG about 16 to 17 years ago at [facility], has followed with us until 12/21 but has been lost for follow-up since then.  He now wants to reestablish his cardiac care and follow-up through our office.  On further questioning he denies any complaints of chest pain or discomfort.  He does complain of shortness of breath with mild to moderate exertion with no PND, orthopnea or pedal edema.  He has a long history of smoking but reportedly quit 15  years ago and then started back about 8 months ago and currently smokes about a pack in 3 days.    Allergies   Allergen Reactions   • Other    :    Current Outpatient Medications:   •  amLODIPine (NORVASC) 5 MG tablet, Take 1 tablet by mouth Daily., Disp: , Rfl:   •  aspirin 81 MG EC tablet, Take 1 tablet by mouth Every Night., Disp: , Rfl:   •  atorvastatin (LIPITOR) 40 MG tablet, Take 1 tablet by mouth Every Night., Disp: 90 tablet, Rfl: 3  •  escitalopram (LEXAPRO) 10 MG tablet, Take 1 tablet by mouth Daily., Disp: , Rfl:   •  insulin glargine (LANTUS, SEMGLEE) 100 UNIT/ML injection, Inject 14 Units under the skin into the appropriate area as directed Daily., Disp: , Rfl:   •  insulin aspart prot-insulin aspart (novoLOG 70/30) (70-30) 100 UNIT/ML injection, Inject 60 Units under the skin into the appropriate area as directed 2 (Two) Times a Day With Meals. (Patient not taking: Reported on 5/19/2025), Disp: , Rfl:   •  levETIRAcetam (KEPPRA) 1000 MG tablet, Take 1 tablet by mouth 2 (Two) Times a Day. (Patient not taking: Reported on 5/19/2025), Disp: , Rfl:   •  levothyroxine (SYNTHROID, LEVOTHROID) 50 MCG tablet, Take 1 tablet by mouth Daily. (Patient not taking: Reported on 5/19/2025), Disp: , Rfl:   •  nicotine (Nicoderm CQ) 14 MG/24HR patch, Place 1 patch on the skin as directed by provider Daily., Disp: 30 patch, Rfl: 1    Past Medical History:   Diagnosis Date   • Anxiety and depression    • Arthritis    • COPD (chronic obstructive pulmonary disease)    • Coronary artery disease    • Diabetes mellitus    • Disease of thyroid gland    • Dyslipidemia    • History of transfusion    • Hypertension    • Seizure disorder     Pt states last seizure x1 mo.     Past Surgical History:   Procedure Laterality Date   • AMPUTATION DIGIT Right 9/13/2017    Procedure: RIGHT 1ST TOE AMPUTATION ;  Surgeon: Lee Lee MD;  Location: [facility];  Service:    • CARDIAC SURGERY     • CIRCUMCISION     • CORONARY ARTERY BYPASS  "GRAFT     • HERNIA REPAIR      X2   • TN INCISION & DRAINAGE LEG/ANKLE ABSCESS/HEMATOMA Right 5/24/2017    Procedure: Debridement of right first toe;  Surgeon: Ronald Perdue MD;  Location: [facility];  Service: General   • TOE SURGERY Left     debridement     Family History   Problem Relation Age of Onset   • Diabetes Mother    • Hypertension Mother    • Diabetes Father      Social History     Tobacco Use   • Smoking status: Every Day     Current packs/day: 0.00     Average packs/day: 2.0 packs/day for 45.0 years (90.0 ttl pk-yrs)     Types: Cigarettes     Start date: 1965     Last attempt to quit: 2010     Years since quitting: 15.3   • Smokeless tobacco: Never   • Tobacco comments:     Trying to quit   Vaping Use   • Vaping status: Never Used   Substance Use Topics   • Alcohol use: No   • Drug use: No     Review of Systems   Constitutional: Positive for malaise/fatigue. Negative for chills, fever, weight gain and weight loss.   HENT:  Positive for hearing loss. Negative for congestion and nosebleeds.    Cardiovascular:  Negative for chest pain, irregular heartbeat, leg swelling and orthopnea.   Respiratory:  Positive for shortness of breath. Negative for cough and hemoptysis.    Musculoskeletal:  Positive for joint pain.   Gastrointestinal:  Negative for abdominal pain, change in bowel habit, hematemesis, melena and vomiting.   Genitourinary:  Negative for dysuria, frequency and hematuria.   Neurological:  Negative for focal weakness, headaches, light-headedness and weakness.   Psychiatric/Behavioral:  Positive for depression.      Objective  Blood pressure 115/64, pulse 77, resp. rate 16, height 180.3 cm (71\"), weight 75.1 kg (165 lb 9.6 oz), SpO2 99%.  Body mass index is 23.1 kg/m².    Vitals reviewed.   Constitutional:       Appearance: Well-developed.   Eyes:      Conjunctiva/sclera: Conjunctivae normal.   HENT:      Head: Normocephalic.   Neck:      Thyroid: No thyromegaly.      Vascular: No JVD.      " "Trachea: No tracheal deviation.   Pulmonary:      Effort: No respiratory distress.      Breath sounds: Normal breath sounds. No wheezing. No rales.   Cardiovascular:      PMI at left midclavicular line. Normal rate. Regular rhythm. Normal S1. Normal S2.       Murmurs: There is no murmur.      No gallop.  No click. No rub.   Pulses:     Carotid: 2+ bilaterally.     Radial: 2+ bilaterally.     Dorsalis pedis: 1+ bilaterally.     Posterior tibial: 1+ bilaterally.  Edema:     Peripheral edema absent.   Abdominal:      General: Bowel sounds are normal.      Palpations: Abdomen is soft. There is no abdominal mass.      Tenderness: There is no abdominal tenderness.   Musculoskeletal:      Cervical back: Normal range of motion and neck supple. Skin:     General: Skin is warm and dry.   Neurological:      Mental Status: Alert and oriented to person, place, and time.      Cranial Nerves: No cranial nerve deficit.         Lab Results   Component Value Date     03/04/2025    K 3.9 03/04/2025     03/04/2025    CO2 25.7 03/04/2025    BUN 30 (H) 03/04/2025    CREATININE 1.19 03/04/2025    GLUCOSE 155 (H) 03/04/2025    CALCIUM 9.2 03/04/2025    AST 17 03/04/2025    ALT 10 03/04/2025    ALKPHOS 132 (H) 03/04/2025     No results found for: \"CKTOTAL\"  Lab Results   Component Value Date    WBC 11.5 (H) 04/21/2025    HGB 11.9 (L) 04/21/2025    HCT 36.5 (L) 04/21/2025     04/21/2025     Lab Results   Component Value Date    INR 0.92 02/28/2025    INR 1.08 05/28/2024    INR 0.95 05/27/2024     Lab Results   Component Value Date    MG 2.0 03/04/2025     Lab Results   Component Value Date    TSH 1.720 05/28/2024    PSA 2.5 03/05/2025          ECG 12 Lead    Date/Time: 5/19/2025 2:06 PM  Performed by: Wero Green MD    Authorized by: Wero Green MD  Comparison: compared with previous ECG from 8/17/2022  Similar to previous ECG  Rhythm: sinus rhythm  Conduction: conduction normal  ST Segments: ST segments " normal  T Waves: T waves normal    Clinical impression: normal ECG            Assessment & Plan   Diagnosis Plan   1. ASCVD (arteriosclerotic cardiovascular disease), s/p CABG about 16 years ago at [facility].  Stress Test With Myocardial Perfusion (1 Day)      2. Dyspnea on exertion (NYHA class II-III)  Adult Transthoracic Echo Complete w/ Color, Spectral and Contrast if necessary per protocol      3. Tobacco abuse        4. Essential hypertension        5. Dyslipidemia        6. Peripheral arterial disease  US Ankle / Brachial Indices Extremity Limited      7. Type 2 diabetes mellitus with diabetic peripheral angiopathy without gangrene, with long-term current use of insulin           9. Tobacco abuse counseling            Recommendations  Orders Placed This Encounter   Procedures   • US Ankle / Brachial Indices Extremity Limited   • CT chest low dose wo   • Stress Test With Myocardial Perfusion (1 Day)   • ECG 12 Lead   • Adult Transthoracic Echo Complete w/ Color, Spectral and Contrast if necessary per protocol      Continue with aspirin and atorvastatin.  Will evaluate him further with a Lexiscan sestamibi study and echo Doppler study as well as KASHIF.  I have strongly emphasized to him about smoking cessation.  He is willing to try nicotine patches.  Will prescribe 14 mg patch a day for a month with 1 refill.  I told him not to smoke while wearing the patch as this would give extra nicotine which would be harmful for him.  Patient expressed understanding  Will try to obtain his recent lipid panel and CMP reports from his PCP and adjust the dose of statin as needed.  Will obtain a low-dose CT scan lung scan to screen for lung cancer given his history of smoking.  I have discussed this with Mr. Clifton and he is agreeable.    Return in about 2 months (around 7/19/2025).    As always, MIKO Alves  I appreciate very much the opportunity to participate in the cardiovascular care of your patients. Please do  not hesitate to call me with any questions with regards to Christo Clifton's evaluation and management.     With Best Regards,        Wero Green MD, [facility]    Please note that portions of this note were completed with a voice recognition program.           Low-Dose Lung Cancer CT Screening Visit    CHIEF COMPLAINT:    Shared Decision Making  I am discussing tobacco cessation today with Christo Clifton    SMOKING HISTORY:     Social History     Tobacco Use   Smoking Status Every Day   • Current packs/day: 0.00   • Average packs/day: 2.0 packs/day for 45.0 years (90.0 ttl pk-yrs)   • Types: Cigarettes   • Start date: 1965   • Last attempt to quit: 2010   • Years since quitting: 15.3   Smokeless Tobacco Never   Tobacco Comments    Trying to quit       SUBJECTIVE:     Christo Clifton is currently smoking less than 1 pack(s) per day with a  50 pack year history.  Reports no use of alternate forms of tobacco, electronic cigarettes, marijuana or other substances.  Based on the recommendation of the [place] Preventive Services Task Force, this patient is at high risk for lung cancer and a low-dose CT screening scan is recommended.     The patient has had no hemoptysis, unintentional weight loss or increasing shortness of breath. The patient is asymptomatic and has no signs or symptoms of lung cancer.     Together we discussed the potential benefits and potential harms of being screened for lung cancer including the potential for follow up diagnostic testing, risk for over diagnosis, false positive rate and radiation exposure using the [facility] Lung Cancer Screening Shared Decision-Making Tool. A copy of this decision aid resource has been provided in the after visit summary.  We also reviewed the patient's smoking history and counseled him on the importance and health benefits of stopping the use of tobacco products.      OBJECTIVE:    /64 (BP Location: Right arm, Cuff Size: Adult)   Pulse 77   Resp 16   Ht  "180.3 cm (71\")   Wt 75.1 kg (165 lb 9.6 oz)   SpO2 99%   BMI 23.10 kg/m²   General: no distress, alert and oriented  Chest: Lung sounds are clear to auscultation, no wheezes, rales or rhonchi, with symmetric air entry. No respiratory distress  Cardiovascular: RRR with no murmur auscultated      Smoking Cessation discussion:     We discussed that there are a number of resources and interventions to assist with smoking cessation if needed in the future.   On a scale of zero to ten, the patient rates their motivation to quit at a 9 out of 10 today.  Referral to stop smoking class has been offered. Medication options for tobacco cessation have been discussed with the patient.     Recommendations for continued lung cancer screening:      We discussed the NCCN guidelines for lung cancer screening and the patient verbalized understanding that annual screening is recommended until fifteen years beyond smoking as long as they have no other disease or comorbidity that would prevent them from receiving cancer treatments such as surgery should a lung cancer be detected.  After review of the NCCN guidelines and recommendations for ongoing screening, the patient verbalized understanding of recommendations for follow-up.  The patient has decided to proceed with a Low Dose Lung Cancer Screening CT today.      10 minutes face-to-face spent counseling patient on the continued health benefits of having quit tobacco, maintaining smoking abstinence, smoking cessation strategies and resources, including the 1-800-Quit-Now referenced in the AVS, as well as the importance of adherence to annual lung cancer low-dose CT screening.    9  "

## 2025-05-19 NOTE — PATIENT INSTRUCTIONS
Please review the decision aid used during our discussion regarding the Low dose lung cancer screening visit today.                          For more information:    Quit Now [place]  1-800-QUIT-NOW  https://[place].quitlogix.org/en-[place]/  Steps to Quit Smoking  Smoking tobacco can be harmful to your health and can affect almost every organ in your body. Smoking puts you, and those around you, at risk for developing many serious chronic diseases. Quitting smoking is difficult, but it is one of the best things that you can do for your health. It is never too late to quit.  What are the benefits of quitting smoking?  When you quit smoking, you lower your risk of developing serious diseases and conditions, such as:  Lung cancer or lung disease, such as COPD.  Heart disease.  Stroke.  Heart attack.  Infertility.  Osteoporosis and bone fractures.  Additionally, symptoms such as coughing, wheezing, and shortness of breath may get better when you quit. You may also find that you get sick less often because your body is stronger at fighting off colds and infections. If you are pregnant, quitting smoking can help to reduce your chances of having a baby of low birth weight.  How do I get ready to quit?  When you decide to quit smoking, create a plan to make sure that you are successful. Before you quit:  Pick a date to quit. Set a date within the next two weeks to give you time to prepare.  Write down the reasons why you are quitting. Keep this list in places where you will see it often, such as on your bathroom mirror or in your car or wallet.  Identify the people, places, things, and activities that make you want to smoke (triggers) and avoid them. Make sure to take these actions:  Throw away all cigarettes at home, at work, and in your car.  Throw away smoking accessories, such as ashtrays and lighters.  Clean your car and make sure to empty the ashtray.  Clean your home, including curtains and carpets.  Tell your family,  friends, and coworkers that you are quitting. Support from your loved ones can make quitting easier.  Talk with your health care provider about your options for quitting smoking.  Find out what treatment options are covered by your health insurance.  What strategies can I use to quit smoking?  Talk with your healthcare provider about different strategies to quit smoking. Some strategies include:  Quitting smoking altogether instead of gradually lessening how much you smoke over a period of time. Research shows that quitting “cold turkey” is more successful than gradually quitting.  Attending in-person counseling to help you build problem-solving skills. You are more likely to have success in quitting if you attend several counseling sessions. Even short sessions of 10 minutes can be effective.  Finding resources and support systems that can help you to quit smoking and remain smoke-free after you quit. These resources are most helpful when you use them often. They can include:  Online chats with a counselor.  Telephone quitlines.  Printed self-help materials.  Support groups or group counseling.  Text messaging programs.  Mobile phone applications.  Taking medicines to help you quit smoking. (If you are pregnant or breastfeeding, talk with your health care provider first.) Some medicines contain nicotine and some do not. Both types of medicines help with cravings, but the medicines that include nicotine help to relieve withdrawal symptoms. Your health care provider may recommend:  Nicotine patches, gum, or lozenges.  Nicotine inhalers or sprays.  Non-nicotine medicine that is taken by mouth.  Talk with your health care provider about combining strategies, such as taking medicines while you are also receiving in-person counseling. Using these two strategies together makes you more likely to succeed in quitting than if you used either strategy on its own.  If you are pregnant or breastfeeding, talk with your health  care provider about finding counseling or other support strategies to quit smoking. Do not take medicine to help you quit smoking unless told to do so by your health care provider.  What things can I do to make it easier to quit?  Quitting smoking might feel overwhelming at first, but there is a lot that you can do to make it easier. Take these important actions:  Reach out to your family and friends and ask that they support and encourage you during this time. Call telephone quitlines, reach out to support groups, or work with a counselor for support.  Ask people who smoke to avoid smoking around you.  Avoid places that trigger you to smoke, such as bars, parties, or smoke-break areas at work.  Spend time around people who do not smoke.  Lessen stress in your life, because stress can be a smoking trigger for some people. To lessen stress, try:  Exercising regularly.  Deep-breathing exercises.  Yoga.  Meditating.  Performing a body scan. This involves closing your eyes, scanning your body from head to toe, and noticing which parts of your body are particularly tense. Purposefully relax the muscles in those areas.  Download or purchase mobile phone or tablet apps (applications) that can help you stick to your quit plan by providing reminders, tips, and encouragement. There are many free apps, such as QuitGuide from the CDC (Centers for Disease Control and Prevention). You can find other support for quitting smoking (smoking cessation) through smokefree.gov and other websites.  How will I feel when I quit smoking?  Within the first 24 hours of quitting smoking, you may start to feel some withdrawal symptoms. These symptoms are usually most noticeable 2-3 days after quitting, but they usually do not last beyond 2-3 weeks. Changes or symptoms that you might experience include:  Mood swings.  Restlessness, anxiety, or irritation.  Difficulty concentrating.  Dizziness.  Strong cravings for sugary foods in addition to  nicotine.  Mild weight gain.  Constipation.  Nausea.  Coughing or a sore throat.  Changes in how your medicines work in your body.  A depressed mood.  Difficulty sleeping (insomnia).  After the first 2-3 weeks of quitting, you may start to notice more positive results, such as:  Improved sense of smell and taste.  Decreased coughing and sore throat.  Slower heart rate.  Lower blood pressure.  Clearer skin.  The ability to breathe more easily.  Fewer sick days.  Quitting smoking is very challenging for most people. Do not get discouraged if you are not successful the first time. Some people need to make many attempts to quit before they achieve long-term success. Do your best to stick to your quit plan, and talk with your health care provider if you have any questions or concerns.  This information is not intended to replace advice given to you by your health care provider. Make sure you discuss any questions you have with your health care provider.  Document Released: 12/12/2002 Document Revised: 08/15/2017 Document Reviewed: 05/03/2016  [employer] Interactive Patient Education © 2017 [employer]    Please review the decision aid used during our discussion regarding the Low dose lung cancer screening visit today.

## 2025-05-19 NOTE — PROGRESS NOTES
Lesa Rae APRN  Christo Clifton  1955 05/19/2025    Patient Active Problem List   Diagnosis    IDDM (insulin dependent diabetes mellitus)    ASCVD (arteriosclerotic cardiovascular disease)    S/P CABG x 3    Essential hypertension    Dyslipidemia    Skin ulcer    Post-operative state    Preop cardiovascular exam    Epididymitis    Testalgia    Cerebral aneurysm, nonruptured    Aneurysm of carotid artery    Anterior communicating artery aneurysm    Bladder tumor    Blood clot in bladder    Hydronephrosis, right       Dear MIKO Alves:    Subjective     Christo Clifton is a 70 y.o. male with the problems as listed above, presents    Chief complaint: To reestablish his cardiac care and follow-up through our office in a patient with history of known CAD, s/p CABG.    History of Present Illness: Mr. Christo Clifton is a pleasant 70-year-old  male with history of known CAD, s/p CABG about 16 to 17 years ago at [facility], has followed with us until 12/21 but has been lost for follow-up since then.  He now wants to reestablish his cardiac care and follow-up through our office.  On further questioning he denies any complaints of chest pain or discomfort.  He does complain of shortness of breath with mild to moderate exertion with no PND, orthopnea or pedal edema.  He has a long history of smoking but reportedly quit 15 years ago and then started back about 8 months ago and currently smokes about a pack in 3 days.    Allergies   Allergen Reactions    Other    :    Current Outpatient Medications:     amLODIPine (NORVASC) 5 MG tablet, Take 1 tablet by mouth Daily., Disp: , Rfl:     aspirin 81 MG EC tablet, Take 1 tablet by mouth Every Night., Disp: , Rfl:     atorvastatin (LIPITOR) 40 MG tablet, Take 1 tablet by mouth Every Night., Disp: 90 tablet, Rfl: 3    escitalopram (LEXAPRO) 10 MG tablet, Take 1 tablet by mouth Daily., Disp: , Rfl:     insulin glargine (LANTUS, SEMGLEE) 100 UNIT/ML  injection, Inject 14 Units under the skin into the appropriate area as directed Daily., Disp: , Rfl:     insulin aspart prot-insulin aspart (novoLOG 70/30) (70-30) 100 UNIT/ML injection, Inject 60 Units under the skin into the appropriate area as directed 2 (Two) Times a Day With Meals. (Patient not taking: Reported on 5/19/2025), Disp: , Rfl:     levETIRAcetam (KEPPRA) 1000 MG tablet, Take 1 tablet by mouth 2 (Two) Times a Day. (Patient not taking: Reported on 5/19/2025), Disp: , Rfl:     levothyroxine (SYNTHROID, LEVOTHROID) 50 MCG tablet, Take 1 tablet by mouth Daily. (Patient not taking: Reported on 5/19/2025), Disp: , Rfl:     nicotine (Nicoderm CQ) 14 MG/24HR patch, Place 1 patch on the skin as directed by provider Daily., Disp: 30 patch, Rfl: 1    Past Medical History:   Diagnosis Date    Anxiety and depression     Arthritis     COPD (chronic obstructive pulmonary disease)     Coronary artery disease     Diabetes mellitus     Disease of thyroid gland     Dyslipidemia     History of transfusion     Hypertension     Seizure disorder     Pt states last seizure x1 mo.     Past Surgical History:   Procedure Laterality Date    AMPUTATION DIGIT Right 9/13/2017    Procedure: RIGHT 1ST TOE AMPUTATION ;  Surgeon: Lee Lee MD;  Location: [facility] OR;  Service:     CARDIAC SURGERY      CIRCUMCISION      CORONARY ARTERY BYPASS GRAFT      HERNIA REPAIR      X2    AR INCISION & DRAINAGE LEG/ANKLE ABSCESS/HEMATOMA Right 5/24/2017    Procedure: Debridement of right first toe;  Surgeon: Ronald Perdue MD;  Location: [facility] OR;  Service: General    TOE SURGERY Left     debridement     Family History   Problem Relation Age of Onset    Diabetes Mother     Hypertension Mother     Diabetes Father      Social History     Tobacco Use    Smoking status: Every Day     Current packs/day: 0.00     Average packs/day: 2.0 packs/day for 45.0 years (90.0 ttl pk-yrs)     Types: Cigarettes     Start date: 1965     Last attempt to quit:  "2010     Years since quitting: 15.3    Smokeless tobacco: Never    Tobacco comments:     Trying to quit   Vaping Use    Vaping status: Never Used   Substance Use Topics    Alcohol use: No    Drug use: No     Review of Systems   Constitutional: Positive for malaise/fatigue. Negative for chills, fever, weight gain and weight loss.   HENT:  Positive for hearing loss. Negative for congestion and nosebleeds.    Cardiovascular:  Negative for chest pain, irregular heartbeat, leg swelling and orthopnea.   Respiratory:  Positive for shortness of breath. Negative for cough and hemoptysis.    Musculoskeletal:  Positive for joint pain.   Gastrointestinal:  Negative for abdominal pain, change in bowel habit, hematemesis, melena and vomiting.   Genitourinary:  Negative for dysuria, frequency and hematuria.   Neurological:  Negative for focal weakness, headaches, light-headedness and weakness.   Psychiatric/Behavioral:  Positive for depression.      Objective   Blood pressure 115/64, pulse 77, resp. rate 16, height 180.3 cm (71\"), weight 75.1 kg (165 lb 9.6 oz), SpO2 99%.  Body mass index is 23.1 kg/m².    Vitals reviewed.   Constitutional:       Appearance: Well-developed.   Eyes:      Conjunctiva/sclera: Conjunctivae normal.   HENT:      Head: Normocephalic.   Neck:      Thyroid: No thyromegaly.      Vascular: No JVD.      Trachea: No tracheal deviation.   Pulmonary:      Effort: No respiratory distress.      Breath sounds: Normal breath sounds. No wheezing. No rales.   Cardiovascular:      PMI at left midclavicular line. Normal rate. Regular rhythm. Normal S1. Normal S2.       Murmurs: There is no murmur.      No gallop.  No click. No rub.   Pulses:     Carotid: 2+ bilaterally.     Radial: 2+ bilaterally.     Dorsalis pedis: 1+ bilaterally.     Posterior tibial: 1+ bilaterally.  Edema:     Peripheral edema absent.   Abdominal:      General: Bowel sounds are normal.      Palpations: Abdomen is soft. There is no abdominal mass. " "     Tenderness: There is no abdominal tenderness.   Musculoskeletal:      Cervical back: Normal range of motion and neck supple. Skin:     General: Skin is warm and dry.   Neurological:      Mental Status: Alert and oriented to person, place, and time.      Cranial Nerves: No cranial nerve deficit.         Lab Results   Component Value Date     03/04/2025    K 3.9 03/04/2025     03/04/2025    CO2 25.7 03/04/2025    BUN 30 (H) 03/04/2025    CREATININE 1.19 03/04/2025    GLUCOSE 155 (H) 03/04/2025    CALCIUM 9.2 03/04/2025    AST 17 03/04/2025    ALT 10 03/04/2025    ALKPHOS 132 (H) 03/04/2025     No results found for: \"CKTOTAL\"  Lab Results   Component Value Date    WBC 11.5 (H) 04/21/2025    HGB 11.9 (L) 04/21/2025    HCT 36.5 (L) 04/21/2025     04/21/2025     Lab Results   Component Value Date    INR 0.92 02/28/2025    INR 1.08 05/28/2024    INR 0.95 05/27/2024     Lab Results   Component Value Date    MG 2.0 03/04/2025     Lab Results   Component Value Date    TSH 1.720 05/28/2024    PSA 2.5 03/05/2025          ECG 12 Lead    Date/Time: 5/19/2025 2:06 PM  Performed by: Wero Green MD    Authorized by: Wero Green MD  Comparison: compared with previous ECG from 8/17/2022  Similar to previous ECG  Rhythm: sinus rhythm  Conduction: conduction normal  ST Segments: ST segments normal  T Waves: T waves normal    Clinical impression: normal ECG            Assessment & Plan    Diagnosis Plan   1. ASCVD (arteriosclerotic cardiovascular disease), s/p CABG about 16 years ago at [facility].  Stress Test With Myocardial Perfusion (1 Day)      2. Dyspnea on exertion (NYHA class II-III)  Adult Transthoracic Echo Complete w/ Color, Spectral and Contrast if necessary per protocol      3. Tobacco abuse        4. Essential hypertension        5. Dyslipidemia        6. Peripheral arterial disease  US Ankle / Brachial Indices Extremity Limited      7. Type 2 diabetes mellitus with diabetic peripheral " angiopathy without gangrene, with long-term current use of insulin           9. Tobacco abuse counseling            Recommendations  Orders Placed This Encounter   Procedures    US Ankle / Brachial Indices Extremity Limited    CT chest low dose wo    Stress Test With Myocardial Perfusion (1 Day)    ECG 12 Lead    Adult Transthoracic Echo Complete w/ Color, Spectral and Contrast if necessary per protocol      Continue with aspirin and atorvastatin.  Will evaluate him further with a Lexiscan sestamibi study and echo Doppler study as well as KASHIF.  I have strongly emphasized to him about smoking cessation.  He is willing to try nicotine patches.  Will prescribe 14 mg patch a day for a month with 1 refill.  I told him not to smoke while wearing the patch as this would give extra nicotine which would be harmful for him.  Patient expressed understanding  Will try to obtain his recent lipid panel and CMP reports from his PCP and adjust the dose of statin as needed.  Will obtain a low-dose CT scan lung scan to screen for lung cancer given his history of smoking.  I have discussed this with Mr. Clifton and he is agreeable.    Return in about 2 months (around 7/19/2025).    As always, IMKO Alves  I appreciate very much the opportunity to participate in the cardiovascular care of your patients. Please do not hesitate to call me with any questions with regards to Christo Clifton's evaluation and management.     With Best Regards,        Wero Green MD, [facility]    Please note that portions of this note were completed with a voice recognition program.           Low-Dose Lung Cancer CT Screening Visit    CHIEF COMPLAINT:    Shared Decision Making  I am discussing tobacco cessation today with Christo Clifton    SMOKING HISTORY:     Social History     Tobacco Use   Smoking Status Every Day    Current packs/day: 0.00    Average packs/day: 2.0 packs/day for 45.0 years (90.0 ttl pk-yrs)    Types: Cigarettes    Start date: 1965     "Last attempt to quit: 2010    Years since quitting: 15.3   Smokeless Tobacco Never   Tobacco Comments    Trying to quit       SUBJECTIVE:     Christo Clifton is currently smoking less than 1 pack(s) per day with a  50 pack year history.  Reports no use of alternate forms of tobacco, electronic cigarettes, marijuana or other substances.  Based on the recommendation of the [place] Preventive Services Task Force, this patient is at high risk for lung cancer and a low-dose CT screening scan is recommended.     The patient has had no hemoptysis, unintentional weight loss or increasing shortness of breath. The patient is asymptomatic and has no signs or symptoms of lung cancer.     Together we discussed the potential benefits and potential harms of being screened for lung cancer including the potential for follow up diagnostic testing, risk for over diagnosis, false positive rate and radiation exposure using the [facility] Lung Cancer Screening Shared Decision-Making Tool. A copy of this decision aid resource has been provided in the after visit summary.  We also reviewed the patient's smoking history and counseled him on the importance and health benefits of stopping the use of tobacco products.      OBJECTIVE:    /64 (BP Location: Right arm, Cuff Size: Adult)   Pulse 77   Resp 16   Ht 180.3 cm (71\")   Wt 75.1 kg (165 lb 9.6 oz)   SpO2 99%   BMI 23.10 kg/m²   General: no distress, alert and oriented  Chest: Lung sounds are clear to auscultation, no wheezes, rales or rhonchi, with symmetric air entry. No respiratory distress  Cardiovascular: RRR with no murmur auscultated      Smoking Cessation discussion:     We discussed that there are a number of resources and interventions to assist with smoking cessation if needed in the future.   On a scale of zero to ten, the patient rates their motivation to quit at a 9 out of 10 today.  Referral to stop smoking class has been offered. Medication options for " tobacco cessation have been discussed with the patient.     Recommendations for continued lung cancer screening:      We discussed the NCCN guidelines for lung cancer screening and the patient verbalized understanding that annual screening is recommended until fifteen years beyond smoking as long as they have no other disease or comorbidity that would prevent them from receiving cancer treatments such as surgery should a lung cancer be detected.  After review of the NCCN guidelines and recommendations for ongoing screening, the patient verbalized understanding of recommendations for follow-up.  The patient has decided to proceed with a Low Dose Lung Cancer Screening CT today.      10 minutes face-to-face spent counseling patient on the continued health benefits of having quit tobacco, maintaining smoking abstinence, smoking cessation strategies and resources, including the 1-800-Quit-Now referenced in the AVS, as well as the importance of adherence to annual lung cancer low-dose CT screening.    9

## 2025-05-20 ENCOUNTER — PATIENT ROUNDING (BHMG ONLY) (OUTPATIENT)
Dept: CARDIOLOGY | Facility: CLINIC | Age: 70
End: 2025-05-20
Payer: MEDICARE

## 2025-05-20 NOTE — PROGRESS NOTES
A [employer] message has been sent to the patient for patient rounding for [facility].

## 2025-06-03 ENCOUNTER — HOSPITAL ENCOUNTER (OUTPATIENT)
Dept: CARDIOLOGY | Facility: HOSPITAL | Age: 70
Discharge: HOME | End: 2025-06-03
Payer: MEDICARE

## 2025-06-03 ENCOUNTER — HOSPITAL ENCOUNTER (OUTPATIENT)
Dept: CT IMAGING | Facility: HOSPITAL | Age: 70
Discharge: HOME | End: 2025-06-03
Payer: MEDICARE

## 2025-06-03 ENCOUNTER — HOSPITAL ENCOUNTER (OUTPATIENT)
Dept: NUCLEAR MEDICINE | Facility: HOSPITAL | Age: 70
Discharge: HOME | End: 2025-06-03
Payer: MEDICARE

## 2025-06-03 ENCOUNTER — APPOINTMENT (OUTPATIENT)
Dept: CT IMAGING | Facility: HOSPITAL | Age: 70
End: 2025-06-03
Payer: MEDICARE

## 2025-06-03 ENCOUNTER — HOSPITAL ENCOUNTER (OUTPATIENT)
Dept: ULTRASOUND IMAGING | Facility: HOSPITAL | Age: 70
Discharge: HOME | End: 2025-06-03
Payer: MEDICARE

## 2025-06-03 DIAGNOSIS — R06.09 DYSPNEA ON EXERTION: ICD-10-CM

## 2025-06-03 DIAGNOSIS — R91.8 PULMONARY NODULES: Primary | ICD-10-CM

## 2025-06-03 DIAGNOSIS — I25.10 ASCVD (ARTERIOSCLEROTIC CARDIOVASCULAR DISEASE): ICD-10-CM

## 2025-06-03 DIAGNOSIS — I73.9 PERIPHERAL ARTERIAL DISEASE: ICD-10-CM

## 2025-06-03 DIAGNOSIS — Z72.0 TOBACCO ABUSE: ICD-10-CM

## 2025-06-03 LAB
BH CV NUCLEAR PRIOR STUDY: 1
BH CV REST NUCLEAR ISOTOPE DOSE: 10.7 MCI
BH CV STRESS COMMENTS STAGE 1: NORMAL
BH CV STRESS DOSE REGADENOSON STAGE 1: 0.4
BH CV STRESS DURATION MIN STAGE 1: 0
BH CV STRESS DURATION SEC STAGE 1: 10
BH CV STRESS NUCLEAR ISOTOPE DOSE: 30.8 MCI
BH CV STRESS PROTOCOL 1: NORMAL
BH CV STRESS RECOVERY BP: NORMAL MMHG
BH CV STRESS RECOVERY HR: 82 BPM
BH CV STRESS STAGE 1: 1
MAXIMAL PREDICTED HEART RATE: 149 BPM
PERCENT MAX PREDICTED HR: 55.7 %
SPECT HRT GATED+EF W RNC IV: 43 %
STRESS BASELINE BP: NORMAL MMHG
STRESS BASELINE HR: 76 BPM
STRESS PERCENT HR: 66 %
STRESS POST PEAK BP: NORMAL MMHG
STRESS POST PEAK HR: 83 BPM
STRESS TARGET HR: 127 BPM

## 2025-06-03 PROCEDURE — 34310000005 TECHNETIUM SESTAMIBI: Performed by: INTERNAL MEDICINE

## 2025-06-03 PROCEDURE — A9500 TC99M SESTAMIBI: HCPCS | Performed by: INTERNAL MEDICINE

## 2025-06-03 PROCEDURE — 71271 CT THORAX LUNG CANCER SCR C-: CPT | Performed by: RADIOLOGY

## 2025-06-03 PROCEDURE — 78452 HT MUSCLE IMAGE SPECT MULT: CPT

## 2025-06-03 PROCEDURE — 71271 CT THORAX LUNG CANCER SCR C-: CPT

## 2025-06-03 PROCEDURE — 93306 TTE W/DOPPLER COMPLETE: CPT

## 2025-06-03 PROCEDURE — 93017 CV STRESS TEST TRACING ONLY: CPT

## 2025-06-03 PROCEDURE — 25010000002 REGADENOSON 0.4 MG/5ML SOLUTION: Performed by: INTERNAL MEDICINE

## 2025-06-03 PROCEDURE — 93922 UPR/L XTREMITY ART 2 LEVELS: CPT | Performed by: RADIOLOGY

## 2025-06-03 PROCEDURE — 93922 UPR/L XTREMITY ART 2 LEVELS: CPT

## 2025-06-03 RX ORDER — REGADENOSON 0.08 MG/ML
0.4 INJECTION, SOLUTION INTRAVENOUS
Status: COMPLETED | OUTPATIENT
Start: 2025-06-03 | End: 2025-06-03

## 2025-06-03 RX ADMIN — TECHNETIUM TC 99M SESTAMIBI 1 DOSE: 1 INJECTION INTRAVENOUS at 11:40

## 2025-06-03 RX ADMIN — TECHNETIUM TC 99M SESTAMIBI 1 DOSE: 1 INJECTION INTRAVENOUS at 12:50

## 2025-06-03 RX ADMIN — REGADENOSON 0.4 MG: 0.08 INJECTION, SOLUTION INTRAVENOUS at 12:52

## 2025-06-06 LAB
AORTIC DIMENSIONLESS INDEX: 0.55 (DI)
AV MEAN PRESS GRAD SYS DOP V1V2: 4 MMHG
AV VMAX SYS DOP: 136 CM/SEC
BH CV ECHO MEAS - ACS: 1.2 CM
BH CV ECHO MEAS - AO MAX PG: 7.4 MMHG
BH CV ECHO MEAS - AO ROOT DIAM: 3.7 CM
BH CV ECHO MEAS - AO V2 VTI: 29.5 CM
BH CV ECHO MEAS - AVA(I,D): 1.73 CM2
BH CV ECHO MEAS - EDV(CUBED): 153.1 ML
BH CV ECHO MEAS - EDV(MOD-SP2): 100 ML
BH CV ECHO MEAS - EDV(MOD-SP4): 66.9 ML
BH CV ECHO MEAS - EF(MOD-SP2): 57.4 %
BH CV ECHO MEAS - EF(MOD-SP4): 65 %
BH CV ECHO MEAS - ESV(CUBED): 48.6 ML
BH CV ECHO MEAS - ESV(MOD-SP2): 42.6 ML
BH CV ECHO MEAS - ESV(MOD-SP4): 23.4 ML
BH CV ECHO MEAS - FS: 31.8 %
BH CV ECHO MEAS - IVS/LVPW: 0.94 CM
BH CV ECHO MEAS - IVSD: 0.85 CM
BH CV ECHO MEAS - LA DIMENSION: 4.7 CM
BH CV ECHO MEAS - LAT PEAK E' VEL: 10.9 CM/SEC
BH CV ECHO MEAS - LV DIASTOLIC VOL/BSA (35-75): 34.4 CM2
BH CV ECHO MEAS - LV MASS(C)D: 171 GRAMS
BH CV ECHO MEAS - LV MAX PG: 2.6 MMHG
BH CV ECHO MEAS - LV MEAN PG: 1 MMHG
BH CV ECHO MEAS - LV SYSTOLIC VOL/BSA (12-30): 12 CM2
BH CV ECHO MEAS - LV V1 MAX: 80 CM/SEC
BH CV ECHO MEAS - LV V1 VTI: 16.2 CM
BH CV ECHO MEAS - LVIDD: 5.4 CM
BH CV ECHO MEAS - LVIDS: 3.7 CM
BH CV ECHO MEAS - LVOT AREA: 3.1 CM2
BH CV ECHO MEAS - LVOT DIAM: 2 CM
BH CV ECHO MEAS - LVPWD: 0.9 CM
BH CV ECHO MEAS - MED PEAK E' VEL: 6.5 CM/SEC
BH CV ECHO MEAS - MV A MAX VEL: 113 CM/SEC
BH CV ECHO MEAS - MV DEC SLOPE: 278 CM/SEC2
BH CV ECHO MEAS - MV E MAX VEL: 58.2 CM/SEC
BH CV ECHO MEAS - MV E/A: 0.52
BH CV ECHO MEAS - MV P1/2T: 60.6 MSEC
BH CV ECHO MEAS - MVA(P1/2T): 3.6 CM2
BH CV ECHO MEAS - PA ACC TIME: 0.1 SEC
BH CV ECHO MEAS - RVDD: 3.4 CM
BH CV ECHO MEAS - SV(LVOT): 50.9 ML
BH CV ECHO MEAS - SV(MOD-SP2): 57.4 ML
BH CV ECHO MEAS - SV(MOD-SP4): 43.5 ML
BH CV ECHO MEAS - SVI(LVOT): 26.2 ML/M2
BH CV ECHO MEAS - SVI(MOD-SP2): 29.5 ML/M2
BH CV ECHO MEAS - SVI(MOD-SP4): 22.4 ML/M2
BH CV ECHO MEAS - TAPSE (>1.6): 1.1 CM
BH CV ECHO MEASUREMENTS AVERAGE E/E' RATIO: 6.69
BH CV XLRA - RV BASE: 2.9 CM
BH CV XLRA - RV LENGTH: 5.8 CM
BH CV XLRA - RV MID: 3 CM
LEFT ATRIUM VOLUME INDEX: 27.9 ML/M2
LV EF BIPLANE MOD: 59 %

## 2025-06-18 ENCOUNTER — OFFICE VISIT (OUTPATIENT)
Dept: CARDIOLOGY | Facility: CLINIC | Age: 70
End: 2025-06-18
Payer: MEDICARE

## 2025-06-18 VITALS
DIASTOLIC BLOOD PRESSURE: 68 MMHG | HEART RATE: 100 BPM | WEIGHT: 166.4 LBS | SYSTOLIC BLOOD PRESSURE: 137 MMHG | BODY MASS INDEX: 23.3 KG/M2 | HEIGHT: 71 IN

## 2025-06-18 DIAGNOSIS — I10 ESSENTIAL HYPERTENSION: ICD-10-CM

## 2025-06-18 DIAGNOSIS — R94.39 ABNORMAL NUCLEAR STRESS TEST: ICD-10-CM

## 2025-06-18 DIAGNOSIS — E78.5 DYSLIPIDEMIA: ICD-10-CM

## 2025-06-18 DIAGNOSIS — I25.10 ASCVD (ARTERIOSCLEROTIC CARDIOVASCULAR DISEASE): Primary | ICD-10-CM

## 2025-06-18 PROCEDURE — 3075F SYST BP GE 130 - 139MM HG: CPT | Performed by: INTERNAL MEDICINE

## 2025-06-18 PROCEDURE — 3078F DIAST BP <80 MM HG: CPT | Performed by: INTERNAL MEDICINE

## 2025-06-18 PROCEDURE — 99214 OFFICE O/P EST MOD 30 MIN: CPT | Performed by: INTERNAL MEDICINE

## 2025-06-18 NOTE — LETTER
June 22, 2025     MIKO Alves  [address] [facility] [address]    Patient: Christo Clifton   YOB: 1955   Date of Visit: 6/18/2025     Dear MIKO Alves:       Thank you for referring Christo Clifton to me for evaluation. Below are the relevant portions of my assessment and plan of care.    If you have questions, please do not hesitate to call me. I look forward to following Christo along with you.         Sincerely,        Wero Green MD        CC: No Recipients    Wero Green MD  06/22/25 1305  Incomplete  Lesa Rae APRN  Christo Clifton  1955 06/18/2025    Patient Active Problem List   Diagnosis   • IDDM (insulin dependent diabetes mellitus)   • ASCVD (arteriosclerotic cardiovascular disease)   • S/P CABG x 3   • Essential hypertension   • Dyslipidemia   • Skin ulcer   • Post-operative state   • Preop cardiovascular exam   • Epididymitis   • Testalgia   • Cerebral aneurysm, nonruptured   • Aneurysm of carotid artery   • Anterior communicating artery aneurysm   • Bladder tumor   • Blood clot in bladder   • Hydronephrosis, right       Dear Lesa Rae APRN:    Subjective     Christo Clifton is a 70 y.o. male with the problems as listed above, presents    Chief Complaint   Patient presents with   • Results     ABNORMAL STRESS TEST       History of Present Illness: Mr. Christo Clifton is a pleasant 70-year-old gentleman with history of known CAD, s/p CABG about 16 to 17 years ago at [facility].  He recently reestablished cardiac care and follow-up through our office.  On recent visit he was complaining of some shortness of breath.  He underwent evaluation with a Lexiscan sestamibi study and echo Doppler study.      His nuclear stress test revealed small to moderate size, mild to moderate degree of apical and inferior wall myocardial ischemia.  His echo Doppler study revealed normal left ventricle chamber size, wall motion and systolic function and no  significant valvular disease.  On today's visit he denies any complaints of chest pains but still has shortness of breath with mild to moderate exertion.  He says he is quit smoking about 5 weeks ago.  His KASHIF was normal.      Complete Transthoracic Echocardiogram with Complete Doppler and Color Flow    Patient Name: Christo Clifton   Patient MRN: [id number]   Patient : 1955 (70 y.o.)   Legal Sex: Male    Accession Number: [id number]   Date of Study: 6/3/25   Ordering Provider: Wero Green MD   Clinical Indications: Dyspnea       [facility]  Performing Staff   Cardiology: Wero Green MD    Tech: Lincoln Dill             Show images for Adult Transthoracic Echo Complete w/ Color, Spectral and Contrast if necessary per protocol   Clinical Indication    Dyspnea   Dx: Dyspnea on exertion (NYHA class II-III) [R06.09 (ICD-10-CM)]     Interpretation Summary   •  Normal left ventricular cavity size and wall thickness noted. All left ventricular wall segments contract normally.  •  Left ventricular ejection fraction appears to be 56 - 60%.  •  Left ventricular diastolic function is consistent with (grade I) impaired relaxation.  •  There is mild calcification of the aortic valve mainly affecting the left coronary cusp(s). The aortic valve was poorly visualized but appears trileaflet.  •  No aortic valve regurgitation is present. Mild aortic valve stenosis is present.  •  The mitral valve is structurally normal with no significant stenosis present. Trace mitral valve regurgitation is present.  •  There is no evidence of pericardial effusion.    Regadenoson Stress Test with Myocardial Perfusion SPECT (Multi Study)    Patient Name: Christo Clifton   Patient MRN: [id number]   Patient : 1955 (70 y.o.)   Legal Sex: Male    Accession Number: [id number]   Date of Study: 6/3/25   Ordering Provider: Wero Green MD   Clinical Indications: Known Coronary Artery Disease       [facility]   Performing Staff   ECG [place], SPECT [place]: Wero Green MD    Tech: Hayden Benitez    Support Staff: Anil Zhang           Show images for Stress Test With Myocardial Perfusion (1 Day)   Interpretation Summary   •  This is a pharmacological nuclear stress test using regadenoson  •  Findings consistent with an indeterminate ECG stress test.  •  Myocardial perfusion imaging indicates a small-to-moderate-sized, mild-to-moderate degree of ischemia located in the apex and inferior wall.  •  TID:0.94  •  Abnormal LV wall motion consistent with moderate global hypokinesis.  •  Left ventricular ejection fraction is moderately reduced (Calculated EF = 43%).  •  Impressions are consistent with an intermediate risk study.      US Ankle / Brachial Indices Extremity Limited   Order  Status: Final result     Study Result    Narrative & Impression   EXAM:    US Ankle-Brachial Index (KASHIF), 1 or 2 Levels     EXAM DATE:    6/3/2025 1:38 PM     CLINICAL HISTORY:    Peripheral arterial disease; I73.9-Peripheral vascular disease,  unspecified     TECHNIQUE:    Limited bilateral and noninvasive physiological study of the upper or  lower extremity arteries.  Bidirectional, Doppler and pressure waveform  recording with analysis at 1-2 levels.     COMPARISON:    No relevant prior studies available.     FINDINGS:    Right KASHIF:  Unremarkable.  Right ankle-brachial index (KASHIF) is 1.01  which is within normal limits.    Left KASHIF:  Unremarkable.  Left ankle-brachial index (KASHIF) is 1.07  which is within normal limits.     IMPRESSION:    Normal ABIs.  No evidence of peripheral arterial disease.     This report was finalized on 6/3/2025 2:34 PM by Dr. Darnell Staton MD.        Allergies   Allergen Reactions   • Other    :    Current Outpatient Medications:   •  amLODIPine (NORVASC) 5 MG tablet, Take 1 tablet by mouth Daily., Disp: , Rfl:   •  aspirin 81 MG EC tablet, Take 1 tablet by mouth Every Night., Disp: , Rfl:   •  atorvastatin  (LIPITOR) 40 MG tablet, Take 1 tablet by mouth Every Night., Disp: 90 tablet, Rfl: 3  •  escitalopram (LEXAPRO) 10 MG tablet, Take 1 tablet by mouth Daily., Disp: , Rfl:   •  insulin aspart prot-insulin aspart (novoLOG 70/30) (70-30) 100 UNIT/ML injection, Inject 60 Units under the skin into the appropriate area as directed 2 (Two) Times a Day With Meals., Disp: , Rfl:   •  insulin glargine (LANTUS, SEMGLEE) 100 UNIT/ML injection, Inject 14 Units under the skin into the appropriate area as directed Daily., Disp: , Rfl:   •  levETIRAcetam (KEPPRA) 1000 MG tablet, Take 1 tablet by mouth 2 (Two) Times a Day., Disp: , Rfl:   •  levothyroxine (SYNTHROID, LEVOTHROID) 50 MCG tablet, Take 1 tablet by mouth Daily., Disp: , Rfl:   •  nicotine (Nicoderm CQ) 14 MG/24HR patch, Place 1 patch on the skin as directed by provider Daily., Disp: 30 patch, Rfl: 1    The following portions of the patient's history were reviewed and updated as appropriate: allergies, current medications, past family history, past medical history, past social history, past surgical history and problem list.    Social History     Tobacco Use   • Smoking status: Every Day     Current packs/day: 0.00     Average packs/day: 2.0 packs/day for 45.0 years (90.0 ttl pk-yrs)     Types: Cigarettes     Start date: 1965     Last attempt to quit: 2010     Years since quitting: 15.4   • Smokeless tobacco: Never   • Tobacco comments:     Trying to quit   Vaping Use   • Vaping status: Never Used   Substance Use Topics   • Alcohol use: No   • Drug use: No     Review of Systems   Constitutional: Negative for chills and fever.   HENT:  Negative for nosebleeds and sore throat.    Respiratory:  Negative for cough, hemoptysis and wheezing.    Gastrointestinal:  Negative for abdominal pain, hematemesis, hematochezia, melena, nausea and vomiting.   Genitourinary:  Negative for dysuria and hematuria.   Neurological:  Negative for headaches.     Objective   Vitals:    06/18/25  "1010   BP: 137/68   Pulse: 100   Weight: 75.5 kg (166 lb 6.4 oz)   Height: 180.3 cm (71\")     Body mass index is 23.21 kg/m².    Vitals reviewed.   Constitutional:       Appearance: Well-developed.   Eyes:      Conjunctiva/sclera: Conjunctivae normal.   HENT:      Head: Normocephalic.   Neck:      Thyroid: No thyromegaly.      Vascular: No JVD.      Trachea: No tracheal deviation.   Pulmonary:      Effort: No respiratory distress.      Breath sounds: Normal breath sounds. No wheezing. No rales.   Cardiovascular:      PMI at left midclavicular line. Normal rate. Regular rhythm. Normal S1. Normal S2.       Murmurs: There is no murmur.      No gallop.  No click. No rub.   Pulses:     Intact distal pulses.   Edema:     Peripheral edema absent.   Abdominal:      General: Bowel sounds are normal.      Palpations: Abdomen is soft. There is no abdominal mass.      Tenderness: There is no abdominal tenderness.   Musculoskeletal:      Cervical back: Normal range of motion and neck supple. Skin:     General: Skin is warm and dry.   Neurological:      Mental Status: Alert and oriented to person, place, and time.      Cranial Nerves: No cranial nerve deficit.       Lab Results   Component Value Date     03/04/2025    K 3.9 03/04/2025     03/04/2025    CO2 25.7 03/04/2025    BUN 30 (H) 03/04/2025    CREATININE 1.19 03/04/2025    GLUCOSE 155 (H) 03/04/2025    CALCIUM 9.2 03/04/2025    AST 17 03/04/2025    ALT 10 03/04/2025    ALKPHOS 132 (H) 03/04/2025       Lab Results   Component Value Date    WBC 11.5 (H) 04/21/2025    HGB 11.9 (L) 04/21/2025    HCT 36.5 (L) 04/21/2025     04/21/2025     Lab Results   Component Value Date    INR 0.92 02/28/2025    INR 1.08 05/28/2024    INR 0.95 05/27/2024     Lab Results   Component Value Date    MG 2.0 03/04/2025     Lab Results   Component Value Date    TSH 1.720 05/28/2024    PSA 2.5 03/05/2025        Procedures    Assessment & Plan    Diagnosis Plan   1. ASCVD " (arteriosclerotic cardiovascular disease), s/p CABG about 16 years ago at [facility].        2. Abnormal nuclear stress test with small to moderate size, mild to moderate degree of inferior and apical Myocard ischemia.        3. Essential hypertension        4. Dyslipidemia  Lipid Panel    Comprehensive Metabolic Panel        Recommendations  I have discussed results of the nuclear stress test, echo Doppler study and KASHIF with Mr. Clifton.  Since his CABG was about 60 to 70 years ago and with his history of smoking and abnormal nuclear stress, there is a good possibility of him having recurrent significant CAD.  Hence have recommended further cardiac evaluation with cardiac catheterization and further core intervention as needed.  Patient is agreeable.  Will try to set this up in the next few days.  Continue with aspirin, atorvastatin.  Will check fasting lipid panel and adjust dose of statin as needed to keep the LDL around 50.    Return in about 3 months (around 9/18/2025).    As always, Lesa Rae APRN  I appreciate very much the opportunity to participate in the cardiovascular care of your patients. Please do not hesitate to call me with any questions with regards to Christo Clifton's evaluation and management.         With Best Regards,        Wero Green MD, [facility]    Please note that portions of this note were completed with a voice recognition program.            Wero Green MD  06/18/25 1049  Sign when Signing Visit  Lesa Rae APRN  Christo Clifton  1955 06/18/2025    Patient Active Problem List   Diagnosis   • IDDM (insulin dependent diabetes mellitus)   • ASCVD (arteriosclerotic cardiovascular disease)   • S/P CABG x 3   • Essential hypertension   • Dyslipidemia   • Skin ulcer   • Post-operative state   • Preop cardiovascular exam   • Epididymitis   • Testalgia   • Cerebral aneurysm, nonruptured   • Aneurysm of carotid artery   • Anterior communicating artery aneurysm   • Bladder tumor    • Blood clot in bladder   • Hydronephrosis, right       Dear Lesa Rae, APRN:    Subjective    Christo Clifton is a 70 y.o. male with the problems as listed above, presents    Chief Complaint   Patient presents with   • Results     ABNORMAL STRESS TEST       History of Present Illness: Mr. Christo Cohen is a pleasant 70-year-old gentleman with history of known CAD, s/p CABG about 16 to 17 years ago at [facility].  He recently reestablishes cardiac care and follow-up through our office.  On recent visit he was complaining of some shortness of breath.  He underwent evaluation with a Lexiscan sestamibi study and echo Doppler study.      His nuclear stress test revealed small to moderate size, mild to moderate degree of apical and inferior wall myocardial ischemia.  His echo Doppler study revealed normal left ventricle chamber size, wall motion and systolic function and no significant valvular disease.  On today's visit he denies any complaints of chest pains but still has shortness of breath with mild to moderate exertion.  He says he is quit smoking about 5 weeks ago.  His KASHIF was normal.      Complete Transthoracic Echocardiogram with Complete Doppler and Color Flow    Patient Name: Christo Clifton   Patient MRN: [id number]   Patient : 1955 (70 y.o.)   Legal Sex: Male    Accession Number: [id number]   Date of Study: 6/3/25   Ordering Provider: Wero Green MD   Clinical Indications: Dyspnea       [facility]  Performing Staff   Cardiology: Wero Green MD    Tech: Lincoln Dill             Show images for Adult Transthoracic Echo Complete w/ Color, Spectral and Contrast if necessary per protocol   Clinical Indication    Dyspnea   Dx: Dyspnea on exertion (NYHA class II-III) [R06.09 (ICD-10-CM)]     Interpretation Summary   •  Normal left ventricular cavity size and wall thickness noted. All left ventricular wall segments contract normally.  •  Left ventricular ejection  fraction appears to be 56 - 60%.  •  Left ventricular diastolic function is consistent with (grade I) impaired relaxation.  •  There is mild calcification of the aortic valve mainly affecting the left coronary cusp(s). The aortic valve was poorly visualized but appears trileaflet.  •  No aortic valve regurgitation is present. Mild aortic valve stenosis is present.  •  The mitral valve is structurally normal with no significant stenosis present. Trace mitral valve regurgitation is present.  •  There is no evidence of pericardial effusion.    Regadenoson Stress Test with Myocardial Perfusion SPECT (Multi Study)    Patient Name: Christo Clifton   Patient MRN: [id number]   Patient : 1955 (70 y.o.)   Legal Sex: Male    Accession Number: [id number]   Date of Study: 6/3/25   Ordering Provider: Wero Green MD   Clinical Indications: Known Coronary Artery Disease       [facility]  Performing Staff   ECG [place], SPECT [place]: Wero Green MD    Tech: Hayden Benitez    Support Staff: Anil Zhang           Show images for Stress Test With Myocardial Perfusion (1 Day)   Interpretation Summary   •  This is a pharmacological nuclear stress test using regadenoson  •  Findings consistent with an indeterminate ECG stress test.  •  Myocardial perfusion imaging indicates a small-to-moderate-sized, mild-to-moderate degree of ischemia located in the apex and inferior wall.  •  TID:0.94  •  Abnormal LV wall motion consistent with moderate global hypokinesis.  •  Left ventricular ejection fraction is moderately reduced (Calculated EF = 43%).  •  Impressions are consistent with an intermediate risk study.      US Ankle / Brachial Indices Extremity Limited   Order  Status: Final result     Study Result    Narrative & Impression   EXAM:    US Ankle-Brachial Index (KASHIF), 1 or 2 Levels     EXAM DATE:    6/3/2025 1:38 PM     CLINICAL HISTORY:    Peripheral arterial disease; I73.9-Peripheral vascular  disease,  unspecified     TECHNIQUE:    Limited bilateral and noninvasive physiological study of the upper or  lower extremity arteries.  Bidirectional, Doppler and pressure waveform  recording with analysis at 1-2 levels.     COMPARISON:    No relevant prior studies available.     FINDINGS:    Right KASHIF:  Unremarkable.  Right ankle-brachial index (KASHIF) is 1.01  which is within normal limits.    Left KASHIF:  Unremarkable.  Left ankle-brachial index (KASHIF) is 1.07  which is within normal limits.     IMPRESSION:    Normal ABIs.  No evidence of peripheral arterial disease.     This report was finalized on 6/3/2025 2:34 PM by Dr. Darnell Staton MD.        Allergies   Allergen Reactions   • Other    :    Current Outpatient Medications:   •  amLODIPine (NORVASC) 5 MG tablet, Take 1 tablet by mouth Daily., Disp: , Rfl:   •  aspirin 81 MG EC tablet, Take 1 tablet by mouth Every Night., Disp: , Rfl:   •  atorvastatin (LIPITOR) 40 MG tablet, Take 1 tablet by mouth Every Night., Disp: 90 tablet, Rfl: 3  •  escitalopram (LEXAPRO) 10 MG tablet, Take 1 tablet by mouth Daily., Disp: , Rfl:   •  insulin aspart prot-insulin aspart (novoLOG 70/30) (70-30) 100 UNIT/ML injection, Inject 60 Units under the skin into the appropriate area as directed 2 (Two) Times a Day With Meals., Disp: , Rfl:   •  insulin glargine (LANTUS, SEMGLEE) 100 UNIT/ML injection, Inject 14 Units under the skin into the appropriate area as directed Daily., Disp: , Rfl:   •  levETIRAcetam (KEPPRA) 1000 MG tablet, Take 1 tablet by mouth 2 (Two) Times a Day., Disp: , Rfl:   •  levothyroxine (SYNTHROID, LEVOTHROID) 50 MCG tablet, Take 1 tablet by mouth Daily., Disp: , Rfl:   •  nicotine (Nicoderm CQ) 14 MG/24HR patch, Place 1 patch on the skin as directed by provider Daily., Disp: 30 patch, Rfl: 1    The following portions of the patient's history were reviewed and updated as appropriate: allergies, current medications, past family history, past medical history, past  "social history, past surgical history and problem list.    Social History     Tobacco Use   • Smoking status: Every Day     Current packs/day: 0.00     Average packs/day: 2.0 packs/day for 45.0 years (90.0 ttl pk-yrs)     Types: Cigarettes     Start date: 1965     Last attempt to quit: 2010     Years since quitting: 15.4   • Smokeless tobacco: Never   • Tobacco comments:     Trying to quit   Vaping Use   • Vaping status: Never Used   Substance Use Topics   • Alcohol use: No   • Drug use: No     Review of Systems   Constitutional: Negative for chills and fever.   HENT:  Negative for nosebleeds and sore throat.    Respiratory:  Negative for cough, hemoptysis and wheezing.    Gastrointestinal:  Negative for abdominal pain, hematemesis, hematochezia, melena, nausea and vomiting.   Genitourinary:  Negative for dysuria and hematuria.   Neurological:  Negative for headaches.     Objective  Vitals:    06/18/25 1010   BP: 137/68   Pulse: 100   Weight: 75.5 kg (166 lb 6.4 oz)   Height: 180.3 cm (71\")     Body mass index is 23.21 kg/m².    Vitals reviewed.   Cardiovascular:      PMI at left midclavicular line. Normal rate. Regular rhythm. Normal S1. Normal S2.       Murmurs: There is no murmur.      No gallop.  No click. No rub.   Pulses:     Intact distal pulses.   Edema:     Peripheral edema absent.     Lab Results   Component Value Date     03/04/2025    K 3.9 03/04/2025     03/04/2025    CO2 25.7 03/04/2025    BUN 30 (H) 03/04/2025    CREATININE 1.19 03/04/2025    GLUCOSE 155 (H) 03/04/2025    CALCIUM 9.2 03/04/2025    AST 17 03/04/2025    ALT 10 03/04/2025    ALKPHOS 132 (H) 03/04/2025       Lab Results   Component Value Date    WBC 11.5 (H) 04/21/2025    HGB 11.9 (L) 04/21/2025    HCT 36.5 (L) 04/21/2025     04/21/2025     Lab Results   Component Value Date    INR 0.92 02/28/2025    INR 1.08 05/28/2024    INR 0.95 05/27/2024     Lab Results   Component Value Date    MG 2.0 03/04/2025     Lab Results "   Component Value Date    TSH 1.720 05/28/2024    PSA 2.5 03/05/2025        Procedures      Assessment & Plan   Diagnosis Plan   1. ASCVD (arteriosclerotic cardiovascular disease), s/p CABG about 16 years ago at [facility].        2. Abnormal nuclear stress test with small to moderate size, mild to moderate degree of inferior and apical Myocard ischemia.        3. Essential hypertension        4. Dyslipidemia  Lipid Panel    Comprehensive Metabolic Panel        Recommendations  I have discussed results of the nuclear stress test, echo Doppler study and KASHIF with Mr. Clifton.  Since his CABG was about 60 to 70 years ago and with his history of smoking and abnormal nuclear stress, there is a good possibility of him having recurrent significant CAD.  Hence have recommended further cardiac evaluation with cardiac catheterization and further core intervention as needed.  Patient is agreeable.  Will try to set this up in the next few days.  Continue with aspirin, atorvastatin.  Will check fasting lipid panel and adjust dose of statin as needed to keep the LDL around 50.    Return in about 3 months (around 9/18/2025).    As always, Lesa Rae, MIKO  I appreciate very much the opportunity to participate in the cardiovascular care of your patients. Please do not hesitate to call me with any questions with regards to Christo Clifton's evaluation and management.         With Best Regards,        Wero Green MD, [facility]    Please note that portions of this note were completed with a voice recognition program.

## 2025-06-18 NOTE — H&P (VIEW-ONLY)
Lesa Rae APRN  Christo Clifton  2025    Patient Active Problem List   Diagnosis    IDDM (insulin dependent diabetes mellitus)    ASCVD (arteriosclerotic cardiovascular disease)    S/P CABG x 3    Essential hypertension    Dyslipidemia    Skin ulcer    Post-operative state    Preop cardiovascular exam    Epididymitis    Testalgia    Cerebral aneurysm, nonruptured    Aneurysm of carotid artery    Anterior communicating artery aneurysm    Bladder tumor    Blood clot in bladder    Hydronephrosis, right       Dear Lesa Rae APRN:    Subjective     Christo Clifton is a 70 y.o. male with the problems as listed above, presents    Chief Complaint   Patient presents with    Results     ABNORMAL STRESS TEST       History of Present Illness: Mr. Christo Clifton is a pleasant 70-year-old gentleman with history of known CAD, s/p CABG about 16 to 17 years ago at [facility].  He recently reestablished cardiac care and follow-up through our office.  On recent visit he was complaining of some shortness of breath.  He underwent evaluation with a Lexiscan sestamibi study and echo Doppler study.      His nuclear stress test revealed small to moderate size, mild to moderate degree of apical and inferior wall myocardial ischemia.  His echo Doppler study revealed normal left ventricle chamber size, wall motion and systolic function and no significant valvular disease.  On today's visit he denies any complaints of chest pains but still has shortness of breath with mild to moderate exertion.  He says he is quit smoking about 5 weeks ago.  His KASHIF was normal.      Complete Transthoracic Echocardiogram with Complete Doppler and Color Flow    Patient Name: Christo Clifton   Patient MRN: [id number]   Patient : 1955 (70 y.o.)   Legal Sex: Male    Accession Number: [id number]   Date of Study: 6/3/25   Ordering Provider: Wero Green MD   Clinical Indications: Dyspnea       [facility]   Performing Staff   Cardiology: Wero Green MD    Tech: Lincoln Dill             Show images for Adult Transthoracic Echo Complete w/ Color, Spectral and Contrast if necessary per protocol   Clinical Indication    Dyspnea   Dx: Dyspnea on exertion (NYHA class II-III) [R06.09 (ICD-10-CM)]     Interpretation Summary     Normal left ventricular cavity size and wall thickness noted. All left ventricular wall segments contract normally.    Left ventricular ejection fraction appears to be 56 - 60%.    Left ventricular diastolic function is consistent with (grade I) impaired relaxation.    There is mild calcification of the aortic valve mainly affecting the left coronary cusp(s). The aortic valve was poorly visualized but appears trileaflet.    No aortic valve regurgitation is present. Mild aortic valve stenosis is present.    The mitral valve is structurally normal with no significant stenosis present. Trace mitral valve regurgitation is present.    There is no evidence of pericardial effusion.    Regadenoson Stress Test with Myocardial Perfusion SPECT (Multi Study)    Patient Name: Christo Clifton   Patient MRN: [id number]   Patient : 1955 (70 y.o.)   Legal Sex: Male    Accession Number: [id number]   Date of Study: 6/3/25   Ordering Provider: Wero Green MD   Clinical Indications: Known Coronary Artery Disease       [facility]  Performing Staff   ECG [place], SPECT [place]: Wero Green MD    Tech: Hayden Benitez    Support Staff: Anil Zhang           Show images for Stress Test With Myocardial Perfusion (1 Day)   Interpretation Summary     This is a pharmacological nuclear stress test using regadenoson    Findings consistent with an indeterminate ECG stress test.    Myocardial perfusion imaging indicates a small-to-moderate-sized, mild-to-moderate degree of ischemia located in the apex and inferior wall.    TID:0.94    Abnormal LV wall motion consistent with moderate global hypokinesis.     Left ventricular ejection fraction is moderately reduced (Calculated EF = 43%).    Impressions are consistent with an intermediate risk study.      US Ankle / Brachial Indices Extremity Limited   Order  Status: Final result     Study Result    Narrative & Impression   EXAM:    US Ankle-Brachial Index (KASHIF), 1 or 2 Levels     EXAM DATE:    6/3/2025 1:38 PM     CLINICAL HISTORY:    Peripheral arterial disease; I73.9-Peripheral vascular disease,  unspecified     TECHNIQUE:    Limited bilateral and noninvasive physiological study of the upper or  lower extremity arteries.  Bidirectional, Doppler and pressure waveform  recording with analysis at 1-2 levels.     COMPARISON:    No relevant prior studies available.     FINDINGS:    Right KASHIF:  Unremarkable.  Right ankle-brachial index (KASHIF) is 1.01  which is within normal limits.    Left KASHIF:  Unremarkable.  Left ankle-brachial index (KASHIF) is 1.07  which is within normal limits.     IMPRESSION:    Normal ABIs.  No evidence of peripheral arterial disease.     This report was finalized on 6/3/2025 2:34 PM by Dr. Darnell Staton MD.        Allergies   Allergen Reactions    Other    :    Current Outpatient Medications:     amLODIPine (NORVASC) 5 MG tablet, Take 1 tablet by mouth Daily., Disp: , Rfl:     aspirin 81 MG EC tablet, Take 1 tablet by mouth Every Night., Disp: , Rfl:     atorvastatin (LIPITOR) 40 MG tablet, Take 1 tablet by mouth Every Night., Disp: 90 tablet, Rfl: 3    escitalopram (LEXAPRO) 10 MG tablet, Take 1 tablet by mouth Daily., Disp: , Rfl:     insulin aspart prot-insulin aspart (novoLOG 70/30) (70-30) 100 UNIT/ML injection, Inject 60 Units under the skin into the appropriate area as directed 2 (Two) Times a Day With Meals., Disp: , Rfl:     insulin glargine (LANTUS, SEMGLEE) 100 UNIT/ML injection, Inject 14 Units under the skin into the appropriate area as directed Daily., Disp: , Rfl:     levETIRAcetam (KEPPRA) 1000 MG tablet, Take 1 tablet by mouth 2 (Two)  "Times a Day., Disp: , Rfl:     levothyroxine (SYNTHROID, LEVOTHROID) 50 MCG tablet, Take 1 tablet by mouth Daily., Disp: , Rfl:     nicotine (Nicoderm CQ) 14 MG/24HR patch, Place 1 patch on the skin as directed by provider Daily., Disp: 30 patch, Rfl: 1    The following portions of the patient's history were reviewed and updated as appropriate: allergies, current medications, past family history, past medical history, past social history, past surgical history and problem list.    Social History     Tobacco Use    Smoking status: Every Day     Current packs/day: 0.00     Average packs/day: 2.0 packs/day for 45.0 years (90.0 ttl pk-yrs)     Types: Cigarettes     Start date: 1965     Last attempt to quit: 2010     Years since quitting: 15.4    Smokeless tobacco: Never    Tobacco comments:     Trying to quit   Vaping Use    Vaping status: Never Used   Substance Use Topics    Alcohol use: No    Drug use: No     Review of Systems   Constitutional: Negative for chills and fever.   HENT:  Negative for nosebleeds and sore throat.    Respiratory:  Negative for cough, hemoptysis and wheezing.    Gastrointestinal:  Negative for abdominal pain, hematemesis, hematochezia, melena, nausea and vomiting.   Genitourinary:  Negative for dysuria and hematuria.   Neurological:  Negative for headaches.     Objective   Vitals:    06/18/25 1010   BP: 137/68   Pulse: 100   Weight: 75.5 kg (166 lb 6.4 oz)   Height: 180.3 cm (71\")     Body mass index is 23.21 kg/m².    Vitals reviewed.   Constitutional:       Appearance: Well-developed.   Eyes:      Conjunctiva/sclera: Conjunctivae normal.   HENT:      Head: Normocephalic.   Neck:      Thyroid: No thyromegaly.      Vascular: No JVD.      Trachea: No tracheal deviation.   Pulmonary:      Effort: No respiratory distress.      Breath sounds: Normal breath sounds. No wheezing. No rales.   Cardiovascular:      PMI at left midclavicular line. Normal rate. Regular rhythm. Normal S1. Normal S2.       " Murmurs: There is no murmur.      No gallop.  No click. No rub.   Pulses:     Intact distal pulses.   Edema:     Peripheral edema absent.   Abdominal:      General: Bowel sounds are normal.      Palpations: Abdomen is soft. There is no abdominal mass.      Tenderness: There is no abdominal tenderness.   Musculoskeletal:      Cervical back: Normal range of motion and neck supple. Skin:     General: Skin is warm and dry.   Neurological:      Mental Status: Alert and oriented to person, place, and time.      Cranial Nerves: No cranial nerve deficit.       Lab Results   Component Value Date     03/04/2025    K 3.9 03/04/2025     03/04/2025    CO2 25.7 03/04/2025    BUN 30 (H) 03/04/2025    CREATININE 1.19 03/04/2025    GLUCOSE 155 (H) 03/04/2025    CALCIUM 9.2 03/04/2025    AST 17 03/04/2025    ALT 10 03/04/2025    ALKPHOS 132 (H) 03/04/2025       Lab Results   Component Value Date    WBC 11.5 (H) 04/21/2025    HGB 11.9 (L) 04/21/2025    HCT 36.5 (L) 04/21/2025     04/21/2025     Lab Results   Component Value Date    INR 0.92 02/28/2025    INR 1.08 05/28/2024    INR 0.95 05/27/2024     Lab Results   Component Value Date    MG 2.0 03/04/2025     Lab Results   Component Value Date    TSH 1.720 05/28/2024    PSA 2.5 03/05/2025        Procedures    Assessment & Plan    Diagnosis Plan   1. ASCVD (arteriosclerotic cardiovascular disease), s/p CABG about 16 years ago at [facility].        2. Abnormal nuclear stress test with small to moderate size, mild to moderate degree of inferior and apical Myocard ischemia.        3. Essential hypertension        4. Dyslipidemia  Lipid Panel    Comprehensive Metabolic Panel        Recommendations  I have discussed results of the nuclear stress test, echo Doppler study and KASHIF with Mr. Clifton.  Since his CABG was about 60 to 70 years ago and with his history of smoking and abnormal nuclear stress, there is a good possibility of him having recurrent significant CAD.   Hence have recommended further cardiac evaluation with cardiac catheterization and further core intervention as needed.  Patient is agreeable.  Will try to set this up in the next few days.  Continue with aspirin, atorvastatin.  Will check fasting lipid panel and adjust dose of statin as needed to keep the LDL around 50.    Return in about 3 months (around 9/18/2025).    As always, Lesa Rae, MIKO  I appreciate very much the opportunity to participate in the cardiovascular care of your patients. Please do not hesitate to call me with any questions with regards to Christo Etienne's evaluation and management.         With Best Regards,        Wero Green MD, FACC    Please note that portions of this note were completed with a voice recognition program.

## 2025-06-18 NOTE — PROGRESS NOTES
Lesa Rae APRN  Christo Clifton  2025    Patient Active Problem List   Diagnosis    IDDM (insulin dependent diabetes mellitus)    ASCVD (arteriosclerotic cardiovascular disease)    S/P CABG x 3    Essential hypertension    Dyslipidemia    Skin ulcer    Post-operative state    Preop cardiovascular exam    Epididymitis    Testalgia    Cerebral aneurysm, nonruptured    Aneurysm of carotid artery    Anterior communicating artery aneurysm    Bladder tumor    Blood clot in bladder    Hydronephrosis, right       Dear Lesa Rae APRN:    Subjective     Christo Clifton is a 70 y.o. male with the problems as listed above, presents    Chief Complaint   Patient presents with    Results     ABNORMAL STRESS TEST       History of Present Illness: Mr. Christo Clifton is a pleasant 70-year-old gentleman with history of known CAD, s/p CABG about 16 to 17 years ago at [facility].  He recently reestablished cardiac care and follow-up through our office.  On recent visit he was complaining of some shortness of breath.  He underwent evaluation with a Lexiscan sestamibi study and echo Doppler study.      His nuclear stress test revealed small to moderate size, mild to moderate degree of apical and inferior wall myocardial ischemia.  His echo Doppler study revealed normal left ventricle chamber size, wall motion and systolic function and no significant valvular disease.  On today's visit he denies any complaints of chest pains but still has shortness of breath with mild to moderate exertion.  He says he is quit smoking about 5 weeks ago.  His KASHIF was normal.      Complete Transthoracic Echocardiogram with Complete Doppler and Color Flow    Patient Name: Christo Clifton   Patient MRN: [id number]   Patient : 1955 (70 y.o.)   Legal Sex: Male    Accession Number: [id number]   Date of Study: 6/3/25   Ordering Provider: Wero Green MD   Clinical Indications: Dyspnea       [facility]   Performing Staff   Cardiology: Wero Green MD    Tech: Lincoln Dill             Show images for Adult Transthoracic Echo Complete w/ Color, Spectral and Contrast if necessary per protocol   Clinical Indication    Dyspnea   Dx: Dyspnea on exertion (NYHA class II-III) [R06.09 (ICD-10-CM)]     Interpretation Summary     Normal left ventricular cavity size and wall thickness noted. All left ventricular wall segments contract normally.    Left ventricular ejection fraction appears to be 56 - 60%.    Left ventricular diastolic function is consistent with (grade I) impaired relaxation.    There is mild calcification of the aortic valve mainly affecting the left coronary cusp(s). The aortic valve was poorly visualized but appears trileaflet.    No aortic valve regurgitation is present. Mild aortic valve stenosis is present.    The mitral valve is structurally normal with no significant stenosis present. Trace mitral valve regurgitation is present.    There is no evidence of pericardial effusion.    Regadenoson Stress Test with Myocardial Perfusion SPECT (Multi Study)    Patient Name: Christo Clifton   Patient MRN: [id number]   Patient : 1955 (70 y.o.)   Legal Sex: Male    Accession Number: [id number]   Date of Study: 6/3/25   Ordering Provider: Wero Green MD   Clinical Indications: Known Coronary Artery Disease       [facility]  Performing Staff   ECG [place], SPECT [place]: Wero Green MD    Tech: Hayden Benitez    Support Staff: Anil Zhang           Show images for Stress Test With Myocardial Perfusion (1 Day)   Interpretation Summary     This is a pharmacological nuclear stress test using regadenoson    Findings consistent with an indeterminate ECG stress test.    Myocardial perfusion imaging indicates a small-to-moderate-sized, mild-to-moderate degree of ischemia located in the apex and inferior wall.    TID:0.94    Abnormal LV wall motion consistent with moderate global hypokinesis.     Left ventricular ejection fraction is moderately reduced (Calculated EF = 43%).    Impressions are consistent with an intermediate risk study.      US Ankle / Brachial Indices Extremity Limited   Order  Status: Final result     Study Result    Narrative & Impression   EXAM:    US Ankle-Brachial Index (KASHIF), 1 or 2 Levels     EXAM DATE:    6/3/2025 1:38 PM     CLINICAL HISTORY:    Peripheral arterial disease; I73.9-Peripheral vascular disease,  unspecified     TECHNIQUE:    Limited bilateral and noninvasive physiological study of the upper or  lower extremity arteries.  Bidirectional, Doppler and pressure waveform  recording with analysis at 1-2 levels.     COMPARISON:    No relevant prior studies available.     FINDINGS:    Right KASHIF:  Unremarkable.  Right ankle-brachial index (KASHIF) is 1.01  which is within normal limits.    Left KASHIF:  Unremarkable.  Left ankle-brachial index (KASHIF) is 1.07  which is within normal limits.     IMPRESSION:    Normal ABIs.  No evidence of peripheral arterial disease.     This report was finalized on 6/3/2025 2:34 PM by Dr. Darnell Staton MD.        Allergies   Allergen Reactions    Other    :    Current Outpatient Medications:     amLODIPine (NORVASC) 5 MG tablet, Take 1 tablet by mouth Daily., Disp: , Rfl:     aspirin 81 MG EC tablet, Take 1 tablet by mouth Every Night., Disp: , Rfl:     atorvastatin (LIPITOR) 40 MG tablet, Take 1 tablet by mouth Every Night., Disp: 90 tablet, Rfl: 3    escitalopram (LEXAPRO) 10 MG tablet, Take 1 tablet by mouth Daily., Disp: , Rfl:     insulin aspart prot-insulin aspart (novoLOG 70/30) (70-30) 100 UNIT/ML injection, Inject 60 Units under the skin into the appropriate area as directed 2 (Two) Times a Day With Meals., Disp: , Rfl:     insulin glargine (LANTUS, SEMGLEE) 100 UNIT/ML injection, Inject 14 Units under the skin into the appropriate area as directed Daily., Disp: , Rfl:     levETIRAcetam (KEPPRA) 1000 MG tablet, Take 1 tablet by mouth 2 (Two)  "Times a Day., Disp: , Rfl:     levothyroxine (SYNTHROID, LEVOTHROID) 50 MCG tablet, Take 1 tablet by mouth Daily., Disp: , Rfl:     nicotine (Nicoderm CQ) 14 MG/24HR patch, Place 1 patch on the skin as directed by provider Daily., Disp: 30 patch, Rfl: 1    The following portions of the patient's history were reviewed and updated as appropriate: allergies, current medications, past family history, past medical history, past social history, past surgical history and problem list.    Social History     Tobacco Use    Smoking status: Every Day     Current packs/day: 0.00     Average packs/day: 2.0 packs/day for 45.0 years (90.0 ttl pk-yrs)     Types: Cigarettes     Start date: 1965     Last attempt to quit: 2010     Years since quitting: 15.4    Smokeless tobacco: Never    Tobacco comments:     Trying to quit   Vaping Use    Vaping status: Never Used   Substance Use Topics    Alcohol use: No    Drug use: No     Review of Systems   Constitutional: Negative for chills and fever.   HENT:  Negative for nosebleeds and sore throat.    Respiratory:  Negative for cough, hemoptysis and wheezing.    Gastrointestinal:  Negative for abdominal pain, hematemesis, hematochezia, melena, nausea and vomiting.   Genitourinary:  Negative for dysuria and hematuria.   Neurological:  Negative for headaches.     Objective   Vitals:    06/18/25 1010   BP: 137/68   Pulse: 100   Weight: 75.5 kg (166 lb 6.4 oz)   Height: 180.3 cm (71\")     Body mass index is 23.21 kg/m².    Vitals reviewed.   Constitutional:       Appearance: Well-developed.   Eyes:      Conjunctiva/sclera: Conjunctivae normal.   HENT:      Head: Normocephalic.   Neck:      Thyroid: No thyromegaly.      Vascular: No JVD.      Trachea: No tracheal deviation.   Pulmonary:      Effort: No respiratory distress.      Breath sounds: Normal breath sounds. No wheezing. No rales.   Cardiovascular:      PMI at left midclavicular line. Normal rate. Regular rhythm. Normal S1. Normal S2.       " Murmurs: There is no murmur.      No gallop.  No click. No rub.   Pulses:     Intact distal pulses.   Edema:     Peripheral edema absent.   Abdominal:      General: Bowel sounds are normal.      Palpations: Abdomen is soft. There is no abdominal mass.      Tenderness: There is no abdominal tenderness.   Musculoskeletal:      Cervical back: Normal range of motion and neck supple. Skin:     General: Skin is warm and dry.   Neurological:      Mental Status: Alert and oriented to person, place, and time.      Cranial Nerves: No cranial nerve deficit.       Lab Results   Component Value Date     03/04/2025    K 3.9 03/04/2025     03/04/2025    CO2 25.7 03/04/2025    BUN 30 (H) 03/04/2025    CREATININE 1.19 03/04/2025    GLUCOSE 155 (H) 03/04/2025    CALCIUM 9.2 03/04/2025    AST 17 03/04/2025    ALT 10 03/04/2025    ALKPHOS 132 (H) 03/04/2025       Lab Results   Component Value Date    WBC 11.5 (H) 04/21/2025    HGB 11.9 (L) 04/21/2025    HCT 36.5 (L) 04/21/2025     04/21/2025     Lab Results   Component Value Date    INR 0.92 02/28/2025    INR 1.08 05/28/2024    INR 0.95 05/27/2024     Lab Results   Component Value Date    MG 2.0 03/04/2025     Lab Results   Component Value Date    TSH 1.720 05/28/2024    PSA 2.5 03/05/2025        Procedures    Assessment & Plan    Diagnosis Plan   1. ASCVD (arteriosclerotic cardiovascular disease), s/p CABG about 16 years ago at [facility].        2. Abnormal nuclear stress test with small to moderate size, mild to moderate degree of inferior and apical Myocard ischemia.        3. Essential hypertension        4. Dyslipidemia  Lipid Panel    Comprehensive Metabolic Panel        Recommendations  I have discussed results of the nuclear stress test, echo Doppler study and KASHIF with Mr. Clifton.  Since his CABG was about 60 to 70 years ago and with his history of smoking and abnormal nuclear stress, there is a good possibility of him having recurrent significant CAD.   Hence have recommended further cardiac evaluation with cardiac catheterization and further core intervention as needed.  Patient is agreeable.  Will try to set this up in the next few days.  Continue with aspirin, atorvastatin.  Will check fasting lipid panel and adjust dose of statin as needed to keep the LDL around 50.    Return in about 3 months (around 9/18/2025).    As always, Lesa Rae, MIKO  I appreciate very much the opportunity to participate in the cardiovascular care of your patients. Please do not hesitate to call me with any questions with regards to Christo Etienne's evaluation and management.         With Best Regards,        Wero Green MD, FACC    Please note that portions of this note were completed with a voice recognition program.

## 2025-06-23 ENCOUNTER — TELEPHONE (OUTPATIENT)
Dept: CARDIOLOGY | Facility: CLINIC | Age: 70
End: 2025-06-23
Payer: MEDICARE

## 2025-06-26 ENCOUNTER — PREP FOR SURGERY (OUTPATIENT)
Dept: OTHER | Facility: HOSPITAL | Age: 70
End: 2025-06-26
Payer: MEDICARE

## 2025-06-26 DIAGNOSIS — I25.10 ASCVD (ARTERIOSCLEROTIC CARDIOVASCULAR DISEASE): Primary | ICD-10-CM

## 2025-06-26 DIAGNOSIS — R94.39 ABNORMAL NUCLEAR STRESS TEST: Primary | ICD-10-CM

## 2025-06-26 RX ORDER — SODIUM CHLORIDE 0.9 % (FLUSH) 0.9 %
10 SYRINGE (ML) INJECTION EVERY 12 HOURS SCHEDULED
Status: CANCELLED | OUTPATIENT
Start: 2025-06-27

## 2025-06-26 RX ORDER — SODIUM CHLORIDE 9 MG/ML
40 INJECTION, SOLUTION INTRAVENOUS AS NEEDED
Status: CANCELLED | OUTPATIENT
Start: 2025-06-27

## 2025-06-26 RX ORDER — SODIUM CHLORIDE 0.9 % (FLUSH) 0.9 %
10 SYRINGE (ML) INJECTION AS NEEDED
Status: CANCELLED | OUTPATIENT
Start: 2025-06-27

## 2025-06-27 ENCOUNTER — HOSPITAL ENCOUNTER (OUTPATIENT)
Facility: HOSPITAL | Age: 70
Setting detail: OBSERVATION
Discharge: HOME | End: 2025-06-28
Attending: INTERNAL MEDICINE | Admitting: INTERNAL MEDICINE
Payer: MEDICARE

## 2025-06-27 DIAGNOSIS — I25.10 ASCVD (ARTERIOSCLEROTIC CARDIOVASCULAR DISEASE): ICD-10-CM

## 2025-06-27 DIAGNOSIS — R94.39 ABNORMAL NUCLEAR STRESS TEST: ICD-10-CM

## 2025-06-27 DIAGNOSIS — E78.5 DYSLIPIDEMIA: ICD-10-CM

## 2025-06-27 LAB
ACT BLD: 227 SECONDS (ref 82–152)
ACT BLD: 233 SECONDS (ref 82–152)
ACT BLD: 239 SECONDS (ref 82–152)
ALBUMIN SERPL-MCNC: 3.5 G/DL (ref 3.5–5.2)
ALBUMIN/GLOB SERPL: 1.1 G/DL
ALP SERPL-CCNC: 120 U/L (ref 39–117)
ALT SERPL W P-5'-P-CCNC: 13 U/L (ref 1–41)
ANION GAP SERPL CALCULATED.3IONS-SCNC: 11.2 MMOL/L (ref 5–15)
AST SERPL-CCNC: 17 U/L (ref 1–40)
BASOPHILS # BLD AUTO: 0.09 10*3/MM3 (ref 0–0.2)
BASOPHILS NFR BLD AUTO: 0.7 % (ref 0–1.5)
BILIRUB SERPL-MCNC: 0.6 MG/DL (ref 0–1.2)
BUN SERPL-MCNC: 29.4 MG/DL (ref 8–23)
BUN/CREAT SERPL: 28 (ref 7–25)
CALCIUM SPEC-SCNC: 8.6 MG/DL (ref 8.6–10.5)
CHLORIDE SERPL-SCNC: 102 MMOL/L (ref 98–107)
CHOLEST SERPL-MCNC: 103 MG/DL (ref 0–200)
CO2 SERPL-SCNC: 23.8 MMOL/L (ref 22–29)
CREAT SERPL-MCNC: 1.05 MG/DL (ref 0.76–1.27)
DEPRECATED RDW RBC AUTO: 47.1 FL (ref 37–54)
EGFRCR SERPLBLD CKD-EPI 2021: 75.9 ML/MIN/1.73
EOSINOPHIL # BLD AUTO: 0.56 10*3/MM3 (ref 0–0.4)
EOSINOPHIL NFR BLD AUTO: 4.4 % (ref 0.3–6.2)
ERYTHROCYTE [DISTWIDTH] IN BLOOD BY AUTOMATED COUNT: 14.3 % (ref 12.3–15.4)
GLOBULIN UR ELPH-MCNC: 3.3 GM/DL
GLUCOSE BLDC GLUCOMTR-MCNC: 133 MG/DL (ref 70–130)
GLUCOSE BLDC GLUCOMTR-MCNC: 149 MG/DL (ref 70–130)
GLUCOSE BLDC GLUCOMTR-MCNC: 200 MG/DL (ref 70–130)
GLUCOSE SERPL-MCNC: 132 MG/DL (ref 65–99)
HCT VFR BLD AUTO: 32.6 % (ref 37.5–51)
HDLC SERPL-MCNC: 30 MG/DL (ref 40–60)
HGB BLD-MCNC: 10.2 G/DL (ref 13–17.7)
IMM GRANULOCYTES # BLD AUTO: 0.07 10*3/MM3 (ref 0–0.05)
IMM GRANULOCYTES NFR BLD AUTO: 0.6 % (ref 0–0.5)
LDLC SERPL CALC-MCNC: 56 MG/DL (ref 0–100)
LDLC/HDLC SERPL: 1.89 {RATIO}
LYMPHOCYTES # BLD AUTO: 3.5 10*3/MM3 (ref 0.7–3.1)
LYMPHOCYTES NFR BLD AUTO: 27.6 % (ref 19.6–45.3)
MCH RBC QN AUTO: 28.1 PG (ref 26.6–33)
MCHC RBC AUTO-ENTMCNC: 31.3 G/DL (ref 31.5–35.7)
MCV RBC AUTO: 89.8 FL (ref 79–97)
MONOCYTES # BLD AUTO: 0.84 10*3/MM3 (ref 0.1–0.9)
MONOCYTES NFR BLD AUTO: 6.6 % (ref 5–12)
NEUTROPHILS NFR BLD AUTO: 60.1 % (ref 42.7–76)
NEUTROPHILS NFR BLD AUTO: 7.6 10*3/MM3 (ref 1.7–7)
NRBC BLD AUTO-RTO: 0 /100 WBC (ref 0–0.2)
PLATELET # BLD AUTO: 286 10*3/MM3 (ref 140–450)
PMV BLD AUTO: 9.6 FL (ref 6–12)
POTASSIUM SERPL-SCNC: 3.9 MMOL/L (ref 3.5–5.2)
PROT SERPL-MCNC: 6.8 G/DL (ref 6–8.5)
RBC # BLD AUTO: 3.63 10*6/MM3 (ref 4.14–5.8)
SODIUM SERPL-SCNC: 137 MMOL/L (ref 136–145)
TRIGL SERPL-MCNC: 81 MG/DL (ref 0–150)
VLDLC SERPL-MCNC: 17 MG/DL (ref 5–40)
WBC NRBC COR # BLD AUTO: 12.66 10*3/MM3 (ref 3.4–10.8)

## 2025-06-27 PROCEDURE — C1761: HCPCS | Performed by: INTERNAL MEDICINE

## 2025-06-27 PROCEDURE — C9600 PERC DRUG-EL COR STENT SING: HCPCS | Performed by: INTERNAL MEDICINE

## 2025-06-27 PROCEDURE — 25010000002 MIDAZOLAM PER 1 MG: Performed by: INTERNAL MEDICINE

## 2025-06-27 PROCEDURE — 63710000001 ESCITALOPRAM 10 MG TABLET: Performed by: INTERNAL MEDICINE

## 2025-06-27 PROCEDURE — 63710000001 ATORVASTATIN 40 MG TABLET: Performed by: INTERNAL MEDICINE

## 2025-06-27 PROCEDURE — C1725 CATH, TRANSLUMIN NON-LASER: HCPCS | Performed by: INTERNAL MEDICINE

## 2025-06-27 PROCEDURE — 25810000003 SODIUM CHLORIDE 0.9 % SOLUTION: Performed by: INTERNAL MEDICINE

## 2025-06-27 PROCEDURE — 63710000001 ASPIRIN 81 MG TABLET DELAYED-RELEASE: Performed by: INTERNAL MEDICINE

## 2025-06-27 PROCEDURE — 92972 PERQ TRLUML CORONRY LITHOTRP: CPT | Performed by: INTERNAL MEDICINE

## 2025-06-27 PROCEDURE — G0378 HOSPITAL OBSERVATION PER HR: HCPCS

## 2025-06-27 PROCEDURE — A9270 NON-COVERED ITEM OR SERVICE: HCPCS | Performed by: INTERNAL MEDICINE

## 2025-06-27 PROCEDURE — 25010000002 HEPARIN (PORCINE) PER 1000 UNITS: Performed by: INTERNAL MEDICINE

## 2025-06-27 PROCEDURE — 80053 COMPREHEN METABOLIC PANEL: CPT | Performed by: INTERNAL MEDICINE

## 2025-06-27 PROCEDURE — C1887 CATHETER, GUIDING: HCPCS | Performed by: INTERNAL MEDICINE

## 2025-06-27 PROCEDURE — 92928 PRQ TCAT PLMT NTRAC ST 1 LES: CPT | Performed by: INTERNAL MEDICINE

## 2025-06-27 PROCEDURE — 93459 L HRT ART/GRFT ANGIO: CPT | Performed by: INTERNAL MEDICINE

## 2025-06-27 PROCEDURE — 63710000001 LEVOTHYROXINE 50 MCG TABLET: Performed by: INTERNAL MEDICINE

## 2025-06-27 PROCEDURE — 85025 COMPLETE CBC W/AUTO DIFF WBC: CPT | Performed by: INTERNAL MEDICINE

## 2025-06-27 PROCEDURE — C1894 INTRO/SHEATH, NON-LASER: HCPCS | Performed by: INTERNAL MEDICINE

## 2025-06-27 PROCEDURE — C1769 GUIDE WIRE: HCPCS | Performed by: INTERNAL MEDICINE

## 2025-06-27 PROCEDURE — 92978 ENDOLUMINL IVUS OCT C 1ST: CPT | Performed by: INTERNAL MEDICINE

## 2025-06-27 PROCEDURE — 85347 COAGULATION TIME ACTIVATED: CPT

## 2025-06-27 PROCEDURE — 82948 REAGENT STRIP/BLOOD GLUCOSE: CPT

## 2025-06-27 PROCEDURE — C1760 CLOSURE DEV, VASC: HCPCS | Performed by: INTERNAL MEDICINE

## 2025-06-27 PROCEDURE — 82948 REAGENT STRIP/BLOOD GLUCOSE: CPT | Performed by: INTERNAL MEDICINE

## 2025-06-27 PROCEDURE — 63710000001 INSULIN LISPRO (HUMAN) PER 5 UNITS: Performed by: INTERNAL MEDICINE

## 2025-06-27 PROCEDURE — 25010000002 LIDOCAINE 2% SOLUTION: Performed by: INTERNAL MEDICINE

## 2025-06-27 PROCEDURE — 80061 LIPID PANEL: CPT | Performed by: INTERNAL MEDICINE

## 2025-06-27 PROCEDURE — 25010000002 FENTANYL CITRATE (PF) 50 MCG/ML SOLUTION: Performed by: INTERNAL MEDICINE

## 2025-06-27 PROCEDURE — C1753 CATH, INTRAVAS ULTRASOUND: HCPCS | Performed by: INTERNAL MEDICINE

## 2025-06-27 PROCEDURE — 63710000001 INSULIN GLARGINE PER 5 UNITS: Performed by: INTERNAL MEDICINE

## 2025-06-27 PROCEDURE — 25510000001 IOPAMIDOL PER 1 ML: Performed by: INTERNAL MEDICINE

## 2025-06-27 PROCEDURE — 63710000001 AMLODIPINE 5 MG TABLET: Performed by: INTERNAL MEDICINE

## 2025-06-27 PROCEDURE — C1874 STENT, COATED/COV W/DEL SYS: HCPCS | Performed by: INTERNAL MEDICINE

## 2025-06-27 DEVICE — XIENCE SKYPOINT™ EVEROLIMUS ELUTING CORONARY STENT SYSTEM 2.50 MM X 18 MM / RAPID-EXCHANGE
Type: IMPLANTABLE DEVICE | Status: FUNCTIONAL
Brand: XIENCE SKYPOINT™

## 2025-06-27 RX ORDER — INSULIN GLARGINE 100 [IU]/ML
14 INJECTION, SOLUTION SUBCUTANEOUS DAILY
Status: DISCONTINUED | OUTPATIENT
Start: 2025-06-27 | End: 2025-06-28 | Stop reason: HOSPADM

## 2025-06-27 RX ORDER — ATORVASTATIN CALCIUM 40 MG/1
40 TABLET, FILM COATED ORAL NIGHTLY
Status: DISCONTINUED | OUTPATIENT
Start: 2025-06-27 | End: 2025-06-27

## 2025-06-27 RX ORDER — SODIUM CHLORIDE 9 MG/ML
INJECTION, SOLUTION INTRAVENOUS
Status: COMPLETED | OUTPATIENT
Start: 2025-06-27 | End: 2025-06-27

## 2025-06-27 RX ORDER — HEPARIN SODIUM 1000 [USP'U]/ML
INJECTION, SOLUTION INTRAVENOUS; SUBCUTANEOUS
Status: DISCONTINUED | OUTPATIENT
Start: 2025-06-27 | End: 2025-06-27 | Stop reason: HOSPADM

## 2025-06-27 RX ORDER — LEVETIRACETAM 500 MG/1
1000 TABLET ORAL EVERY 12 HOURS SCHEDULED
Status: DISCONTINUED | OUTPATIENT
Start: 2025-06-27 | End: 2025-06-27

## 2025-06-27 RX ORDER — AMLODIPINE BESYLATE 5 MG/1
5 TABLET ORAL DAILY
Status: DISCONTINUED | OUTPATIENT
Start: 2025-06-27 | End: 2025-06-28 | Stop reason: HOSPADM

## 2025-06-27 RX ORDER — FENTANYL CITRATE 50 UG/ML
INJECTION, SOLUTION INTRAMUSCULAR; INTRAVENOUS
Status: DISCONTINUED | OUTPATIENT
Start: 2025-06-27 | End: 2025-06-27 | Stop reason: HOSPADM

## 2025-06-27 RX ORDER — DEXTROSE MONOHYDRATE 25 G/50ML
25 INJECTION, SOLUTION INTRAVENOUS
Status: DISCONTINUED | OUTPATIENT
Start: 2025-06-27 | End: 2025-06-28 | Stop reason: HOSPADM

## 2025-06-27 RX ORDER — NITROGLYCERIN 0.4 MG/1
0.4 TABLET SUBLINGUAL
Status: DISCONTINUED | OUTPATIENT
Start: 2025-06-27 | End: 2025-06-28 | Stop reason: HOSPADM

## 2025-06-27 RX ORDER — ATORVASTATIN CALCIUM 80 MG/1
80 TABLET, FILM COATED ORAL DAILY
Status: ON HOLD | COMMUNITY

## 2025-06-27 RX ORDER — FLUTICASONE PROPIONATE 50 MCG
1 SPRAY, SUSPENSION (ML) NASAL DAILY PRN
Status: ON HOLD | COMMUNITY

## 2025-06-27 RX ORDER — GLUCAGON 1 MG/ML
1 KIT INJECTION
Status: DISCONTINUED | OUTPATIENT
Start: 2025-06-27 | End: 2025-06-28 | Stop reason: HOSPADM

## 2025-06-27 RX ORDER — INSULIN LISPRO 100 [IU]/ML
2-7 INJECTION, SOLUTION INTRAVENOUS; SUBCUTANEOUS
Status: DISCONTINUED | OUTPATIENT
Start: 2025-06-27 | End: 2025-06-28 | Stop reason: HOSPADM

## 2025-06-27 RX ORDER — CLOPIDOGREL BISULFATE 75 MG/1
TABLET ORAL
Status: DISCONTINUED | OUTPATIENT
Start: 2025-06-27 | End: 2025-06-27 | Stop reason: HOSPADM

## 2025-06-27 RX ORDER — INSULIN GLARGINE 100 [IU]/ML
14 INJECTION, SOLUTION SUBCUTANEOUS DAILY
Status: ON HOLD | COMMUNITY

## 2025-06-27 RX ORDER — CLOPIDOGREL BISULFATE 75 MG/1
75 TABLET ORAL DAILY
Status: DISCONTINUED | OUTPATIENT
Start: 2025-06-28 | End: 2025-06-28 | Stop reason: HOSPADM

## 2025-06-27 RX ORDER — ACETAMINOPHEN 325 MG/1
650 TABLET ORAL EVERY 4 HOURS PRN
Status: DISCONTINUED | OUTPATIENT
Start: 2025-06-27 | End: 2025-06-28 | Stop reason: HOSPADM

## 2025-06-27 RX ORDER — SODIUM CHLORIDE 9 MG/ML
3 INJECTION, SOLUTION INTRAVENOUS CONTINUOUS
Status: ACTIVE | OUTPATIENT
Start: 2025-06-27 | End: 2025-06-27

## 2025-06-27 RX ORDER — NICOTINE POLACRILEX 4 MG
15 LOZENGE BUCCAL
Status: DISCONTINUED | OUTPATIENT
Start: 2025-06-27 | End: 2025-06-28 | Stop reason: HOSPADM

## 2025-06-27 RX ORDER — LIDOCAINE HYDROCHLORIDE 20 MG/ML
INJECTION, SOLUTION INFILTRATION; PERINEURAL
Status: DISCONTINUED | OUTPATIENT
Start: 2025-06-27 | End: 2025-06-27 | Stop reason: HOSPADM

## 2025-06-27 RX ORDER — ASPIRIN 81 MG/1
81 TABLET ORAL NIGHTLY
Status: DISCONTINUED | OUTPATIENT
Start: 2025-06-27 | End: 2025-06-28 | Stop reason: HOSPADM

## 2025-06-27 RX ORDER — LEVOTHYROXINE SODIUM 50 UG/1
50 TABLET ORAL DAILY
Status: DISCONTINUED | OUTPATIENT
Start: 2025-06-27 | End: 2025-06-28 | Stop reason: HOSPADM

## 2025-06-27 RX ORDER — ATORVASTATIN CALCIUM 40 MG/1
80 TABLET, FILM COATED ORAL NIGHTLY
Status: DISCONTINUED | OUTPATIENT
Start: 2025-06-27 | End: 2025-06-28 | Stop reason: HOSPADM

## 2025-06-27 RX ORDER — MIDAZOLAM HYDROCHLORIDE 1 MG/ML
INJECTION, SOLUTION INTRAMUSCULAR; INTRAVENOUS
Status: DISCONTINUED | OUTPATIENT
Start: 2025-06-27 | End: 2025-06-27 | Stop reason: HOSPADM

## 2025-06-27 RX ORDER — IOPAMIDOL 755 MG/ML
INJECTION, SOLUTION INTRAVASCULAR
Status: DISCONTINUED | OUTPATIENT
Start: 2025-06-27 | End: 2025-06-27 | Stop reason: HOSPADM

## 2025-06-27 RX ORDER — ESCITALOPRAM OXALATE 10 MG/1
10 TABLET ORAL DAILY
Status: DISCONTINUED | OUTPATIENT
Start: 2025-06-27 | End: 2025-06-28 | Stop reason: HOSPADM

## 2025-06-27 RX ADMIN — INSULIN LISPRO 3 UNITS: 100 INJECTION, SOLUTION INTRAVENOUS; SUBCUTANEOUS at 21:01

## 2025-06-27 RX ADMIN — INSULIN GLARGINE 14 UNITS: 100 INJECTION, SOLUTION SUBCUTANEOUS at 13:09

## 2025-06-27 RX ADMIN — AMLODIPINE BESYLATE 5 MG: 5 TABLET ORAL at 12:10

## 2025-06-27 RX ADMIN — ESCITALOPRAM OXALATE 10 MG: 10 TABLET ORAL at 12:10

## 2025-06-27 RX ADMIN — SODIUM CHLORIDE 3 ML/KG/HR: 9 INJECTION, SOLUTION INTRAVENOUS at 12:10

## 2025-06-27 RX ADMIN — ATORVASTATIN CALCIUM 80 MG: 40 TABLET, FILM COATED ORAL at 21:01

## 2025-06-27 RX ADMIN — LEVOTHYROXINE SODIUM 50 MCG: 0.05 TABLET ORAL at 12:10

## 2025-06-27 RX ADMIN — ASPIRIN 81 MG: 81 TABLET, COATED ORAL at 21:00

## 2025-06-27 NOTE — Clinical Note
IVL device used: Shockwave C2 2.5x12;   10 total pulses delivered;   Maximum Pressure achieved was 3 jyoti and

## 2025-06-27 NOTE — Clinical Note
First balloon inflation max pressure = 10 jyoti. First balloon inflation duration = 27 seconds. Second inflation of balloon - Max pressure = 10 jyoti. 2nd Inflation of balloon - Duration = 22 seconds. 2nd inflation was done at 08:57 EDT. Third inflation of balloon - Max pressure = 10 jyoti. 3rd Inflation of balloon - Duration = 20 seconds. 3rd inflation was done at 08:58 EDT.

## 2025-06-27 NOTE — INTERVAL H&P NOTE
H&P reviewed. The patient was examined and there are no changes to the H&P.        Review of system as per HPI otherwise negative.

## 2025-06-27 NOTE — Clinical Note
IVL device used: Shockwave C2 2.5x12;   10 total pulses delivered;   Maximum Pressure achieved was 4 jyoti and

## 2025-06-27 NOTE — NURSING NOTE
Cardiac rehab staff went to see pt. Pt resting with eyes closed. Will try to see pt before discharge but if not will contact pt at home regarding program.

## 2025-06-27 NOTE — Clinical Note
First balloon inflation max pressure = 14 jyoti. First balloon inflation duration = 27 seconds. Second inflation of balloon - Max pressure = 12 jyoti. 2nd Inflation of balloon - Duration = 7 seconds. 2nd inflation was done at 09:05 EDT. Third inflation of balloon - Max pressure = 12 jyoti. 3rd Inflation of balloon - Duration = 8 seconds. 3rd inflation was done at 09:05 EDT.

## 2025-06-27 NOTE — Clinical Note
Removed intact 66 y/o female with PMHx hypothyroidism, GIOVANNA (on CPAP), obesity, rectal CA s/p chemo and radiation in 2016, sleeve gastrectomy, found to have meningioma now s/p left craniotomy for tumor resection 05/24/2022. SICU consulted for q1hr neuro checks and monitoring.       PLAN:     NEUROLOGY:  - A&Ox3  - q1 hr neuro checks  - pain control with Tylenol and oxy prn  - Lidocaine patch for back pain  - keppra 500 bid ppx  - monitor FOREST drain output   - No need for postop head imaging    RESPIRATORY:  - hx of GIOVANNA on CPAP at home   - Maintain O2 saturation >92%    CARDIOVASCULAR:  - Goal MAP > 65, SBP < 160  - s/p hydralazine 10mg IVP x2 overnight    GI / NUTRITION:  - c/w protonix   - Regular diet    RENAL / GENITOURINARY:  - Indwelling mejia catheter, consider removal today  - Monitor electrolytes and replete PRN  - Continue to monitor strict ins and outs q1 hour  - IVL    HEMATOLOGIC:  - Start DVT ppx today at 24hrs postop    INFECTIOUS DISEASE:  - Currently afebrile with no leukocytosis  - Ancef x24 hrs postop    ENDOCRINOLOGY:  - hx of hypothyroidism, continue home levothyroxine  - Continue to monitor glucose on BMP (goal 140-180)  - decadron 2mg IVP q8hrs per neurosx    Disposition: SICU   66 y/o female with PMHx hypothyroidism, GIOVANNA (on CPAP), obesity, rectal CA s/p chemo and radiation in 2016, sleeve gastrectomy, found to have meningioma now s/p left craniotomy for tumor resection 05/24/2022. SICU consulted for q1hr neuro checks and monitoring.     mPLAN:     NEUROLOGY:  - A&Ox3  - q1 hr neuro checks  - pain control with Tylenol and oxy prn  - Lidocaine patch for back pain  - keppra 500 bid ppx  - monitor FOREST drain output   - No need for postop head imaging    RESPIRATORY:  - hx of GIOVANNA on CPAP at home   - Maintain O2 saturation >92%    CARDIOVASCULAR:  - Goal MAP > 65  - s/p hydralazine 10mg IVP x2 overnight    GI / NUTRITION:  - Regular diet  - c/w protonix     RENAL / GENITOURINARY:  - Indwelling mejia catheter, consider removal today  - Monitor electrolytes and replete PRN  - Continue to monitor strict ins and outs q1 hour  - f/u repeat BMP, uOsm  - IVL    HEMATOLOGIC:  - Start DVT ppx today at 24hrs postop    INFECTIOUS DISEASE:  - Currently afebrile with no leukocytosis  - Ancef x24 hrs postop    ENDOCRINOLOGY:  - hx of hypothyroidism, continue home levothyroxine  - Continue to monitor glucose on BMP (goal 140-180)  - decadron 2mg IVP q8hrs per neurosx    Disposition: SICU   66 y/o female with PMHx hypothyroidism, GIOVANNA (on CPAP), obesity, rectal CA s/p chemo and radiation in 2016, sleeve gastrectomy, found to have meningioma now s/p left craniotomy for tumor resection 05/24/2022. SICU consulted for q1hr neuro checks and monitoring.     mPLAN:     NEUROLOGY:  - A&Ox3  - c/w q1 hr neuro checks  - Tylenol and Oxy prn Pain  - Lidocaine patch for back pain  - keppra 500 bid for sz ppx  - no need for postop head imaging    RESPIRATORY:  - hx of GIOVANNA on CPAP at home   - Maintain O2 saturation >92%  - c/w PT/OT    CARDIOVASCULAR:  - Goal MAP > 65  - s/p hydralazine 10mg IVP x2 overnight    GI / NUTRITION:  - Regular diet  - c/w protonix     RENAL / GENITOURINARY:  - Monitor electrolytes and replete PRN  - continue to monitor strict ins and outs q1 hour  - f/u repeat BMP, urine lytes, uOsm  - Indwelling mejia catheter, consider removal after no c/f poss. DI  - IVL    HEMATOLOGIC:  - start DVT ppx today at 24hrs postop    INFECTIOUS DISEASE:  - Currently afebrile with no leukocytosis  - Ancef x24 hrs postop    ENDOCRINOLOGY:  - hx of hypothyroidism, continue home levothyroxine  - continue to monitor glucose on BMP (goal 140-180)  - decadron 2mg IVP q8hrs per neurosx    Disposition: SICU

## 2025-06-27 NOTE — Clinical Note
First balloon inflation max pressure = 12 jyoti. First balloon inflation duration = 10 seconds. Second inflation of balloon - Max pressure = 16 jyoti. 2nd Inflation of balloon - Duration = 9 seconds. 2nd inflation was done at 09:16 EDT. Third inflation of balloon - Max pressure = 16 jyoti. 3rd Inflation of balloon - Duration = 9 seconds. 3rd inflation was done at 09:16 EDT.

## 2025-06-28 VITALS
HEART RATE: 64 BPM | WEIGHT: 149.25 LBS | DIASTOLIC BLOOD PRESSURE: 58 MMHG | RESPIRATION RATE: 20 BRPM | OXYGEN SATURATION: 98 % | BODY MASS INDEX: 20.9 KG/M2 | TEMPERATURE: 97.1 F | SYSTOLIC BLOOD PRESSURE: 145 MMHG | HEIGHT: 71 IN

## 2025-06-28 LAB
ANION GAP SERPL CALCULATED.3IONS-SCNC: 11.2 MMOL/L (ref 5–15)
BUN SERPL-MCNC: 26.3 MG/DL (ref 8–23)
BUN/CREAT SERPL: 26.3 (ref 7–25)
CALCIUM SPEC-SCNC: 8.2 MG/DL (ref 8.6–10.5)
CHLORIDE SERPL-SCNC: 105 MMOL/L (ref 98–107)
CO2 SERPL-SCNC: 20.8 MMOL/L (ref 22–29)
CREAT SERPL-MCNC: 1 MG/DL (ref 0.76–1.27)
DEPRECATED RDW RBC AUTO: 45.8 FL (ref 37–54)
EGFRCR SERPLBLD CKD-EPI 2021: 80.5 ML/MIN/1.73
ERYTHROCYTE [DISTWIDTH] IN BLOOD BY AUTOMATED COUNT: 14.4 % (ref 12.3–15.4)
GLUCOSE BLDC GLUCOMTR-MCNC: 146 MG/DL (ref 70–130)
GLUCOSE BLDC GLUCOMTR-MCNC: 149 MG/DL (ref 70–130)
GLUCOSE SERPL-MCNC: 117 MG/DL (ref 65–99)
HCT VFR BLD AUTO: 30 % (ref 37.5–51)
HGB BLD-MCNC: 9.5 G/DL (ref 13–17.7)
MCH RBC QN AUTO: 28.1 PG (ref 26.6–33)
MCHC RBC AUTO-ENTMCNC: 31.7 G/DL (ref 31.5–35.7)
MCV RBC AUTO: 88.8 FL (ref 79–97)
PLATELET # BLD AUTO: 247 10*3/MM3 (ref 140–450)
PMV BLD AUTO: 10.1 FL (ref 6–12)
POTASSIUM SERPL-SCNC: 3.7 MMOL/L (ref 3.5–5.2)
RBC # BLD AUTO: 3.38 10*6/MM3 (ref 4.14–5.8)
SODIUM SERPL-SCNC: 137 MMOL/L (ref 136–145)
WBC NRBC COR # BLD AUTO: 9.92 10*3/MM3 (ref 3.4–10.8)

## 2025-06-28 PROCEDURE — 63710000001 CLOPIDOGREL 75 MG TABLET: Performed by: INTERNAL MEDICINE

## 2025-06-28 PROCEDURE — 80048 BASIC METABOLIC PNL TOTAL CA: CPT | Performed by: INTERNAL MEDICINE

## 2025-06-28 PROCEDURE — A9270 NON-COVERED ITEM OR SERVICE: HCPCS | Performed by: INTERNAL MEDICINE

## 2025-06-28 PROCEDURE — G0378 HOSPITAL OBSERVATION PER HR: HCPCS

## 2025-06-28 PROCEDURE — 63710000001 ESCITALOPRAM 10 MG TABLET: Performed by: INTERNAL MEDICINE

## 2025-06-28 PROCEDURE — 85027 COMPLETE CBC AUTOMATED: CPT | Performed by: INTERNAL MEDICINE

## 2025-06-28 PROCEDURE — 82948 REAGENT STRIP/BLOOD GLUCOSE: CPT

## 2025-06-28 PROCEDURE — 63710000001 AMLODIPINE 5 MG TABLET: Performed by: INTERNAL MEDICINE

## 2025-06-28 PROCEDURE — 99239 HOSP IP/OBS DSCHRG MGMT >30: CPT | Performed by: INTERNAL MEDICINE

## 2025-06-28 PROCEDURE — 63710000001 INSULIN GLARGINE PER 5 UNITS: Performed by: INTERNAL MEDICINE

## 2025-06-28 PROCEDURE — 63710000001 LEVOTHYROXINE 50 MCG TABLET: Performed by: INTERNAL MEDICINE

## 2025-06-28 RX ORDER — CLOPIDOGREL BISULFATE 75 MG/1
75 TABLET ORAL DAILY
Qty: 90 TABLET | Refills: 2 | Status: ON HOLD | OUTPATIENT
Start: 2025-06-29

## 2025-06-28 RX ADMIN — AMLODIPINE BESYLATE 5 MG: 5 TABLET ORAL at 09:02

## 2025-06-28 RX ADMIN — INSULIN GLARGINE 14 UNITS: 100 INJECTION, SOLUTION SUBCUTANEOUS at 09:01

## 2025-06-28 RX ADMIN — LEVOTHYROXINE SODIUM 50 MCG: 0.05 TABLET ORAL at 09:02

## 2025-06-28 RX ADMIN — ESCITALOPRAM OXALATE 10 MG: 10 TABLET ORAL at 09:02

## 2025-06-28 RX ADMIN — CLOPIDOGREL BISULFATE 75 MG: 75 TABLET, FILM COATED ORAL at 09:02

## 2025-06-28 NOTE — DISCHARGE SUMMARY
[facility] Medicine Services  DISCHARGE SUMMARY    Date of Admission: 6/27/2025    Date of Discharge:  6/28/2025    PCP: Lesa Rae APRN    Discharging Provider: Mmii Botello MD    Admission Diagnosis:   Please see admission H&P    Discharge Diagnosis:   Coronary artery disease  Dyslipidemia  Hypertension  Hematuria      Procedures Performed:  1. Selective right and left coronary Angiogram  2. Left heart catheterization    3.  Aortic angiogram  4.  Left subclavian angiogram  5.  Angiogram of LIMA to LAD  6.  IVUS of left circumflex artery  7.  Lithotripsy with a 2.5 x 12 mm balloon  8.  PCI of the proximal circumflex with a 2.5 x 18 mm Xience Skypoint BRITT postdilated with a 3.0 NC.     Consults:   Consults       No orders found for last 30 day(s).            HPI     History of Present Illness:  Christo Clifton is a 70 y.o. male who presented to hospital for elective coronary angiogram.       Hospital Course     Hospital Course  Christo Clifton was admitted as outlined in above HPI.       Patient underwent coronary angiogram and found to have 2 occluded vein grafts and a patent but immature ZAVALA to LAD.  He was noted to have severe disease of proximal left circumflex artery.  He underwent successful PCI.  Postprocedure patient developed mild hematuria.  He was able to void.  He denied any discomfort in the suprapubic area.  He denied any chest pain or shortness of breath.  Telemetry did not show any significant arrhythmias.  Because it is a weekend urology consult is not available.  Patient was advised to stay until Monday to get this evaluated.  But patient declined and wanted to follow-up with his urologist next week.  Patient was advised to come back to the ER immediately if he stops voiding urine or if the hematuria gets worse.    Pertinent Laboratory and Radiology Results     Pertinent Test Results:          Results from last 7 days   Lab Units 06/28/25  0149 06/27/25  0714   WBC  10*3/mm3 9.92 12.66*   HEMOGLOBIN g/dL 9.5* 10.2*   HEMATOCRIT % 30.0* 32.6*   PLATELETS 10*3/mm3 247 286   MCV fL 88.8 89.8   SODIUM mmol/L 137 137   POTASSIUM mmol/L 3.7 3.9   CHLORIDE mmol/L 105 102   CO2 mmol/L 20.8* 23.8   BUN mg/dL 26.3* 29.4*   CREATININE mg/dL 1.00 1.05   GLUCOSE mg/dL 117* 132*   CALCIUM mg/dL 8.2* 8.6          Results from last 7 days   Lab Units 06/28/25  0149 06/27/25  0714   WBC 10*3/mm3 9.92 12.66*     Results from last 7 days   Lab Units 06/27/25  0714   BILIRUBIN mg/dL 0.6   ALK PHOS U/L 120*   ALT (SGPT) U/L 13   AST (SGOT) U/L 17     Results from last 7 days   Lab Units 06/27/25  0714   CHOLESTEROL mg/dL 103   TRIGLYCERIDES mg/dL 81   HDL CHOL mg/dL 30*         Brief Urine Lab Results  (Last result in the past 365 days)        Color   Clarity   Blood   Leuk Est   Nitrite   Protein   CREAT   Urine HCG        03/04/25 0042 Dark Yellow   Cloudy   Large (3+)   Small (1+)   Negative   >=300 mg/dL (3+)                           Results for orders placed during the hospital encounter of 06/03/25    Adult Transthoracic Echo Complete w/ Color, Spectral and Contrast if necessary per protocol    Interpretation Summary    Normal left ventricular cavity size and wall thickness noted. All left ventricular wall segments contract normally.    Left ventricular ejection fraction appears to be 56 - 60%.    Left ventricular diastolic function is consistent with (grade I) impaired relaxation.    There is mild calcification of the aortic valve mainly affecting the left coronary cusp(s). The aortic valve was poorly visualized but appears trileaflet.    No aortic valve regurgitation is present. Mild aortic valve stenosis is present.    The mitral valve is structurally normal with no significant stenosis present. Trace mitral valve regurgitation is present.    There is no evidence of pericardial  effusion.            ----------------------------------------------------------------------------------------------------------------------  US Ankle / Brachial Indices Extremity Limited  Result Date: 6/3/2025    Normal ABIs.  No evidence of peripheral arterial disease.  This report was finalized on 6/3/2025 2:34 PM by Dr. Darnell Staton MD.      CT Chest Low Dose Cancer Screening WO  Result Date: 6/3/2025  1.  Focal parenchymal scarring of the right upper lobe with 10.3 mm nodule like opacity best seen coronal image #72; axial image #46. 2.  4 mm nodule left upper lobe, image #25. 3.  5.3 mm nodule right lower lobe, image #53. 4.  Mild upper lung predominant emphysema is noted. 5.  Mild cardiomegaly and changes of prior CABG.  ASSESSMENT:   Lung-RADS score: 4A - Suspicious.  Recommend low-dose CT (LDCT) in 3 months or PET/CT for solid components 8 mm or larger in size.  This report was finalized on 6/3/2025 11:21 AM by Dr. Darnell Staton MD.          Microbiology Results (last 10 days)       ** No results found for the last 240 hours. **            Labs above have been reviewed on the day of discharge.  Radiology images from prior 30 days were reviewed prior to discharge as incorporated into this document.     Discharge Vitals and Physical Examination       Vital Signs  Temp:  [97.2 °F (36.2 °C)-98.5 °F (36.9 °C)] 97.8 °F (36.6 °C)  Heart Rate:  [52-76] 58  Resp:  [14-20] 20  BP: (141-176)/(55-79) 141/61     PHYSICAL EXAMINATION:   Physical Exam  Constitutional:       Appearance: Normal appearance.   HENT:      Head: Normocephalic and atraumatic.   Neck:      Vascular: No JVD.   Cardiovascular:      Rate and Rhythm: Normal rate and regular rhythm.      Comments: Right groin CDI.  Pulmonary:      Effort: Pulmonary effort is normal.      Breath sounds: Normal breath sounds.   Abdominal:      Palpations: Abdomen is soft.      Tenderness: There is no abdominal tenderness.   Neurological:      Mental Status: He is  alert.   Psychiatric:         Behavior: Behavior is cooperative.           Discharge Disposition, Discharge Medications, and Discharge Appointments     Discharge Disposition:   home    Condition on Discharge:  Stable    Discharge Medications:     Discharge Medications        New Medications        Instructions Start Date   clopidogrel 75 MG tablet  Commonly known as: PLAVIX   75 mg, Oral, Daily   Start Date: June 29, 2025            Continue These Medications        Instructions Start Date   amLODIPine 5 MG tablet  Commonly known as: NORVASC   5 mg, Oral, Daily      aspirin 81 MG EC tablet   81 mg, Daily      atorvastatin 80 MG tablet  Commonly known as: LIPITOR   80 mg, Oral, Daily      escitalopram 10 MG tablet  Commonly known as: LEXAPRO   10 mg, Oral, Daily      fluticasone 50 MCG/ACT nasal spray  Commonly known as: FLONASE   1 spray, Nasal, Daily PRN      insulin glargine 100 UNIT/ML injection  Commonly known as: LANTUS, SEMGLEE   14 Units, Subcutaneous, Daily      levothyroxine 50 MCG tablet  Commonly known as: SYNTHROID, LEVOTHROID   50 mcg, Oral, Daily               Discharged medication regimen discussed with attending physician prior to discharge.     Discharge Diet:   cardiac diet  Dietary Orders (From admission, onward)       Start     Ordered    06/27/25 1027  Diet: Cardiac; Healthy Heart (2-3 Na+); Fluid Consistency: Thin (IDDSI 0)  Diet Effective Now        References:    Diet Order Definitions   Question Answer Comment   Diets: Cardiac    Cardiac Diet: Healthy Heart (2-3 Na+)    Fluid Consistency: Thin (IDDSI 0)        06/27/25 1026                    Activity at Discharge:  no heavy lifting for 1 weeks         Discharge Disposition:    Home or Self Care        Follow-up Appointments:  Your Scheduled Appointments      Sep 15, 2025 11:00 AM  Follow Up with Wero Green MD  [facility] ([place])  [place] [address]  [phone]   -Bring photo ID,  insurance card, and list of medications to appointment  -If testing was completed outside of [facility] then patient must bring images on a disc  -Copay will be collected at time of appointment                 Follow-up Information       Lesa Rae APRN .    Specialty: Nurse Practitioner  Contact information:  [address]  [place] [address]  [phone]                                 Test Results Pending at Discharge:        06/28/25  10:08 EDT      Time: Greater than 30 minutes spent on this discharge.  I spent 30 minutes on this discharge activity which included:  Face-to-face encounter with the patient, discussing plan with attending physician, reviewing the data in the system, coordination of the care with the nursing staff as well as consultations, documentation, and entering orders.

## 2025-06-28 NOTE — NURSING NOTE
Pt being discharged home today on room air. CHERI Knutson made aware the discharge is complete. Family to provide transportation.

## 2025-06-28 NOTE — PLAN OF CARE
Goal Outcome Evaluation:      Patient is resting in bed this shift. No acute signs or symptoms of distress have been noted. Plan of care is ongoing.

## 2025-06-30 ENCOUNTER — TELEPHONE (OUTPATIENT)
Dept: CARDIAC REHAB | Facility: HOSPITAL | Age: 70
End: 2025-06-30
Payer: MEDICARE

## 2025-06-30 ENCOUNTER — TELEPHONE (OUTPATIENT)
Dept: TELEMETRY | Facility: HOSPITAL | Age: 70
End: 2025-06-30
Payer: MEDICARE

## 2025-07-01 NOTE — CASE MANAGEMENT/SOCIAL WORK
Case Management Discharge Note      Final Note: Patient was discharged home on 6/2/25.  No needs identified.         Selected Continued Care - Discharged on 6/28/2025 Admission date: 6/27/2025 - Discharge disposition: Home or Self Care              Final Discharge Disposition Code: 01 - home or self-care

## 2025-07-06 ENCOUNTER — HOSPITAL ENCOUNTER (INPATIENT)
Facility: HOSPITAL | Age: 70
LOS: 3 days | Discharge: ANOTHER HOSPITAL | DRG: 270 | End: 2025-07-09
Admitting: INTERNAL MEDICINE
Payer: MEDICARE

## 2025-07-06 ENCOUNTER — APPOINTMENT (OUTPATIENT)
Dept: GENERAL RADIOLOGY | Facility: HOSPITAL | Age: 70
DRG: 270 | End: 2025-07-06
Payer: MEDICARE

## 2025-07-06 DIAGNOSIS — I24.9 ACUTE ISCHEMIC HEART DISEASE, UNSPECIFIED: ICD-10-CM

## 2025-07-06 DIAGNOSIS — I25.9 CHEST PAIN DUE TO MYOCARDIAL ISCHEMIA, UNSPECIFIED ISCHEMIC CHEST PAIN TYPE: Primary | ICD-10-CM

## 2025-07-06 DIAGNOSIS — R79.89 ELEVATED TROPONIN: ICD-10-CM

## 2025-07-06 DIAGNOSIS — I45.9 HEART BLOCK: ICD-10-CM

## 2025-07-06 PROBLEM — R07.9 CHEST PAIN: Status: ACTIVE | Noted: 2025-07-06

## 2025-07-06 LAB
ALBUMIN SERPL-MCNC: 3 G/DL (ref 3.5–5.2)
ALBUMIN/GLOB SERPL: 0.9 G/DL
ALP SERPL-CCNC: 100 U/L (ref 39–117)
ALT SERPL W P-5'-P-CCNC: 20 U/L (ref 1–41)
ANION GAP SERPL CALCULATED.3IONS-SCNC: 12 MMOL/L (ref 5–15)
ANION GAP SERPL CALCULATED.3IONS-SCNC: 13.8 MMOL/L (ref 5–15)
APTT PPP: 33.9 SECONDS (ref 24.5–35.9)
AST SERPL-CCNC: 27 U/L (ref 1–40)
BASOPHILS # BLD AUTO: 0.05 10*3/MM3 (ref 0–0.2)
BASOPHILS # BLD AUTO: 0.05 10*3/MM3 (ref 0–0.2)
BASOPHILS NFR BLD AUTO: 0.5 % (ref 0–1.5)
BASOPHILS NFR BLD AUTO: 0.5 % (ref 0–1.5)
BILIRUB SERPL-MCNC: 0.8 MG/DL (ref 0–1.2)
BUN SERPL-MCNC: 28.5 MG/DL (ref 8–23)
BUN SERPL-MCNC: 29.1 MG/DL (ref 8–23)
BUN/CREAT SERPL: 22.4 (ref 7–25)
BUN/CREAT SERPL: 23.8 (ref 7–25)
CALCIUM SPEC-SCNC: 8.7 MG/DL (ref 8.6–10.5)
CALCIUM SPEC-SCNC: 8.7 MG/DL (ref 8.6–10.5)
CHLORIDE SERPL-SCNC: 100 MMOL/L (ref 98–107)
CHLORIDE SERPL-SCNC: 101 MMOL/L (ref 98–107)
CO2 SERPL-SCNC: 20.2 MMOL/L (ref 22–29)
CO2 SERPL-SCNC: 22 MMOL/L (ref 22–29)
CREAT SERPL-MCNC: 1.2 MG/DL (ref 0.76–1.27)
CREAT SERPL-MCNC: 1.3 MG/DL (ref 0.76–1.27)
DEPRECATED RDW RBC AUTO: 45.9 FL (ref 37–54)
DEPRECATED RDW RBC AUTO: 46.2 FL (ref 37–54)
EGFRCR SERPLBLD CKD-EPI 2021: 58.7 ML/MIN/1.73
EGFRCR SERPLBLD CKD-EPI 2021: 64.7 ML/MIN/1.73
EOSINOPHIL # BLD AUTO: 0.16 10*3/MM3 (ref 0–0.4)
EOSINOPHIL # BLD AUTO: 0.31 10*3/MM3 (ref 0–0.4)
EOSINOPHIL NFR BLD AUTO: 1.7 % (ref 0.3–6.2)
EOSINOPHIL NFR BLD AUTO: 3.1 % (ref 0.3–6.2)
ERYTHROCYTE [DISTWIDTH] IN BLOOD BY AUTOMATED COUNT: 14.1 % (ref 12.3–15.4)
ERYTHROCYTE [DISTWIDTH] IN BLOOD BY AUTOMATED COUNT: 14.3 % (ref 12.3–15.4)
GEN 5 1HR TROPONIN T REFLEX: 311 NG/L
GLOBULIN UR ELPH-MCNC: 3.5 GM/DL
GLUCOSE SERPL-MCNC: 205 MG/DL (ref 65–99)
GLUCOSE SERPL-MCNC: 238 MG/DL (ref 65–99)
HCT VFR BLD AUTO: 27.5 % (ref 37.5–51)
HCT VFR BLD AUTO: 28.3 % (ref 37.5–51)
HGB BLD-MCNC: 8.8 G/DL (ref 13–17.7)
HGB BLD-MCNC: 9 G/DL (ref 13–17.7)
HOLD SPECIMEN: NORMAL
HOLD SPECIMEN: NORMAL
IMM GRANULOCYTES # BLD AUTO: 0.03 10*3/MM3 (ref 0–0.05)
IMM GRANULOCYTES # BLD AUTO: 0.03 10*3/MM3 (ref 0–0.05)
IMM GRANULOCYTES NFR BLD AUTO: 0.3 % (ref 0–0.5)
IMM GRANULOCYTES NFR BLD AUTO: 0.3 % (ref 0–0.5)
INR PPP: 1.04 (ref 0.9–1.1)
LYMPHOCYTES # BLD AUTO: 1.36 10*3/MM3 (ref 0.7–3.1)
LYMPHOCYTES # BLD AUTO: 1.6 10*3/MM3 (ref 0.7–3.1)
LYMPHOCYTES NFR BLD AUTO: 14.5 % (ref 19.6–45.3)
LYMPHOCYTES NFR BLD AUTO: 15.9 % (ref 19.6–45.3)
MCH RBC QN AUTO: 28.1 PG (ref 26.6–33)
MCH RBC QN AUTO: 28.2 PG (ref 26.6–33)
MCHC RBC AUTO-ENTMCNC: 31.8 G/DL (ref 31.5–35.7)
MCHC RBC AUTO-ENTMCNC: 32 G/DL (ref 31.5–35.7)
MCV RBC AUTO: 88.1 FL (ref 79–97)
MCV RBC AUTO: 88.4 FL (ref 79–97)
MONOCYTES # BLD AUTO: 0.69 10*3/MM3 (ref 0.1–0.9)
MONOCYTES # BLD AUTO: 1.01 10*3/MM3 (ref 0.1–0.9)
MONOCYTES NFR BLD AUTO: 10 % (ref 5–12)
MONOCYTES NFR BLD AUTO: 7.3 % (ref 5–12)
NEUTROPHILS NFR BLD AUTO: 7.09 10*3/MM3 (ref 1.7–7)
NEUTROPHILS NFR BLD AUTO: 7.12 10*3/MM3 (ref 1.7–7)
NEUTROPHILS NFR BLD AUTO: 70.2 % (ref 42.7–76)
NEUTROPHILS NFR BLD AUTO: 75.7 % (ref 42.7–76)
NRBC BLD AUTO-RTO: 0 /100 WBC (ref 0–0.2)
NRBC BLD AUTO-RTO: 0 /100 WBC (ref 0–0.2)
PLATELET # BLD AUTO: 318 10*3/MM3 (ref 140–450)
PLATELET # BLD AUTO: 341 10*3/MM3 (ref 140–450)
PMV BLD AUTO: 10.1 FL (ref 6–12)
PMV BLD AUTO: 9.9 FL (ref 6–12)
POTASSIUM SERPL-SCNC: 3.6 MMOL/L (ref 3.5–5.2)
POTASSIUM SERPL-SCNC: 3.8 MMOL/L (ref 3.5–5.2)
PROT SERPL-MCNC: 6.5 G/DL (ref 6–8.5)
PROTHROMBIN TIME: 14.2 SECONDS (ref 12.5–15.2)
QT INTERVAL: 490 MS
QTC INTERVAL: 529 MS
RBC # BLD AUTO: 3.12 10*6/MM3 (ref 4.14–5.8)
RBC # BLD AUTO: 3.2 10*6/MM3 (ref 4.14–5.8)
SODIUM SERPL-SCNC: 134 MMOL/L (ref 136–145)
SODIUM SERPL-SCNC: 135 MMOL/L (ref 136–145)
TROPONIN T % DELTA: -13
TROPONIN T NUMERIC DELTA: -46 NG/L
TROPONIN T SERPL HS-MCNC: 357 NG/L
UFH PPP CHRO-ACNC: <0.1 IU/ML (ref 0.3–0.7)
WBC NRBC COR # BLD AUTO: 10.09 10*3/MM3 (ref 3.4–10.8)
WBC NRBC COR # BLD AUTO: 9.41 10*3/MM3 (ref 3.4–10.8)
WHOLE BLOOD HOLD COAG: NORMAL
WHOLE BLOOD HOLD SPECIMEN: NORMAL

## 2025-07-06 PROCEDURE — 36415 COLL VENOUS BLD VENIPUNCTURE: CPT

## 2025-07-06 PROCEDURE — 99291 CRITICAL CARE FIRST HOUR: CPT

## 2025-07-06 PROCEDURE — 85025 COMPLETE CBC W/AUTO DIFF WBC: CPT

## 2025-07-06 PROCEDURE — 25010000002 HEPARIN (PORCINE) PER 1000 UNITS

## 2025-07-06 PROCEDURE — 85730 THROMBOPLASTIN TIME PARTIAL: CPT

## 2025-07-06 PROCEDURE — 93010 ELECTROCARDIOGRAM REPORT: CPT | Performed by: INTERNAL MEDICINE

## 2025-07-06 PROCEDURE — 84484 ASSAY OF TROPONIN QUANT: CPT

## 2025-07-06 PROCEDURE — 25010000002 HEPARIN (PORCINE) 25000-0.45 UT/250ML-% SOLUTION

## 2025-07-06 PROCEDURE — 93005 ELECTROCARDIOGRAM TRACING: CPT | Performed by: INTERNAL MEDICINE

## 2025-07-06 PROCEDURE — 99223 1ST HOSP IP/OBS HIGH 75: CPT

## 2025-07-06 PROCEDURE — 93005 ELECTROCARDIOGRAM TRACING: CPT

## 2025-07-06 PROCEDURE — 85520 HEPARIN ASSAY: CPT

## 2025-07-06 PROCEDURE — 80053 COMPREHEN METABOLIC PANEL: CPT

## 2025-07-06 PROCEDURE — 85610 PROTHROMBIN TIME: CPT

## 2025-07-06 PROCEDURE — 71045 X-RAY EXAM CHEST 1 VIEW: CPT

## 2025-07-06 RX ORDER — ASPIRIN 81 MG/1
81 TABLET ORAL DAILY
Status: DISCONTINUED | OUTPATIENT
Start: 2025-07-07 | End: 2025-07-09 | Stop reason: HOSPADM

## 2025-07-06 RX ORDER — SODIUM CHLORIDE 9 MG/ML
40 INJECTION, SOLUTION INTRAVENOUS AS NEEDED
Status: DISCONTINUED | OUTPATIENT
Start: 2025-07-06 | End: 2025-07-09 | Stop reason: HOSPADM

## 2025-07-06 RX ORDER — HEPARIN SODIUM 5000 [USP'U]/ML
4000 INJECTION, SOLUTION INTRAVENOUS; SUBCUTANEOUS ONCE
Status: COMPLETED | OUTPATIENT
Start: 2025-07-06 | End: 2025-07-06

## 2025-07-06 RX ORDER — BISACODYL 5 MG/1
5 TABLET, DELAYED RELEASE ORAL DAILY PRN
Status: DISCONTINUED | OUTPATIENT
Start: 2025-07-06 | End: 2025-07-09 | Stop reason: HOSPADM

## 2025-07-06 RX ORDER — POLYETHYLENE GLYCOL 3350 17 G/17G
17 POWDER, FOR SOLUTION ORAL DAILY PRN
Status: DISCONTINUED | OUTPATIENT
Start: 2025-07-06 | End: 2025-07-09 | Stop reason: HOSPADM

## 2025-07-06 RX ORDER — BISACODYL 10 MG
10 SUPPOSITORY, RECTAL RECTAL DAILY PRN
Status: DISCONTINUED | OUTPATIENT
Start: 2025-07-06 | End: 2025-07-09 | Stop reason: HOSPADM

## 2025-07-06 RX ORDER — CLOPIDOGREL BISULFATE 75 MG/1
75 TABLET ORAL DAILY
Status: DISCONTINUED | OUTPATIENT
Start: 2025-07-07 | End: 2025-07-07

## 2025-07-06 RX ORDER — SODIUM CHLORIDE 0.9 % (FLUSH) 0.9 %
10 SYRINGE (ML) INJECTION AS NEEDED
Status: DISCONTINUED | OUTPATIENT
Start: 2025-07-06 | End: 2025-07-09 | Stop reason: HOSPADM

## 2025-07-06 RX ORDER — LEVOTHYROXINE SODIUM 50 UG/1
50 TABLET ORAL DAILY
Status: DISCONTINUED | OUTPATIENT
Start: 2025-07-07 | End: 2025-07-09 | Stop reason: HOSPADM

## 2025-07-06 RX ORDER — CLOPIDOGREL BISULFATE 75 MG/1
75 TABLET ORAL DAILY
Status: CANCELLED | OUTPATIENT
Start: 2025-07-07

## 2025-07-06 RX ORDER — HEPARIN SODIUM 5000 [USP'U]/ML
25 INJECTION, SOLUTION INTRAVENOUS; SUBCUTANEOUS AS NEEDED
Status: DISCONTINUED | OUTPATIENT
Start: 2025-07-06 | End: 2025-07-06

## 2025-07-06 RX ORDER — NITROGLYCERIN 0.4 MG/1
0.4 TABLET SUBLINGUAL ONCE
Status: COMPLETED | OUTPATIENT
Start: 2025-07-06 | End: 2025-07-06

## 2025-07-06 RX ORDER — ESCITALOPRAM OXALATE 10 MG/1
10 TABLET ORAL DAILY
Status: DISCONTINUED | OUTPATIENT
Start: 2025-07-07 | End: 2025-07-09 | Stop reason: HOSPADM

## 2025-07-06 RX ORDER — NITROGLYCERIN 0.4 MG/1
0.4 TABLET SUBLINGUAL
Status: DISCONTINUED | OUTPATIENT
Start: 2025-07-06 | End: 2025-07-07

## 2025-07-06 RX ORDER — AMLODIPINE BESYLATE 5 MG/1
5 TABLET ORAL DAILY
Status: DISCONTINUED | OUTPATIENT
Start: 2025-07-07 | End: 2025-07-07

## 2025-07-06 RX ORDER — ATORVASTATIN CALCIUM 40 MG/1
80 TABLET, FILM COATED ORAL DAILY
Status: DISCONTINUED | OUTPATIENT
Start: 2025-07-07 | End: 2025-07-09 | Stop reason: HOSPADM

## 2025-07-06 RX ORDER — FLUTICASONE PROPIONATE 50 MCG
1 SPRAY, SUSPENSION (ML) NASAL DAILY PRN
Status: DISCONTINUED | OUTPATIENT
Start: 2025-07-06 | End: 2025-07-09 | Stop reason: HOSPADM

## 2025-07-06 RX ORDER — HEPARIN SODIUM 10000 [USP'U]/100ML
12 INJECTION, SOLUTION INTRAVENOUS
Status: DISCONTINUED | OUTPATIENT
Start: 2025-07-06 | End: 2025-07-07

## 2025-07-06 RX ORDER — AMOXICILLIN 250 MG
2 CAPSULE ORAL 2 TIMES DAILY PRN
Status: DISCONTINUED | OUTPATIENT
Start: 2025-07-06 | End: 2025-07-09 | Stop reason: HOSPADM

## 2025-07-06 RX ORDER — ACETAMINOPHEN 325 MG/1
650 TABLET ORAL EVERY 4 HOURS PRN
Status: DISCONTINUED | OUTPATIENT
Start: 2025-07-06 | End: 2025-07-07

## 2025-07-06 RX ORDER — HEPARIN SODIUM 5000 [USP'U]/ML
50 INJECTION, SOLUTION INTRAVENOUS; SUBCUTANEOUS AS NEEDED
Status: DISCONTINUED | OUTPATIENT
Start: 2025-07-06 | End: 2025-07-06

## 2025-07-06 RX ORDER — ESCITALOPRAM OXALATE 10 MG/1
10 TABLET ORAL DAILY
Status: CANCELLED | OUTPATIENT
Start: 2025-07-07

## 2025-07-06 RX ORDER — SODIUM CHLORIDE 0.9 % (FLUSH) 0.9 %
10 SYRINGE (ML) INJECTION EVERY 12 HOURS SCHEDULED
Status: DISCONTINUED | OUTPATIENT
Start: 2025-07-06 | End: 2025-07-09 | Stop reason: HOSPADM

## 2025-07-06 RX ORDER — INSULIN GLARGINE 100 [IU]/ML
14 INJECTION, SOLUTION SUBCUTANEOUS DAILY
Status: DISCONTINUED | OUTPATIENT
Start: 2025-07-07 | End: 2025-07-09 | Stop reason: HOSPADM

## 2025-07-06 RX ORDER — SENNOSIDES 8.6 MG
325 CAPSULE ORAL ONCE
Status: DISCONTINUED | OUTPATIENT
Start: 2025-07-06 | End: 2025-07-06 | Stop reason: SDUPTHER

## 2025-07-06 RX ORDER — ASPIRIN 81 MG/1
324 TABLET, CHEWABLE ORAL ONCE
Status: DISCONTINUED | OUTPATIENT
Start: 2025-07-06 | End: 2025-07-06

## 2025-07-06 RX ORDER — ASPIRIN 81 MG/1
324 TABLET, CHEWABLE ORAL ONCE
Status: COMPLETED | OUTPATIENT
Start: 2025-07-06 | End: 2025-07-06

## 2025-07-06 RX ADMIN — HEPARIN SODIUM 12 UNITS/KG/HR: 10000 INJECTION, SOLUTION INTRAVENOUS at 18:42

## 2025-07-06 RX ADMIN — NITROGLYCERIN 0.4 MG: 0.4 TABLET SUBLINGUAL at 17:00

## 2025-07-06 RX ADMIN — Medication 10 ML: at 21:35

## 2025-07-06 RX ADMIN — ASPIRIN 81 MG CHEWABLE TABLET 324 MG: 81 TABLET CHEWABLE at 17:10

## 2025-07-06 RX ADMIN — HEPARIN SODIUM 4000 UNITS: 5000 INJECTION INTRAVENOUS; SUBCUTANEOUS at 18:41

## 2025-07-06 NOTE — Clinical Note
First balloon inflation max pressure = 12 jyoti. First balloon inflation duration = 30 seconds. Second inflation of balloon - Max pressure = 12 jyoti. 2nd Inflation of balloon - Duration = 30 seconds.

## 2025-07-06 NOTE — Clinical Note
First balloon inflation max pressure = 12 jyoti. First balloon inflation duration = 22 seconds. Second inflation of balloon - Max pressure = 12 jyoti. 2nd Inflation of balloon - Duration = 24 seconds.

## 2025-07-06 NOTE — Clinical Note
A 6 fr sheath was successfully inserted using micropuncture technique with ultrasound guidance into the left femoral artery. Sheath insertion not delayed.

## 2025-07-06 NOTE — Clinical Note
First balloon inflation max pressure = 10 jyoti. First balloon inflation duration = 26 seconds. Second inflation of balloon - Max pressure = 12 jyoti. 2nd Inflation of balloon - Duration = 30 seconds.

## 2025-07-06 NOTE — Clinical Note
First balloon inflation max pressure = 12 jyoti. First balloon inflation duration = 20 seconds. Second inflation of balloon - Max pressure = 16 jyoti. 2nd Inflation of balloon - Duration = 22 seconds.

## 2025-07-06 NOTE — H&P
[facility] Medicine Services  History & Physical    Patient Identification:  Name:  Christo Clifton  Age:  70 y.o.  Sex:  male  :  1955  MRN:  [id number]   Visit Number:  [id number]  Admit Date: 2025   Primary Care Physician:  Lesa Rae APRN    Subjective     Chief complaint: chest pain    History of presenting illness:      Christo Clifton is a 70 y.o. male who presented for further evaluation of chest pain.  Patient was admitted to this facility  through 2025 for left heart catheterization and was found to have 2 occluded vein grafts and had PCI to the left circumflex.  Per discharge summary postoperative course was complicated by mild hematuria and patient planned to follow-up with his urologist in the outpatient setting.  He was seen and examined today in the ED with his daughter present at the bedside.  He reported chest pain began overnight and has continued intermittently.  He complains of burning and pressure sensation in the retrosternal area which radiates into the right arm and he states his head.  He is having associated dizziness, shortness of breath, nausea with periods of more severe pain.  Also reports has been intermittently diaphoretic.  He states has been taking all medications as directed post stent placement.  He is not a smoker.  No leg swelling, no palpitations.  ED provider has discussed case with interventional cardiology who recommended continue heparin drip and admit for further management.    Past medical history is significant for IDDM, ASCVD s/p CABG, HTN, HLD, COPD, seizure disorder, bladder    Upon arrival to the ED, vital signs were temp 97.6, heart rate 68, respirations 20, /40, SpO2 99% on RA.  His initial HS troponin was 357 with repeat at 311.  His creatinine is slightly elevated at 1.3 with BUN 29.1.  Glucose was 238.  CBC appears to be generally stable compared to prior.  EKG showed sinus rhythm with PACs, LBBB.    Known  Emergency Department medications received prior to my evaluation included aspirin, subcu heparin.   Emergency Department Room location at the time of my evaluation was 104.     ---------------------------------------------------------------------------------------------------------------------   Review of Systems   Constitutional:  Positive for diaphoresis. Negative for chills and fever.   HENT:  Negative for congestion and rhinorrhea.    Respiratory:  Positive for shortness of breath. Negative for cough.    Cardiovascular:  Positive for chest pain. Negative for palpitations and leg swelling.   Gastrointestinal:  Positive for nausea. Negative for abdominal pain and vomiting.   Genitourinary:  Negative for difficulty urinating, dysuria and hematuria.   Musculoskeletal:  Negative for arthralgias and myalgias.   Skin:  Negative for rash and wound.   Neurological:  Positive for dizziness and light-headedness.        ---------------------------------------------------------------------------------------------------------------------   Past Medical History:   Diagnosis Date    Anxiety and depression     Arthritis     COPD (chronic obstructive pulmonary disease)     Coronary artery disease     Diabetes mellitus     Disease of thyroid gland     Dyslipidemia     History of transfusion     Hypertension     Seizure disorder     Pt states last seizure x1 mo.     Past Surgical History:   Procedure Laterality Date    AMPUTATION DIGIT Right 09/13/2017    Procedure: RIGHT 1ST TOE AMPUTATION ;  Surgeon: Lee Lee MD;  Location: [facility] OR;  Service:     CARDIAC CATHETERIZATION      CARDIAC CATHETERIZATION N/A 6/27/2025    Procedure: Left Heart Cath;  Surgeon: Mimi Botello MD;  Location:  [facility] CATH INVASIVE LOCATION;  Service: Cardiovascular;  Laterality: N/A;    CARDIAC CATHETERIZATION N/A 6/27/2025    Procedure: Percutaneous Coronary Intervention;  Surgeon: Mimi Botello MD;  Location: [facility] INVASIVE  LOCATION;  Service: Cardiovascular;  Laterality: N/A;    CARDIAC SURGERY      CIRCUMCISION      CORONARY ARTERY BYPASS GRAFT      HERNIA REPAIR      X2    INTRAVASCULAR ULTRASOUND N/A 6/27/2025    Procedure: Intravascular Ultrasound;  Surgeon: Mimi Botello MD;  Location: [facility] CATH INVASIVE LOCATION;  Service: Cardiovascular;  Laterality: N/A;    FL INCISION & DRAINAGE LEG/ANKLE ABSCESS/HEMATOMA Right 05/24/2017    Procedure: Debridement of right first toe;  Surgeon: Ronald Perdue MD;  Location: [facility] OR;  Service: General    TOE SURGERY Left     debridement     Family History   Problem Relation Age of Onset    Diabetes Mother     Hypertension Mother     Diabetes Father      Social History     Socioeconomic History    Marital status:    Tobacco Use    Smoking status: Every Day     Current packs/day: 0.00     Average packs/day: 2.0 packs/day for 45.0 years (90.0 ttl pk-yrs)     Types: Cigarettes     Start date: 1965     Last attempt to quit: 2010     Years since quitting: 15.5    Smokeless tobacco: Never    Tobacco comments:     Trying to quit   Vaping Use    Vaping status: Never Used   Substance and Sexual Activity    Alcohol use: No    Drug use: No    Sexual activity: Defer     ---------------------------------------------------------------------------------------------------------------------   Allergies:  Other  ---------------------------------------------------------------------------------------------------------------------   Home medications:    Medications below are reported home medications pulling from within the system; at this time, these medications have not been reconciled unless otherwise specified and are in the verification process for further verifcation as current home medications.  (Not in a hospital admission)      Hospital Scheduled Meds:  heparin (porcine), 4,000 Units, Intravenous, Once      heparin, 12 Units/kg/hr (Dosing Weight)  Pharmacy to Dose Heparin,         Current  listed hospital scheduled medications may not yet reflect those currently placed in orders that are signed and held awaiting patient's arrival to floor.   ---------------------------------------------------------------------------------------------------------------------     Objective     Vital Signs:  Temp:  [97.6 °F (36.4 °C)] 97.6 °F (36.4 °C)  Heart Rate:  [68-75] 75  Resp:  [20] 20  BP: ()/(40-63) 97/54      07/06/25  1613   Weight: 67.7 kg (149 lb 4 oz)     Body mass index is 20.83 kg/m².  ---------------------------------------------------------------------------------------------------------------------       Physical Exam  Vitals and nursing note reviewed.   Constitutional:       General: He is not in acute distress.  HENT:      Head: Normocephalic and atraumatic.   Eyes:      Extraocular Movements: Extraocular movements intact.   Cardiovascular:      Rate and Rhythm: Normal rate and regular rhythm.   Pulmonary:      Effort: Pulmonary effort is normal.      Breath sounds: Normal breath sounds.   Abdominal:      Palpations: Abdomen is soft.   Musculoskeletal:      Right lower leg: No edema.      Left lower leg: No edema.   Skin:     General: Skin is warm and dry.   Neurological:      Mental Status: He is alert. Mental status is at baseline.   Psychiatric:         Mood and Affect: Mood normal.         Behavior: Behavior normal.       ---------------------------------------------------------------------------------------------------------------------  EKG:        I have personally looked at the EKG.  ---------------------------------------------------------------------------------------------------------------------   Results from last 7 days   Lab Units 07/06/25  1629   WBC 10*3/mm3 10.09   HEMOGLOBIN g/dL 9.0*   HEMATOCRIT % 28.3*   MCV fL 88.4   MCHC g/dL 31.8   PLATELETS 10*3/mm3 341   INR  1.04         Results from last 7 days   Lab Units 07/06/25  1629   SODIUM mmol/L 134*   POTASSIUM mmol/L 3.8  "  CHLORIDE mmol/L 100   CO2 mmol/L 20.2*   BUN mg/dL 29.1*   CREATININE mg/dL 1.30*   CALCIUM mg/dL 8.7   GLUCOSE mg/dL 238*   ALBUMIN g/dL 3.0*   BILIRUBIN mg/dL 0.8   ALK PHOS U/L 100   AST (SGOT) U/L 27   ALT (SGPT) U/L 20   Estimated Creatinine Clearance: 49.9 mL/min (A) (by C-G formula based on SCr of 1.3 mg/dL (H)).  No results found for: \"AMMONIA\"  Results from last 7 days   Lab Units 07/06/25  1733 07/06/25  1629   HSTROP T ng/L 311* 357*         Lab Results   Component Value Date    HGBA1C 9.10 (H) 10/15/2018     Lab Results   Component Value Date    TSH 1.720 05/28/2024    FREET4 1.06 05/28/2024     No results found for: \"PREGTESTUR\", \"PREGSERUM\", \"HCG\", \"HCGQUANT\"  Pain Management Panel  More data exists         Latest Ref Rng & Units 10/26/2020 7/20/2020   Pain Management Panel   Creatinine, Urine mg/dL  mg/dL 110.5  110.5  60.9       Details          Multiple values from one day are sorted in reverse-chronological order             No results found for: \"BLOODCX\"  No results found for: \"URINECX\"  No results found for: \"WOUNDCX\"  No results found for: \"STOOLCX\"      ---------------------------------------------------------------------------------------------------------------------  Imaging Results (Last 7 Days)       Procedure Component Value Units Date/Time    XR Chest 1 View - In process [[id number]] Resulted: 07/06/25 1713     Updated: 07/06/25 1827    This result has not been signed. Information might be incomplete.              Cultures:  No results found for: \"BLOODCX\", \"URINECX\", \"WOUNDCX\", \"MRSACX\", \"RESPCX\", \"STOOLCX\"    Last echocardiogram:  Results for orders placed during the hospital encounter of 06/03/25    Adult Transthoracic Echo Complete w/ Color, Spectral and Contrast if necessary per protocol 06/06/2025  9:35 AM    Interpretation Summary    Normal left ventricular cavity size and wall thickness noted. All left ventricular wall segments contract normally.    Left ventricular ejection " fraction appears to be 56 - 60%.    Left ventricular diastolic function is consistent with (grade I) impaired relaxation.    There is mild calcification of the aortic valve mainly affecting the left coronary cusp(s). The aortic valve was poorly visualized but appears trileaflet.    No aortic valve regurgitation is present. Mild aortic valve stenosis is present.    The mitral valve is structurally normal with no significant stenosis present. Trace mitral valve regurgitation is present.    There is no evidence of pericardial effusion.          I have personally reviewed the above radiology images and read the final radiology report on 07/06/25  ---------------------------------------------------------------------------------------------------------------------  Assessment / Plan     There are no active hospital problems to display for this patient.      ASSESSMENT/PLAN:    Unstable angina  NSTEMI  CAD s/p recent stent placement, remote history of CABG  Patient presented to the ED secondary to chest pain.  Notably had [facility] with stent to circumflex on 6/27/2025.  He reports pain began at rest last night and has persisted.  He describes chest pressure with radiation to the right arm with associated nausea, shortness of breath, diaphoresis.  His initial HS troponin was 357, HS troponin 311, EKG concerning for left bundle branch block.  Case has been discussed with interventional cardiology who recommended continue heparin drip and admit.  He received full dose aspirin in the ED.  Admit to the PCU  Continue heparin drip-pharmacy to dose, assistance appreciated.  Monitor per protocol  Continue cardiac monitoring.  Standing orders in place for recurrent chest pain.  Plan to resume home cardiac regimen as indicated once med rec is complete  Consult interventional cardiology for further assistance and recommendations, appreciated.  NPO pending potential further testing or intervention  Continue to provide supportive care  measures  Trend labs    Chronic:  IDDM  HTN  HLD  COPD  Seizure disorder  Bladder tumor  Continue home medication regimen as indicated  Continue insulin regimen at appropriate doses as well once med rec is complete.  Monitor vital signs closely  Monitor for recurrence of hematuria with initiation of heparin drip  Monitor respiratory status  ----------  -DVT prophylaxis: Currently on heparin drip  -Activity: Ad padmini.  -Expected length of stay: INPATIENT status due to the need for care which can only be reasonably provided in an hospital setting such as aggressive/expedited ancillary services and/or consultation services, the necessity for IV medications, close physician monitoring and/or the possible need for procedures.  In such, I feel patient’s risk for adverse outcomes and need for care warrant INPATIENT evaluation and predict the patient’s care encounter to likely last beyond 2 midnights.   -Disposition pending further clinical course     High risk secondary to Unstable angina, NSTEMI, recent stenting    There are no questions and answers to display.       Fox Bolden PA-C   07/06/25  18:28 EDT

## 2025-07-06 NOTE — Clinical Note
Patient arrived to cath lab intubated with sedation infusing.   #7.5 ETT secured at 23@ lip line.  Vent settings:  AC 20    PEEP 5  FiO2 100%    OGT in place / secured at 55 @ mid lip line.  Currently clamped.     Transcutaneous pacing in place upon arrival.  Rate 60. 80mA.

## 2025-07-07 LAB
ACT BLD: 164 SECONDS (ref 82–152)
ACT BLD: 164 SECONDS (ref 82–152)
ACT BLD: 193 SECONDS (ref 82–152)
ACT BLD: 245 SECONDS (ref 82–152)
ACT BLD: 245 SECONDS (ref 82–152)
ANION GAP SERPL CALCULATED.3IONS-SCNC: 12.2 MMOL/L (ref 5–15)
BASOPHILS # BLD AUTO: 0.03 10*3/MM3 (ref 0–0.2)
BASOPHILS NFR BLD AUTO: 0.3 % (ref 0–1.5)
BUN SERPL-MCNC: 26.7 MG/DL (ref 8–23)
BUN/CREAT SERPL: 22.6 (ref 7–25)
CALCIUM SPEC-SCNC: 8.7 MG/DL (ref 8.6–10.5)
CHLORIDE SERPL-SCNC: 104 MMOL/L (ref 98–107)
CO2 SERPL-SCNC: 20.8 MMOL/L (ref 22–29)
CREAT SERPL-MCNC: 1.18 MG/DL (ref 0.76–1.27)
DEPRECATED RDW RBC AUTO: 46 FL (ref 37–54)
EGFRCR SERPLBLD CKD-EPI 2021: 66 ML/MIN/1.73
EOSINOPHIL # BLD AUTO: 0.2 10*3/MM3 (ref 0–0.4)
EOSINOPHIL NFR BLD AUTO: 2.1 % (ref 0.3–6.2)
ERYTHROCYTE [DISTWIDTH] IN BLOOD BY AUTOMATED COUNT: 14.2 % (ref 12.3–15.4)
GEN 5 1HR TROPONIN T REFLEX: ABNORMAL NG/L
GLUCOSE BLDC GLUCOMTR-MCNC: 137 MG/DL (ref 70–130)
GLUCOSE SERPL-MCNC: 167 MG/DL (ref 65–99)
HCT VFR BLD AUTO: 28.9 % (ref 37.5–51)
HGB BLD-MCNC: 9.2 G/DL (ref 13–17.7)
IMM GRANULOCYTES # BLD AUTO: 0.04 10*3/MM3 (ref 0–0.05)
IMM GRANULOCYTES NFR BLD AUTO: 0.4 % (ref 0–0.5)
LYMPHOCYTES # BLD AUTO: 1.94 10*3/MM3 (ref 0.7–3.1)
LYMPHOCYTES NFR BLD AUTO: 20.8 % (ref 19.6–45.3)
MAGNESIUM SERPL-MCNC: 1.9 MG/DL (ref 1.6–2.4)
MCH RBC QN AUTO: 28.3 PG (ref 26.6–33)
MCHC RBC AUTO-ENTMCNC: 31.8 G/DL (ref 31.5–35.7)
MCV RBC AUTO: 88.9 FL (ref 79–97)
MONOCYTES # BLD AUTO: 0.93 10*3/MM3 (ref 0.1–0.9)
MONOCYTES NFR BLD AUTO: 10 % (ref 5–12)
NEUTROPHILS NFR BLD AUTO: 6.19 10*3/MM3 (ref 1.7–7)
NEUTROPHILS NFR BLD AUTO: 66.4 % (ref 42.7–76)
NRBC BLD AUTO-RTO: 0 /100 WBC (ref 0–0.2)
PLATELET # BLD AUTO: 360 10*3/MM3 (ref 140–450)
PMV BLD AUTO: 10.5 FL (ref 6–12)
POTASSIUM SERPL-SCNC: 4.1 MMOL/L (ref 3.5–5.2)
RBC # BLD AUTO: 3.25 10*6/MM3 (ref 4.14–5.8)
SODIUM SERPL-SCNC: 137 MMOL/L (ref 136–145)
TROPONIN T % DELTA: ABNORMAL
TROPONIN T NUMERIC DELTA: ABNORMAL
TROPONIN T SERPL HS-MCNC: 2085 NG/L
TROPONIN T SERPL HS-MCNC: ABNORMAL NG/L
TROPONIN T SERPL HS-MCNC: ABNORMAL NG/L
UFH PPP CHRO-ACNC: <0.1 IU/ML (ref 0.3–0.7)
WBC NRBC COR # BLD AUTO: 9.33 10*3/MM3 (ref 3.4–10.8)

## 2025-07-07 PROCEDURE — 92941 PRQ TRLML REVSC TOT OCCL AMI: CPT | Performed by: INTERNAL MEDICINE

## 2025-07-07 PROCEDURE — 71045 X-RAY EXAM CHEST 1 VIEW: CPT | Performed by: RADIOLOGY

## 2025-07-07 PROCEDURE — C1725 CATH, TRANSLUMIN NON-LASER: HCPCS | Performed by: INTERNAL MEDICINE

## 2025-07-07 PROCEDURE — 25010000002 EPTIFIBATIDE 20 MG/10ML SOLUTION: Performed by: INTERNAL MEDICINE

## 2025-07-07 PROCEDURE — 027034Z DILATION OF CORONARY ARTERY, ONE ARTERY WITH DRUG-ELUTING INTRALUMINAL DEVICE, PERCUTANEOUS APPROACH: ICD-10-PCS | Performed by: INTERNAL MEDICINE

## 2025-07-07 PROCEDURE — B2111ZZ FLUOROSCOPY OF MULTIPLE CORONARY ARTERIES USING LOW OSMOLAR CONTRAST: ICD-10-PCS | Performed by: INTERNAL MEDICINE

## 2025-07-07 PROCEDURE — 25010000002 EPTIFIBATIDE PER 5 MG: Performed by: INTERNAL MEDICINE

## 2025-07-07 PROCEDURE — 3E033PZ INTRODUCTION OF PLATELET INHIBITOR INTO PERIPHERAL VEIN, PERCUTANEOUS APPROACH: ICD-10-PCS | Performed by: INTERNAL MEDICINE

## 2025-07-07 PROCEDURE — 25010000002 LIDOCAINE 2% SOLUTION: Performed by: INTERNAL MEDICINE

## 2025-07-07 PROCEDURE — 93010 ELECTROCARDIOGRAM REPORT: CPT | Performed by: INTERNAL MEDICINE

## 2025-07-07 PROCEDURE — 25810000003 SODIUM CHLORIDE 0.9 % SOLUTION: Performed by: INTERNAL MEDICINE

## 2025-07-07 PROCEDURE — 63710000001 INSULIN GLARGINE PER 5 UNITS: Performed by: INTERNAL MEDICINE

## 2025-07-07 PROCEDURE — C1887 CATHETER, GUIDING: HCPCS | Performed by: INTERNAL MEDICINE

## 2025-07-07 PROCEDURE — 83735 ASSAY OF MAGNESIUM: CPT | Performed by: INTERNAL MEDICINE

## 2025-07-07 PROCEDURE — 93458 L HRT ARTERY/VENTRICLE ANGIO: CPT | Performed by: INTERNAL MEDICINE

## 2025-07-07 PROCEDURE — C1894 INTRO/SHEATH, NON-LASER: HCPCS | Performed by: INTERNAL MEDICINE

## 2025-07-07 PROCEDURE — 93005 ELECTROCARDIOGRAM TRACING: CPT | Performed by: INTERNAL MEDICINE

## 2025-07-07 PROCEDURE — C1874 STENT, COATED/COV W/DEL SYS: HCPCS | Performed by: INTERNAL MEDICINE

## 2025-07-07 PROCEDURE — 4A023N7 MEASUREMENT OF CARDIAC SAMPLING AND PRESSURE, LEFT HEART, PERCUTANEOUS APPROACH: ICD-10-PCS | Performed by: INTERNAL MEDICINE

## 2025-07-07 PROCEDURE — 82948 REAGENT STRIP/BLOOD GLUCOSE: CPT

## 2025-07-07 PROCEDURE — 25010000002 HEPARIN (PORCINE) PER 1000 UNITS: Performed by: INTERNAL MEDICINE

## 2025-07-07 PROCEDURE — 99232 SBSQ HOSP IP/OBS MODERATE 35: CPT | Performed by: INTERNAL MEDICINE

## 2025-07-07 PROCEDURE — 85347 COAGULATION TIME ACTIVATED: CPT

## 2025-07-07 PROCEDURE — 25510000001 IOPAMIDOL PER 1 ML: Performed by: INTERNAL MEDICINE

## 2025-07-07 PROCEDURE — B2151ZZ FLUOROSCOPY OF LEFT HEART USING LOW OSMOLAR CONTRAST: ICD-10-PCS | Performed by: INTERNAL MEDICINE

## 2025-07-07 PROCEDURE — C1769 GUIDE WIRE: HCPCS | Performed by: INTERNAL MEDICINE

## 2025-07-07 PROCEDURE — C9606 PERC D-E COR REVASC W AMI S: HCPCS | Performed by: INTERNAL MEDICINE

## 2025-07-07 PROCEDURE — 84484 ASSAY OF TROPONIN QUANT: CPT | Performed by: INTERNAL MEDICINE

## 2025-07-07 PROCEDURE — 25010000002 NITROGLYCERIN 200 MCG/ML SOLUTION: Performed by: HOSPITALIST

## 2025-07-07 DEVICE — XIENCE SKYPOINT™ EVEROLIMUS ELUTING CORONARY STENT SYSTEM 2.50 MM X 15 MM / RAPID-EXCHANGE
Type: IMPLANTABLE DEVICE | Site: HEART | Status: FUNCTIONAL
Brand: XIENCE SKYPOINT™

## 2025-07-07 RX ORDER — TICAGRELOR 90 MG/1
TABLET, FILM COATED ORAL
Status: DISCONTINUED | OUTPATIENT
Start: 2025-07-07 | End: 2025-07-07 | Stop reason: HOSPADM

## 2025-07-07 RX ORDER — SODIUM CHLORIDE 9 MG/ML
INJECTION, SOLUTION INTRAVENOUS
Status: COMPLETED | OUTPATIENT
Start: 2025-07-07 | End: 2025-07-07

## 2025-07-07 RX ORDER — EPTIFIBATIDE 20 MG/10ML
INJECTION INTRAVENOUS
Status: DISCONTINUED | OUTPATIENT
Start: 2025-07-07 | End: 2025-07-07 | Stop reason: HOSPADM

## 2025-07-07 RX ORDER — TICAGRELOR 90 MG/1
90 TABLET, FILM COATED ORAL 2 TIMES DAILY
Status: DISCONTINUED | OUTPATIENT
Start: 2025-07-07 | End: 2025-07-09

## 2025-07-07 RX ORDER — EPTIFIBATIDE 0.75 MG/ML
2 INJECTION, SOLUTION INTRAVENOUS CONTINUOUS
Status: DISPENSED | OUTPATIENT
Start: 2025-07-07 | End: 2025-07-07

## 2025-07-07 RX ORDER — IOPAMIDOL 755 MG/ML
INJECTION, SOLUTION INTRAVASCULAR
Status: DISCONTINUED | OUTPATIENT
Start: 2025-07-07 | End: 2025-07-07 | Stop reason: HOSPADM

## 2025-07-07 RX ORDER — NITROGLYCERIN 20 MG/100ML
10-50 INJECTION INTRAVENOUS
Status: DISCONTINUED | OUTPATIENT
Start: 2025-07-07 | End: 2025-07-07

## 2025-07-07 RX ORDER — HYDROCODONE BITARTRATE AND ACETAMINOPHEN 5; 325 MG/1; MG/1
1 TABLET ORAL EVERY 4 HOURS PRN
Status: DISCONTINUED | OUTPATIENT
Start: 2025-07-07 | End: 2025-07-09 | Stop reason: HOSPADM

## 2025-07-07 RX ORDER — SODIUM CHLORIDE 9 MG/ML
150 INJECTION, SOLUTION INTRAVENOUS CONTINUOUS
Status: ACTIVE | OUTPATIENT
Start: 2025-07-07 | End: 2025-07-07

## 2025-07-07 RX ORDER — LIDOCAINE HYDROCHLORIDE 20 MG/ML
INJECTION, SOLUTION INFILTRATION; PERINEURAL
Status: DISCONTINUED | OUTPATIENT
Start: 2025-07-07 | End: 2025-07-07 | Stop reason: HOSPADM

## 2025-07-07 RX ORDER — NITROGLYCERIN 0.4 MG/1
0.4 TABLET SUBLINGUAL
Status: DISCONTINUED | OUTPATIENT
Start: 2025-07-07 | End: 2025-07-09 | Stop reason: HOSPADM

## 2025-07-07 RX ORDER — HEPARIN SODIUM 5000 [USP'U]/ML
50 INJECTION, SOLUTION INTRAVENOUS; SUBCUTANEOUS ONCE
Status: COMPLETED | OUTPATIENT
Start: 2025-07-07 | End: 2025-07-07

## 2025-07-07 RX ORDER — ISOSORBIDE MONONITRATE 30 MG/1
30 TABLET, EXTENDED RELEASE ORAL
Status: DISCONTINUED | OUTPATIENT
Start: 2025-07-07 | End: 2025-07-09

## 2025-07-07 RX ORDER — EPTIFIBATIDE 0.75 MG/ML
INJECTION, SOLUTION INTRAVENOUS
Status: COMPLETED | OUTPATIENT
Start: 2025-07-07 | End: 2025-07-07

## 2025-07-07 RX ORDER — HEPARIN SODIUM 10000 [USP'U]/100ML
15 INJECTION, SOLUTION INTRAVENOUS
Status: DISCONTINUED | OUTPATIENT
Start: 2025-07-07 | End: 2025-07-07

## 2025-07-07 RX ORDER — ACETAMINOPHEN 325 MG/1
650 TABLET ORAL EVERY 4 HOURS PRN
Status: DISCONTINUED | OUTPATIENT
Start: 2025-07-07 | End: 2025-07-09 | Stop reason: HOSPADM

## 2025-07-07 RX ORDER — METOPROLOL TARTRATE 25 MG/1
25 TABLET, FILM COATED ORAL EVERY 12 HOURS SCHEDULED
Status: DISCONTINUED | OUTPATIENT
Start: 2025-07-07 | End: 2025-07-09

## 2025-07-07 RX ORDER — HEPARIN SODIUM 1000 [USP'U]/ML
INJECTION, SOLUTION INTRAVENOUS; SUBCUTANEOUS
Status: DISCONTINUED | OUTPATIENT
Start: 2025-07-07 | End: 2025-07-07 | Stop reason: HOSPADM

## 2025-07-07 RX ADMIN — EPTIFIBATIDE 2 MCG/KG/MIN: 0.75 INJECTION INTRAVENOUS at 12:08

## 2025-07-07 RX ADMIN — ATORVASTATIN CALCIUM 80 MG: 40 TABLET, FILM COATED ORAL at 08:33

## 2025-07-07 RX ADMIN — EPTIFIBATIDE 2 MCG/KG/MIN: 0.75 INJECTION INTRAVENOUS at 06:45

## 2025-07-07 RX ADMIN — NITROGLYCERIN 10 MCG/MIN: 20 INJECTION INTRAVENOUS at 03:38

## 2025-07-07 RX ADMIN — TICAGRELOR 90 MG: 90 TABLET ORAL at 21:06

## 2025-07-07 RX ADMIN — ISOSORBIDE MONONITRATE 30 MG: 30 TABLET, EXTENDED RELEASE ORAL at 18:59

## 2025-07-07 RX ADMIN — LEVOTHYROXINE SODIUM 50 MCG: 0.05 TABLET ORAL at 08:33

## 2025-07-07 RX ADMIN — ASPIRIN 81 MG: 81 TABLET, COATED ORAL at 08:33

## 2025-07-07 RX ADMIN — NITROGLYCERIN 0.4 MG: 0.4 TABLET, ORALLY DISINTEGRATING SUBLINGUAL at 03:17

## 2025-07-07 RX ADMIN — INSULIN GLARGINE 14 UNITS: 100 INJECTION, SOLUTION SUBCUTANEOUS at 08:32

## 2025-07-07 RX ADMIN — Medication 10 ML: at 21:06

## 2025-07-07 RX ADMIN — HEPARIN SODIUM 3400 UNITS: 5000 INJECTION INTRAVENOUS; SUBCUTANEOUS at 01:13

## 2025-07-07 RX ADMIN — SODIUM CHLORIDE 150 ML/HR: 9 INJECTION, SOLUTION INTRAVENOUS at 06:44

## 2025-07-07 RX ADMIN — NITROGLYCERIN 0.4 MG: 0.4 TABLET, ORALLY DISINTEGRATING SUBLINGUAL at 17:14

## 2025-07-07 RX ADMIN — METOPROLOL TARTRATE 25 MG: 25 TABLET, FILM COATED ORAL at 21:06

## 2025-07-07 NOTE — PROGRESS NOTES
[facility] HOSPITALIST PROGRESS NOTE     Patient Identification:  Name:  Christo Clifton  Age:  70 y.o.  Sex:  male  :  1955  MRN:  [id number]  Visit Number:  [id number]  Primary Care Provider:  Lesa Rae APRN    Length of stay:  1    Chief complaint: Chest pain    Subjective:    Patient seen and examined at bedside with nursing staff present.  Patient reports complete resolution of chest pain since having left heart catheterization earlier this morning.  However, was notified by patient's nurse later this evening of recurrence of chest pain which she rated at a 9 on a 1-10 pain scale.  This did improve to a 1 out of 10 with sublingual nitroglycerin.  Cardiology services were notified who have started patient on Imdur.  ----------------------------------------------------------------------------------------------------------------------  Current Hospital Meds:  aspirin, 81 mg, Oral, Daily  atorvastatin, 80 mg, Oral, Daily  [Held by provider] escitalopram, 10 mg, Oral, Daily  insulin glargine, 14 Units, Subcutaneous, Daily  isosorbide mononitrate, 30 mg, Oral, Q24H  levothyroxine, 50 mcg, Oral, Daily  sodium chloride, 10 mL, Intravenous, Q12H  ticagrelor, 90 mg, Oral, BID         ----------------------------------------------------------------------------------------------------------------------  Vital Signs:  Temp:  [97.7 °F (36.5 °C)-98.1 °F (36.7 °C)] 98 °F (36.7 °C)  Heart Rate:  [65-86] 86  Resp:  [10-27] 21  BP: (113-138)/(59-92) 122/92      25  1613 25  1935 25  0400   Weight: 67.7 kg (149 lb 4 oz) 77.5 kg (170 lb 13.7 oz) 77.5 kg (170 lb 13.7 oz)     Body mass index is 31.95 kg/m².    Intake/Output Summary (Last 24 hours) at 2025 1927  Last data filed at 2025 1700  Gross per 24 hour   Intake 854 ml   Output 600 ml   Net 254 ml     Diet: Cardiac; Healthy Heart (2-3 Na+); Fluid Consistency: Thin (IDDSI  0)  ----------------------------------------------------------------------------------------------------------------------  Physical exam:  Constitutional: Well-nourished  male in no apparent distress.     HENT:  Head:  Normocephalic and atraumatic.  Mouth:  Moist mucous membranes.    Eyes:  Conjunctivae and EOM are normal.  Pupils are equal, round, and reactive to light.  No scleral icterus.    Neck:  Neck supple. No thyromegaly.  No JVD present.    Cardiovascular:  Regular rate and rhythm with no murmurs, rubs, clicks or gallops appreciated.  Pulmonary/Chest:  Clear to auscultation bilaterally with no crackles, wheezes or rhonchi appreciated.  Abdominal:  Soft. Nontender. Nondistended  Bowel sounds are normal in all four quadrants. No organomegally appreciated.   Musculoskeletal:  No edema, no tenderness, and no deformity.  No red or swollen joints anywhere.    Neurological:  Alert, Cranial nerves II-XII intact with no focal deficits.  No facial droop.  No slurred speech.   Skin:  Warm and dry to palpation with no rashes or lesions appreciated.  Peripheral vascular:  2+ radial and pedal pulses in bilateral upper and lower extremities.  Psychiatric:  Alert and oriented x3, demonstrates appropriate judgment and insight.  ---------------------------------------------------------------------------------------  ----------------------------------------------------------------------------------------------------------------------  Results from last 7 days   Lab Units 07/07/25  1714 07/07/25  0319 07/06/25  1733   HSTROP T ng/L >10,000* 2,085* 311*     Results from last 7 days   Lab Units 07/06/25 2345 07/06/25 2036 07/06/25  1629   WBC 10*3/mm3 9.33 9.41 10.09   HEMOGLOBIN g/dL 9.2* 8.8* 9.0*   HEMATOCRIT % 28.9* 27.5* 28.3*   MCV fL 88.9 88.1 88.4   MCHC g/dL 31.8 32.0 31.8   PLATELETS 10*3/mm3 360 318 341   INR   --   --  1.04         Results from last 7 days   Lab Units 07/06/25 2345 07/06/25 2036  "07/06/25  1629   SODIUM mmol/L 137 135* 134*   POTASSIUM mmol/L 4.1 3.6 3.8   CHLORIDE mmol/L 104 101 100   CO2 mmol/L 20.8* 22.0 20.2*   BUN mg/dL 26.7* 28.5* 29.1*   CREATININE mg/dL 1.18 1.20 1.30*   CALCIUM mg/dL 8.7 8.7 8.7   GLUCOSE mg/dL 167* 205* 238*   ALBUMIN g/dL  --   --  3.0*   BILIRUBIN mg/dL  --   --  0.8   ALK PHOS U/L  --   --  100   AST (SGOT) U/L  --   --  27   ALT (SGPT) U/L  --   --  20   Estimated Creatinine Clearance: 51 mL/min (by C-G formula based on SCr of 1.18 mg/dL).    No results found for: \"AMMONIA\"      No results found for: \"BLOODCX\"  No results found for: \"URINECX\"  No results found for: \"WOUNDCX\"  No results found for: \"STOOLCX\"    I have personally looked at the labs and they are summarized above.  ----------------------------------------------------------------------------------------------------------------------  Imaging Results (Last 24 Hours)       Procedure Component Value Units Date/Time    XR Chest 1 View [[id number]] Collected: 07/07/25 0753     Updated: 07/07/25 0756    Narrative:      XR CHEST 1 VW-     CLINICAL INDICATION: Chest Pain Protocol; I25.9-Chronic ischemic heart  disease, unspecified; R79.89-Other specified abnormal findings of blood  chemistry        COMPARISON: 7/13/2020      TECHNIQUE: Single frontal view of the chest.     FINDINGS:     LUNGS: Lungs are adequately aerated.      HEART AND MEDIASTINUM: Heart and mediastinal contours are unremarkable        SKELETON: Bony and soft tissue structures are unremarkable.             Impression:      No radiographic evidence of acute cardiac or pulmonary disease.           This report was finalized on 7/7/2025 7:54 AM by Dr. Sky Roberts MD.             ----------------------------------------------------------------------------------------------------------------------  Assessment and Plan:    Type I NSTEMI -patient did have left heart catheterization today which demonstrated complete occlusion of proximal " circumflex.  Patient had successful PCI with stent placement, continue aspirin/Alimta.  Plan for echo tomorrow, appreciate cardiology recommendations.  Will also continue Lipitor 80 mg p.o. nightly and will start metoprolol tartrate 25 mg p.o. twice daily.  Will also order transthoracic echo per cardiology recommendations    2.  Insulin-dependent type 2 diabetes mellitus -continue Accu-Cheks before every meal and nightly with sliding scale insulin    3.  Hyperlipidemia -atorvastatin    4.  Essential hypertension -currently well-controlled    Disposition possible discharge tomorrow    Calros Perkins,    07/07/25   19:27 EDT

## 2025-07-07 NOTE — NURSING NOTE
Time In 1450 Time Out 1510      Order received for Cardiac Rehab Consultation.     Patient stated he is interested but will have to talk to family about transportation. He will call us back if he wants to schedule       Information discussed with: Patient        Educated on: Benefits of Exercise, Educated on Home Exercise Guidelines,  Educated on Cardiac Rehab and Program Protocol, Brochure and/or educational material provided, Contact information given, and Teach Back Verified            Thank you for the referral. Please contact the Cardiac Rehab Dept. (ext. [phone]) with any further questions or concerns.

## 2025-07-07 NOTE — CASE MANAGEMENT/SOCIAL WORK
Discharge Planning Assessment  [facility]     Patient Name: Christo Clifton  MRN: [id number]  Today's Date: 7/7/2025    Admit Date: 7/6/2025    Plan: CM spoke with Pt at bedside. Pt lives at home in [facility] and plans to return at d/c. Pt has no HH/DME or oxygen. PCP is Lesa CROUCH and gets prescriptions filled at [place], [place]. Pt has [employer] Medicare denies any trouble with coverage. Family will provide transportation at d/c.   Discharge Needs Assessment    No documentation.                  Discharge Plan       Row Name 07/07/25 1541       Plan    Plan CM spoke with Pt at bedside. Pt lives at home in [facility] and plans to return at d/c. Pt has no HH/DME or oxygen. PCP is Lesa CROUCH and gets prescriptions filled at [place], [place]. Pt has [employer] Medicare denies any trouble with coverage. Family will provide transportation at d/c.                             Opal Rausch RN

## 2025-07-07 NOTE — CONSULTS
Consults    Patient Identification:    Name:  Christo Clifton  Age:  70 y.o.  Sex:  male  :  1955  MRN:  [id number]  Visit Number:  [id number]  Primary care provider:  Lesa Rae APRN    Chief complaint:   Chest pain      Patient's acute on chronic issues:  1.  Coronary artery disease  - History of CABG x 3, done about 16-17 years ago [facility].  However his recent cath on 2025 showed LIMA patent but never matured.  Venous grafts are occluded.  - PCI to ostial/proximal Lcx with 2.5 x 18 mm Xience (postdilated with a 3.0 NC balloon) on 2025  2.  HTN  3.  HLD  4.  DM2  5.  Hypothyroidism  5.  Former Tobacco abuse. Now occasionally vapes.   - Primary cardiologist Dr. Green    History of presenting illness:   Patient presented last night with a complaint of chest pain.    Patient recently reported anginal symptoms, stress test was done which shows inferior wall perfusion defect.  - Patient underwent coronary angiogram by Dr. Upton on 2025:  - LIMA was patent but not mature.  His venous grafts were closed.  - There is a 80% calcified proximal LCx lesion patient underwent PCI with lithotripsy balloon and subsequently 2.5 x 18 mm Xience, postdilated 3.0 NC.  - Patient was then discharged home.    He returned last night with a complaint of chest pain  - His EKG shows left bundle branch block but no ST elevation.  - His initial troponin was 354, which then downtrended to 311.  - Patient was started on a heparin drip and his chest pain improved.    However early this morning (about 4 AM (his chest pain returned.  - EKG shows no new changes.  - However his troponin now increased to 2085.  - Patient claims that has been compliant with Plavix (but somewhat unsure about taking it when asked).    Due to recurrence of chest pain and elevated troponin patient we have plan to bring patient back to Cath Lab to assess his coronary anatomy and recently placed stent.      Assessment and plan:  1.   CAD, remote h/o CABG (occluded venous grafts),  recent ostial/proximal LCx stent (2.5 x 18 Xience) on 6/27/2025  - Now with recurrence chest pain and significant elevation in troponin.  - Plan for repeat angiogram.  Risk and benefit of the procedures were informed the patient.    - Patient has been on Aspirin and Plavix (per pt he has been compliant).  Will will changes antiplatelet to Brilinta.  - Beta-blocker  - High intensity statin    - Check echo later today.    2.  HTN  - Patient has been on amlodipine as outpatient.    3.  HLD  - Patient has been on Lipitor 80            Cardiac pertinent studies:  EKG  - 7/7/2025: Sinus rhythm 68.  IVCD.    Echo  - 6/3/2025: LVEF 55-60%, grade 1 DD EF.  RV SF normal.  Mild aortic valve stenosis/no regurgitation.  No MV stenosis/trace regurgitation.    Stress test  - 6/3/2025: Small to moderate, mild to moderate ischemia at the inferior apical wall.  LVEF 43%.    Cardiac cath  - 6/27/2025: Mild diffuse disease proximal to mid LAD.  Distal LAD beyond the anastomosis is very small.  80% proximal calcified LCx lesion.  PCI done with lithotripsy balloon, 2.5 x 18 Xience, postdilated to 3.0 NC.  Diffuse RCA disease.  Grafts: LIMA is patent but did not fully mature.  Venous graft to OM and venous graft to RCA are closed.  LVEDP 18 mmHg.  ---------------------------------------------------------------------------------------------------------------------        ---------------------------------------------------------------------------------------------------------------------     ROS:   Constitutional: Denies any weight changes, fatigue or weakness.  Cardiovascular: + Chest pain, palpitations, edemas.  Respiratory: Denies cough, sputum, SOB.  Gastrointestinal: Denies abdominal pain.      Past History:   Family History   Problem Relation Age of Onset    Diabetes Mother     Hypertension Mother     Diabetes Father      Past Medical History:   Diagnosis Date    Anxiety and depression      Arthritis     COPD (chronic obstructive pulmonary disease)     Coronary artery disease     Diabetes mellitus     Disease of thyroid gland     Dyslipidemia     History of transfusion     Hypertension     Seizure disorder     Pt states last seizure x1 mo.     Past Surgical History:   Procedure Laterality Date    AMPUTATION DIGIT Right 09/13/2017    Procedure: RIGHT 1ST TOE AMPUTATION ;  Surgeon: Lee Lee MD;  Location: [facility] OR;  Service:     CARDIAC CATHETERIZATION      CARDIAC CATHETERIZATION N/A 6/27/2025    Procedure: Left Heart Cath;  Surgeon: Mimi Botello MD;  Location: [facility] CATH INVASIVE LOCATION;  Service: Cardiovascular;  Laterality: N/A;    CARDIAC CATHETERIZATION N/A 6/27/2025    Procedure: Percutaneous Coronary Intervention;  Surgeon: Mimi Botello MD;  Location: [facility] CATH INVASIVE LOCATION;  Service: Cardiovascular;  Laterality: N/A;    CARDIAC SURGERY      CIRCUMCISION      CORONARY ARTERY BYPASS GRAFT      HERNIA REPAIR      X2    INTRAVASCULAR ULTRASOUND N/A 6/27/2025    Procedure: Intravascular Ultrasound;  Surgeon: Mimi Botello MD;  Location: [facility] CATH INVASIVE LOCATION;  Service: Cardiovascular;  Laterality: N/A;    OR INCISION & DRAINAGE LEG/ANKLE ABSCESS/HEMATOMA Right 05/24/2017    Procedure: Debridement of right first toe;  Surgeon: Ronald Perude MD;  Location: [facility] OR;  Service: General    TOE SURGERY Left     debridement     Social History     Socioeconomic History    Marital status:    Tobacco Use    Smoking status: Every Day     Current packs/day: 0.00     Average packs/day: 2.0 packs/day for 45.0 years (90.0 ttl pk-yrs)     Types: Cigarettes     Start date: 1965     Last attempt to quit: 2010     Years since quitting: 15.5    Smokeless tobacco: Never    Tobacco comments:     Trying to quit   Vaping Use    Vaping status: Never Used   Substance and Sexual Activity    Alcohol use: No    Drug use: No    Sexual activity: Defer      ---------------------------------------------------------------------------------------------------------------------   Allergies:  Other  ---------------------------------------------------------------------------------------------------------------------   Prior to Admission Medications       Prescriptions Last Dose Informant Patient Reported? Taking?    amLODIPine (NORVASC) 5 MG tablet 7/6/2025 Pharmacy, Self Yes Yes    Take 1 tablet by mouth Daily.    aspirin 81 MG EC tablet 7/6/2025 Self Yes Yes    Take 1 tablet by mouth Daily.    atorvastatin (LIPITOR) 80 MG tablet 7/6/2025 Pharmacy, Self Yes Yes    Take 1 tablet by mouth Daily.    clopidogrel (PLAVIX) 75 MG tablet 7/6/2025  No Yes    Take 1 tablet by mouth Daily.    escitalopram (LEXAPRO) 10 MG tablet 7/6/2025 Self, Pharmacy Yes Yes    Take 1 tablet by mouth Daily.    insulin glargine (LANTUS, SEMGLEE) 100 UNIT/ML injection 7/6/2025 Pharmacy, Self Yes Yes    Inject 14 Units under the skin into the appropriate area as directed Daily.    levothyroxine (SYNTHROID, LEVOTHROID) 50 MCG tablet 7/6/2025 Self, Pharmacy Yes Yes    Take 1 tablet by mouth Daily.    fluticasone (FLONASE) 50 MCG/ACT nasal spray Unknown Pharmacy, Self Yes No    Administer 1 spray into the nostril(s) as directed by provider Daily As Needed for Rhinitis or Allergies.          Hospital Meds:  amLODIPine, 5 mg, Oral, Daily  aspirin, 81 mg, Oral, Daily  atorvastatin, 80 mg, Oral, Daily  clopidogrel, 75 mg, Oral, Daily  [Held by provider] escitalopram, 10 mg, Oral, Daily  insulin glargine, 14 Units, Subcutaneous, Daily  levothyroxine, 50 mcg, Oral, Daily  sodium chloride, 10 mL, Intravenous, Q12H      heparin, 15 Units/kg/hr (Dosing Weight), Last Rate: 15 Units/kg/hr (07/07/25 0114)  nitroglycerin, 10-50 mcg/min, Last Rate: 20 mcg/min (07/07/25 0344)  Pharmacy to Dose Heparin,        ---------------------------------------------------------------------------------------------------------------------   Vital Signs:  Temp:  [97.6 °F (36.4 °C)-98.1 °F (36.7 °C)] 98.1 °F (36.7 °C)  Heart Rate:  [67-76] 71  Resp:  [11-27] 11  BP: ()/(40-94) 132/68      07/06/25  1613 07/06/25  1935   Weight: 67.7 kg (149 lb 4 oz) 77.5 kg (170 lb 13.7 oz)     Body mass index is 31.95 kg/m².  -------------------------------------------------------------------------------------------------------------------       Physical exam:  Head:  Normocephalic and atraumatic.    Eyes:  No scleral icterus.    Neck:  No JVD present.    Cardiovascular: Regular rhythm, S1 S2+,   Pulmonary/Chest:  Breath sounds B/L  Abdominal:  Soft.   Neurological:  Alert and oriented x 3.   Skin:  Skin is warm and dry.   ---------------------------------------------------------------------------------------------------------------------     Results for orders placed during the hospital encounter of 06/03/25    Adult Transthoracic Echo Complete w/ Color, Spectral and Contrast if necessary per protocol 06/06/2025  9:35 AM    Interpretation Summary    Normal left ventricular cavity size and wall thickness noted. All left ventricular wall segments contract normally.    Left ventricular ejection fraction appears to be 56 - 60%.    Left ventricular diastolic function is consistent with (grade I) impaired relaxation.    There is mild calcification of the aortic valve mainly affecting the left coronary cusp(s). The aortic valve was poorly visualized but appears trileaflet.    No aortic valve regurgitation is present. Mild aortic valve stenosis is present.    The mitral valve is structurally normal with no significant stenosis present. Trace mitral valve regurgitation is present.    There is no evidence of pericardial effusion.    ---------------------------------------------------------------------------------------------------------------------  "  Results from last 7 days   Lab Units 07/06/25  2345 07/06/25 2036 07/06/25  1629   WBC 10*3/mm3 9.33 9.41 10.09   HEMOGLOBIN g/dL 9.2* 8.8* 9.0*   HEMATOCRIT % 28.9* 27.5* 28.3*   MCV fL 88.9 88.1 88.4   MCHC g/dL 31.8 32.0 31.8   PLATELETS 10*3/mm3 360 318 341   INR   --   --  1.04         Results from last 7 days   Lab Units 07/06/25  2345 07/06/25 2036 07/06/25  1629   SODIUM mmol/L 137 135* 134*   POTASSIUM mmol/L 4.1 3.6 3.8   CHLORIDE mmol/L 104 101 100   CO2 mmol/L 20.8* 22.0 20.2*   BUN mg/dL 26.7* 28.5* 29.1*   CREATININE mg/dL 1.18 1.20 1.30*   CALCIUM mg/dL 8.7 8.7 8.7   GLUCOSE mg/dL 167* 205* 238*   ALBUMIN g/dL  --   --  3.0*   BILIRUBIN mg/dL  --   --  0.8   ALK PHOS U/L  --   --  100   AST (SGOT) U/L  --   --  27   ALT (SGPT) U/L  --   --  20   Estimated Creatinine Clearance: 51 mL/min (by C-G formula based on SCr of 1.18 mg/dL).  No results found for: \"AMMONIA\"  Results from last 7 days   Lab Units 07/07/25  0319 07/06/25  1733 07/06/25  1629   HSTROP T ng/L 2,085* 311* 357*         Lab Results   Component Value Date    HGBA1C 9.10 (H) 10/15/2018     Lab Results   Component Value Date    TSH 1.720 05/28/2024    FREET4 1.06 05/28/2024     No results found for: \"PREGTESTUR\", \"PREGSERUM\", \"HCG\", \"HCGQUANT\"  Pain Management Panel  More data exists         Latest Ref Rng & Units 10/26/2020 7/20/2020   Pain Management Panel   Creatinine, Urine mg/dL  mg/dL 110.5  110.5  60.9       Details          Multiple values from one day are sorted in reverse-chronological order             No results found for: \"BLOODCX\"  No results found for: \"URINECX\"  No results found for: \"WOUNDCX\"  No results found for: \"STOOLCX\"        ---------------------------------------------------------------------------------------------------------------------   Imaging Results (Last 7 Days)       Procedure Component Value Units Date/Time    XR Chest 1 View - In process [[id number]] Resulted: 07/06/25 1713     Updated: 07/06/25 1827 "    This result has not been signed. Information might be incomplete.            ----------------------------------------------------------------------------------------------------------------------  Assessment  and Plan:   See discussion  above.     Thank you for the consult.      Anali Arevalo MD   07/07/25  04:53 EDT

## 2025-07-07 NOTE — PLAN OF CARE
Problem: Adult Inpatient Plan of Care  Goal: Plan of Care Review  Outcome: Progressing  Flowsheets (Taken 7/6/2025 2203)  Plan of Care Reviewed With: patient  Goal: Patient-Specific Goal (Individualized)  Outcome: Progressing  Goal: Absence of Hospital-Acquired Illness or Injury  Outcome: Progressing  Intervention: Identify and Manage Fall Risk  Recent Flowsheet Documentation  Taken 7/6/2025 2100 by Mala Guerra RN  Safety Promotion/Fall Prevention: safety round/check completed  Intervention: Prevent and Manage VTE (Venous Thromboembolism) Risk  Recent Flowsheet Documentation  Taken 7/6/2025 1942 by Mala Guerra RN  VTE Prevention/Management: (heparin gtt) other (see comments)  Intervention: Prevent Infection  Recent Flowsheet Documentation  Taken 7/6/2025 2100 by Mala Guerra RN  Infection Prevention: single patient room provided  Goal: Optimal Comfort and Wellbeing  Outcome: Progressing  Intervention: Provide Person-Centered Care  Recent Flowsheet Documentation  Taken 7/6/2025 1942 by Mala Guerra RN  Trust Relationship/Rapport:   care explained   choices provided   thoughts/feelings acknowledged  Goal: Readiness for Transition of Care  Outcome: Progressing   Goal Outcome Evaluation:  Plan of Care Reviewed With: patient

## 2025-07-07 NOTE — PROCEDURES
CARDIAC CATHETERIZATION / INTERVENTION REPORT             DATE OF PROCEDURE: 7/7/2025       INDICATION FOR PROCEDURE: NSTEMI  70-year-old gentleman with a known CAD.    - He has history of remote CABG x 3 (this was done about 17 years ago.  Recent cardiac cath shows LIMA patent but did not mature, venous grafts are completely occluded at the ostium).  - On 6/27/2025 patient underwent PCI to ostial/proximal LCx (2.5 x 18 Xience, postdilated with 3.0 NC balloon), and was discharged the next day.  - He presented with chest pain last evening.  His troponin initially was in the 357 subsequently reduced and with medical therapy his chest pain resolved.  His EKG showed no acute ST elevation.  - However early this morning his chest pain returned.  Repeat troponin is in the 2000 range.  Patient was brought in to cardiac Cath Lab for emergent coronary angiogram.  -Patient claims he has been compliant with Plavix.    - Patient's other medical history includes HTN, HLD, DM2 and tobacco abuse with vaping.      PRE PROCEDURE DIAGNOSIS:  NSTEMI.      POST PROCEDURE DIAGNOSIS:  Luminal irregularities in proximal LAD.  Distal LAD is very small.  100% occlusion at proximal LCx within previously placed stent  - PCI was done.  Additional stent was deployed distal to recent stent (overlapped) at mid LCx with a 2.5 x 15 Xience sj point stent.  Stents were postdilated with a 3.0 NC balloon and the very proximal segment with 3.5 NC balloon.  - 100% lesion reduced to 0%.  Preprocedure MARILYN 0 flow, postprocedure MARILYN-3 flow.  Diffuse luminal irregularities in RCA.  LVEF 35-40%.  Lateral wall hypokinesis.  LVEDP 25 mmHg.         PROCEDURE PERFORMED:   1. Selective Coronary Angiogram  2. Left heart catheretization    3. Left Ventriculography  4.  PCI of ostial/proximal LCx with balloon angioplasty (3.5 NC balloon).  PCI of mid LCx with a 2.5 x 15 Xience sj point (postdilated with a 3.0 NC balloon): The stent overlapped with  distal segment of recently placed stent.      DESCRIPTION OF DIAGNOSTIC PROCEDURE:  Prior to the procedure risk, benefits and possible alternative were discussed with the patient and informed consent was obtained. Patient was brought to cardiac cath lab table in post absorbtive state. Patient was prepped and drape in usual sterile fashion.     Right groin was infiltrated with 2% lidocaine injection.  Right femoral artery puncture with a micropuncture needle via modified Seldinger technique and a 6 Citizen of [place] arterial sheath was inserted.    RCA was engaged with a JR4 catheter. Angiograms were taken in multiple views.   Left main was engaged with a XB 3.5 catheter.  Angiograms were taken in multiple views.  Angiogram shows 100% occluded proximal LCx within recently placed stent: PCI was performed (describe in a separate section).      After completion of PCI pigtail catheter was used to enter the left ventricle, LV pressures were measured, LV gram was performed with 30 cc contrast at 10 cc/sec injection.  Full catheter was flushed with saline and pulled back under continuous pressure monitoring. All the catheters were exchanged over 0.035 wire.       DESCRIPTION OF CORONARY INTERVENTION:   Diagnostic angiogram shows 100% lesion at the proximal Lcx, within recently placed stent.  We decided to perform percutaneous coronary intervention.    Heparin bolus was given and therapeutic ACT was obtained.  Patient was also initiated on Integrilin infusion and loaded with Brilinta.    As mentioned previously left main was engaged with XB 3.5 guide.  A  50 wire was used to cross the lesion and parked in distal LCx.      Initially we advanced a 2.0 x 12 mini trek balloon and serial inflations were made at the ostial/proximal LCx, after which a trickle of flow was restored into the LCx.  Then a 2.5 x 15 NC balloon was advanced and serial dilation was also made.      Repeat angiogram shows at the distal edge of recently placed  stent, 2.5 x 18 Xience, placed on 6/27/2025) there is 90% hazy lesion, we decided to stent this segment.  A 2.5 x 15 Xience sj point stent was advanced and deployed across the lesion at mid LCx at 12 JYOTI (this stent overlapped with recently placed stent).  Distal part of the stented segment was postdilated using a 3.0 x 12 NC balloon at 12 to 14 jyoti.  The ostial to mid part of stented segment was postdilated using 3.5 x 12 NC balloon at 12 to 14 jyoti.    Final angiogram shows excellent result.  100% lesion was reduced to 0%.  MARILYN 0 flow improved to MARILYN-3.      At the end of the procedure all catheters were removed.  Sheath was left in place.  We opted not to put any closure device as patient had recent intervention and Angio-Seal was deployed.  Sheath will be pulled once ACT normalizes with manual compression for hemostasis.      Lesion characteristics:  Lesion Type: B2  Lesion length: 10 mm  Pre PCI Stenosis: 100 %  Post PCI stenosis: 0 %  Pre PCI MARILYN flow: 0  Post PCI MARILYN flow: 3        DIAGNOSTIC FINDINGS:  LM: Large calibre vessel , normal take off from left cusp. Free of disease. LM divides into LAD and Lcx.     LAD: Large-caliber vessel proximally, mid segment is medium in caliber, distal segment is quite small.  There is a mild luminal irregularities at the proximal segment.  Distal LAD is very small competitive flow is seen.  Diagonals are small caliber vessels.      LCX: Medium calibre vessel.  Initial angiogram shows 100% occlusion within recently place ostial/proximal LCx stent.  Rest of vessel was visualized after balloon angioplasty.  After balloon angioplasty patient had a 90% lesion at the distal edge of prior stent, PCI was done with a additional single stent.  Rest of need LCx is a medium in caliber has luminal irregularities.  OM1 was small and jailed by prior stent and has no flow.  OM 2 is a medium to small caliber vessel OM 3 is a small caliber vessel.    RCA: Medium caliber dominant vessel,  normal takeoff from right cusp.  There is a 40-50% possibly calcified lesion in the proximal segment.  Rest of the vessel has mild luminal irregularities.  There is a medium caliber acute marginal which has disease at the distal segment.    Left Ventriculography: LVEF 30-35%.  Moderate inferior - lateral wall hypokinesis. No significant mitral regurgitation noted.     Left heart catheterization: LVSP 105 mmHg. LVEDP: 24 mmHg. No gradient across the aortic valve on pull back.     Graft: Angiograms were not done.  Recent study from 6/27/2025 showed LIMA is patent but very small and never matured.  Venous grafts are completely closed at the ostium.      COMPLICATIONS : None    CLOSURE: Will be closed via manual compression.      MISCELLANEOUS:  Clinical frailty: 3- Managing Well  ASA: 2=2- Mild to moderate systemic disease, medially well controlled, with no functional limitation  Mallampati: Class 2=2- Able to visualize the soft palate, fauces, uvula.  The anterior & posterior tonsilar pillars are hidden by the tongue.  Face to face mdoerate conscious  sedation time (an independent trained observer was present and monitored the patient under my supervision) : 0  EBL: Less than 20 CC          ASSESSMENT:  NSTEMI.  100% occlusion at proximal LCx within recently placed stent (2.5 x 18 Xience, placed on 6/27/2025).  - PCI was done with balloon angioplasty of recently placed stent.  At the distal edge of stent there was a 90% lesion, which was treated with a additional single stent, 2.5 x 15 Xience sj point.  - Distal part of stented segment postdilated with a 3.0 NC balloon and proximal/mid part of stented segment was postdilated with a 3.5 NC balloon.  -100% lesion reduced to 0%.  Preprocedure MARILYN 0 flow, postprocedure MARILYN-3 flow.  Luminal irregularities in proximal LAD.  Distal LAD is very small, competitive flow is noted.  40%, possibly calcified proximal RCA lesion.  LVEF 30-35%.  Moderate inferior/lateral wall  hypokinesis.  LVEDP 25 mmHg.        RECOMMENDATIONS:  Aspirin and Brilinta.  I have extensively informed him about taking dual antiplatelet therapy.  Integrilin infusion for 12 hours  Beta-blocker once hemodynamically stable  High intensity statin  Echo in a.m.  Aggressive cardiac risk factors modification.            Anali Arevalo MD  07/07/25  06:12 EDT

## 2025-07-07 NOTE — PLAN OF CARE
Goal Outcome Evaluation:  Plan of Care Reviewed With: patient        Progress: improving  Outcome Evaluation: patient alert and oriented. VSS thus far. Sheath removed during shift, site clean dry and intact. No request at this time. Will continue to follow plan of care til 7p.

## 2025-07-07 NOTE — PHARMACY PATIENT ASSISTANCE
Pharmacy evaluated the cost of Brilinta for patient. Brilinta is covered on the patient's insurance with no copay. Insurance prefers brand name.    Thank you,    Pamela Malhotra, PharmD  07/07/25  16:50 EDT

## 2025-07-07 NOTE — NURSING NOTE
Patient with dry, scab covered wound to right foot and left foot.  No drainage.  No odor.  Ivy wound intact and pink.  Order placed to paint with betadine daily and leave open to air.    Anson score 21.

## 2025-07-08 ENCOUNTER — APPOINTMENT (OUTPATIENT)
Dept: CARDIOLOGY | Facility: HOSPITAL | Age: 70
DRG: 270 | End: 2025-07-08
Payer: MEDICARE

## 2025-07-08 LAB
A-A DO2: 68.6 MMHG (ref 0–300)
ANION GAP SERPL CALCULATED.3IONS-SCNC: 14.5 MMOL/L (ref 5–15)
AORTIC DIMENSIONLESS INDEX: 0.55 (DI)
ARTERIAL PATENCY WRIST A: ABNORMAL
ATMOSPHERIC PRESS: 728 MMHG
AV MEAN PRESS GRAD SYS DOP V1V2: 3 MMHG
AV VMAX SYS DOP: 125 CM/SEC
BACTERIA UR QL AUTO: ABNORMAL /HPF
BASE EXCESS BLDA CALC-SCNC: -2.2 MMOL/L (ref 0–2)
BDY SITE: ABNORMAL
BH CV ECHO MEAS - ACS: 1.4 CM
BH CV ECHO MEAS - AO MAX PG: 6.3 MMHG
BH CV ECHO MEAS - AO ROOT DIAM: 3.8 CM
BH CV ECHO MEAS - AO V2 VTI: 27.8 CM
BH CV ECHO MEAS - AVA(I,D): 1.73 CM2
BH CV ECHO MEAS - EDV(CUBED): 172.8 ML
BH CV ECHO MEAS - EDV(MOD-SP2): 155 ML
BH CV ECHO MEAS - EDV(MOD-SP4): 98 ML
BH CV ECHO MEAS - EF(MOD-SP2): 32.3 %
BH CV ECHO MEAS - EF(MOD-SP4): 40.5 %
BH CV ECHO MEAS - ESV(CUBED): 111.3 ML
BH CV ECHO MEAS - ESV(MOD-SP2): 105 ML
BH CV ECHO MEAS - ESV(MOD-SP4): 58.3 ML
BH CV ECHO MEAS - FS: 13.6 %
BH CV ECHO MEAS - IVS/LVPW: 1.14 CM
BH CV ECHO MEAS - IVSD: 1.11 CM
BH CV ECHO MEAS - LA DIMENSION: 3.6 CM
BH CV ECHO MEAS - LAT PEAK E' VEL: 7.2 CM/SEC
BH CV ECHO MEAS - LV DIASTOLIC VOL/BSA (35-75): 55.6 CM2
BH CV ECHO MEAS - LV MASS(C)D: 229.5 GRAMS
BH CV ECHO MEAS - LV MAX PG: 2.02 MMHG
BH CV ECHO MEAS - LV MEAN PG: 1 MMHG
BH CV ECHO MEAS - LV SYSTOLIC VOL/BSA (12-30): 33.1 CM2
BH CV ECHO MEAS - LV V1 MAX: 71.1 CM/SEC
BH CV ECHO MEAS - LV V1 VTI: 15.3 CM
BH CV ECHO MEAS - LVIDD: 5.6 CM
BH CV ECHO MEAS - LVIDS: 4.8 CM
BH CV ECHO MEAS - LVOT AREA: 3.1 CM2
BH CV ECHO MEAS - LVOT DIAM: 2 CM
BH CV ECHO MEAS - LVPWD: 0.97 CM
BH CV ECHO MEAS - MED PEAK E' VEL: 6.9 CM/SEC
BH CV ECHO MEAS - MR MAX PG: 74.3 MMHG
BH CV ECHO MEAS - MR MAX VEL: 431 CM/SEC
BH CV ECHO MEAS - MR MEAN PG: 44 MMHG
BH CV ECHO MEAS - MR MEAN VEL: 301 CM/SEC
BH CV ECHO MEAS - MR VTI: 153 CM
BH CV ECHO MEAS - MV A MAX VEL: 58.5 CM/SEC
BH CV ECHO MEAS - MV DEC SLOPE: 579 CM/SEC2
BH CV ECHO MEAS - MV E MAX VEL: 114 CM/SEC
BH CV ECHO MEAS - MV E/A: 1.95
BH CV ECHO MEAS - MV P1/2T: 57.2 MSEC
BH CV ECHO MEAS - MVA(P1/2T): 3.8 CM2
BH CV ECHO MEAS - PA ACC TIME: 0.08 SEC
BH CV ECHO MEAS - PA V2 MAX: 89.6 CM/SEC
BH CV ECHO MEAS - RVDD: 3.7 CM
BH CV ECHO MEAS - SV(LVOT): 48.1 ML
BH CV ECHO MEAS - SV(MOD-SP2): 50 ML
BH CV ECHO MEAS - SV(MOD-SP4): 39.7 ML
BH CV ECHO MEAS - SVI(LVOT): 27.3 ML/M2
BH CV ECHO MEAS - SVI(MOD-SP2): 28.4 ML/M2
BH CV ECHO MEAS - SVI(MOD-SP4): 22.5 ML/M2
BH CV ECHO MEAS - TAPSE (>1.6): 1.8 CM
BH CV ECHO MEASUREMENTS AVERAGE E/E' RATIO: 16.17
BH CV XLRA - RV BASE: 2.8 CM
BH CV XLRA - RV LENGTH: 7.2 CM
BH CV XLRA - RV MID: 2.7 CM
BILIRUB UR QL STRIP: NEGATIVE
BUN SERPL-MCNC: 30.8 MG/DL (ref 8–23)
BUN/CREAT SERPL: 18.7 (ref 7–25)
CALCIUM SPEC-SCNC: 8.8 MG/DL (ref 8.6–10.5)
CHLORIDE SERPL-SCNC: 100 MMOL/L (ref 98–107)
CLARITY UR: ABNORMAL
CO2 BLDA-SCNC: 20.8 MMOL/L (ref 22–33)
CO2 SERPL-SCNC: 19.5 MMOL/L (ref 22–29)
COHGB MFR BLD: 1.6 % (ref 0–5)
COLOR UR: YELLOW
CREAT SERPL-MCNC: 1.65 MG/DL (ref 0.76–1.27)
D-LACTATE SERPL-SCNC: 1.7 MMOL/L (ref 0.5–2)
D-LACTATE SERPL-SCNC: 2.1 MMOL/L (ref 0.5–2)
D-LACTATE SERPL-SCNC: 2.3 MMOL/L (ref 0.5–2)
DEPRECATED RDW RBC AUTO: 47.9 FL (ref 37–54)
DEVICE COMMENT: ABNORMAL
DEVICE COMMENT: ABNORMAL
EGFRCR SERPLBLD CKD-EPI 2021: 44.1 ML/MIN/1.73
ERYTHROCYTE [DISTWIDTH] IN BLOOD BY AUTOMATED COUNT: 14.6 % (ref 12.3–15.4)
GAS FLOW AIRWAY: 2 LPM
GLUCOSE BLDC GLUCOMTR-MCNC: 195 MG/DL (ref 70–130)
GLUCOSE BLDC GLUCOMTR-MCNC: 220 MG/DL (ref 70–130)
GLUCOSE BLDC GLUCOMTR-MCNC: 221 MG/DL (ref 70–130)
GLUCOSE BLDC GLUCOMTR-MCNC: 230 MG/DL (ref 70–130)
GLUCOSE SERPL-MCNC: 218 MG/DL (ref 65–99)
GLUCOSE UR STRIP-MCNC: ABNORMAL MG/DL
HCO3 BLDA-SCNC: 20 MMOL/L (ref 20–26)
HCT VFR BLD AUTO: 28.5 % (ref 37.5–51)
HCT VFR BLD CALC: 26 % (ref 38–51)
HGB BLD-MCNC: 8.8 G/DL (ref 13–17.7)
HGB BLDA-MCNC: 8.5 G/DL (ref 14–18)
HGB UR QL STRIP.AUTO: ABNORMAL
HYALINE CASTS UR QL AUTO: ABNORMAL /LPF
INHALED O2 CONCENTRATION: 28 %
KETONES UR QL STRIP: NEGATIVE
LEFT ATRIUM VOLUME INDEX: 18.2 ML/M2
LEUKOCYTE ESTERASE UR QL STRIP.AUTO: ABNORMAL
LV EF BIPLANE MOD: 37.2 %
Lab: ABNORMAL
Lab: ABNORMAL
MCH RBC QN AUTO: 27.7 PG (ref 26.6–33)
MCHC RBC AUTO-ENTMCNC: 30.9 G/DL (ref 31.5–35.7)
MCV RBC AUTO: 89.6 FL (ref 79–97)
METHGB BLD QL: 0.1 % (ref 0–3)
MODALITY: ABNORMAL
NITRITE UR QL STRIP: NEGATIVE
NOTIFIED BY: ABNORMAL
NOTIFIED WHO: ABNORMAL
OXYHGB MFR BLDV: 96.3 % (ref 94–99)
PCO2 BLDA: 24.8 MM HG (ref 35–45)
PCO2 TEMP ADJ BLD: ABNORMAL MM[HG]
PH BLDA: 7.51 PH UNITS (ref 7.35–7.45)
PH UR STRIP.AUTO: 6 [PH] (ref 5–8)
PH, TEMP CORRECTED: ABNORMAL
PLATELET # BLD AUTO: 442 10*3/MM3 (ref 140–450)
PMV BLD AUTO: 10.1 FL (ref 6–12)
PO2 BLDA: 94.4 MM HG (ref 83–108)
PO2 TEMP ADJ BLD: ABNORMAL MM[HG]
POTASSIUM SERPL-SCNC: 4.3 MMOL/L (ref 3.5–5.2)
PROT UR QL STRIP: ABNORMAL
QT INTERVAL: 444 MS
QT INTERVAL: 454 MS
QT INTERVAL: 484 MS
QT INTERVAL: 484 MS
QT INTERVAL: 492 MS
QTC INTERVAL: 514 MS
QTC INTERVAL: 525 MS
QTC INTERVAL: 527 MS
QTC INTERVAL: 527 MS
QTC INTERVAL: 534 MS
RBC # BLD AUTO: 3.18 10*6/MM3 (ref 4.14–5.8)
RBC # UR STRIP: ABNORMAL /HPF
REF LAB TEST METHOD: ABNORMAL
SAO2 % BLDCOA: 98 % (ref 94–99)
SODIUM SERPL-SCNC: 134 MMOL/L (ref 136–145)
SP GR UR STRIP: >1.03 (ref 1–1.03)
SQUAMOUS #/AREA URNS HPF: ABNORMAL /HPF
UROBILINOGEN UR QL STRIP: ABNORMAL
VENTILATOR MODE: ABNORMAL
WBC # UR STRIP: ABNORMAL /HPF
WBC NRBC COR # BLD AUTO: 17.07 10*3/MM3 (ref 3.4–10.8)

## 2025-07-08 PROCEDURE — 25010000002 CEFTRIAXONE PER 250 MG: Performed by: INTERNAL MEDICINE

## 2025-07-08 PROCEDURE — 85027 COMPLETE CBC AUTOMATED: CPT | Performed by: INTERNAL MEDICINE

## 2025-07-08 PROCEDURE — 87147 CULTURE TYPE IMMUNOLOGIC: CPT | Performed by: INTERNAL MEDICINE

## 2025-07-08 PROCEDURE — 99232 SBSQ HOSP IP/OBS MODERATE 35: CPT | Performed by: INTERNAL MEDICINE

## 2025-07-08 PROCEDURE — 25010000002 FUROSEMIDE PER 20 MG: Performed by: INTERNAL MEDICINE

## 2025-07-08 PROCEDURE — 82805 BLOOD GASES W/O2 SATURATION: CPT

## 2025-07-08 PROCEDURE — 93005 ELECTROCARDIOGRAM TRACING: CPT | Performed by: INTERNAL MEDICINE

## 2025-07-08 PROCEDURE — 93010 ELECTROCARDIOGRAM REPORT: CPT | Performed by: INTERNAL MEDICINE

## 2025-07-08 PROCEDURE — 63710000001 INSULIN GLARGINE PER 5 UNITS: Performed by: INTERNAL MEDICINE

## 2025-07-08 PROCEDURE — 87186 SC STD MICRODIL/AGAR DIL: CPT | Performed by: INTERNAL MEDICINE

## 2025-07-08 PROCEDURE — 82948 REAGENT STRIP/BLOOD GLUCOSE: CPT

## 2025-07-08 PROCEDURE — 80048 BASIC METABOLIC PNL TOTAL CA: CPT | Performed by: INTERNAL MEDICINE

## 2025-07-08 PROCEDURE — 87077 CULTURE AEROBIC IDENTIFY: CPT | Performed by: INTERNAL MEDICINE

## 2025-07-08 PROCEDURE — 81001 URINALYSIS AUTO W/SCOPE: CPT | Performed by: INTERNAL MEDICINE

## 2025-07-08 PROCEDURE — 83050 HGB METHEMOGLOBIN QUAN: CPT

## 2025-07-08 PROCEDURE — 93306 TTE W/DOPPLER COMPLETE: CPT | Performed by: INTERNAL MEDICINE

## 2025-07-08 PROCEDURE — 93306 TTE W/DOPPLER COMPLETE: CPT

## 2025-07-08 PROCEDURE — 36600 WITHDRAWAL OF ARTERIAL BLOOD: CPT

## 2025-07-08 PROCEDURE — 25010000002 HEPARIN (PORCINE) PER 1000 UNITS: Performed by: INTERNAL MEDICINE

## 2025-07-08 PROCEDURE — 82375 ASSAY CARBOXYHB QUANT: CPT

## 2025-07-08 PROCEDURE — 87086 URINE CULTURE/COLONY COUNT: CPT | Performed by: INTERNAL MEDICINE

## 2025-07-08 PROCEDURE — 25810000003 LACTATED RINGERS PER 1000 ML: Performed by: INTERNAL MEDICINE

## 2025-07-08 PROCEDURE — 83605 ASSAY OF LACTIC ACID: CPT | Performed by: INTERNAL MEDICINE

## 2025-07-08 RX ORDER — NICOTINE POLACRILEX 4 MG
15 LOZENGE BUCCAL
Status: DISCONTINUED | OUTPATIENT
Start: 2025-07-08 | End: 2025-07-09 | Stop reason: HOSPADM

## 2025-07-08 RX ORDER — HEPARIN SODIUM 5000 [USP'U]/ML
5000 INJECTION, SOLUTION INTRAVENOUS; SUBCUTANEOUS EVERY 12 HOURS SCHEDULED
Status: DISCONTINUED | OUTPATIENT
Start: 2025-07-08 | End: 2025-07-09

## 2025-07-08 RX ORDER — GLUCAGON 1 MG/ML
1 KIT INJECTION
Status: DISCONTINUED | OUTPATIENT
Start: 2025-07-08 | End: 2025-07-09 | Stop reason: HOSPADM

## 2025-07-08 RX ORDER — DEXTROSE MONOHYDRATE 25 G/50ML
25 INJECTION, SOLUTION INTRAVENOUS
Status: DISCONTINUED | OUTPATIENT
Start: 2025-07-08 | End: 2025-07-09 | Stop reason: HOSPADM

## 2025-07-08 RX ORDER — HYDRALAZINE HYDROCHLORIDE 50 MG/1
25 TABLET, FILM COATED ORAL EVERY 12 HOURS SCHEDULED
Status: DISCONTINUED | OUTPATIENT
Start: 2025-07-08 | End: 2025-07-09

## 2025-07-08 RX ORDER — INSULIN LISPRO 100 [IU]/ML
2-9 INJECTION, SOLUTION INTRAVENOUS; SUBCUTANEOUS
Status: DISCONTINUED | OUTPATIENT
Start: 2025-07-09 | End: 2025-07-09 | Stop reason: HOSPADM

## 2025-07-08 RX ORDER — SODIUM CHLORIDE, SODIUM LACTATE, POTASSIUM CHLORIDE, CALCIUM CHLORIDE 600; 310; 30; 20 MG/100ML; MG/100ML; MG/100ML; MG/100ML
100 INJECTION, SOLUTION INTRAVENOUS CONTINUOUS
Status: ACTIVE | OUTPATIENT
Start: 2025-07-08 | End: 2025-07-08

## 2025-07-08 RX ORDER — FUROSEMIDE 20 MG/1
20 TABLET ORAL DAILY
Status: DISCONTINUED | OUTPATIENT
Start: 2025-07-09 | End: 2025-07-09 | Stop reason: HOSPADM

## 2025-07-08 RX ORDER — FUROSEMIDE 10 MG/ML
20 INJECTION INTRAMUSCULAR; INTRAVENOUS ONCE
Status: COMPLETED | OUTPATIENT
Start: 2025-07-08 | End: 2025-07-08

## 2025-07-08 RX ADMIN — METOPROLOL TARTRATE 25 MG: 25 TABLET, FILM COATED ORAL at 20:55

## 2025-07-08 RX ADMIN — Medication 10 ML: at 20:54

## 2025-07-08 RX ADMIN — HYDRALAZINE HYDROCHLORIDE 25 MG: 25 TABLET ORAL at 20:55

## 2025-07-08 RX ADMIN — CEFTRIAXONE SODIUM 2000 MG: 2 INJECTION, POWDER, FOR SOLUTION INTRAMUSCULAR; INTRAVENOUS at 03:27

## 2025-07-08 RX ADMIN — ATORVASTATIN CALCIUM 80 MG: 40 TABLET, FILM COATED ORAL at 08:57

## 2025-07-08 RX ADMIN — ISOSORBIDE MONONITRATE 30 MG: 30 TABLET, EXTENDED RELEASE ORAL at 08:57

## 2025-07-08 RX ADMIN — Medication 10 MG: at 22:12

## 2025-07-08 RX ADMIN — TICAGRELOR 90 MG: 90 TABLET ORAL at 20:54

## 2025-07-08 RX ADMIN — TICAGRELOR 90 MG: 90 TABLET ORAL at 08:56

## 2025-07-08 RX ADMIN — Medication 10 ML: at 08:58

## 2025-07-08 RX ADMIN — SODIUM CHLORIDE, POTASSIUM CHLORIDE, SODIUM LACTATE AND CALCIUM CHLORIDE 100 ML/HR: 600; 310; 30; 20 INJECTION, SOLUTION INTRAVENOUS at 02:32

## 2025-07-08 RX ADMIN — LEVOTHYROXINE SODIUM 50 MCG: 0.05 TABLET ORAL at 08:56

## 2025-07-08 RX ADMIN — METOPROLOL TARTRATE 25 MG: 25 TABLET, FILM COATED ORAL at 08:56

## 2025-07-08 RX ADMIN — INSULIN GLARGINE 14 UNITS: 100 INJECTION, SOLUTION SUBCUTANEOUS at 09:40

## 2025-07-08 RX ADMIN — FUROSEMIDE 20 MG: 10 INJECTION, SOLUTION INTRAMUSCULAR; INTRAVENOUS at 09:40

## 2025-07-08 RX ADMIN — ASPIRIN 81 MG: 81 TABLET, COATED ORAL at 08:57

## 2025-07-08 RX ADMIN — HEPARIN SODIUM 5000 UNITS: 5000 INJECTION INTRAVENOUS; SUBCUTANEOUS at 23:10

## 2025-07-08 NOTE — PROGRESS NOTES
[facility]     Patient Identification:  Name:  Christo Clifton  Age:  70 y.o.  Sex:  male  :  1955  MRN:  [id number]  Visit Number:  [id number]  Primary Care Provider:  Lesa Rae APRN    Length of stay:  2    Chief complaint: Chest pain    Subjective:    Patient seen and examined at bedside with interventional cardiology present.  Patient states he is feeling well this morning with absent chest pain.  However, he does report intermittent chest pain which is difficult for him to describe.  He denies any nausea, vomiting, palpitations or any other symptoms at this time.  ----------------------------------------------------------------------------------------------------------------------  Current [facility] Meds:  aspirin, 81 mg, Oral, Daily  atorvastatin, 80 mg, Oral, Daily  cefTRIAXone, 2,000 mg, Intravenous, Q24H  [Held by provider] escitalopram, 10 mg, Oral, Daily  [START ON 2025] furosemide, 20 mg, Oral, Daily  hydrALAZINE, 25 mg, Oral, Q12H  insulin glargine, 14 Units, Subcutaneous, Daily  isosorbide mononitrate, 30 mg, Oral, Q24H  levothyroxine, 50 mcg, Oral, Daily  metoprolol tartrate, 25 mg, Oral, Q12H  sodium chloride, 10 mL, Intravenous, Q12H  ticagrelor, 90 mg, Oral, BID         ----------------------------------------------------------------------------------------------------------------------  Vital Signs:  Temp:  [97.8 °F (36.6 °C)-98.3 °F (36.8 °C)] 97.9 °F (36.6 °C)  Heart Rate:  [61-98] 65  Resp:  [11-29] 26  BP: ()/(51-88) 108/60      25  1613 25  1935 25  0400   Weight: 67.7 kg (149 lb 4 oz) 77.5 kg (170 lb 13.7 oz) 77.5 kg (170 lb 13.7 oz)     Body mass index is 31.95 kg/m².    Intake/Output Summary (Last 24 hours) at 2025 1913  Last data filed at 2025 1700  Gross per 24 hour   Intake 1521 ml   Output 600 ml   Net 921 ml     Diet: Cardiac; Healthy Heart (2-3 Na+); Fluid Consistency: Thin (IDDSI  0)  ----------------------------------------------------------------------------------------------------------------------  Physical exam:  Constitutional: Well-nourished  male in no apparent distress.     HENT:  Head:  Normocephalic and atraumatic.  Mouth:  Moist mucous membranes.    Eyes:  Conjunctivae and EOM are normal.  Pupils are equal, round, and reactive to light.  No scleral icterus.    Neck:  Neck supple. No thyromegaly.  No JVD present.    Cardiovascular:  Regular rate and rhythm with no murmurs, rubs, clicks or gallops appreciated.  Pulmonary/Chest:  Clear to auscultation bilaterally with no crackles, wheezes or rhonchi appreciated.  Abdominal:  Soft. Nontender. Nondistended  Bowel sounds are normal in all four quadrants. No organomegally appreciated.   Musculoskeletal:  No edema, no tenderness, and no deformity.  No red or swollen joints anywhere.    Neurological:  Alert, Cranial nerves II-XII intact with no focal deficits.  No facial droop.  No slurred speech.   Skin:  Warm and dry to palpation with no rashes or lesions appreciated.  Peripheral vascular:  2+ radial and pedal pulses in bilateral upper and lower extremities.  Psychiatric:  Alert and oriented x3, demonstrates appropriate judgment and insight.    No significant change in physical exam in comparison to 7/7/2025  ---------------------------------------------------------------------------------------  ----------------------------------------------------------------------------------------------------------------------  Results from last 7 days   Lab Units 07/07/25  1955 07/07/25  1846 07/07/25  1714   HSTROP T ng/L >10,000* >10,000* >10,000*     Results from last 7 days   Lab Units 07/08/25  1048 07/08/25  0726 07/08/25  0513 07/08/25  0140 07/06/25  2345 07/06/25  2036 07/06/25  1629   LACTATE mmol/L 1.7 2.3* 2.1*  --   --   --   --    WBC 10*3/mm3  --   --   --  17.07* 9.33 9.41 10.09   HEMOGLOBIN g/dL  --   --   --  8.8* 9.2* 8.8*  "9.0*   HEMATOCRIT %  --   --   --  28.5* 28.9* 27.5* 28.3*   MCV fL  --   --   --  89.6 88.9 88.1 88.4   MCHC g/dL  --   --   --  30.9* 31.8 32.0 31.8   PLATELETS 10*3/mm3  --   --   --  442 360 318 341   INR   --   --   --   --   --   --  1.04     Results from last 7 days   Lab Units 07/08/25  0114   PH, ARTERIAL pH units 7.515*   PO2 ART mm Hg 94.4   PCO2, ARTERIAL mm Hg 24.8*   HCO3 ART mmol/L 20.0     Results from last 7 days   Lab Units 07/08/25  0140 07/07/25  1955 07/06/25  2345 07/06/25 2036 07/06/25  1629   SODIUM mmol/L 134*  --  137 135* 134*   POTASSIUM mmol/L 4.3  --  4.1 3.6 3.8   MAGNESIUM mg/dL  --  1.9  --   --   --    CHLORIDE mmol/L 100  --  104 101 100   CO2 mmol/L 19.5*  --  20.8* 22.0 20.2*   BUN mg/dL 30.8*  --  26.7* 28.5* 29.1*   CREATININE mg/dL 1.65*  --  1.18 1.20 1.30*   CALCIUM mg/dL 8.8  --  8.7 8.7 8.7   GLUCOSE mg/dL 218*  --  167* 205* 238*   ALBUMIN g/dL  --   --   --   --  3.0*   BILIRUBIN mg/dL  --   --   --   --  0.8   ALK PHOS U/L  --   --   --   --  100   AST (SGOT) U/L  --   --   --   --  27   ALT (SGPT) U/L  --   --   --   --  20   Estimated Creatinine Clearance: 36.5 mL/min (A) (by C-G formula based on SCr of 1.65 mg/dL (H)).    No results found for: \"AMMONIA\"      No results found for: \"BLOODCX\"  No results found for: \"URINECX\"  No results found for: \"WOUNDCX\"  No results found for: \"STOOLCX\"    I have personally looked at the labs and they are summarized above.  ----------------------------------------------------------------------------------------------------------------------  Imaging Results (Last 24 Hours)       ** No results found for the last 24 hours. **          ----------------------------------------------------------------------------------------------------------------------  Assessment and Plan:    Type I NSTEMI - patient did have left heart catheterization on 7/7/2025 which demonstrated complete occlusion of proximal circumflex.  Patient had successful PCI " with stent placement, continue aspirin/Alimta.  Plan for echo tomorrow, appreciate cardiology recommendations.  Will also continue Lipitor 80 mg p.o. nightly and will start metoprolol tartrate 25 mg p.o. twice daily.  Transthoracic echo obtained today demonstrates reduced left ventricular systolic function with estimated EF 30 to 35% with grade 2 diastolic dysfunction.    2.  Newly diagnosed HFrEF - will attempt to maximize GDMT as blood pressure permits    2.  UTI -patient with elevation in white blood cell count overnight with confusion.  UA does appear suspicious for UTI.  Have started Rocephin 1 g IV every 24 hours.  Urine culture currently pending.    3.  Insulin-dependent type 2 diabetes mellitus -continue Accu-Cheks before every meal and nightly with sliding scale insulin    4.  Hyperlipidemia -atorvastatin    5.  Essential hypertension -currently well-controlled    Disposition possible discharge tomorrow    Carlos Perkins,    07/08/25   19:13 EDT

## 2025-07-08 NOTE — PROGRESS NOTES
Patient Identification:  Name:  Christo Clifton  Age:  70 y.o.  Sex:  male  :  1955  MRN:  [id number]  Visit Number:  [id number]    Chief Complaint:   Chest pain    Subjective:      Patient underwent PCI to occluded left circumflex artery.  Had episodes of chest pain last night.  He says the pain is aggravated by taking a deep breaths or sometimes leaning forward.  He also complains of some shortness of breath.  Patient states that he sometimes forgets to take his medication including his DAPT.  Heart rate ranged from 65-98.  Blood pressure ranged from 94/51-1 138/81 mmHg.  He was +140 cc.  He currently denies any chest pain.  He denies any cough phlegm nausea vomiting or diarrhea.  WBC 17.7 H&H 8.8/28.5 with a platelet count of 442.  BUN 30 creatinine 1.65.  ----------------------------------------------------------------------------------------------------------------------  Current Hospital Meds:  aspirin, 81 mg, Oral, Daily  atorvastatin, 80 mg, Oral, Daily  cefTRIAXone, 2,000 mg, Intravenous, Q24H  [Held by provider] escitalopram, 10 mg, Oral, Daily  furosemide, 20 mg, Intravenous, Once  insulin glargine, 14 Units, Subcutaneous, Daily  isosorbide mononitrate, 30 mg, Oral, Q24H  levothyroxine, 50 mcg, Oral, Daily  metoprolol tartrate, 25 mg, Oral, Q12H  sodium chloride, 10 mL, Intravenous, Q12H  ticagrelor, 90 mg, Oral, BID      lactated ringers, 100 mL/hr, Last Rate: 100 mL/hr (25 0232)      ----------------------------------------------------------------------------------------------------------------------  Vital Signs:  Temp:  [97.8 °F (36.6 °C)-98.3 °F (36.8 °C)] 98.3 °F (36.8 °C)  Heart Rate:  [65-98] 68  Resp:  [10-26] 16  BP: ()/(51-92) 125/71      25  1613 25  1935 25  0400   Weight: 67.7 kg (149 lb 4 oz) 77.5 kg (170 lb 13.7 oz) 77.5 kg (170 lb 13.7 oz)     Body mass index is 31.95 kg/m².    Intake/Output Summary (Last 24 hours) at 2025 0951  Last data filed at  7/8/2025 0900  Gross per 24 hour   Intake 1318.11 ml   Output 900 ml   Net 418.11 ml     Diet: Cardiac; Healthy Heart (2-3 Na+); Fluid Consistency: Thin (IDDSI 0)  ----------------------------------------------------------------------------------------------------------------------  Physical exam:  Constitutional:    HENT:  Head:  Normocephalic and atraumatic.    Neck:  Neck supple.  Positive JVP.  Cardiovascular: Normal rate, regular rhythm, S1 S2+, NO S3 / S4  Pulmonary/Chest:  Vesicular breath sounds B/L  Abdominal:  Soft.  Bowel sounds are normal.  No distension and no tenderness.   Neurological:  Alert and oriented to person, place, and time. No focal deficits.  Skin:  Skin is warm and dry. No rash noted. No pallor.   Musculoskeletal:  No edema, no tenderness, and no deformity.  No red or swollen joints anywhere.   Peripheral vascular: Right groin CDI.  No hematoma.  Mildly tender to touch.  ----------------------------------------------------------------------------------------------------------------------    ----------------------------------------------------------------------------------------------------------------------  Results from last 7 days   Lab Units 07/07/25  1955 07/07/25  1846 07/07/25  1714   HSTROP T ng/L >10,000* >10,000* >10,000*     Results from last 7 days   Lab Units 07/08/25  0726 07/08/25  0513 07/08/25  0140 07/06/25  2345 07/06/25  2036 07/06/25  1629   LACTATE mmol/L 2.3* 2.1*  --   --   --   --    WBC 10*3/mm3  --   --  17.07* 9.33 9.41 10.09   HEMOGLOBIN g/dL  --   --  8.8* 9.2* 8.8* 9.0*   HEMATOCRIT %  --   --  28.5* 28.9* 27.5* 28.3*   MCV fL  --   --  89.6 88.9 88.1 88.4   MCHC g/dL  --   --  30.9* 31.8 32.0 31.8   PLATELETS 10*3/mm3  --   --  442 360 318 341   INR   --   --   --   --   --  1.04     Results from last 7 days   Lab Units 07/08/25  0114   PH, ARTERIAL pH units 7.515*   PO2 ART mm Hg 94.4   PCO2, ARTERIAL mm Hg 24.8*   HCO3 ART mmol/L 20.0     Results from last 7  "days   Lab Units 07/08/25  0140 07/07/25 1955 07/06/25  2345 07/06/25 2036 07/06/25  1629   SODIUM mmol/L 134*  --  137 135* 134*   POTASSIUM mmol/L 4.3  --  4.1 3.6 3.8   MAGNESIUM mg/dL  --  1.9  --   --   --    CHLORIDE mmol/L 100  --  104 101 100   CO2 mmol/L 19.5*  --  20.8* 22.0 20.2*   BUN mg/dL 30.8*  --  26.7* 28.5* 29.1*   CREATININE mg/dL 1.65*  --  1.18 1.20 1.30*   CALCIUM mg/dL 8.8  --  8.7 8.7 8.7   GLUCOSE mg/dL 218*  --  167* 205* 238*   ALBUMIN g/dL  --   --   --   --  3.0*   BILIRUBIN mg/dL  --   --   --   --  0.8   ALK PHOS U/L  --   --   --   --  100   AST (SGOT) U/L  --   --   --   --  27   ALT (SGPT) U/L  --   --   --   --  20   Estimated Creatinine Clearance: 36.5 mL/min (A) (by C-G formula based on SCr of 1.65 mg/dL (H)).    No results found for: \"AMMONIA\"      No results found for: \"BLOODCX\"  No results found for: \"URINECX\"  No results found for: \"WOUNDCX\"  No results found for: \"STOOLCX\"  Echo:  Results for orders placed during the hospital encounter of 06/03/25    Adult Transthoracic Echo Complete w/ Color, Spectral and Contrast if necessary per protocol 06/06/2025  9:35 AM    Interpretation Summary    Normal left ventricular cavity size and wall thickness noted. All left ventricular wall segments contract normally.    Left ventricular ejection fraction appears to be 56 - 60%.    Left ventricular diastolic function is consistent with (grade I) impaired relaxation.    There is mild calcification of the aortic valve mainly affecting the left coronary cusp(s). The aortic valve was poorly visualized but appears trileaflet.    No aortic valve regurgitation is present. Mild aortic valve stenosis is present.    The mitral valve is structurally normal with no significant stenosis present. Trace mitral valve regurgitation is present.    There is no evidence of pericardial effusion.        I have personally looked at the labs and they are summarized " above.  ----------------------------------------------------------------------------------------------------------------------  Imaging Results (Last 24 Hours)       ** No results found for the last 24 hours. **          ----------------------------------------------------------------------------------------------------------------------    Assessment:  Acute stent thrombosis in the setting of medical noncompliance.  Status post PCI to left circumflex artery.  History of coronary artery disease status post CABG.  Hypertension.  Dyslipidemia.  Diabetes.  UTI  Hematuria  Anemia    Plan:  -Lasix 20 mg IV push x 1.  - Awaiting echocardiogram.  - Will switch his Brilinta to prasugrel.        This document has been electronically signed by Mimi Botello MD [facility], Interventional Cardiology  July 8, 2025 09:40 EDT

## 2025-07-08 NOTE — PLAN OF CARE
Problem: Adult Inpatient Plan of Care  Goal: Plan of Care Review  Outcome: Progressing  Flowsheets (Taken 2025 0300)  Progress: declining  Plan of Care Reviewed With: patient  Goal: Patient-Specific Goal (Individualized)  Outcome: Progressing  Goal: Absence of Hospital-Acquired Illness or Injury  Outcome: Progressing  Intervention: Identify and Manage Fall Risk  Recent Flowsheet Documentation  Taken 2025 0100 by Mala Guerra RN  Safety Promotion/Fall Prevention: safety round/check completed  Taken 2025 2300 by Mala Guerra RN  Safety Promotion/Fall Prevention: safety round/check completed  Taken 2025 2100 by Mala Guerra RN  Safety Promotion/Fall Prevention: safety round/check completed  Taken 2025 1900 by Mala Guerra RN  Safety Promotion/Fall Prevention: safety round/check completed  Intervention: Prevent and Manage VTE (Venous Thromboembolism) Risk  Recent Flowsheet Documentation  Taken 2025 2200 by Mala Guerra RN  VTE Prevention/Management: (integerlin order  at shift change. Dr. Kim contacted for VTE orders) other (see comments)  Intervention: Prevent Infection  Recent Flowsheet Documentation  Taken 2025 0100 by Mala Guerra RN  Infection Prevention: single patient room provided  Taken 2025 2300 by Mala Guerra RN  Infection Prevention: single patient room provided  Taken 2025 2100 by Mala Guerra RN  Infection Prevention: single patient room provided  Taken 2025 1900 by Mala Guerra RN  Infection Prevention: single patient room provided  Goal: Optimal Comfort and Wellbeing  Outcome: Progressing  Intervention: Provide Person-Centered Care  Recent Flowsheet Documentation  Taken 2025 by Mala Guerra RN  Trust Relationship/Rapport:   care explained   choices provided   thoughts/feelings acknowledged  Goal: Readiness for Transition of Care  Outcome: Progressing  Goal: Plan of Care Review  Outcome: Progressing  Flowsheets (Taken 2025  0300)  Progress: declining  Plan of Care Reviewed With: patient  Goal: Patient-Specific Goal (Individualized)  Outcome: Progressing  Goal: Absence of Hospital-Acquired Illness or Injury  Outcome: Progressing  Intervention: Identify and Manage Fall Risk  Recent Flowsheet Documentation  Taken 2025 0100 by Mala Guerra RN  Safety Promotion/Fall Prevention: safety round/check completed  Taken 2025 2300 by Mala Guerra RN  Safety Promotion/Fall Prevention: safety round/check completed  Taken 2025 2100 by Mala Guerra RN  Safety Promotion/Fall Prevention: safety round/check completed  Taken 2025 1900 by Mala Guerra RN  Safety Promotion/Fall Prevention: safety round/check completed  Intervention: Prevent and Manage VTE (Venous Thromboembolism) Risk  Recent Flowsheet Documentation  Taken 2025 by Mala Guerra RN  VTE Prevention/Management: (integerlin order  at shift change. Dr. Kim contacted for VTE orders) other (see comments)  Intervention: Prevent Infection  Recent Flowsheet Documentation  Taken 2025 0100 by Mala Guerra RN  Infection Prevention: single patient room provided  Taken 2025 2300 by Mala Guerra RN  Infection Prevention: single patient room provided  Taken 2025 2100 by Mala Guerra RN  Infection Prevention: single patient room provided  Taken 2025 190 by Mala Guerra RN  Infection Prevention: single patient room provided  Goal: Optimal Comfort and Wellbeing  Outcome: Progressing  Intervention: Provide Person-Centered Care  Recent Flowsheet Documentation  Taken 2025 by Mala Guerra RN  Trust Relationship/Rapport:   care explained   choices provided   thoughts/feelings acknowledged  Goal: Readiness for Transition of Care  Outcome: Progressing     Problem: Wound  Goal: Optimal Coping  Outcome: Progressing  Intervention: Support Patient and Family Response  Recent Flowsheet Documentation  Taken 2025 by Mala Guerra RN  Family/Support  System Care:   self-care encouraged   support provided  Goal: Optimal Functional Ability  Outcome: Progressing  Goal: Absence of Infection Signs and Symptoms  Outcome: Progressing  Goal: Improved Oral Intake  Outcome: Progressing  Goal: Optimal Pain Control and Function  Outcome: Progressing  Goal: Skin Health and Integrity  Outcome: Progressing  Goal: Optimal Wound Healing  Outcome: Progressing     Problem: Fall Injury Risk  Goal: Absence of Fall and Fall-Related Injury  Outcome: Progressing  Intervention: Identify and Manage Contributors  Recent Flowsheet Documentation  Taken 7/8/2025 0100 by Mala Guerra RN  Medication Review/Management: medications reviewed  Taken 7/7/2025 2300 by Mala Guerra RN  Medication Review/Management: medications reviewed  Taken 7/7/2025 2100 by Mala Guerra RN  Medication Review/Management: medications reviewed  Taken 7/7/2025 1900 by Mala Guerra RN  Medication Review/Management: medications reviewed  Intervention: Promote Injury-Free Environment  Recent Flowsheet Documentation  Taken 7/8/2025 0100 by Mala Guerra RN  Safety Promotion/Fall Prevention: safety round/check completed  Taken 7/7/2025 2300 by Mala Guerra RN  Safety Promotion/Fall Prevention: safety round/check completed  Taken 7/7/2025 2100 by Mala Guerra RN  Safety Promotion/Fall Prevention: safety round/check completed  Taken 7/7/2025 1900 by Mala Guerra RN  Safety Promotion/Fall Prevention: safety round/check completed   Goal Outcome Evaluation:  Plan of Care Reviewed With: patient        Progress: declining

## 2025-07-08 NOTE — PLAN OF CARE
Problem: Adult Inpatient Plan of Care  Goal: Plan of Care Review  Outcome: Progressing  Goal: Patient-Specific Goal (Individualized)  Outcome: Progressing  Goal: Absence of Hospital-Acquired Illness or Injury  Outcome: Progressing  Intervention: Identify and Manage Fall Risk  Recent Flowsheet Documentation  Taken 7/8/2025 1500 by Aliyah Ayala RN  Safety Promotion/Fall Prevention:   activity supervised   assistive device/personal items within reach   clutter free environment maintained   fall prevention program maintained   nonskid shoes/slippers when out of bed   room organization consistent   safety round/check completed  Taken 7/8/2025 1348 by Aliyah Ayala RN  Safety Promotion/Fall Prevention:   activity supervised   assistive device/personal items within reach   clutter free environment maintained   fall prevention program maintained   nonskid shoes/slippers when out of bed   room organization consistent   safety round/check completed  Taken 7/8/2025 1300 by Aliyah Ayala RN  Safety Promotion/Fall Prevention:   activity supervised   assistive device/personal items within reach   clutter free environment maintained   fall prevention program maintained   nonskid shoes/slippers when out of bed   room organization consistent   safety round/check completed  Taken 7/8/2025 1100 by Aliyah Ayala RN  Safety Promotion/Fall Prevention:   activity supervised   assistive device/personal items within reach   clutter free environment maintained   fall prevention program maintained   nonskid shoes/slippers when out of bed   room organization consistent   safety round/check completed  Taken 7/8/2025 0900 by Aliyah Ayala RN  Safety Promotion/Fall Prevention:   activity supervised   assistive device/personal items within reach   clutter free environment maintained   fall prevention program maintained   nonskid shoes/slippers when out of bed   room organization consistent   safety round/check completed  Taken 7/8/2025  0700 by Aliyah Ayala RN  Safety Promotion/Fall Prevention:   activity supervised   assistive device/personal items within reach   clutter free environment maintained   fall prevention program maintained   nonskid shoes/slippers when out of bed   room organization consistent   safety round/check completed  Intervention: Prevent Skin Injury  Recent Flowsheet Documentation  Taken 7/8/2025 1348 by Aliyah Ayala RN  Body Position: position changed independently  Skin Protection: transparent dressing maintained  Taken 7/8/2025 0800 by Aliyah Ayala RN  Skin Protection: transparent dressing maintained  Intervention: Prevent Infection  Recent Flowsheet Documentation  Taken 7/8/2025 1500 by Aliyah Ayala RN  Infection Prevention:   rest/sleep promoted   single patient room provided  Taken 7/8/2025 1348 by Aliyah Ayala RN  Infection Prevention:   rest/sleep promoted   single patient room provided  Taken 7/8/2025 1300 by Aliyah Ayala RN  Infection Prevention:   rest/sleep promoted   single patient room provided  Taken 7/8/2025 1100 by Aliyah Ayala RN  Infection Prevention:   rest/sleep promoted   single patient room provided  Taken 7/8/2025 0900 by Aliyah Ayala RN  Infection Prevention:   rest/sleep promoted   single patient room provided  Taken 7/8/2025 0700 by Aliyah Ayala RN  Infection Prevention:   rest/sleep promoted   single patient room provided  Goal: Optimal Comfort and Wellbeing  Outcome: Progressing  Intervention: Provide Person-Centered Care  Recent Flowsheet Documentation  Taken 7/8/2025 0800 by Aliyah Ayala RN  Trust Relationship/Rapport:   care explained   choices provided   emotional support provided  Goal: Readiness for Transition of Care  Outcome: Progressing  Goal: Plan of Care Review  Outcome: Progressing  Goal: Patient-Specific Goal (Individualized)  Outcome: Progressing  Goal: Absence of Hospital-Acquired Illness or Injury  Outcome: Progressing  Intervention: Identify and Manage Fall  Risk  Recent Flowsheet Documentation  Taken 7/8/2025 1500 by Aliyah Ayala RN  Safety Promotion/Fall Prevention:   activity supervised   assistive device/personal items within reach   clutter free environment maintained   fall prevention program maintained   nonskid shoes/slippers when out of bed   room organization consistent   safety round/check completed  Taken 7/8/2025 1348 by Aliyah Ayala RN  Safety Promotion/Fall Prevention:   activity supervised   assistive device/personal items within reach   clutter free environment maintained   fall prevention program maintained   nonskid shoes/slippers when out of bed   room organization consistent   safety round/check completed  Taken 7/8/2025 1300 by Aliyah Ayala RN  Safety Promotion/Fall Prevention:   activity supervised   assistive device/personal items within reach   clutter free environment maintained   fall prevention program maintained   nonskid shoes/slippers when out of bed   room organization consistent   safety round/check completed  Taken 7/8/2025 1100 by Aliyah Ayala RN  Safety Promotion/Fall Prevention:   activity supervised   assistive device/personal items within reach   clutter free environment maintained   fall prevention program maintained   nonskid shoes/slippers when out of bed   room organization consistent   safety round/check completed  Taken 7/8/2025 0900 by Aliyah Ayala RN  Safety Promotion/Fall Prevention:   activity supervised   assistive device/personal items within reach   clutter free environment maintained   fall prevention program maintained   nonskid shoes/slippers when out of bed   room organization consistent   safety round/check completed  Taken 7/8/2025 0700 by Aliyah Ayala RN  Safety Promotion/Fall Prevention:   activity supervised   assistive device/personal items within reach   clutter free environment maintained   fall prevention program maintained   nonskid shoes/slippers when out of bed   room organization  consistent   safety round/check completed  Intervention: Prevent Skin Injury  Recent Flowsheet Documentation  Taken 7/8/2025 1348 by Aliyah Ayala RN  Body Position: position changed independently  Skin Protection: transparent dressing maintained  Taken 7/8/2025 0800 by Aliyah Ayala RN  Skin Protection: transparent dressing maintained  Intervention: Prevent Infection  Recent Flowsheet Documentation  Taken 7/8/2025 1500 by Aliyah Ayala RN  Infection Prevention:   rest/sleep promoted   single patient room provided  Taken 7/8/2025 1348 by Aliyah Ayala RN  Infection Prevention:   rest/sleep promoted   single patient room provided  Taken 7/8/2025 1300 by Aliyah Ayala RN  Infection Prevention:   rest/sleep promoted   single patient room provided  Taken 7/8/2025 1100 by Aliyah Ayala RN  Infection Prevention:   rest/sleep promoted   single patient room provided  Taken 7/8/2025 0900 by Aliyah Ayala RN  Infection Prevention:   rest/sleep promoted   single patient room provided  Taken 7/8/2025 0700 by Aliyah Ayala RN  Infection Prevention:   rest/sleep promoted   single patient room provided  Goal: Optimal Comfort and Wellbeing  Outcome: Progressing  Intervention: Provide Person-Centered Care  Recent Flowsheet Documentation  Taken 7/8/2025 0800 by Aliyah Ayala RN  Trust Relationship/Rapport:   care explained   choices provided   emotional support provided  Goal: Readiness for Transition of Care  Outcome: Progressing     Problem: Wound  Goal: Optimal Coping  Outcome: Progressing  Intervention: Support Patient and Family Response  Recent Flowsheet Documentation  Taken 7/8/2025 0800 by Aliyah Ayala RN  Family/Support System Care: self-care encouraged  Goal: Optimal Functional Ability  Outcome: Progressing  Intervention: Optimize Functional Ability  Recent Flowsheet Documentation  Taken 7/8/2025 1500 by Aliyah Ayala RN  Activity Management: activity encouraged  Activity Assistance Provided: assistance, 1  person  Taken 7/8/2025 1348 by Aliyah Ayala RN  Activity Management: activity encouraged  Activity Assistance Provided: assistance, 1 person  Taken 7/8/2025 1300 by Aliyah Ayala RN  Activity Management: ambulated to bathroom  Activity Assistance Provided: assistance, 1 person  Goal: Absence of Infection Signs and Symptoms  Outcome: Progressing  Goal: Improved Oral Intake  Outcome: Progressing  Goal: Optimal Pain Control and Function  Outcome: Progressing  Goal: Skin Health and Integrity  Outcome: Progressing  Intervention: Optimize Skin Protection  Recent Flowsheet Documentation  Taken 7/8/2025 1500 by Aliyah Ayala RN  Activity Management: activity encouraged  Taken 7/8/2025 1348 by Aliyah Ayala RN  Activity Management: activity encouraged  Pressure Reduction Techniques: heels elevated off bed  Head of Bed (HOB) Positioning: HOB elevated  Pressure Reduction Devices: pressure-redistributing mattress utilized  Taken 7/8/2025 1300 by Aliyah Ayala RN  Activity Management: ambulated to bathroom  Taken 7/8/2025 0800 by Aliyah Ayala RN  Pressure Reduction Techniques: heels elevated off bed  Pressure Reduction Devices: pressure-redistributing mattress utilized  Goal: Optimal Wound Healing  Outcome: Progressing     Problem: Fall Injury Risk  Goal: Absence of Fall and Fall-Related Injury  Outcome: Progressing  Intervention: Identify and Manage Contributors  Recent Flowsheet Documentation  Taken 7/8/2025 1500 by Aliyah Ayala RN  Medication Review/Management: medications reviewed  Taken 7/8/2025 1348 by Aliyah Ayala RN  Medication Review/Management: medications reviewed  Taken 7/8/2025 1300 by Aliyah Ayala RN  Medication Review/Management: medications reviewed  Taken 7/8/2025 1100 by Aliyah Ayala RN  Medication Review/Management: medications reviewed  Taken 7/8/2025 0900 by Aliyah Ayala RN  Medication Review/Management: medications reviewed  Taken 7/8/2025 0800 by Aliyah Ayala RN  Medication  Review/Management: medications reviewed  Taken 7/8/2025 0700 by Aliyah Ayala RN  Medication Review/Management: medications reviewed  Intervention: Promote Injury-Free Environment  Recent Flowsheet Documentation  Taken 7/8/2025 1500 by Aliyah Ayala RN  Safety Promotion/Fall Prevention:   activity supervised   assistive device/personal items within reach   clutter free environment maintained   fall prevention program maintained   nonskid shoes/slippers when out of bed   room organization consistent   safety round/check completed  Taken 7/8/2025 1348 by Aliyah Ayala RN  Safety Promotion/Fall Prevention:   activity supervised   assistive device/personal items within reach   clutter free environment maintained   fall prevention program maintained   nonskid shoes/slippers when out of bed   room organization consistent   safety round/check completed  Taken 7/8/2025 1300 by Aliyah Ayala RN  Safety Promotion/Fall Prevention:   activity supervised   assistive device/personal items within reach   clutter free environment maintained   fall prevention program maintained   nonskid shoes/slippers when out of bed   room organization consistent   safety round/check completed  Taken 7/8/2025 1100 by Aliyah Ayala RN  Safety Promotion/Fall Prevention:   activity supervised   assistive device/personal items within reach   clutter free environment maintained   fall prevention program maintained   nonskid shoes/slippers when out of bed   room organization consistent   safety round/check completed  Taken 7/8/2025 0900 by Aliyah Ayala RN  Safety Promotion/Fall Prevention:   activity supervised   assistive device/personal items within reach   clutter free environment maintained   fall prevention program maintained   nonskid shoes/slippers when out of bed   room organization consistent   safety round/check completed  Taken 7/8/2025 0700 by Aliyah Ayala RN  Safety Promotion/Fall Prevention:   activity supervised   assistive  device/personal items within reach   clutter free environment maintained   fall prevention program maintained   nonskid shoes/slippers when out of bed   room organization consistent   safety round/check completed   Goal Outcome Evaluation:      Pt AOX3, with intermittent confusion. VSS, no pain reported.Echo completed.

## 2025-07-09 ENCOUNTER — APPOINTMENT (OUTPATIENT)
Dept: GENERAL RADIOLOGY | Facility: HOSPITAL | Age: 70
DRG: 270 | End: 2025-07-09
Payer: MEDICARE

## 2025-07-09 ENCOUNTER — APPOINTMENT (OUTPATIENT)
Dept: ULTRASOUND IMAGING | Facility: HOSPITAL | Age: 70
DRG: 270 | End: 2025-07-09
Payer: MEDICARE

## 2025-07-09 VITALS
SYSTOLIC BLOOD PRESSURE: 84 MMHG | OXYGEN SATURATION: 97 % | TEMPERATURE: 96.6 F | HEART RATE: 79 BPM | WEIGHT: 170.86 LBS | DIASTOLIC BLOOD PRESSURE: 39 MMHG | BODY MASS INDEX: 32.26 KG/M2 | RESPIRATION RATE: 20 BRPM | HEIGHT: 61 IN

## 2025-07-09 PROBLEM — I44.2 COMPLETE HEART BLOCK: Status: ACTIVE | Noted: 2025-07-09

## 2025-07-09 PROBLEM — E87.20 LACTIC ACIDOSIS: Status: ACTIVE | Noted: 2025-07-09

## 2025-07-09 PROBLEM — N17.9 AKI (ACUTE KIDNEY INJURY): Status: ACTIVE | Noted: 2025-01-01

## 2025-07-09 PROBLEM — K72.00 SHOCK LIVER: Status: ACTIVE | Noted: 2025-07-09

## 2025-07-09 PROBLEM — I50.21 ACUTE HFREF (HEART FAILURE WITH REDUCED EJECTION FRACTION): Status: ACTIVE | Noted: 2025-07-09

## 2025-07-09 PROBLEM — I25.5 ISCHEMIC CARDIOMYOPATHY: Status: ACTIVE | Noted: 2025-01-01

## 2025-07-09 PROBLEM — I45.9 HEART BLOCK: Status: ACTIVE | Noted: 2025-07-06

## 2025-07-09 PROBLEM — R07.9 CHEST PAIN: Status: RESOLVED | Noted: 2025-07-06 | Resolved: 2025-07-09

## 2025-07-09 PROBLEM — I24.9 ACUTE ISCHEMIC HEART DISEASE, UNSPECIFIED: Status: ACTIVE | Noted: 2025-01-01

## 2025-07-09 PROBLEM — N30.00 ACUTE CYSTITIS WITHOUT HEMATURIA: Status: ACTIVE | Noted: 2025-07-09

## 2025-07-09 PROBLEM — I44.2 THIRD DEGREE AV BLOCK: Status: ACTIVE | Noted: 2025-07-09

## 2025-07-09 LAB
A-A DO2: 551.2 MMHG (ref 0–300)
ALBUMIN SERPL-MCNC: 2.9 G/DL (ref 3.5–5.2)
ALBUMIN/GLOB SERPL: 0.9 G/DL
ALP SERPL-CCNC: 110 U/L (ref 39–117)
ALT SERPL W P-5'-P-CCNC: 288 U/L (ref 1–41)
ANION GAP SERPL CALCULATED.3IONS-SCNC: 15.7 MMOL/L (ref 5–15)
ANION GAP SERPL CALCULATED.3IONS-SCNC: 24.2 MMOL/L (ref 5–15)
APTT PPP: 30.6 SECONDS (ref 24.5–35.9)
ARTERIAL PATENCY WRIST A: ABNORMAL
AST SERPL-CCNC: 325 U/L (ref 1–40)
ATMOSPHERIC PRESS: 729 MMHG
BACTERIA SPEC AEROBE CULT: ABNORMAL
BASE EXCESS BLDA CALC-SCNC: -13.2 MMOL/L (ref 0–2)
BDY SITE: ABNORMAL
BILIRUB SERPL-MCNC: 0.5 MG/DL (ref 0–1.2)
BUN SERPL-MCNC: 42.6 MG/DL (ref 8–23)
BUN SERPL-MCNC: 50.4 MG/DL (ref 8–23)
BUN/CREAT SERPL: 14.9 (ref 7–25)
BUN/CREAT SERPL: 17.8 (ref 7–25)
CALCIUM SPEC-SCNC: 8.3 MG/DL (ref 8.6–10.5)
CALCIUM SPEC-SCNC: 8.8 MG/DL (ref 8.6–10.5)
CHLORIDE SERPL-SCNC: 100 MMOL/L (ref 98–107)
CHLORIDE SERPL-SCNC: 98 MMOL/L (ref 98–107)
CO2 BLDA-SCNC: 15 MMOL/L (ref 22–33)
CO2 SERPL-SCNC: 11.8 MMOL/L (ref 22–29)
CO2 SERPL-SCNC: 19.3 MMOL/L (ref 22–29)
COHGB MFR BLD: 1.5 % (ref 0–5)
CREAT SERPL-MCNC: 2.4 MG/DL (ref 0.76–1.27)
CREAT SERPL-MCNC: 3.39 MG/DL (ref 0.76–1.27)
DEPRECATED RDW RBC AUTO: 48.1 FL (ref 37–54)
DEPRECATED RDW RBC AUTO: 52.1 FL (ref 37–54)
EGFRCR SERPLBLD CKD-EPI 2021: 18.6 ML/MIN/1.73
EGFRCR SERPLBLD CKD-EPI 2021: 28.1 ML/MIN/1.73
ERYTHROCYTE [DISTWIDTH] IN BLOOD BY AUTOMATED COUNT: 14.9 % (ref 12.3–15.4)
ERYTHROCYTE [DISTWIDTH] IN BLOOD BY AUTOMATED COUNT: 14.9 % (ref 12.3–15.4)
GLOBULIN UR ELPH-MCNC: 3.4 GM/DL
GLUCOSE BLDC GLUCOMTR-MCNC: 220 MG/DL (ref 70–130)
GLUCOSE BLDC GLUCOMTR-MCNC: 312 MG/DL (ref 70–130)
GLUCOSE BLDC GLUCOMTR-MCNC: 313 MG/DL (ref 70–130)
GLUCOSE BLDC GLUCOMTR-MCNC: 322 MG/DL (ref 70–130)
GLUCOSE SERPL-MCNC: 226 MG/DL (ref 65–99)
GLUCOSE SERPL-MCNC: 348 MG/DL (ref 65–99)
HCO3 BLDA-SCNC: 13.9 MMOL/L (ref 20–26)
HCT VFR BLD AUTO: 25.9 % (ref 37.5–51)
HCT VFR BLD AUTO: 27.8 % (ref 37.5–51)
HCT VFR BLD CALC: 25.4 % (ref 38–51)
HGB BLD-MCNC: 8.3 G/DL (ref 13–17.7)
HGB BLD-MCNC: 8.3 G/DL (ref 13–17.7)
HGB BLDA-MCNC: 8.3 G/DL (ref 14–18)
INHALED O2 CONCENTRATION: 100 %
INR PPP: 1.25 (ref 0.9–1.1)
Lab: ABNORMAL
Lab: ABNORMAL
MCH RBC QN AUTO: 28.3 PG (ref 26.6–33)
MCH RBC QN AUTO: 28.6 PG (ref 26.6–33)
MCHC RBC AUTO-ENTMCNC: 29.9 G/DL (ref 31.5–35.7)
MCHC RBC AUTO-ENTMCNC: 32 G/DL (ref 31.5–35.7)
MCV RBC AUTO: 89.3 FL (ref 79–97)
MCV RBC AUTO: 94.9 FL (ref 79–97)
METHGB BLD QL: 0.7 % (ref 0–3)
MODALITY: ABNORMAL
NOTIFIED BY: ABNORMAL
NOTIFIED WHO: ABNORMAL
OXYHGB MFR BLDV: 92.4 % (ref 94–99)
PCO2 BLDA: 36.1 MM HG (ref 35–45)
PCO2 TEMP ADJ BLD: ABNORMAL MM[HG]
PEEP RESPIRATORY: 5 CM[H2O]
PH BLDA: 7.2 PH UNITS (ref 7.35–7.45)
PH, TEMP CORRECTED: ABNORMAL
PLATELET # BLD AUTO: 423 10*3/MM3 (ref 140–450)
PLATELET # BLD AUTO: 494 10*3/MM3 (ref 140–450)
PMV BLD AUTO: 10.6 FL (ref 6–12)
PMV BLD AUTO: 10.7 FL (ref 6–12)
PO2 BLDA: 94.4 MM HG (ref 83–108)
PO2 TEMP ADJ BLD: ABNORMAL MM[HG]
POTASSIUM SERPL-SCNC: 3.9 MMOL/L (ref 3.5–5.2)
POTASSIUM SERPL-SCNC: 4.6 MMOL/L (ref 3.5–5.2)
PROCALCITONIN SERPL-MCNC: 0.18 NG/ML (ref 0–0.25)
PROT SERPL-MCNC: 6.3 G/DL (ref 6–8.5)
PROTHROMBIN TIME: 16.5 SECONDS (ref 12.5–15.2)
QT INTERVAL: 432 MS
QT INTERVAL: 466 MS
QT INTERVAL: 490 MS
QT INTERVAL: 586 MS
QTC INTERVAL: 453 MS
QTC INTERVAL: 532 MS
QTC INTERVAL: 557 MS
QTC INTERVAL: 597 MS
RBC # BLD AUTO: 2.9 10*6/MM3 (ref 4.14–5.8)
RBC # BLD AUTO: 2.93 10*6/MM3 (ref 4.14–5.8)
SAO2 % BLDCOA: 94.5 % (ref 94–99)
SET MECH RESP RATE: 20
SODIUM SERPL-SCNC: 134 MMOL/L (ref 136–145)
SODIUM SERPL-SCNC: 135 MMOL/L (ref 136–145)
UFH PPP CHRO-ACNC: <0.1 IU/ML (ref 0.3–0.7)
VENTILATOR MODE: ABNORMAL
VT ON VENT VENT: 400 ML
WBC NRBC COR # BLD AUTO: 18.83 10*3/MM3 (ref 3.4–10.8)
WBC NRBC COR # BLD AUTO: 25.37 10*3/MM3 (ref 3.4–10.8)

## 2025-07-09 PROCEDURE — 5A1935Z RESPIRATORY VENTILATION, LESS THAN 24 CONSECUTIVE HOURS: ICD-10-PCS | Performed by: INTERNAL MEDICINE

## 2025-07-09 PROCEDURE — 85520 HEPARIN ASSAY: CPT | Performed by: INTERNAL MEDICINE

## 2025-07-09 PROCEDURE — 99239 HOSP IP/OBS DSCHRG MGMT >30: CPT | Performed by: INTERNAL MEDICINE

## 2025-07-09 PROCEDURE — 25010000002 DOPAMINE PER 40 MG

## 2025-07-09 PROCEDURE — 25010000002 PROCHLORPERAZINE 10 MG/2ML SOLUTION: Performed by: INTERNAL MEDICINE

## 2025-07-09 PROCEDURE — 36600 WITHDRAWAL OF ARTERIAL BLOOD: CPT

## 2025-07-09 PROCEDURE — 33967 INSERT I-AORT PERCUT DEVICE: CPT | Performed by: INTERNAL MEDICINE

## 2025-07-09 PROCEDURE — 71045 X-RAY EXAM CHEST 1 VIEW: CPT | Performed by: RADIOLOGY

## 2025-07-09 PROCEDURE — 94799 UNLISTED PULMONARY SVC/PX: CPT

## 2025-07-09 PROCEDURE — 94002 VENT MGMT INPAT INIT DAY: CPT

## 2025-07-09 PROCEDURE — 84145 PROCALCITONIN (PCT): CPT | Performed by: INTERNAL MEDICINE

## 2025-07-09 PROCEDURE — 25010000002 FENTANYL 10 MCG/1 ML NS: Performed by: INTERNAL MEDICINE

## 2025-07-09 PROCEDURE — 4A023N7 MEASUREMENT OF CARDIAC SAMPLING AND PRESSURE, LEFT HEART, PERCUTANEOUS APPROACH: ICD-10-PCS | Performed by: INTERNAL MEDICINE

## 2025-07-09 PROCEDURE — C1769 GUIDE WIRE: HCPCS | Performed by: INTERNAL MEDICINE

## 2025-07-09 PROCEDURE — C1894 INTRO/SHEATH, NON-LASER: HCPCS | Performed by: INTERNAL MEDICINE

## 2025-07-09 PROCEDURE — 93923 UPR/LXTR ART STDY 3+ LVLS: CPT | Performed by: RADIOLOGY

## 2025-07-09 PROCEDURE — 25010000002 ALBUMIN HUMAN 25% PER 50 ML: Performed by: INTERNAL MEDICINE

## 2025-07-09 PROCEDURE — 36556 INSERT NON-TUNNEL CV CATH: CPT | Performed by: INTERNAL MEDICINE

## 2025-07-09 PROCEDURE — 85027 COMPLETE CBC AUTOMATED: CPT | Performed by: INTERNAL MEDICINE

## 2025-07-09 PROCEDURE — 0BH17EZ INSERTION OF ENDOTRACHEAL AIRWAY INTO TRACHEA, VIA NATURAL OR ARTIFICIAL OPENING: ICD-10-PCS | Performed by: INTERNAL MEDICINE

## 2025-07-09 PROCEDURE — 31500 INSERT EMERGENCY AIRWAY: CPT | Performed by: INTERNAL MEDICINE

## 2025-07-09 PROCEDURE — 92950 HEART/LUNG RESUSCITATION CPR: CPT

## 2025-07-09 PROCEDURE — 82375 ASSAY CARBOXYHB QUANT: CPT

## 2025-07-09 PROCEDURE — 83050 HGB METHEMOGLOBIN QUAN: CPT

## 2025-07-09 PROCEDURE — 25810000003 SODIUM CHLORIDE 0.9 % SOLUTION: Performed by: INTERNAL MEDICINE

## 2025-07-09 PROCEDURE — 82805 BLOOD GASES W/O2 SATURATION: CPT

## 2025-07-09 PROCEDURE — 5A02210 ASSISTANCE WITH CARDIAC OUTPUT USING BALLOON PUMP, CONTINUOUS: ICD-10-PCS | Performed by: INTERNAL MEDICINE

## 2025-07-09 PROCEDURE — 99232 SBSQ HOSP IP/OBS MODERATE 35: CPT | Performed by: INTERNAL MEDICINE

## 2025-07-09 PROCEDURE — 82948 REAGENT STRIP/BLOOD GLUCOSE: CPT

## 2025-07-09 PROCEDURE — 93010 ELECTROCARDIOGRAM REPORT: CPT | Performed by: INTERNAL MEDICINE

## 2025-07-09 PROCEDURE — 25010000002 HEPARIN (PORCINE) PER 1000 UNITS: Performed by: INTERNAL MEDICINE

## 2025-07-09 PROCEDURE — 85610 PROTHROMBIN TIME: CPT | Performed by: INTERNAL MEDICINE

## 2025-07-09 PROCEDURE — 25010000002 MIDAZOLAM PER 1 MG

## 2025-07-09 PROCEDURE — 93923 UPR/LXTR ART STDY 3+ LVLS: CPT

## 2025-07-09 PROCEDURE — 93458 L HRT ARTERY/VENTRICLE ANGIO: CPT | Performed by: INTERNAL MEDICINE

## 2025-07-09 PROCEDURE — 85730 THROMBOPLASTIN TIME PARTIAL: CPT | Performed by: INTERNAL MEDICINE

## 2025-07-09 PROCEDURE — 71045 X-RAY EXAM CHEST 1 VIEW: CPT

## 2025-07-09 PROCEDURE — 25010000002 PROPOFOL 10 MG/ML EMULSION

## 2025-07-09 PROCEDURE — 25510000001 IOPAMIDOL PER 1 ML: Performed by: INTERNAL MEDICINE

## 2025-07-09 PROCEDURE — P9047 ALBUMIN (HUMAN), 25%, 50ML: HCPCS | Performed by: INTERNAL MEDICINE

## 2025-07-09 PROCEDURE — 80053 COMPREHEN METABOLIC PANEL: CPT | Performed by: INTERNAL MEDICINE

## 2025-07-09 PROCEDURE — 63710000001 INSULIN GLARGINE PER 5 UNITS: Performed by: INTERNAL MEDICINE

## 2025-07-09 PROCEDURE — 25010000002 CEFTRIAXONE PER 250 MG: Performed by: INTERNAL MEDICINE

## 2025-07-09 PROCEDURE — 25010000002 DOPAMINE PER 40 MG: Performed by: INTERNAL MEDICINE

## 2025-07-09 PROCEDURE — 25010000002 HEPARIN (PORCINE) 25000-0.45 UT/250ML-% SOLUTION: Performed by: INTERNAL MEDICINE

## 2025-07-09 PROCEDURE — 93005 ELECTROCARDIOGRAM TRACING: CPT | Performed by: INTERNAL MEDICINE

## 2025-07-09 PROCEDURE — 94761 N-INVAS EAR/PLS OXIMETRY MLT: CPT

## 2025-07-09 PROCEDURE — 25010000002 PROPOFOL 10 MG/ML EMULSION: Performed by: INTERNAL MEDICINE

## 2025-07-09 PROCEDURE — B2111ZZ FLUOROSCOPY OF MULTIPLE CORONARY ARTERIES USING LOW OSMOLAR CONTRAST: ICD-10-PCS | Performed by: INTERNAL MEDICINE

## 2025-07-09 PROCEDURE — 76937 US GUIDE VASCULAR ACCESS: CPT | Performed by: INTERNAL MEDICINE

## 2025-07-09 PROCEDURE — 25010000002 DIPHENHYDRAMINE PER 50 MG

## 2025-07-09 PROCEDURE — 02HV33Z INSERTION OF INFUSION DEVICE INTO SUPERIOR VENA CAVA, PERCUTANEOUS APPROACH: ICD-10-PCS | Performed by: INTERNAL MEDICINE

## 2025-07-09 PROCEDURE — 5A1223Z PERFORMANCE OF CARDIAC PACING, CONTINUOUS: ICD-10-PCS | Performed by: INTERNAL MEDICINE

## 2025-07-09 PROCEDURE — 63710000001 INSULIN LISPRO (HUMAN) PER 5 UNITS: Performed by: INTERNAL MEDICINE

## 2025-07-09 PROCEDURE — 25010000002 LIDOCAINE 2% SOLUTION: Performed by: INTERNAL MEDICINE

## 2025-07-09 PROCEDURE — 82948 REAGENT STRIP/BLOOD GLUCOSE: CPT | Performed by: INTERNAL MEDICINE

## 2025-07-09 PROCEDURE — 5A12012 PERFORMANCE OF CARDIAC OUTPUT, SINGLE, MANUAL: ICD-10-PCS | Performed by: INTERNAL MEDICINE

## 2025-07-09 RX ORDER — PROPOFOL 10 MG/ML
VIAL (ML) INTRAVENOUS
Status: COMPLETED
Start: 2025-07-09 | End: 2025-07-09

## 2025-07-09 RX ORDER — LIDOCAINE HYDROCHLORIDE 20 MG/ML
INJECTION, SOLUTION INFILTRATION; PERINEURAL
Status: DISCONTINUED | OUTPATIENT
Start: 2025-07-09 | End: 2025-07-09 | Stop reason: HOSPADM

## 2025-07-09 RX ORDER — DOPAMINE HYDROCHLORIDE 160 MG/100ML
2-20 INJECTION, SOLUTION INTRAVENOUS
Status: ON HOLD
Start: 2025-07-09 | End: 2025-07-10

## 2025-07-09 RX ORDER — DOPAMINE HYDROCHLORIDE 160 MG/100ML
2-20 INJECTION, SOLUTION INTRAVENOUS
Status: DISCONTINUED | OUTPATIENT
Start: 2025-07-09 | End: 2025-07-09

## 2025-07-09 RX ORDER — DOPAMINE HYDROCHLORIDE 160 MG/100ML
2-20 INJECTION, SOLUTION INTRAVENOUS
Status: DISCONTINUED | OUTPATIENT
Start: 2025-07-09 | End: 2025-07-09 | Stop reason: HOSPADM

## 2025-07-09 RX ORDER — PRASUGREL 10 MG/1
10 TABLET, FILM COATED ORAL DAILY
Status: ON HOLD
Start: 2025-07-10 | End: 2025-07-10

## 2025-07-09 RX ORDER — PRASUGREL 10 MG/1
10 TABLET, FILM COATED ORAL DAILY
Status: DISCONTINUED | OUTPATIENT
Start: 2025-07-10 | End: 2025-07-09 | Stop reason: HOSPADM

## 2025-07-09 RX ORDER — NITROGLYCERIN 20 MG/100ML
10-50 INJECTION INTRAVENOUS
Status: DISCONTINUED | OUTPATIENT
Start: 2025-07-09 | End: 2025-07-09

## 2025-07-09 RX ORDER — PRASUGREL 10 MG/1
60 TABLET, FILM COATED ORAL ONCE
Qty: 6 TABLET | Refills: 0 | Status: ON HOLD | OUTPATIENT
Start: 2025-07-09 | End: 2025-07-10

## 2025-07-09 RX ORDER — DIPHENHYDRAMINE HYDROCHLORIDE 50 MG/ML
INJECTION, SOLUTION INTRAMUSCULAR; INTRAVENOUS
Status: COMPLETED
Start: 2025-07-09 | End: 2025-07-09

## 2025-07-09 RX ORDER — HEPARIN SODIUM 10000 [USP'U]/100ML
12 INJECTION, SOLUTION INTRAVENOUS
Status: DISCONTINUED | OUTPATIENT
Start: 2025-07-09 | End: 2025-07-09 | Stop reason: HOSPADM

## 2025-07-09 RX ORDER — PHENYLEPHRINE HCL IN 0.9% NACL 1 MG/10 ML
SYRINGE (ML) INTRAVENOUS
Status: COMPLETED | OUTPATIENT
Start: 2025-07-09 | End: 2025-07-09

## 2025-07-09 RX ORDER — HEPARIN SODIUM 5000 [USP'U]/ML
50 INJECTION, SOLUTION INTRAVENOUS; SUBCUTANEOUS AS NEEDED
Status: DISCONTINUED | OUTPATIENT
Start: 2025-07-09 | End: 2025-07-09 | Stop reason: ALTCHOICE

## 2025-07-09 RX ORDER — ALBUMIN (HUMAN) 12.5 G/50ML
12.5 SOLUTION INTRAVENOUS 2 TIMES DAILY
Status: DISCONTINUED | OUTPATIENT
Start: 2025-07-09 | End: 2025-07-09 | Stop reason: HOSPADM

## 2025-07-09 RX ORDER — DIPHENHYDRAMINE HYDROCHLORIDE 50 MG/ML
50 INJECTION, SOLUTION INTRAMUSCULAR; INTRAVENOUS ONCE
Status: COMPLETED | OUTPATIENT
Start: 2025-07-09 | End: 2025-07-09

## 2025-07-09 RX ORDER — FUROSEMIDE 10 MG/ML
40 INJECTION INTRAMUSCULAR; INTRAVENOUS ONCE
Status: DISCONTINUED | OUTPATIENT
Start: 2025-07-09 | End: 2025-07-09

## 2025-07-09 RX ORDER — AMOXICILLIN 250 MG
2 CAPSULE ORAL 2 TIMES DAILY
Status: DISCONTINUED | OUTPATIENT
Start: 2025-07-09 | End: 2025-07-09 | Stop reason: HOSPADM

## 2025-07-09 RX ORDER — BISACODYL 10 MG
10 SUPPOSITORY, RECTAL RECTAL DAILY PRN
Status: DISCONTINUED | OUTPATIENT
Start: 2025-07-09 | End: 2025-07-09 | Stop reason: HOSPADM

## 2025-07-09 RX ORDER — POLYETHYLENE GLYCOL 3350 17 G/17G
17 POWDER, FOR SOLUTION ORAL DAILY PRN
Status: DISCONTINUED | OUTPATIENT
Start: 2025-07-09 | End: 2025-07-09 | Stop reason: HOSPADM

## 2025-07-09 RX ORDER — PROCHLORPERAZINE EDISYLATE 5 MG/ML
5 INJECTION INTRAMUSCULAR; INTRAVENOUS ONCE
Status: COMPLETED | OUTPATIENT
Start: 2025-07-09 | End: 2025-07-09

## 2025-07-09 RX ORDER — BISACODYL 5 MG/1
5 TABLET, DELAYED RELEASE ORAL DAILY PRN
Status: DISCONTINUED | OUTPATIENT
Start: 2025-07-09 | End: 2025-07-09 | Stop reason: HOSPADM

## 2025-07-09 RX ORDER — ATROPINE SULFATE 0.1 MG/ML
0.5 INJECTION INTRAVENOUS ONCE
Status: DISCONTINUED | OUTPATIENT
Start: 2025-07-09 | End: 2025-07-09 | Stop reason: HOSPADM

## 2025-07-09 RX ORDER — SODIUM CHLORIDE 9 MG/ML
INJECTION, SOLUTION INTRAVENOUS
Status: COMPLETED | OUTPATIENT
Start: 2025-07-09 | End: 2025-07-09

## 2025-07-09 RX ORDER — NOREPINEPHRINE BITARTRATE 0.03 MG/ML
INJECTION, SOLUTION INTRAVENOUS
Status: DISCONTINUED
Start: 2025-07-09 | End: 2025-07-09 | Stop reason: HOSPADM

## 2025-07-09 RX ORDER — ETOMIDATE 2 MG/ML
INJECTION INTRAVENOUS
Status: COMPLETED | OUTPATIENT
Start: 2025-07-09 | End: 2025-07-09

## 2025-07-09 RX ORDER — PRASUGREL 10 MG/1
60 TABLET, FILM COATED ORAL ONCE
Status: COMPLETED | OUTPATIENT
Start: 2025-07-09 | End: 2025-07-09

## 2025-07-09 RX ORDER — ACETAMINOPHEN 325 MG/1
650 TABLET ORAL EVERY 4 HOURS PRN
Status: DISCONTINUED | OUTPATIENT
Start: 2025-07-09 | End: 2025-07-09 | Stop reason: HOSPADM

## 2025-07-09 RX ORDER — DOPAMINE HYDROCHLORIDE 160 MG/100ML
INJECTION, SOLUTION INTRAVENOUS
Status: COMPLETED
Start: 2025-07-09 | End: 2025-07-09

## 2025-07-09 RX ORDER — HEPARIN SODIUM 5000 [USP'U]/ML
25 INJECTION, SOLUTION INTRAVENOUS; SUBCUTANEOUS AS NEEDED
Status: DISCONTINUED | OUTPATIENT
Start: 2025-07-09 | End: 2025-07-09 | Stop reason: ALTCHOICE

## 2025-07-09 RX ORDER — IOPAMIDOL 755 MG/ML
INJECTION, SOLUTION INTRAVASCULAR
Status: DISCONTINUED | OUTPATIENT
Start: 2025-07-09 | End: 2025-07-09 | Stop reason: HOSPADM

## 2025-07-09 RX ORDER — MIDAZOLAM HYDROCHLORIDE 1 MG/ML
INJECTION, SOLUTION INTRAMUSCULAR; INTRAVENOUS
Status: COMPLETED
Start: 2025-07-09 | End: 2025-07-09

## 2025-07-09 RX ADMIN — PROPOFOL 20 MCG/KG/MIN: 10 INJECTION, EMULSION INTRAVENOUS at 17:00

## 2025-07-09 RX ADMIN — ASPIRIN 81 MG: 81 TABLET, COATED ORAL at 08:50

## 2025-07-09 RX ADMIN — PROPOFOL 20 MCG/KG/MIN: 10 INJECTION, EMULSION INTRAVENOUS at 09:43

## 2025-07-09 RX ADMIN — HEPARIN SODIUM 12 UNITS/KG/HR: 10000 INJECTION, SOLUTION INTRAVENOUS at 14:50

## 2025-07-09 RX ADMIN — DIPHENHYDRAMINE HYDROCHLORIDE 50 MG: 50 INJECTION, SOLUTION INTRAMUSCULAR; INTRAVENOUS at 03:17

## 2025-07-09 RX ADMIN — ALBUMIN (HUMAN) 12.5 G: 0.25 INJECTION, SOLUTION INTRAVENOUS at 10:51

## 2025-07-09 RX ADMIN — Medication 10 ML: at 08:50

## 2025-07-09 RX ADMIN — INSULIN LISPRO 7 UNITS: 100 INJECTION, SOLUTION INTRAVENOUS; SUBCUTANEOUS at 10:51

## 2025-07-09 RX ADMIN — ATORVASTATIN CALCIUM 80 MG: 40 TABLET, FILM COATED ORAL at 08:50

## 2025-07-09 RX ADMIN — DOPAMINE HYDROCHLORIDE 2 MCG/KG/MIN: 160 INJECTION, SOLUTION INTRAVENOUS at 10:28

## 2025-07-09 RX ADMIN — ETOMIDATE 10 MG: 2 INJECTION, SOLUTION INTRAVENOUS at 09:17

## 2025-07-09 RX ADMIN — DOPAMINE HYDROCHLORIDE 18 MCG/KG/MIN: 160 INJECTION, SOLUTION INTRAVENOUS at 17:01

## 2025-07-09 RX ADMIN — Medication 100 MCG: at 09:13

## 2025-07-09 RX ADMIN — MIDAZOLAM HYDROCHLORIDE 2 MG: 1 INJECTION, SOLUTION INTRAMUSCULAR; INTRAVENOUS at 09:30

## 2025-07-09 RX ADMIN — HEPARIN SODIUM 5000 UNITS: 5000 INJECTION INTRAVENOUS; SUBCUTANEOUS at 08:49

## 2025-07-09 RX ADMIN — PRASUGREL 60 MG: 10 TABLET, FILM COATED ORAL at 14:12

## 2025-07-09 RX ADMIN — DOPAMINE HYDROCHLORIDE 16 MCG/KG/MIN: 160 INJECTION, SOLUTION INTRAVENOUS at 14:08

## 2025-07-09 RX ADMIN — Medication 50 MCG/HR: at 09:44

## 2025-07-09 RX ADMIN — TICAGRELOR 90 MG: 90 TABLET ORAL at 08:51

## 2025-07-09 RX ADMIN — LEVOTHYROXINE SODIUM 50 MCG: 0.05 TABLET ORAL at 08:50

## 2025-07-09 RX ADMIN — CEFTRIAXONE SODIUM 2000 MG: 2 INJECTION, POWDER, FOR SOLUTION INTRAMUSCULAR; INTRAVENOUS at 03:23

## 2025-07-09 RX ADMIN — PROCHLORPERAZINE EDISYLATE 5 MG: 5 INJECTION INTRAMUSCULAR; INTRAVENOUS at 02:26

## 2025-07-09 RX ADMIN — INSULIN GLARGINE 14 UNITS: 100 INJECTION, SOLUTION SUBCUTANEOUS at 08:49

## 2025-07-09 NOTE — PROCEDURES
Intubation    Date/Time: 7/9/2025 9:20 AM    Performed by: Carlos Perkins DO  Authorized by: Carlos Perkins DO  Consent: The procedure was performed in an emergent situation  Indications: respiratory failure  Intubation method: video-assisted  Patient status: sedated  Preoxygenation: BVM  Sedatives: etomidate  Laryngoscope size: Mac 4  Tube size: 7.5 mm  Tube type: cuffed  Number of attempts: 1  Ventilation between attempts: BVM  Cricoid pressure: no  Cords visualized: yes  Post-procedure assessment: chest rise and ETCO2 monitor  Breath sounds: equal  Cuff inflated: yes  ETT to lip: 25 cm  Tube secured with: ETT casas  Chest x-ray interpreted by me.  Chest x-ray findings: endotracheal tube in appropriate position  Patient tolerance: patient tolerated the procedure well with no immediate complications

## 2025-07-09 NOTE — NURSING NOTE
Attempted to call son twice and went to voice mail. Called and reached daughter Betsey Lamb and gave her an update and what room number he was transferred to.

## 2025-07-09 NOTE — PLAN OF CARE
Goal Outcome Evaluation:           Progress: no change  Outcome Evaluation: Pt alert and oriented this am with episode of shortness of breath/anxiety/delirium overnight - benadryl given per order and cxr obtained.  On 3L NC.  Afebrile.  Vitals WDL.  Bed in lowest, locked position with bed alarm on and call bell in reach.

## 2025-07-09 NOTE — PROGRESS NOTES
Patient Identification:  Name:  Christo Clifton  Age:  70 y.o.  Sex:  male  :  1955  MRN:  [id number]  Visit Number:  [id number]    Chief Complaint:   Chest pain    Subjective:        Patient had worsening shortness of breath last night.  Chest x-ray was concerning for pulmonary edema.  Patient says that he feels like smothering.  Denies any chest pain.  Echocardiogram yesterday showed ejection fraction of 30 to 35%.  Moderate MR was noted.  Is a blood pressure ranged from 96/68-1 32/63.  Heart rate ranged from 61-1 106 bpm.  He was 811 mL positive.  Creatinine is now 2.4.  H&H is 8.3/25.9.        2025  Patient underwent PCI to occluded left circumflex artery.  Had episodes of chest pain last night.  He says the pain is aggravated by taking a deep breaths or sometimes leaning forward.  He also complains of some shortness of breath.  Patient states that he sometimes forgets to take his medication including his DAPT.  Heart rate ranged from 65-98.  Blood pressure ranged from 94/51-1 138/81 mmHg.  He was +140 cc.  He currently denies any chest pain.  He denies any cough phlegm nausea vomiting or diarrhea.  WBC 17.7 H&H 8.8/28.5 with a platelet count of 442.  BUN 30 creatinine 1.65.  ----------------------------------------------------------------------------------------------------------------------  Current [facility] Meds:  aspirin, 81 mg, Oral, Daily  atorvastatin, 80 mg, Oral, Daily  cefTRIAXone, 2,000 mg, Intravenous, Q24H  [Held by provider] escitalopram, 10 mg, Oral, Daily  furosemide, 40 mg, Intravenous, Once  [Held by provider] furosemide, 20 mg, Oral, Daily  heparin (porcine), 5,000 Units, Subcutaneous, Q12H  hydrALAZINE, 25 mg, Oral, Q12H  insulin glargine, 14 Units, Subcutaneous, Daily  insulin lispro, 2-9 Units, Subcutaneous, 4x Daily AC & at Bedtime  levothyroxine, 50 mcg, Oral, Daily  metoprolol tartrate, 25 mg, Oral, Q12H  sodium chloride, 10 mL, Intravenous, Q12H  ticagrelor, 90 mg, Oral,  BID      nitroglycerin, 10-50 mcg/min      ----------------------------------------------------------------------------------------------------------------------  Vital Signs:  Temp:  [97.8 °F (36.6 °C)-98.8 °F (37.1 °C)] 98.8 °F (37.1 °C)  Heart Rate:  [] 88  Resp:  [14-28] 26  BP: ()/(51-82) 102/65      07/06/25  1613 07/06/25  1935 07/07/25  0400   Weight: 67.7 kg (149 lb 4 oz) 77.5 kg (170 lb 13.7 oz) 77.5 kg (170 lb 13.7 oz)     Body mass index is 31.95 kg/m².    Intake/Output Summary (Last 24 hours) at 7/9/2025 0784  Last data filed at 7/9/2025 0323  Gross per 24 hour   Intake 1311 ml   Output 500 ml   Net 811 ml     Diet: Cardiac, Diabetic; Healthy Heart (2-3 Na+); Consistent Carbohydrate; Fluid Consistency: Thin (IDDSI 0)  ----------------------------------------------------------------------------------------------------------------------  Physical exam:  Constitutional:    HENT:  Head:  Normocephalic and atraumatic.    Neck:  Neck supple.  Positive JVP.  Cardiovascular: Normal rate, regular rhythm, S1 S2+, NO S3 / S4  Pulmonary/Chest:  Vesicular breath sounds B/L  Abdominal:  Soft.  Bowel sounds are normal.  No distension and no tenderness.   Neurological:  Alert and oriented to person, place, and time. No focal deficits.  Skin:  Skin is warm and dry. No rash noted. No pallor.   Musculoskeletal:  No edema, no tenderness, and no deformity.  No red or swollen joints anywhere.   Peripheral vascular: Right groin CDI.  No hematoma.  Mildly tender to touch.  ----------------------------------------------------------------------------------------------------------------------    ----------------------------------------------------------------------------------------------------------------------  Results from last 7 days   Lab Units 07/07/25  1955 07/07/25  1846 07/07/25  1714   HSTROP T ng/L >10,000* >10,000* >10,000*     Results from last 7 days   Lab Units 07/09/25  0038 07/08/25  1048  "07/08/25  0726 07/08/25  0513 07/08/25 0140 07/06/25 2345 07/06/25 2036 07/06/25  1629   LACTATE mmol/L  --  1.7 2.3* 2.1*  --   --   --   --    WBC 10*3/mm3 18.83*  --   --   --  17.07* 9.33   < > 10.09   HEMOGLOBIN g/dL 8.3*  --   --   --  8.8* 9.2*   < > 9.0*   HEMATOCRIT % 25.9*  --   --   --  28.5* 28.9*   < > 28.3*   MCV fL 89.3  --   --   --  89.6 88.9   < > 88.4   MCHC g/dL 32.0  --   --   --  30.9* 31.8   < > 31.8   PLATELETS 10*3/mm3 423  --   --   --  442 360   < > 341   INR   --   --   --   --   --   --   --  1.04    < > = values in this interval not displayed.     Results from last 7 days   Lab Units 07/08/25  0114   PH, ARTERIAL pH units 7.515*   PO2 ART mm Hg 94.4   PCO2, ARTERIAL mm Hg 24.8*   HCO3 ART mmol/L 20.0     Results from last 7 days   Lab Units 07/09/25  0038 07/08/25  0140 07/07/25 1955 07/06/25 2345 07/06/25 2036 07/06/25  1629   SODIUM mmol/L 135* 134*  --  137   < > 134*   POTASSIUM mmol/L 3.9 4.3  --  4.1   < > 3.8   MAGNESIUM mg/dL  --   --  1.9  --   --   --    CHLORIDE mmol/L 100 100  --  104   < > 100   CO2 mmol/L 19.3* 19.5*  --  20.8*   < > 20.2*   BUN mg/dL 42.6* 30.8*  --  26.7*   < > 29.1*   CREATININE mg/dL 2.40* 1.65*  --  1.18   < > 1.30*   CALCIUM mg/dL 8.8 8.8  --  8.7   < > 8.7   GLUCOSE mg/dL 226* 218*  --  167*   < > 238*   ALBUMIN g/dL  --   --   --   --   --  3.0*   BILIRUBIN mg/dL  --   --   --   --   --  0.8   ALK PHOS U/L  --   --   --   --   --  100   AST (SGOT) U/L  --   --   --   --   --  27   ALT (SGPT) U/L  --   --   --   --   --  20    < > = values in this interval not displayed.   Estimated Creatinine Clearance: 25.1 mL/min (A) (by C-G formula based on SCr of 2.4 mg/dL (H)).    No results found for: \"AMMONIA\"      No results found for: \"BLOODCX\"  No results found for: \"URINECX\"  No results found for: \"WOUNDCX\"  No results found for: \"STOOLCX\"  Echo:  Results for orders placed during the hospital encounter of 07/06/25    Adult Transthoracic Echo " Complete W/ Cont if Necessary Per Protocol 07/08/2025 12:27 PM    Interpretation Summary    Left ventricle is normal in size with moderately reduced ejection fraction of around 30 to 35%. Grade 2 diastolic dysfunction. There is hypo to akinesis of anterior and anterolateral wall.    Right ventricle is normal in size and function.    Normal biatrial size.    The aortic valve appears trileaflet. There is no significant aortic stenosis or regurgitation.    There is moderate mitral regurgitation.    There is no evidence of pericardial effusion.    The aortic root measures 3.8 cm.        I have personally looked at the labs and they are summarized above.  ----------------------------------------------------------------------------------------------------------------------  Imaging Results (Last 24 Hours)       Procedure Component Value Units Date/Time    XR Chest 1 View [[id number]] Collected: 07/09/25 0734     Updated: 07/09/25 0736    Narrative:      XR CHEST 1 VW-     CLINICAL INDICATION: dyspnea; I25.9-Chronic ischemic heart disease,  unspecified; R79.89-Other specified abnormal findings of blood chemistry        COMPARISON: 7/6/2025     TECHNIQUE: Single frontal view of the chest.     FINDINGS:     LUNGS: Increased interstitial markings and cephalization of blood flow     HEART AND MEDIASTINUM: Heart and mediastinal contours are unremarkable        SKELETON: Bony and soft tissue structures are unremarkable.             Impression:      Appearance suggestive of pulmonary congestion           This report was finalized on 7/9/2025 7:34 AM by Dr. Sky Roberts MD.             ----------------------------------------------------------------------------------------------------------------------    Assessment:  Acute stent thrombosis in the setting of medical noncompliance.  Status post PCI to left circumflex artery.  History of coronary artery disease status post  CABG.  Hypertension.  Dyslipidemia.  Diabetes.  UTI  Hematuria  Anemia  TOOTIE-question cardiorenal/contrast induced.    Plan:  -Lasix 40 mg IV push x 1  - Start IV nitro  - Continue with Brilinta today.  - Nephrology consult.        This document has been electronically signed by Mimi Botello MD [facility], Interventional Cardiology  July 9, 2025 07:47 EDT

## 2025-07-09 NOTE — NURSING NOTE
[facility] declined transport due to weather       [facility] called waiting for call back to see if accepted

## 2025-07-09 NOTE — PROGRESS NOTES
HEPARIN INFUSION  Christo Clifton is a  70 y.o. male receiving heparin infusion.     Therapy for (VTE/Cardiac):   cardiac  Patient Dosing Weight: 77.5  kg  Initial Bolus (Y/N):     no  Any Bolus (Y/N):       yes    Signs or Symptoms of Bleeding:      no    Cardiac or Other (Not VTE)   Initial Bolus: 60 units/kg (Max 4,000 units)  Initial rate: 12 units/kg/hr (Max 1,000 units/hr)   Anti Xa Rebolus Infusion Hold time Change infusion Dose (Units/kg/hr) Next Anti Xa or aPTT Level Due   < 0.11 50 Units/kg  (4000 Units Max) None Increase by  3 Units/kg/hr 6 hours   0.11- 0.19 25 Units/kg  (2000 Units Max) None Increase by  2 Units/kg/hr 6 hours   0.2 - 0.29 0 None Increase by  1 Units/kg/hr 6 hours   0.3 - 0.5 0 None No Change 6 hours (after 2 consecutive levels in range check q24h @0700)   0.51 - 0.6 0 None Decrease by  1 Units/kg/hr 6 hours   0.61 - 0.8 0 30 Minutes Decrease by  2 Units/kg/hr 6 hours   0.81 - 1 0 60 Minutes Decrease by  3 Units/kg/hr 6 hours   >1 0 Hold  After Anti Xa less than 0.5 decrease previous rate by  4 Units/kg/hr  Every 2 hours until Anti Xa  less than 0.5 then when infusion restarts in 6 hours         Recommend anti-Xa every 6 hours.          Date   Time   Anti-Xa Current Rate (Unit/kg/hr) Bolus   (Units) Rate Change   (Unit/kg/hr) New Rate (Unit/kg/hr) Next   Anti-Xa Comments  Pump Check Daily   7/9 1254 <0.10 0 no +12 12 2100 No initial bolus, rate adjusted, no s/s bleeding, d/w   Lan ALONZO                                                                                                                                                                                                                                 Pharmacy will continue to follow anti-Xa results and monitor for signs and symptoms of bleeding or thrombosis.    Howie Mckay, PharmD  07/09/25 14:38 EDT

## 2025-07-09 NOTE — CASE MANAGEMENT/SOCIAL WORK
Discharge Planning Assessment   [place]     Patient Name: Christo Clifton  MRN: [id number]  Today's Date: 7/9/2025    Admit Date: 7/6/2025     Discharge Plan       Row Name 07/09/25 1128       Plan    Plan Pt was transferred from PCU to CCU on this date. Pt is intubated. Pt in Pt lives alone. Pt does not utilize  services or DME. Pt PCP is Lesa GILMORE to follow and assist with discharge planning.               SHAWNA AtkinsonW

## 2025-07-09 NOTE — PROCEDURES
[facility]    Patient Identification:  Name:  Christo Clifton  Age:  70 y.o.  Sex:  male  :  1955  MRN:  [id number]   Visit Number:  [id number]    Date of Procedure: 25    Name of Procedure: Insertion of right internal jugular vein central venous catheter    Pre-Procdural Diagnosis: Cardiogenic shock    Post-Procedural Diagnosis: Cardiogenic shock    Procedure : Dr. Carlos Perkins    Description Of Procedure:  A time out was performed to identify the appropriate patient and placement site.  After timeout was completed, the patient was prepped and draped in a sterile fashion.  A sterile ultrasound probe was then used to identify the right internal jugular vein.  A finder needle was then inserted into the right internal jugular vein under ultrasound guidance.  Once flash was obtained, the syringe was removed from the finder needle hub.  A guidewire was then advanced through the finder needle hub to approximately 15cm.  Finder needle was then removed and a small incision made in the skin at the entry site of the guidewire with a scalpel.  A dilator was then advanced over the guidewire, and once appropriate dilatation was achieved the dilator was removed.  A 7 Polish 16 cm triple lumen central venous catheter was then advanced over the guidewire.  The guidewire was noted to exit the red port and was simultaneously withdrawn while advancing the central line to 15cm.  The red port was then capped, and all ports were then appropriately aspirated and flushed.  The central line was then sutured in place, and a sterile occlusive dressing was applied.  A stat portable chest x-ray was obtained and demonstrated appropriate placement of central line.  Patient tolerated the procedure well with no anita-procedural complications.        Total Estimated Bloos Loss:  Less than 5ml.    Carlos Perkins,   25  15:56 EDT

## 2025-07-09 NOTE — NURSING NOTE
Report called to Ksenia ALONZO at [facility] and daughter Betsey was updated via phone.

## 2025-07-09 NOTE — NURSING NOTE
0857 PT HR sinusbrady HR in 30s at this time pt cool sweaty, hypotensive  new complaints of chest luda notified MD Stat EKG placed MD present at bedside. Rapid response called and RRT at bedside. and then called code blue team at bedside. PT transferred to CCU

## 2025-07-09 NOTE — DISCHARGE SUMMARY
[facility]ICINE DISCHARGE SUMMARY    Patient Identification:  Name:  Christo Clifton  Age:  70 y.o.  Sex:  male  :  1955  MRN:  [id number]  Visit Number:  [id number]    Date of Admission: 2025  Date of Discharge: 2025    PCP: Lesa Rae APRN    DISCHARGE DIAGNOSIS   Third-degree AV block  Cardiac arrest  Type I NSTEMI  Newly diagnosed HFrEF  UTI  Insulin-dependent type 2 diabetes mellitus  Hyperlipidemia  Essential hypertension      CONSULTS  Dr. Botello, Interventional Cardiology  Dr. Arevalo, Interventional Cardiology      PROCEDURES PERFORMED   Patient underwent left heart catheterization on 2025 secondary to type I NSTEMI.  Patient did undergo PCI of ostial left circumflex lesion with balloon angioplasty to pre-existing stent and placement of overlapping stent.  Please see procedure note for specific details.  Patient tolerated the procedure well with no postprocedural complications.  Patient did undergo temporary transvenous pacer placement on 2025 after developing third-degree AV block.  No procedure note is available for review at time of this dictation.  Per verbal discussion via telephone, interventional cardiology did place successful transvenous pacemaker with no complications.  Patient did undergo repeat left heart catheterization on 2025 after patient went into third-degree AV block with brief degeneration into cardiac arrest.  Left heart catheterization demonstrated patent stent in proximal left circumflex artery with diffuse coronary artery vasospasm distal to that stent.  Please see procedure note for specific details.      HOSPITAL COURSE  Mr. Clifton is a 70 y.o. male who presented to [facility] ED on 2025 with a chief complaint of chest pain.  Patient has a past medical history markable for insulin-dependent type 2 diabetes mellitus, essential hypertension, hyperlipidemia, COPD and seizure disorder.  It should be noted  patient was recently hospitalized in our facility from 6/27/2025 and discharged 6/28/2025 after having left heart catheterization.  During that left heart catheterization, patient was found to have 2 occluded vein grafts and did have PCI to left circumflex with stent placement.  Patient tolerated that procedure well and then was discharged home.  However, patient had sudden onset chest pain that began overnight on the night prior to admission.  He reported the pain as a burning and pressure type sensation in the retrosternal area radiating into his right arm.  Patient also reported associated dizziness with shortness of breath.  Initial evaluation in the emergency department did consist of basic laboratory work as well as physical exam and vital signs.  Initial vital signs found patient's blood pressure 102/40, respiratory rate 20, heart rate 68, temperature 97.6 °F with oxygen saturation 99% on room air.  Initial lab work also included troponin, the first of which was 357 and repeat was 311.  With this in mind, patient was diagnosed with unstable angina and suspected type I NSTEMI and admitted for further treatment and evaluation.    Interventional cardiology services were consulted who thoroughly evaluated the patient.  Recommendation was made to start patient on heparin drip and to proceed with repeat left heart catheterization.  Patient underwent left heart catheterization on 7/7/2025 which demonstrated in-stent thrombosis felt to be secondary to medical noncompliance.  Patient had balloon angioplasty to pre-existing proximal left circumflex stent with deployment of overlapping stent just distal to previous existing stent.  Patient tolerated the procedure well with no postprocedural complications.  Patient was observed for 24 hours and transthoracic echo then obtained.  Transthoracic echo demonstrated significantly reduced left ventricular systolic function with estimated EF of 30 to 35% with grade 2 diastolic  dysfunction.  Patient did have elevating serum creatinine on 7/8/2025 and patient was observed for another 24 hours to address developing acute kidney injury.  On the morning of 7/9/2025, patient's renal function had worsened as evidenced by serum creatinine of 1.6 rising to 2.4.  Unfortunately, later on the morning of 7/9/2025, patient had abrupt change in cardiac rhythm.  Patient developed significant bradycardia and review of telemetry demonstrated what appeared to be consistent with third-degree AV block.  Stat EKG was obtained which did confirm third-degree AV block.  Interventional cardiology services were immediately contacted to inform them of this abrupt change and likely need for repeat left heart catheterization with suspicion of possible failure of recent stent placement.  Attempt was made at 1 mg IV atropine which briefly improved patient's heart rate to the low 50s and quickly settled back into third-degree AV block in the 30s.  Patient was having pacing pads placed on him and being prepared for transcutaneous pacing.  However, patient was somewhat combative and attempts were made at giving out Versed 1 mg IV x 1 dose.  While administering medication, patient was noted to go from third-degree AV block to what appeared to be complete asystole and patient became completely unresponsive.  CODE BLUE was called and patient did undergo brief round of ACLS.  Prior to the first 2 minutes of compressions being performed, patient did have spontaneous ROSC where he was moving his arms and trying to push nursing staff away from him.  Review of telemetry and AED demonstrated persistent third-degree AV block.  Patient was minimally responsive and did appear to have developed agonal breathing.  As such, as soon as pacing pads were attached, transcutaneous pacing was initiated and patient was emergently intubated.  Patient was then transferred to the intensive care unit for further treatment and evaluation.    When  patient arrived to the intensive care unit, he did develop hypotension requiring the initiation of dopamine.  There was some difficulty getting transcutaneous pacer to capture and initial settings with 60 mA had to be increased to 80 mA to ensure capture of 60 bpm.  Interventional cardiology then evaluated patient at bedside who recommended urgent transvenous pacemaker placement as well as repeat left heart catheterization.  Patient underwent both of these procedures on 7/9/2025 with details as listed above.  After completion of these procedures, recommendation was made to transfer patient to [facility] for higher level of care.  This case was discussed between interventional cardiology at [facility] and cardiology services at [facility].  Patient has been graciously accepted in transfer for further evaluation at this time.  At time of this dictation, patient is prepared for transport but our facility is having difficulty arranging transport at this time.  Patient will be transported to [facility] as soon as transportation can be arranged (due to inclement weather, no helicopters are flying at this time.  There are also no ground EMS crew's available per discussion with house supervisor).  The beforementioned plan was thoroughly discussed with patient's family who expressed their understanding and willingness to proceed with the beforementioned plan.    VITAL SIGNS:      07/06/25  1613 07/06/25  1935 07/07/25  0400   Weight: 67.7 kg (149 lb 4 oz) 77.5 kg (170 lb 13.7 oz) 77.5 kg (170 lb 13.7 oz)     Body mass index is 31.95 kg/m².    PHYSICAL EXAM:  General -patient is acutely ill and currently intubated and sedated  HEENT -head normocephalic and atraumatic  Lungs -clear to auscultation bilaterally with no wheezes, rales or rhonchi appreciated  Cardiovascular -currently transvenous paced  GI -abdomen soft, nontender and nondistended  Neurological -intubated and  sedated    DISCHARGE DISPOSITION   Stable    DISCHARGE MEDICATIONS:     Discharge Medications        PAUSE taking these medications        Instructions Start Date   amLODIPine 5 MG tablet  Wait to take this until your doctor or other care provider tells you to start again.  Commonly known as: NORVASC   5 mg, Daily             New Medications        Instructions Start Date   cefTRIAXone 2,000 mg in sodium chloride 0.9 % 100 mL IVPB   2,000 mg, Intravenous, Every 24 Hours   Start Date: July 10, 2025     DOPamine 400 mg/250 mL (1.6 mg/mL) in D5W infusion  Commonly known as: INTROPIN   2-20 mcg/kg/min (155-1,550 mcg/min), Intravenous, Titrated      fentaNYL 10 mcg/1 mL NS    mcg/hr ( mcg/hr), Intravenous, Titrated      prasugrel 10 MG tablet  Commonly known as: EFFIENT   60 mg, Oral, Once      prasugrel 10 MG tablet  Commonly known as: EFFIENT   10 mg, Oral, Daily   Start Date: July 10, 2025     propofol 10 mg/mL emulsion infusion  Commonly known as: DIPRIVAN   5-50 mcg/kg/min (387.5-3,875 mcg/min), Intravenous, Titrated             Continue These Medications        Instructions Start Date   aspirin 81 MG EC tablet   81 mg, Daily      atorvastatin 80 MG tablet  Commonly known as: LIPITOR   80 mg, Daily      escitalopram 10 MG tablet  Commonly known as: LEXAPRO   10 mg, Daily      fluticasone 50 MCG/ACT nasal spray  Commonly known as: FLONASE   1 spray, Nasal, Daily PRN      insulin glargine 100 UNIT/ML injection  Commonly known as: LANTUS, SEMGLEE   14 Units, Daily      levothyroxine 50 MCG tablet  Commonly known as: SYNTHROID, LEVOTHROID   50 mcg, Daily             Stop These Medications      clopidogrel 75 MG tablet  Commonly known as: PLAVIX                Your Scheduled Appointments      Sep 15, 2025 11:00 AM  Follow Up with Wero Green MD  [facility] CARDIOLOGY ([place]) [address] [place] [address]  [phone]   -Bring photo ID, insurance card, and list of medications  to appointment  -If testing was completed outside of [facility] then patient must bring images on a disc  -Copay will be collected at time of appointment                 Follow-up Information       Lesa Rae APRN .    Specialty: Nurse Practitioner  Contact information:  [address] [place] [id number] [phone]                             TEST  RESULTS PENDING AT DISCHARGE  Pending Labs       Order Current Status    Urine Culture - Urine, Urine, Clean Catch In process             The ASCVD Risk score (Aidan DK, et al., 2019) failed to calculate for the following reasons:    The valid total cholesterol range is 130 to 320 mg/dL   Carlos Perkins DO  07/09/25  14:17 EDT    Please note that this discharge summary required more than 30 minutes to complete.    Please send a copy of this dictation to the following providers:  Lesa Rae APRN

## 2025-07-09 NOTE — LETTER
[facility]  [address] [facility] [address]  [phone]        July 27, 2025      Patient: Christo Clifton  YOB: 1955  Date of Visit: 7/9/2025      Inpatient referral at [facility].    Hospice APRN: Yanelis CROUCH  Certifying: Dr. Lan Bustamante  Referring: Ana Rosa CROUCH    Thank you,          Edna Cross RN

## 2025-07-09 NOTE — ED PROVIDER NOTES
Subjective   History of Present Illness  ANAT FELIX is a 71yo M pmhx recent PCI for ACS presenting to the ED for concern of chest pain. He reports about 2-3 hrs prior he began having L sided dull constant chest pain radiating into the arm that feels similar to previous MI prompting his visit today. Denies any fever, chills, URI symptoms. He also reports sob and feeling clammy and nauseated. No vomiting, no abd pain, no urinary symptoms or change in bowel habits, no recent illnesses or sick contacts, no trauma. Concerned this may be related to his stent.         Review of Systems    Past Medical History:   Diagnosis Date    Anxiety and depression     Arthritis     COPD (chronic obstructive pulmonary disease)     Coronary artery disease     Diabetes mellitus     Disease of thyroid gland     Dyslipidemia     History of transfusion     Hypertension     Seizure disorder     Pt states last seizure x1 mo.       Allergies   Allergen Reactions    Other        Past Surgical History:   Procedure Laterality Date    AMPUTATION DIGIT Right 09/13/2017    Procedure: RIGHT 1ST TOE AMPUTATION ;  Surgeon: Lee Lee MD;  Location: [facility];  Service:     CARDIAC CATHETERIZATION      CARDIAC CATHETERIZATION N/A 6/27/2025    Procedure: Left Heart Cath;  Surgeon: Mimi Botello MD;  Location: [facility] CATH INVASIVE LOCATION;  Service: Cardiovascular;  Laterality: N/A;    CARDIAC CATHETERIZATION N/A 6/27/2025    Procedure: Percutaneous Coronary Intervention;  Surgeon: Mimi Botello MD;  Location: [facility] CATH INVASIVE LOCATION;  Service: Cardiovascular;  Laterality: N/A;    CARDIAC CATHETERIZATION N/A 7/7/2025    Procedure: Left Heart Cath;  Surgeon: Anali Arevalo MD;  Location: [facility] CATH INVASIVE LOCATION;  Service: Cardiovascular;  Laterality: N/A;    CARDIAC SURGERY      CIRCUMCISION      CORONARY ARTERY BYPASS GRAFT      HERNIA REPAIR      X2    INTRAVASCULAR ULTRASOUND N/A 6/27/2025    Procedure: Intravascular  Ultrasound;  Surgeon: Mimi Botello MD;  Location: [facility] CATH INVASIVE LOCATION;  Service: Cardiovascular;  Laterality: N/A;    WY INCISION & DRAINAGE LEG/ANKLE ABSCESS/HEMATOMA Right 05/24/2017    Procedure: Debridement of right first toe;  Surgeon: Ronald Perdue MD;  Location: [facility] OR;  Service: General    TOE SURGERY Left     debridement       Family History   Problem Relation Age of Onset    Diabetes Mother     Hypertension Mother     Diabetes Father        Social History     Socioeconomic History    Marital status:    Tobacco Use    Smoking status: Every Day     Current packs/day: 0.00     Average packs/day: 2.0 packs/day for 45.0 years (90.0 ttl pk-yrs)     Types: Cigarettes     Start date: 1965     Last attempt to quit: 2010     Years since quitting: 15.5    Smokeless tobacco: Never    Tobacco comments:     Trying to quit   Vaping Use    Vaping status: Never Used   Substance and Sexual Activity    Alcohol use: No    Drug use: No    Sexual activity: Defer           Objective   Physical Exam  Vitals and nursing note reviewed.   Constitutional:       Appearance: He is diaphoretic.      Comments: Appears chronically ill and uncomfortable but in no acute distress.    HENT:      Head: Normocephalic and atraumatic.      Right Ear: External ear normal.      Left Ear: External ear normal.      Nose: Nose normal.   Eyes:      Conjunctiva/sclera: Conjunctivae normal.      Pupils: Pupils are equal, round, and reactive to light.   Neck:      Vascular: No JVD.      Trachea: No tracheal deviation.   Cardiovascular:      Rate and Rhythm: Normal rate and regular rhythm.      Pulses:           Radial pulses are 2+ on the right side and 2+ on the left side.      Heart sounds: Normal heart sounds. No murmur heard.  Pulmonary:      Effort: Pulmonary effort is normal. No respiratory distress.      Breath sounds: Normal breath sounds. No wheezing, rhonchi or rales.   Abdominal:      General: Bowel sounds are  normal.      Palpations: Abdomen is soft.      Tenderness: There is no abdominal tenderness.   Musculoskeletal:         General: No deformity. Normal range of motion.      Cervical back: Normal range of motion and neck supple.   Skin:     General: Skin is warm.   Neurological:      Mental Status: He is alert.   Psychiatric:         Thought Content: Thought content normal.         Critical Care    Performed by: Stephanie Alonso MD  Authorized by: Stephanie Alonso MD    Critical care provider statement:     Critical care time (minutes):  35             ED Course  ED Course as of 07/09/25 0024   Sun Jul 06, 2025   1614 EKG interpretation normal sinus rhythm 83 bpm QTc is 493 nonspecific ST changes LBB.  Electronically signed by Lincoln Eid DO, 07/06/25, 4:14 PM EDT.   [GF]   1624 Per Dr. Arevalo, no STEMI activation at this time. Continue chest pain workup [MJ]   1708 HS Troponin T(!!): 357 [MJ]      ED Course User Index  [GF] Lincoln Eid,   [MJ] Stephanie Alonso MD                                                       Medical Decision Making  Patient is a 69yo M presenting to the ED for concern of chest pain.     Differential diagnosis includes but not limited to ACS (STEMI, NSTEMI, unstable angina), aortic dissection, cardiac tamponade, pericarditis, myocarditis, valvular disorders, PE, pneumothorax, pneumonia, pleural based diseases (pleurisy), asthma/COPD exacerbation, esophageal perforation, GERD, MSK related pain     Overall, patient is in no acute distress but does appear ill and diaphoretic, examined immediately in triage and concern of possible stent occlusion vs MI, vs ACS. Initial EKG does show LBBB but does not appear new and does not meet Sgarbossa criteria. He is given aspirin and nitro with improvement in chest pain. Discussed with on call cardiology who agrees with continuing chest pain workup and plan for admission. Positive troponin prompts initiating of heparin gtt.  Delta trop is trending down and no dynamic changes on EKG. Patient is admitted for chest pain eval and possible cardiac cath in the AM, discussed with hospitalist who is agreeable to this plan.     Problems Addressed:  Chest pain due to myocardial ischemia, unspecified ischemic chest pain type: complicated acute illness or injury  Elevated troponin: complicated acute illness or injury    Amount and/or Complexity of Data Reviewed  External Data Reviewed:      Details: Chart review discharge summary from 6/28/25  Labs: ordered. Decision-making details documented in ED Course.  Radiology: ordered and independent interpretation performed.     Details: CXR shows no acute cardiopulmonary abnormalities.   ECG/medicine tests: ordered and independent interpretation performed.     Details: EKG shows sinus rhythm rate 70, LBBB no ST elevation or depression no acute ischemia appreciated no Sgarbossa criteria.     Risk  OTC drugs.  Prescription drug management.  Decision regarding hospitalization.        Final diagnoses:   Chest pain due to myocardial ischemia, unspecified ischemic chest pain type   Elevated troponin       ED Disposition  ED Disposition       ED Disposition   Decision to Admit    Condition   --    Comment   Level of Care: Progressive Care [20]   Diagnosis: Chest pain [307585]   Certification: I Certify That Inpatient Hospital Services Are Medically Necessary For Greater Than 2 Midnights                 No follow-up provider specified.       Medication List      No changes were made to your prescriptions during this visit.            Setphanie Alonso MD  07/09/25 0024

## 2025-07-10 NOTE — PROGRESS NOTES
Pharmacy to Dose Heparin Infusion Note    Christo Clifton is a 70 y.o. male receiving heparin infusion.     Therapy for (VTE/Cardiac): cardiac  Patient Weight: 77.5 kg  Initial Bolus (Y/N): No  Any Bolus (Y/N): Yes     Signs or Symptoms of Bleeding: none noted    Cardiac or Other (Not VTE)   Initial Bolus: 60 units/kg (Max 4,000 units)  Initial rate: 12 units/kg/hr (Max 1,000 units/hr)   Anti-Xa Bolus   Dose Infusion Hold   Time Infusion Rate Change (units/kg/hr) Repeat  Anti-Xa    < 0.11 50 units/kg  (4000 units Max) None Increase by  3 units/kg/hr 6 hours   0.11- 0.19 25 units/kg  (2000 units Max) None Increase by  2 units/kg/hr 6 hours   0.2 - 0.29 0 None Increase by  1 units/kg/hr 6 hours   0.3 - 0.5 0 None No Change 6 hours (after 2 consecutive levels in range check qAM)   0.51 - 0.6 0 None Decrease by  1 units/kg/hr 6 hours   0.61 - 0.8 0 30 minutes Decrease by  2 units/kg/hr 6 hours   0.81 - 1 0 60 minutes Decrease by  3 units/kg/hr 6 hours   >1 0 Hold  After Anti-Xa less than 0.5 decrease previous rate by  4 units/kg/hr  Every 2 hours until Anti-Xa  less than 0.5 then when infusion restarts in 6 hours       Results from last 7 days   Lab Units 07/10/25  0344 07/09/25  1915 07/09/25  1906 07/09/25  1254 07/09/25  1055   INR  1.37* 1.43*  --  1.25*  --    HEMOGLOBIN g/dL 8.2*  --  9.1*  --  8.3*   HEMATOCRIT % 26.0*  --  28.9*  --  27.8*   PLATELETS 10*3/mm3 431  --  430  --  494*       Date   Time   Anti-Xa Current Rate (units/kg/hr) Bolus   (units) Rate Change   (units/kg/hr) New Rate (units/kg/hr) Repeat  Anti-Xa Comments /  Pump Check    7/9 2245 STAT 12 -- -- 12  D/w RN, on from [facility]   7/9 2332 0.12 12 -- -- -- 0500 DW RN   7/10 0540 0.17  2000 +2 14 1200 D/w RN   7/10 1203 0.23 14 -- +1 15 [address] Thalia Funez,  PharmD  7/10/2025  12:56 EDT

## 2025-07-10 NOTE — PLAN OF CARE
Goal Outcome Evaluation:             Neuro:  Intubated and sedated. Propofol and fentanyl gtts infusing on transfer. At 0535 pt nodded head to his name but did not follow further commands.  Upon waking pt SPO2 89% and augmented BP 90. Pt has not moved any extremities. Pupils are sluggish R5, L4.    Respiratory:  Vent settings as ordered.    Cardiac:  NSR w/ LBBB on per 12 lead EKG   S1,S2. Dopplered pedal pulses.  IABP in place.   Dopamine gtt weaned as able to maintain augmented -140 per cardiology.   Heparin gtt.       GI/:  +BS, no BM.  UOP - 740 ml.       Other:  Family updated at bedside. Questions answered.

## 2025-07-10 NOTE — CONSULTS
Date of Hospital Visit: 07/10/25  Encounter Provider: Juanita George MD  Place of Service: [facility]  Patient Name: Christo Clifton  :1955  Referral Provider: Chandrakant Jones MD  Primary Care Provider: Lesa Rae APRN    Chief complaint/Reason for Consultation: 3rd degree AVB    Problem List:  3rd degree AVB  2025 temporary transvenous pacer placed  CAD/type I non-STEMI 2025  Remote CABG -all vein grafts closed  25 LHC: LIMA is patent but very small and never matured. Venous grafts are completely closed at the ostium. 100% occlusion within recently placed ostial/proximal LCx stent. PCI to ostial/proximal LCx (2.5 x 18 Xience, postdilated with 3.0 NC balloon)   25 LHC: PCI of ostial/proximal/mid LCx with balloon angioplasty, stent overlapped with distal segment of recently placed stent, EF 35-40%, lateral wall hypokinesis, LVEDP 25 LHC: right dominant circulation, patent proximal LCx stent with diffuse vasospasm noted distally, Moderate mid LAD disease and severe mid to distal LAD disease, elevated LVEDP, temp pacing wire paced, IABP  Acute HFrEF  25 Echo: EF 30-35%, grade 2 diastolic dysfunction, hypo to akinesis of anterior and anterolateral wall, moderate MR  HTN  HLD  T2DM  Hypothyroidism   COPD  Seizure disorder  Former tobacco  Surgical history:  CABG  Hernia repair x 2  Right great toe amputation      History of Present Illness:Christo Clifton is a 70-year-old male with past medical history noted above transferred to [facility] from [facility] on 2025.  He was recently admitted at [facility] from - for LHC where he was found to have 2 occluded vein grafts and underwent PCI to left circ with stent placement. Patient re-presented to [facility] ED on 25 with chest pain that began the night prior. He was admitted with an NSTEMI and underwent repeat LHC on 28 which demonstrated in-stent thrombus felt to be secondary to medical noncompliance.  "Balloon angioplasty performed to stent with deployment of overlapping stent just distal to previous stent. ECHO obtained 24 hours later which showed LVEF 30-35% with grade 2 diastolic dysfunction. On the morning of 7/9, patient developed third-degree AV block. Atropine given with temporary improvement of HR to 50s / HR dropped back down to 30.  Attempted to transcutaneous pace patient when patient became combative and 1 mg IV Versed given. Patient developed complete asystole on monitor and became unresponsive. He received \"brief ACLS\" and had ROSC. Following this, patient back in third-degree AV block. He was agonally breathing and was intubated. Transcutaneous pacing was initiated. Following this he was seen by cardiology and underwent repeat C due to concerns about reocclusion.  Cath showed patent stents of the circumflex and stable disease with diffuse vasospasm.  Temporary transvenous pacemaker was inserted.  Meanwhile patient had developed cardiogenic shock requiring vasopressor support.  Intra-aortic balloon pump was inserted and he was transferred to [facility] for higher level of care.    Overnight he has not required any temporary pacing.  His dopamine was weaned requiring decrease augmented pressure therefore currently he is maintained at a stable dose.  He is oxygenating well and is currently on the ventilator.  He is intubated and sedated.  We were asked to participate in his management.        Medications Prior to Admission   Medication Sig Dispense Refill Last Dose/Taking    amLODIPine (NORVASC) 5 MG tablet Take 1 tablet by mouth Daily.       aspirin 81 MG EC tablet Take 1 tablet by mouth Daily.       atorvastatin (LIPITOR) 80 MG tablet Take 1 tablet by mouth Daily.       clopidogrel (PLAVIX) 75 MG tablet Take 1 tablet by mouth Daily.       escitalopram (LEXAPRO) 10 MG tablet Take 1 tablet by mouth Daily.       insulin glargine (LANTUS, SEMGLEE) 100 UNIT/ML injection Inject 14 Units " "under the skin into the appropriate area as directed Daily.       levothyroxine (SYNTHROID, LEVOTHROID) 50 MCG tablet Take 1 tablet by mouth Daily.          Social History     Socioeconomic History    Marital status:    Tobacco Use    Smoking status: Every Day     Current packs/day: 0.00     Average packs/day: 2.0 packs/day for 45.0 years (90.0 ttl pk-yrs)     Types: Cigarettes     Start date: 1965     Last attempt to quit: 2010     Years since quitting: 15.5    Smokeless tobacco: Never    Tobacco comments:     Trying to quit   Vaping Use    Vaping status: Never Used   Substance and Sexual Activity    Alcohol use: No    Drug use: No    Sexual activity: Defer       Family History   Problem Relation Age of Onset    Diabetes Mother     Hypertension Mother     Diabetes Father        REVIEW OF SYSTEMS:   Not performed - intubated, sedated     Objective:   Temp:  [93.4 °F (34.1 °C)-99 °F (37.2 °C)] 99 °F (37.2 °C)  Heart Rate:  [] 75  Resp:  [16-27] 16  BP: ()/(29-83) 90/41  Arterial Line BP: ()/(33-58) 104/38  FiO2 (%):  [35 %-100 %] 35 %     Body mass index is 23.84 kg/m².  Flowsheet Rows      Flowsheet Row First Filed Value   Admission Height 180.3 cm (70.98\") Documented at 07/09/2025 1800   Admission Weight 77.5 kg (170 lb 13.7 oz) Documented at 07/09/2025 1908            Vitals reviewed.   Constitutional:       Interventions: Sedated and intubated.      Comments: Right IJ triple-lumen.   Pulmonary:      Effort: Intubated.      Breath sounds: Normal breath sounds.   Cardiovascular:      Normal rate. Regular rhythm. Normal S1. Normal S2.       Murmurs: There is a systolic murmur.      Comments: Right femoral artery access/IABP.  Dressing intact, no hematoma, no drainage.  Left femoral venous access/temporary transvenous pacer wire.  Dressing intact, no hematoma, no drainage  Pulses:     Decreased pulses.   Edema:     Peripheral edema absent.   Abdominal:      General: Bowel sounds are normal. " There is no distension.      Palpations: Abdomen is soft.   Skin:     General: Skin is cool and dry.   Feet:      Right foot:      Skin integrity: Ulcer present.      Left foot:      Skin integrity: Ulcer present.      Comments: Right great toe amputated.  Bilateral dorsal ulcerated areas, mild erythema, dry, no drainage.         Lab Review:                Results from last 7 days   Lab Units 07/10/25  0344   SODIUM mmol/L 136   POTASSIUM mmol/L 4.5   CHLORIDE mmol/L 101   CO2 mmol/L 19.0*   BUN mg/dL 57.4*   CREATININE mg/dL 3.03*   GLUCOSE mg/dL 196*   CALCIUM mg/dL 7.8*     Results from last 7 days   Lab Units 07/07/25  1955 07/07/25  1846 07/07/25  1714   HSTROP T ng/L >10,000* >10,000* >10,000*     Results from last 7 days   Lab Units 07/10/25  0344   WBC 10*3/mm3 22.95*   HEMOGLOBIN g/dL 8.2*   HEMATOCRIT % 26.0*   PLATELETS 10*3/mm3 431     Results from last 7 days   Lab Units 07/10/25  0344 07/09/25  1915 07/09/25  1254   INR  1.37* 1.43* 1.25*   APTT seconds 40.3* 36.5* 30.6     Results from last 7 days   Lab Units 07/10/25  0344   MAGNESIUM mg/dL 2.2         proBNP   24,260  7/10/2025  Imaging Results (Last 24 Hours)       Procedure Component Value Units Date/Time    XR Chest 1 View [[id number]] Collected: 07/10/25 0611     Updated: 07/10/25 0617    Narrative:      XR CHEST 1 VW    Date of Exam: 7/10/2025 3:32 AM EDT    Indication: intubated    Comparison: 7/9/2025    Findings:  Endotracheal tube and right IJ line are in good position. NG tube extends below the diaphragm. Heart size stable status post sternotomy. Balloon pump marker is noted at the level of the aortic knob. There appear to be layering pleural effusions   bilaterally. Infiltrate or atelectasis is noted in both mid to lower lungs. No definite pneumothorax. Skinfolds are noted on both sides of the chest.      Impression:      1.Tubes and lines appear in good position.  2.Layering pleural effusions with bibasilar infiltrates or  atelectasis.        Electronically Signed: Ramiro Partida MD    7/10/2025 6:14 AM EDT    Workstation ID: [id number]    XR Abdomen KUB [[id number]] Collected: 07/10/25 0610     Updated: 07/10/25 0614    Narrative:      XR ABDOMEN KUB    Date of Exam: 7/10/2025 3:27 AM EDT    Indication: ileus    Comparison: None available.    Findings:  NG tube tip in the stomach. There is some gaseous distention of the stomach noted. Vega catheter noted in the bladder. There is a right groin transvenous pacing lead extending into the heart. There is some gas and stool in the colon. Small bowel gas may   reflect a mild ileus. No definite bowel obstruction. Continued follow-up is recommended.      Impression:      Gas and stool in the colon. Mild gaseous distention of the stomach. Gas-filled small bowel loops may reflect a mild ileus.        Electronically Signed: Ramiro Partida MD    7/10/2025 6:11 AM EDT    Workstation ID: [id number]    XR Chest 1 View [[id number]] Collected: 07/09/25 2012     Updated: 07/09/25 2017    Narrative:      XR CHEST 1 VW    Date of Exam: 7/9/2025 7:49 PM EDT    Indication: s/p intuabtion, resp failure    Comparison: Chest radiograph 7/9/2025    Findings:  Endotracheal tube projects 4 cm above the isaías. Right-sided central venous catheter in stable position enteric tube courses inferiorly out of field-of-view.    Mediastinum: Cardiac silhouette appears unchanged including postoperative changes and mild cardiomegaly.    Lungs: Mild prominence of central pulmonary vasculature, bilateral interstitial and hazy opacities.    Pleura: No visible pleural effusion or pneumothorax.    Bones and soft tissues: Median sternotomy.        Impression:      Impression:  Endotracheal tube projects 4 cm above the isaías. Stable appearance of additional support devices.    Findings suggestive of mild pulmonary edema. Multifocal infection, including atypical/viral infection, can appear similar.      Electronically Signed:  Tab Cerrato MD    7/9/2025 8:14 PM EDT    Workstation ID: [id number]           Results for orders placed during the hospital encounter of 07/06/25    Adult Transthoracic Echo Complete W/ Cont if Necessary Per Protocol    Interpretation Summary    Left ventricle is normal in size with moderately reduced ejection fraction of around 30 to 35%. Grade 2 diastolic dysfunction. There is hypo to akinesis of anterior and anterolateral wall.    Right ventricle is normal in size and function.    Normal biatrial size.    The aortic valve appears trileaflet. There is no significant aortic stenosis or regurgitation.    There is moderate mitral regurgitation.    There is no evidence of pericardial effusion.    The aortic root measures 3.8 cm.     Results for orders placed during the hospital encounter of 07/06/25    Cardiac Catheterization/Vascular Study    Conclusion  Conclusion:  1.  Right dominant circulation.  2.  Patent proximal left circumflex artery stent with diffuse vasospasm noted distally.  3.  Moderate mid LAD disease and severe mid to distal LAD disease.  4.  Elevated LVEDP.  5.  Heart block.    EKG: Sinus rhythm  Scheduled Meds:  aspirin, 81 mg, Nasogastric, Daily  [Held by provider] atorvastatin, 80 mg, Oral, Daily  cefTRIAXone (ROCEPHIN) 2,000 mg in sodium chloride 0.9 % 100 mL IVPB-VTB, 2,000 mg, Intravenous, Q24H  escitalopram, 10 mg, Nasogastric, Daily  hydrocortisone sodium succinate, 50 mg, Intravenous, Q6H  insulin regular, 3-14 Units, Subcutaneous, Q6H  levothyroxine, 50 mcg, Nasogastric, Daily  mupirocin, 1 Application, Each Nare, BID  pantoprazole, 40 mg, Intravenous, Q24H  prasugrel, 10 mg, Nasogastric, Daily  senna-docusate sodium, 2 tablet, Nasogastric, BID  sodium chloride, 10 mL, Intravenous, Q12H      Continuous Infusions:  DOPamine, 2-20 mcg/kg/min (Dosing Weight), Last Rate: 12 mcg/kg/min (07/10/25 0810)  fentanyl 10 mcg/mL,  mcg/hr, Last Rate: 100 mcg/hr (07/10/25 0537)  heparin, 14  Units/kg/hr, Last Rate: 14 Units/kg/hr (07/10/25 0550)  Pharmacy to Dose Heparin,   propofol, 5-50 mcg/kg/min (Dosing Weight), Last Rate: 10 mcg/kg/min (07/10/25 1003)      PRN Meds:    senna-docusate sodium **AND** polyethylene glycol **AND** [DISCONTINUED] bisacodyl **AND** bisacodyl    Calcium Replacement - Follow Nurse / BPA Driven Protocol    dextrose    fentaNYL    glucagon (human recombinant)    hydrALAZINE    Magnesium Cardiology Dose Replacement - Follow Nurse / BPA Driven Protocol    nitroglycerin    [DISCONTINUED] ondansetron ODT **OR** ondansetron    Pharmacy to Dose Heparin    Phosphorus Replacement - Follow Nurse / BPA Driven Protocol    Potassium Replacement - Follow Nurse / BPA Driven Protocol    sodium chloride    sodium chloride         Assessment:   Acute HFrEF, ischemic cardiomyopathy cardiogenic shock  EF 30-35%  TOOTIE and elevated transaminases  IABP in place  Cardiogenic shock   CAD status post PCI/BRITT to circumflex, documented diffuse distal LCx vasospasm  Third-degree heart block/cardiac arrest  No AVN blocking agents prior to admission  Temp transvenous pacer intact  On Dopamine gtt  Not requiring pacing currently  EP consulted -continue to monitor  T2DM  TOOTIE  Baseline creatinine 1.0-1.2  Peak creatinine 3.44 on 7/9/2025  Creatinine 3.03 today  Anemia  Plan:   Continue current supportive care with ventilator, IABP and vasopressors   Wean off vasopressors and ventilator per ICU team.  Continue DAPT and intravenous heparin .  Once he is on lower/stable dose of vasopressors we will consider weaning off IABP.    Continue to optimize GDMT as tolerated, unable to initiate at this time due to shock and renal dysfunction   Management of multiple conditions per ICU team, he will probably need nephrology involvement as well.  Thank you for this consultation, we will follow    Scribed for Juanita George MD by Mandy Swanson, APRN. 7/10/2025  11:14 EDT     I, Juanita George MD, personally performed the  services described in this documentation as scribed by the above named individual in my presence, and it is both accurate and complete.  7/10/2025  13:18 EDT

## 2025-07-10 NOTE — PROGRESS NOTES
"Intensive Care Follow-up     Hospital:  LOS: 1 day   Mr. Christo Clifton, 70 y.o. male is followed for:   Third degree AV block   Cardiac arrest  Cardiogenic shock         History of present illness:   70 year-old male with COPD, T2DM, CAD, HTN, questionable history of seizures, hypothyroidism, dyslipidemia, anxiety, and depression who was recently admitted at [facility] from 6/27-6/28 for LHC where he was found to have 2 occluded vein grafts and did have PCI to left circ with stent placement. Patient re-presented to [facility] ED on 7/6/25 with chest pain that began the night prior. He was admitted with an NSTEMI and underwent repeat LHC on 7/7/28 which demonstrated in-stent thrombus felt to be secondary to medical noncompliance. Balloon angioplasty performed to stent with deployment of overlapping stent just distal to previous stent. ECHO obtained 24 hours later which showed LVEF 30-35% with grade 2 diastolic dysfunction.      Stay complicated by worsening renal failure.     On the morning of 7/9, patient developed third-degree AV block. Atropine given with temporary improvement of HR to 50s / HR dropped back down to 30.  Attempted to transcutaneous pace patient when patient became combative and 1 mg IV Versed given. Patient developed complete asystole on monitor and became unresponsive. He received \"brief ACLS\" and had ROSC. Following this, patient back in third-degree AV block. He was agonally breathing and was intubated. Transcutaneous pacing was initiated.      Following this, Cardiology called who felt patient needed repeat LHC due to possible failure of stent. He underwent transvenous pacer placement on 7/9/25 and repeat LHC showing patent stent in proximal left circ artery with diffuse coronary artery vasospasm distal to stent.  Intra-aortic balloon pump placed.      [facility] contacted for transfer to higher level of care and he has been accepted.     Patient was noted to have worsening renal failure as well as elevated LFTs " suggestive of shock liver.  Patient was started on dopamine and was at 20 mics per KG per minute dopamine on arrival with 1:1 support on balloon pump.      Subjective   Interval History:  Overnight patient remained on dopamine.  Attempts to wean have not been very successful.  Patient on 12 mics dose currently.  Remains on balloon pump.  Urine output is acceptable but appears cloudy. Ventilator wise doing fair.  Renal function slightly better.                 The patient's past medical, surgical and social history were reviewed and updated in Epic as appropriate.       Objective     Infusions:  DOPamine, 2-20 mcg/kg/min (Dosing Weight), Last Rate: 14 mcg/kg/min (07/10/25 0551)  fentanyl 10 mcg/mL,  mcg/hr, Last Rate: 100 mcg/hr (07/10/25 0537)  heparin, 14 Units/kg/hr, Last Rate: 14 Units/kg/hr (07/10/25 0550)  Pharmacy to Dose Heparin,   propofol, 5-50 mcg/kg/min (Dosing Weight), Last Rate: 20 mcg/kg/min (07/10/25 0550)      Medications:  aspirin, 81 mg, Nasogastric, Daily  [Held by provider] atorvastatin, 80 mg, Oral, Daily  cefTRIAXone (ROCEPHIN) 2,000 mg in sodium chloride 0.9 % 100 mL IVPB-VTB, 2,000 mg, Intravenous, Q24H  escitalopram, 10 mg, Nasogastric, Daily  insulin regular, 3-14 Units, Subcutaneous, Q6H  levothyroxine, 50 mcg, Nasogastric, Daily  mupirocin, 1 Application, Each Nare, BID  pantoprazole, 40 mg, Intravenous, Q24H  prasugrel, 10 mg, Nasogastric, Daily  senna-docusate sodium, 2 tablet, Nasogastric, BID  sodium chloride, 10 mL, Intravenous, Q12H        Vital Sign Min/Max for last 24 hours  Temp  Min: 93.4 °F (34.1 °C)  Max: 99.3 °F (37.4 °C)   BP  Min: 71/31  Max: 160/53   Pulse  Min: 60  Max: 86   Resp  Min: 16  Max: 27   SpO2  Min: 93 %  Max: 100 %   No data recorded       Input/Output for last 24 hour shift  07/09 0701 - 07/10 0700  In: 100   Out: 810 [Urine:740]   Mode: VC+/AC  FiO2 (%):  [35 %-100 %] 35 %  S RR:  [16-20] 16  S VT:  [400 mL-530 mL] 530 mL  PEEP/CPAP (cm H2O):  [5 cm  "H20] 5 cm H20  MAP (cm H2O):  [8-10] 8.9  Objective:  Vital signs: (most recent): Blood pressure (!) 71/31, pulse 76, temperature 99.3 °F (37.4 °C), temperature source Bladder, resp. rate 16, height 180.3 cm (70.98\"), weight 77.5 kg (170 lb 13.7 oz), SpO2 97%.            General Appearance:  Not in distress, no asynchrony with mechanical ventilator.  Head:  Atraumatic, Normocephalic, Pupils sluggish reactive & symmetrical B/L.  Neck:  Endotracheal tube clean.   Lungs:   B/L Breath sounds present with decreased breath sounds on bases, no wheezing heard, no crackles.   Heart: S1 and S2 present, no murmur, IABP counter pulsations heard.   Abdomen: Soft, nontender, no guarding or rigidity, bowel sounds hypoactive.  Extremities: Previous right great toe amputation, left dorsum of foot has healing ulcers with mild erythema around..  Pulses: Doppler positive and dampened on the left side.  Balloon pump.  Sheath in place on the left.  Neurologic:  Pt sedated on the vent. Not able to participate in full neurological exam    Ventilator settings: A/C: FiO2 35%    Results from last 7 days   Lab Units 07/10/25  0344 07/09/25 1906 07/09/25  1055   WBC 10*3/mm3 22.95* 26.27* 25.37*   HEMOGLOBIN g/dL 8.2* 9.1* 8.3*   PLATELETS 10*3/mm3 431 430 494*     Results from last 7 days   Lab Units 07/10/25  0344 07/09/25  1906 07/09/25  1055 07/08/25  0140 07/07/25  1955   SODIUM mmol/L 136 130* 134*   < >  --    POTASSIUM mmol/L 4.5 5.2 4.6   < >  --    CO2 mmol/L 19.0* 18.5* 11.8*   < >  --    BUN mg/dL 57.4* 52.7* 50.4*   < >  --    CREATININE mg/dL 3.03* 3.44* 3.39*   < >  --    MAGNESIUM mg/dL 2.2 2.4  --   --  1.9   PHOSPHORUS mg/dL 7.0*  --   --   --   --    GLUCOSE mg/dL 196* 283* 348*   < >  --     < > = values in this interval not displayed.     Estimated Creatinine Clearance: 24.5 mL/min (A) (by C-G formula based on SCr of 3.03 mg/dL (H)).    Results from last 7 days   Lab Units 07/10/25  0827   PH, ARTERIAL pH units 7.331* "   PCO2, ARTERIAL mm Hg 37.8   PO2 ART mm Hg 81.5*       Images:   Chest x-ray reviewed personally and showed endotracheal tube in mid trachea.  Right IJ catheter in good position without any pneumothorax.  Balloon pump tape appears to be distal to subclavian artery.  Bilateral small pleural effusions.    Abdominal x-ray showed nonspecific bowel gas pattern with moderate stool burden.    I reviewed the patient's results and images.     Assessment & Plan   Impression        Third degree AV block    CAD (coronary artery disease)    TOOTIE (acute kidney injury)    Lactic acidosis    Acute cystitis without hematuria    Ischemic cardiomyopathy    Acute HFrEF (heart failure with reduced ejection fraction)    Shock liver    Complete heart block       Plan        1.  Patient presented from outside facility with complete heart block, cardiogenic shock and multiorgan failure, intubated on mechanical ventilation with intra-aortic balloon pump support.  Patient currently appears to be in sinus rhythm.  Not requiring any backup pacing.  Transvenous pacemaker in place.  Continues on dopamine infusion.  Will attempt to wean as tolerated.  Cardiology team has been consulted.  Continue balloon pump support for now and await cardiac recovery.  Monitor peripheral vasculature.  Patient has PVD and has dampened pulses in the left lower extremity but no ischemic changes observed at this time.  2.  Patient with recent in-stent thrombosis complicated by medical noncompliance.  Continue aspirin, Effient.  On IV heparin infusion with balloon pump in place.  3.  Continue current ventilatory support.  ABGs are acceptable.  Mild metabolic acidosis likely from renal failure but slowly improving.  Will continue to support.  4.  Component of UTI.  Urine cultures are pending.  Continue Rocephin.  WBC count slowly improving.  5.  Check cortisol level to make sure not dealing with any adrenal insufficiency leading to significant shock.  6.  Continue  levothyroxine for hypothyroidism.  7.  Monitor electrolytes and replace per ICU protocol.  Creatinine slightly improved.  Urine output is improving.  8.  Also in shock liver.  Continue to trend transaminase levels for now.  Holding statins for now.  Discontinue Tylenol.  9.  Ileus pattern noted.  Will add bowel regimen.  10.  GI prophylaxis with Protonix.  11.  Insulin as needed for hyperglycemia management.  12.  Wound care consult for what appears like diabetic ulcers.  13.  Patient is currently sedated.  Will go ahead and wean sedation to assess mental status.  TTM was not initiated as it was a brief 1 round CPR.  Will assess neurological status after weaning sedation    Patient remains with extremely guarded prognosis.  Will continue aggressive support.    Plan of care and goals reviewed with multidisciplinary/antibiotic stewardship team during rounds.   I discussed the patient's findings and my recommendations with nursing staff and consulting provider       Time spent Critical care 38 min (exclusive of procedure time)  including high complexity decision making to assess, manipulate, and support vital organ system failure in this individual who has impairment of one or more vital organ systems such that there is a high probability of imminent or life threatening deterioration in the patient’s condition.      Chandrakant Jones MD, [facility]  Pulmonary, Critical care and Sleep Medicine

## 2025-07-10 NOTE — CONSULTS
NEPHROLOGY CONSULT NOTE      REASON FOR CONSULTATION: Elevated creatinine    CHIEF COMPLAINT: Chest pain    HISTORY OF PRESENTING ILLNESS:  Christo Clifton is a 70 y.o. male who presented to [facility] emergency department with chief complaint of with chest pain associated with palpitations diaphoresis and pressure-like symptoms, underwent emergent cardiac catheterization after admission For acute stent thrombosis.  Patient also recently had cardiac catheterization done on 6/28/2025 with PCI to left circumflex.  Patient does not have a history of chronic kidney disease, on detailed chart review apparently patient's creatinine is around 1.1 he was admitted with creatinine of 1.1 that has increased to 2.5 over the past couple of days.  Patient has been getting IV Lasix by cardiology, his most recent echocardiogram shows ejection fraction of 35%.  This morning, patient has been transferred to CCU due to hypotension.  Patient currently intubated on mechanical ventilation.    History  Past Medical History:   Diagnosis Date    Anxiety and depression     Arthritis     COPD (chronic obstructive pulmonary disease)     Coronary artery disease     Diabetes mellitus     Disease of thyroid gland     Dyslipidemia     History of transfusion     Hypertension     Seizure disorder     Pt states last seizure x1 mo.   ,   Past Surgical History:   Procedure Laterality Date    AMPUTATION DIGIT Right 09/13/2017    Procedure: RIGHT 1ST TOE AMPUTATION ;  Surgeon: eLe Lee MD;  Location: [facility];  Service:     CARDIAC CATHETERIZATION      CARDIAC CATHETERIZATION N/A 6/27/2025    Procedure: Left Heart Cath;  Surgeon: Mimi Botello MD;  Location: [facility] INVASIVE LOCATION;  Service: Cardiovascular;  Laterality: N/A;    CARDIAC CATHETERIZATION N/A 6/27/2025    Procedure: Percutaneous Coronary Intervention;  Surgeon: Mimi Botello MD;  Location: [facility] INVASIVE LOCATION;  Service: Cardiovascular;   Laterality: N/A;    CARDIAC CATHETERIZATION N/A 7/7/2025    Procedure: Left Heart Cath;  Surgeon: Anali Arevalo MD;  Location:  [facility];  Service: Cardiovascular;  Laterality: N/A;    CARDIAC SURGERY      CIRCUMCISION      CORONARY ARTERY BYPASS GRAFT      HERNIA REPAIR      X2    INTRAVASCULAR ULTRASOUND N/A 6/27/2025    Procedure: Intravascular Ultrasound;  Surgeon: Mimi Botello MD;  Location:  [facility] INV[facility];  Service: Cardiovascular;  Laterality: N/A;    ID INCISION & DRAINAGE LEG/ANKLE ABSCESS/HEMATOMA Right 05/24/2017    Procedure: Debridement of right first toe;  Surgeon: Ronald Perdue MD;  Location:  [facility] OR;  Service: General    TOE SURGERY Left     debridement   ,   Family History   Problem Relation Age of Onset    Diabetes Mother     Hypertension Mother     Diabetes Father    ,   Social History     Tobacco Use    Smoking status: Every Day     Current packs/day: 0.00     Average packs/day: 2.0 packs/day for 45.0 years (90.0 ttl pk-yrs)     Types: Cigarettes     Start date: 1965     Last attempt to quit: 2010     Years since quitting: 15.5    Smokeless tobacco: Never    Tobacco comments:     Trying to quit   Vaping Use    Vaping status: Never Used   Substance Use Topics    Alcohol use: No    Drug use: No   ,   Medications Prior to Admission   Medication Sig Dispense Refill Last Dose/Taking    [Paused] amLODIPine (NORVASC) 5 MG tablet Take 1 tablet by mouth Daily.   7/6/2025    aspirin 81 MG EC tablet Take 1 tablet by mouth Daily.   7/6/2025    atorvastatin (LIPITOR) 80 MG tablet Take 1 tablet by mouth Daily.   7/6/2025    [START ON 7/10/2025] cefTRIAXone 2,000 mg in sodium chloride 0.9 % 100 mL IVPB Infuse 2,000 mg into a venous catheter Daily for 3 doses. Indications: Empiric, UNCOMPLICATED CYSTITIS   Taking    DOPamine (INTROPIN) 400 mg/250 mL (1.6 mg/mL) in D5W infusion Infuse 155-1,550 mcg/min into a venous catheter Dose Adjusted By Provider As Needed.    Taking    escitalopram (LEXAPRO) 10 MG tablet Take 1 tablet by mouth Daily.   7/6/2025    fentaNYL Citrate (fentaNYL 10 mcg/1 mL NS) Infuse  mcg/hr into a venous catheter Dose Adjusted By Provider As Needed for 7 days.   Taking    fluticasone (FLONASE) 50 MCG/ACT nasal spray Administer 1 spray into the nostril(s) as directed by provider Daily As Needed for Rhinitis or Allergies.   Unknown    insulin glargine (LANTUS, SEMGLEE) 100 UNIT/ML injection Inject 14 Units under the skin into the appropriate area as directed Daily.   7/6/2025    levothyroxine (SYNTHROID, LEVOTHROID) 50 MCG tablet Take 1 tablet by mouth Daily.   7/6/2025    [START ON 7/10/2025] prasugrel (EFFIENT) 10 MG tablet Take 1 tablet by mouth Daily.   Taking    prasugrel (EFFIENT) 10 MG tablet Take 6 tablets by mouth 1 (One) Time for 1 dose. 6 tablet 0 Taking    propofol (DIPRIVAN) 10 mg/mL emulsion infusion Infuse 387.5-3,875 mcg/min into a venous catheter Dose Adjusted By Provider As Needed.   Taking   , Scheduled Meds:  , Continuous Infusions:  No current facility-administered medications for this encounter.  , PRN Meds:   and Allergies:  Other    REVIEW OF SYSTEMS  More than 10 point review of systems was done. Pertinent items are noted in HPI, all other systems reviewed and negative    Objective     Intake/Output                               07/07/25 0701 - 07/08/25 0700 07/08/25 0701 - 07/09/25 0700 07/09/25 0701 - 07/10/25 0700 (Discharged)     [id number] [id number] Total 3344-0591 2811-4673 Total 3084-1447 9116-6151 Total                    Intake    P.O.  480  -- 480  644  -- 644  0  -- 0    I.V.  250  310 560  567  -- 567  97.5  -- 97.5    IV Piggyback  --  -- --  --  100 100  --  -- --    Total Intake  0718 360 3756 97.5 -- 97.5       Output    Urine  600  300 900  300  200 500  150  -- 150    Total Output 600 300 900 300 200 500 150 -- 150             VITAL SIGNS  Temp:  [93.4 °F (34.1 °C)-98.8 °F (37.1 °C)] 97.9 °F (36.6  "°C)  Heart Rate:  [] 77  Resp:  [16-27] 16  BP: ()/(29-96) 95/45  Arterial Line BP: (113-124)/(44-57) 123/44  FiO2 (%):  [50 %-100 %] 50 %    PHYSICAL EXAMINATION:    GENERAL EXAM  Laying in bed   Comfortable /    MENTAL STATUS EXAM  Sedated    NECK EXAM  JVP is not elevated, carotid exam normal    CARDIOVASCULAR EXAM  Regular rate and rhythm, no murmurs noted, no added heart sounds, normal apical pulsation  Trace bilateral lower extremity edema    MUSCULOSKELETAL EXAM  No cyanosis or clubbing noted in the digits    RESPIRATORY EXAM  Bilateral decreased intensity of breath sounds, no crackles    ABDOMINAL EXAM  Abdomen soft and non tender, normal bowel sounds    SKIN EXAM  No new rashes    CLINICAL DATA REVIEW :    CBC, Renal functions, Serum electrolytes, Acid base labs reviewed 1  Independent review of CXR and demonstrated findings consistent with; no pulmonary edema 2  Discussed the labs with Dr. Perkins 1      WBC WBC   Date Value Ref Range Status   07/09/2025 26.27 (H) 3.40 - 10.80 10*3/mm3 Final   07/09/2025 25.37 (H) 3.40 - 10.80 10*3/mm3 Final   07/09/2025 18.83 (H) 3.40 - 10.80 10*3/mm3 Final   07/08/2025 17.07 (H) 3.40 - 10.80 10*3/mm3 Final   07/06/2025 9.33 3.40 - 10.80 10*3/mm3 Final      HGB Hemoglobin   Date Value Ref Range Status   07/09/2025 9.1 (L) 13.0 - 17.7 g/dL Final   07/09/2025 8.3 (L) 13.0 - 17.7 g/dL Final   07/09/2025 8.3 (L) 13.0 - 17.7 g/dL Final   07/08/2025 8.8 (L) 13.0 - 17.7 g/dL Final   07/06/2025 9.2 (L) 13.0 - 17.7 g/dL Final      HCT Hematocrit   Date Value Ref Range Status   07/09/2025 28.9 (L) 37.5 - 51.0 % Final   07/09/2025 27.8 (L) 37.5 - 51.0 % Final   07/09/2025 25.9 (L) 37.5 - 51.0 % Final   07/08/2025 28.5 (L) 37.5 - 51.0 % Final   07/06/2025 28.9 (L) 37.5 - 51.0 % Final      Platlets No results found for: \"LABPLAT\"   MCV MCV   Date Value Ref Range Status   07/09/2025 91.2 79.0 - 97.0 fL Final   07/09/2025 94.9 79.0 - 97.0 fL Final   07/09/2025 89.3 79.0 - " 97.0 fL Final   07/08/2025 89.6 79.0 - 97.0 fL Final   07/06/2025 88.9 79.0 - 97.0 fL Final          Sodium Sodium   Date Value Ref Range Status   07/09/2025 130 (L) 136 - 145 mmol/L Final   07/09/2025 134 (L) 136 - 145 mmol/L Final   07/09/2025 135 (L) 136 - 145 mmol/L Final   07/08/2025 134 (L) 136 - 145 mmol/L Final   07/06/2025 137 136 - 145 mmol/L Final      Potassium Potassium   Date Value Ref Range Status   07/09/2025 5.2 3.5 - 5.2 mmol/L Final   07/09/2025 4.6 3.5 - 5.2 mmol/L Final   07/09/2025 3.9 3.5 - 5.2 mmol/L Final   07/08/2025 4.3 3.5 - 5.2 mmol/L Final   07/06/2025 4.1 3.5 - 5.2 mmol/L Final      Chloride Chloride   Date Value Ref Range Status   07/09/2025 96 (L) 98 - 107 mmol/L Final   07/09/2025 98 98 - 107 mmol/L Final   07/09/2025 100 98 - 107 mmol/L Final   07/08/2025 100 98 - 107 mmol/L Final   07/06/2025 104 98 - 107 mmol/L Final      CO2 CO2   Date Value Ref Range Status   07/09/2025 18.5 (L) 22.0 - 29.0 mmol/L Final   07/09/2025 11.8 (L) 22.0 - 29.0 mmol/L Final   07/09/2025 19.3 (L) 22.0 - 29.0 mmol/L Final   07/08/2025 19.5 (L) 22.0 - 29.0 mmol/L Final   07/06/2025 20.8 (L) 22.0 - 29.0 mmol/L Final      BUN BUN   Date Value Ref Range Status   07/09/2025 52.7 (H) 8.0 - 23.0 mg/dL Final   07/09/2025 50.4 (H) 8.0 - 23.0 mg/dL Final   07/09/2025 42.6 (H) 8.0 - 23.0 mg/dL Final   07/08/2025 30.8 (H) 8.0 - 23.0 mg/dL Final   07/06/2025 26.7 (H) 8.0 - 23.0 mg/dL Final      Creatinine Creatinine   Date Value Ref Range Status   07/09/2025 3.44 (H) 0.76 - 1.27 mg/dL Final   07/09/2025 3.39 (H) 0.76 - 1.27 mg/dL Final   07/09/2025 2.40 (H) 0.76 - 1.27 mg/dL Final   07/08/2025 1.65 (H) 0.76 - 1.27 mg/dL Final   07/06/2025 1.18 0.76 - 1.27 mg/dL Final      Calcium Calcium   Date Value Ref Range Status   07/09/2025 8.3 (L) 8.6 - 10.5 mg/dL Final   07/09/2025 8.3 (L) 8.6 - 10.5 mg/dL Final   07/09/2025 8.8 8.6 - 10.5 mg/dL Final   07/08/2025 8.8 8.6 - 10.5 mg/dL Final   07/06/2025 8.7 8.6 - 10.5 mg/dL  "Final      PO4 No results found for: \"CAPO4\"   Albumin Albumin   Date Value Ref Range Status   07/09/2025 3.4 (L) 3.5 - 5.2 g/dL Final   07/09/2025 2.9 (L) 3.5 - 5.2 g/dL Final      Magnesium Magnesium   Date Value Ref Range Status   07/09/2025 2.4 1.6 - 2.4 mg/dL Final   07/07/2025 1.9 1.6 - 2.4 mg/dL Final      Uric Acid No results found for: \"URICACID\"     Radiology results :     Imaging Results (Last 72 Hours)       Procedure Component Value Units Date/Time    US Arterial Doppler Lower Extremity Bilateral [[id number]] Collected: 07/09/25 1537     Updated: 07/09/25 1540    Narrative:      EXAMINATION: US ARTERIAL DOPPLER LOWER EXTREMITY  BILATERAL-      CLINICAL INDICATION: absent pulses after balloon pump; I25.9-Chronic  ischemic heart disease, unspecified; R79.89-Other specified abnormal  findings of blood chemistry; I45.9-Conduction disorder, unspecified;  I24.9-Acute ischemic heart disease, unspecified        COMPARISON: None available     Any stenoses are evaluated using the NASCET or similar method.     Color Doppler imaging with pulsed Doppler waveform to evaluate the lower  extremity arteries.     Irregular waveforms bilaterally but no occlusive segments.     No significant velocity elevation was identified.       Impression:      1. Dampened flow  2. Irregular waveforms but no occlusion.        This report was finalized on 7/9/2025 3:38 PM by Dr. Sky Roberts MD.       XR Chest 1 View [[id number]] Collected: 07/09/25 1050     Updated: 07/09/25 1052    Narrative:      XR CHEST 1 VW-     CLINICAL INDICATION: CENTRAL LINE PLACEMENT; I25.9-Chronic ischemic  heart disease, unspecified; R79.89-Other specified abnormal findings of  blood chemistry; I45.9-Conduction disorder, unspecified; I24.9-Acute  ischemic heart disease, unspecified        COMPARISON: 7/9/2025     TECHNIQUE: Single frontal view of the chest.     FINDINGS:     LUNGS: Right internal jugular central line tip in the superior vena  cava. No " pneumothorax.  NG tube tip is in the stomach. Side port at the GE junction.     HEART AND MEDIASTINUM: Heart and mediastinal contours are unremarkable        SKELETON: Bony and soft tissue structures are unremarkable.             Impression:      Line placement as above           This report was finalized on 7/9/2025 10:50 AM by Dr. Sky Roberts MD.       XR Chest 1 View [[id number]] Collected: 07/09/25 0734     Updated: 07/09/25 0736    Narrative:      XR CHEST 1 VW-     CLINICAL INDICATION: dyspnea; I25.9-Chronic ischemic heart disease,  unspecified; R79.89-Other specified abnormal findings of blood chemistry        COMPARISON: 7/6/2025     TECHNIQUE: Single frontal view of the chest.     FINDINGS:     LUNGS: Increased interstitial markings and cephalization of blood flow     HEART AND MEDIASTINUM: Heart and mediastinal contours are unremarkable        SKELETON: Bony and soft tissue structures are unremarkable.             Impression:      Appearance suggestive of pulmonary congestion           This report was finalized on 7/9/2025 7:34 AM by Dr. Sky Roberts MD.       XR Chest 1 View [[id number]] Collected: 07/07/25 0753     Updated: 07/07/25 0756    Narrative:      XR CHEST 1 VW-     CLINICAL INDICATION: Chest Pain Protocol; I25.9-Chronic ischemic heart  disease, unspecified; R79.89-Other specified abnormal findings of blood  chemistry        COMPARISON: 7/13/2020      TECHNIQUE: Single frontal view of the chest.     FINDINGS:     LUNGS: Lungs are adequately aerated.      HEART AND MEDIASTINUM: Heart and mediastinal contours are unremarkable        SKELETON: Bony and soft tissue structures are unremarkable.             Impression:      No radiographic evidence of acute cardiac or pulmonary disease.           This report was finalized on 7/7/2025 7:54 AM by Dr. Sky Roberts MD.                 Medications:        No current facility-administered medications for this encounter.      Assessment & Plan       Heart  block    Acute ischemic heart disease, unspecified    -Acute kidney injury, nonoliguric  - Coronary artery disease with acute stent thrombosis s/p PCI to left circumflex artery during this hospitalization  - Contrast-induced nephropathy  - Congestive heart failure, heart failure with reduced ejection fraction, ejection fraction 35%  - Anion gap metabolic acidosis  - Hypotension likely due to cardiogenic shock    Acute kidney injury most likely multifactorial including contrast-induced nephropathy, hemodynamic mediated changes from diuretics and acute tubular necrosis from shock.  Other diagnoses on differential is cardiorenal syndrome  Baseline creatinine appears to be around 1.1, has increased to 2.4 over the course of current hospitalization.    There is no florid fluid overload clinically and radiologically.  - Discontinue diuretics  - Urine analysis with microscopy  - Urine protein creatinine ratio  - Renal imaging  - IV albumin 12.5 g to dosage today  - If patient remains hypotensive, would reassess tomorrow for dobutamine infusion  - Patient remains high risk for worsening renal functions,    Continue strict intake and output record  Please avoid any nephrotoxic agents, hypotension and adjust medications according to estimated GFR    REVIEWED NOTES OF CLINICAL TEAMS INVOLVED WITH PATIENT CARE.     DISCUSSED IN DETAIL WITH PATIENT AND/OR FAMILY ABOUT CURRENT CLINICAL CONDITIONS    DISCUSSED IN DETAIL WITH PATIENT AND/OR FAMILY ABOUT RISKS ASSOCIATED WITH CURRENT CLINICAL CONDITION AND RISK INVOLVED WITH CURRENT MANAGEMENT.     CODE STATUS REVIEWED.    DISCUSSED IN DETAIL WITH HOSPITALIST    Car Thomas MD  07/09/25  23:14 EDT

## 2025-07-10 NOTE — H&P
"CRITICAL CARE ADMISSION NOTE       Patient's name Christo Clifton MRN: [id number] : 1955-70 y.o.-male  Admission date 2025  Length of stay 0  ICU LOS 4h    REASON FOR CONSULTATION / MAIN COMPLAINT  Third degree AV block   No chief complaint on file.     HISTORY OF MAIN COMPLAINT    Christo Clifton is a 70 year-old male with COPD, T2DM, CAD, HTN, seizures, hypothyroidism, dyslipidemia, anxiety, and depression that was recently admitted at [facility] from - for LHC where he was found to have 2 occluded vein grafts and did have PCI to left circ with stent placement. Patient re-presented to [facility] ED on 25 with chest pain that began the night prior. He was admitted with an NSTEMI and underwent repeat LHC on 28 which demonstrated in-stent thrombus felt to be secondary to medical noncompliance. Balloon angioplasty performed to stent with deployment of overlapping stent just distal to previous stent. ECHO obtained 24 hours later which showed LVEF 30-35% with grade 2 diastolic dysfunction.     Stay complicated by worsening renal failure.    On the morning of , patient developed third-degree AV block. Atropine given with temporary improvement of HR to 50s / HR dropped back down to 30.  Attempted to transcutaneous pace patient when patient became combative and 1 mg IV Versed given. Patient developed complete asystole on monitor and became unresponsive. He received \"brief ACLS\" and had ROSC. Following this, patient back in third-degree AV block. He was agonally breathing and was intubated. Transcutaneous pacing was initiated.     Following this, Cardiology called who felt patient needed repeat LHC due to possible failure of stent. He underwent transvenous pacer placement on 25 and repeat LHC showing patent stent in proximal left circ artery with diffuse coronary artery vasospasm distal to stent. Balloon pump placed.     [facility] contacted for transfer to higher level of care and he has been accepted.     Time " spent: 8 minutes  Electronically signed by MIKO Mcnamara, 07/09/25, 6:12 PM EDT.      PAST MEDICAL HISTORY:  Past Medical History:   Diagnosis Date    Anxiety and depression     Arthritis     COPD (chronic obstructive pulmonary disease)     Coronary artery disease     Diabetes mellitus     Disease of thyroid gland     Dyslipidemia     History of transfusion     Hypertension     Seizure disorder     Pt states last seizure x1 mo.       PAST SURGICAL HISTORY:  Past Surgical History:   Procedure Laterality Date    AMPUTATION DIGIT Right 09/13/2017    Procedure: RIGHT 1ST TOE AMPUTATION ;  Surgeon: Lee Lee MD;  Location: [facility] OR;  Service:     CARDIAC CATHETERIZATION      CARDIAC CATHETERIZATION N/A 6/27/2025    Procedure: Left Heart Cath;  Surgeon: Mimi Botello MD;  Location:  [facility] CATH INVASIVE [facility];  Service: Cardiovascular;  Laterality: N/A;    CARDIAC CATHETERIZATION N/A 6/27/2025    Procedure: Percutaneous Coronary Intervention;  Surgeon: Mimi Botello MD;  Location:  [facility] CATH INVASIVE LOCATION;  Service: Cardiovascular;  Laterality: N/A;    CARDIAC CATHETERIZATION N/A 7/7/2025    Procedure: Left Heart Cath;  Surgeon: Anali Arevalo MD;  Location: [facility] CATH INVASIVE LOCATION;  Service: Cardiovascular;  Laterality: N/A;    CARDIAC SURGERY      CIRCUMCISION      CORONARY ARTERY BYPASS GRAFT      HERNIA REPAIR      X2    INTRAVASCULAR ULTRASOUND N/A 6/27/2025    Procedure: Intravascular Ultrasound;  Surgeon: Mimi Botello MD;  Location: [facility] CATH INVASIVE LOCATION;  Service: Cardiovascular;  Laterality: N/A;    AL INCISION & DRAINAGE LEG/ANKLE ABSCESS/HEMATOMA Right 05/24/2017    Procedure: Debridement of right first toe;  Surgeon: Ronald Perdue MD;  Location: [facility] OR;  Service: General    TOE SURGERY Left     debridement       FAMILY HISTORY:  Family History   Problem Relation Age of Onset    Diabetes Mother     Hypertension Mother     Diabetes Father         SOCIAL HISTORY:  Social History     Tobacco Use    Smoking status: Every Day     Current packs/day: 0.00     Average packs/day: 2.0 packs/day for 45.0 years (90.0 ttl pk-yrs)     Types: Cigarettes     Start date: 1965     Last attempt to quit: 2010     Years since quitting: 15.5    Smokeless tobacco: Never    Tobacco comments:     Trying to quit   Vaping Use    Vaping status: Never Used   Substance Use Topics    Alcohol use: No    Drug use: No       ALLERGIES:  Allergies   Allergen Reactions    Other        HOME MEDS INCLUDE  Current Outpatient Medications   Medication Instructions    [Paused] amLODIPine (NORVASC) 5 mg, Daily    aspirin 81 mg, Daily    atorvastatin (LIPITOR) 80 mg, Daily    [START ON 7/10/2025] cefTRIAXone 2,000 mg in sodium chloride 0.9 % 100 mL IVPB 2,000 mg, Intravenous, Every 24 Hours    DOPamine (INTROPIN) 400 mg/250 mL (1.6 mg/mL) in D5W infusion 2-20 mcg/kg/min, Intravenous, Titrated    escitalopram (LEXAPRO) 10 mg, Daily    fentaNYL 10 mcg/1 mL NS  mcg/hr ( mcg/hr), Intravenous, Titrated    fluticasone (FLONASE) 50 MCG/ACT nasal spray 1 spray, Nasal, Daily PRN    insulin glargine (LANTUS, SEMGLEE) 14 Units, Daily    levothyroxine (SYNTHROID, LEVOTHROID) 50 mcg, Daily    [START ON 7/10/2025] prasugrel (EFFIENT) 10 mg, Oral, Daily    prasugrel (EFFIENT) 60 mg, Oral, Once    propofol (DIPRIVAN) 10 mg/mL emulsion infusion 5-50 mcg/kg/min, Intravenous, Titrated       SCHEDULED MEDS:  [START ON 7/10/2025] aspirin, 81 mg, Oral, Daily  [START ON 7/10/2025] atorvastatin, 80 mg, Oral, Daily  [START ON 7/10/2025] cefTRIAXone (ROCEPHIN) 2,000 mg in sodium chloride 0.9 % 100 mL IVPB-VTB, 2,000 mg, Intravenous, Q24H  DOPamine, , ,   escitalopram, 10 mg, Oral, Daily  [START ON 7/10/2025] insulin regular, 3-14 Units, Subcutaneous, Q6H  [START ON 7/10/2025] levothyroxine, 50 mcg, Oral, Daily  mupirocin, 1 Application, Each Nare, BID  [START ON 7/10/2025] prasugrel, 10 mg, Oral,  Daily  propofol, , ,   sodium chloride, 10 mL, Intravenous, Q12H         CONTINUOUS INFUSIONS:  DOPamine, 2-20 mcg/kg/min (Dosing Weight), Last Rate: 15 mcg/kg/min (07/09/25 2243)  fentanyl 10 mcg/mL,  mcg/hr, Last Rate: 100 mcg/hr (07/09/25 2249)  heparin, 12 Units/kg/hr, Last Rate: 12 Units/kg/hr (07/09/25 2255)  Pharmacy to Dose Heparin,   propofol, 5-50 mcg/kg/min (Dosing Weight), Last Rate: 30 mcg/kg/min (07/09/25 2210)         REVIEW OF SYSTEMS:  Unable to obtain because patient is intubated/sedated    PHYSICAL EXAMINATION:    Temp:  [93.4 °F (34.1 °C)-98.8 °F (37.1 °C)] 97.9 °F (36.6 °C)  Heart Rate:  [] 77  Resp:  [16-27] 16  BP: ()/(29-96) 95/45  Arterial Line BP: (113-124)/(44-57) 123/44  FiO2 (%):  [45 %-100 %] 45 %    Intake/Output Summary (Last 24 hours) at 7/9/2025 2317  Last data filed at 7/9/2025 2200  Gross per 24 hour   Intake --   Output 110 ml   Net -110 ml     Mode: VC+/AC  FiO2 (%):  [45 %-100 %] 45 %  S RR:  [16-20] 16  S VT:  [400 mL-530 mL] 530 mL  PEEP/CPAP (cm H2O):  [5 cm H20] 5 cm H20  MAP (cm H2O):  [1-10] 8.6    General appearance: ill appearing  Trachea: midline  Lymphatic: no lymphadenopathy.  Chest: full vent support  Heart: regular rate and rhythm, S1, S2 normal  Abdomen: soft  Genitourinary: duran in place  Neurological: sedated  Psychiatric: sedated  Skin: warm, dry, IABP - left fem area    DATA REVIEW:    1. Medical chart reviewed in detail  2. I reviewed the actual EKG rhythm strips and Pulse Oximetry data on the monitors  3. I reviewed today's lab values and the report of the most recent Chest X ray.  4. In addition, I have independently reviewed the actual image of the most recent chest Xray    Hematology:  Results from last 7 days   Lab Units 07/09/25  1906 07/09/25  1055 07/09/25  0038   WBC 10*3/mm3 26.27* 25.37* 18.83*   HEMOGLOBIN g/dL 9.1* 8.3* 8.3*   HEMATOCRIT % 28.9* 27.8* 25.9*   PLATELETS 10*3/mm3 430 494* 423     Chemistry:  Estimated Creatinine  Clearance: 21.6 mL/min (A) (by C-G formula based on SCr of 3.44 mg/dL (H)).  Results from last 7 days   Lab Units 07/09/25 1906 07/09/25  1055   SODIUM mmol/L 130* 134*   POTASSIUM mmol/L 5.2 4.6   CHLORIDE mmol/L 96* 98   CO2 mmol/L 18.5* 11.8*   BUN mg/dL 52.7* 50.4*   CREATININE mg/dL 3.44* 3.39*   GLUCOSE mg/dL 283* 348*     Results from last 7 days   Lab Units 07/09/25 1915 07/09/25 1906 07/09/25  1055 07/08/25  0140 07/07/25 1955   IONIZED CALCIUM mmol/L 1.07*  --   --   --   --    CALCIUM mg/dL  --  8.3* 8.3*   < >  --    MAGNESIUM mg/dL  --  2.4  --   --  1.9    < > = values in this interval not displayed.     Hepatic Panel:  Results from last 7 days   Lab Units 07/09/25 1906 07/09/25  1055 07/06/25  1629   ALBUMIN g/dL 3.4* 2.9* 3.0*   TOTAL PROTEIN g/dL 6.8 6.3 6.5   BILIRUBIN mg/dL 0.6 0.5 0.8   AST (SGOT) U/L 2,383* 325* 27   ALT (SGPT) U/L 2,020* 288* 20   ALK PHOS U/L 113 110 100     Coagulation Labs:  Results from last 7 days   Lab Units 07/09/25 1915 07/09/25  1254   PROTIME Seconds 18.4* 16.5*   INR  1.43* 1.25*   APTT seconds 36.5* 30.6      Cardiac Labs:  Results from last 7 days   Lab Units 07/07/25 1955 07/07/25  1846 07/07/25  1714   HSTROP T ng/L >10,000* >10,000* >10,000*     Biomarkers:  Results from last 7 days   Lab Units 07/09/25  2209 07/09/25 1906 07/09/25  0038 07/08/25  1048   LACTATE mmol/L 1.4 2.5*  --  1.7   PROCALCITONIN ng/mL  --  0.37* 0.18  --      U/A  Results from last 7 days   Lab Units 07/09/25 1915   COLOR UA  Brown*   CLARITY UA  Turbid*   PH, URINE  <=5.0   SPECIFIC GRAVITY, URINE  1.029   GLUCOSE UA  100 mg/dL (Trace)*   KETONES UA  Negative   BILIRUBIN UA  Small (1+)*   PROTEIN UA  >=300 mg/dL (3+)*   BLOOD UA  Large (3+)*   LEUKOCYTES UA  Moderate (2+)*   NITRITE UA  Negative   UROBILINOGEN UA  1.0 E.U./dL     Results from last 7 days   Lab Units 07/09/25 1915   RBC UA /HPF Too Numerous to Count*   WBC UA /HPF Too Numerous to Count*   BACTERIA UA /HPF 4+*    SQUAM EPITHEL UA /HPF Too Numerous to Count*   HYALINE CASTS UA /LPF Too Numerous to Count     Arterial Blood Gases:  Results from last 7 days   Lab Units 07/09/25  1908 07/09/25  1053 07/08/25  0114   PH, ARTERIAL pH units 7.328* 7.195* 7.515*   PCO2, ARTERIAL mm Hg 35.1 36.1 24.8*   PO2 ART mm Hg 82.5* 94.4 94.4   FIO2 % 50 100 28       Images:  XR Chest 1 View  Result Date: 7/9/2025  Impression: Endotracheal tube projects 4 cm above the isaías. Stable appearance of additional support devices. Findings suggestive of mild pulmonary edema. Multifocal infection, including atypical/viral infection, can appear similar. Electronically Signed: Tab Cerrato MD  7/9/2025 8:14 PM EDT  Workstation ID: [id number]    US Arterial Doppler Lower Extremity Bilateral  Result Date: 7/9/2025  1. Dampened flow 2. Irregular waveforms but no occlusion.   This report was finalized on 7/9/2025 3:38 PM by Dr. Sky Roberts MD.      XR Chest 1 View  Result Date: 7/9/2025  Line placement as above    This report was finalized on 7/9/2025 10:50 AM by Dr. Sky Roberts MD.      XR Chest 1 View  Result Date: 7/9/2025  Appearance suggestive of pulmonary congestion    This report was finalized on 7/9/2025 7:34 AM by Dr. Sky Roberts MD.        Echo:  Results for orders placed during the hospital encounter of 07/06/25    Adult Transthoracic Echo Complete W/ Cont if Necessary Per Protocol 07/08/2025 12:27 PM    Interpretation Summary    Left ventricle is normal in size with moderately reduced ejection fraction of around 30 to 35%. Grade 2 diastolic dysfunction. There is hypo to akinesis of anterior and anterolateral wall.    Right ventricle is normal in size and function.    Normal biatrial size.    The aortic valve appears trileaflet. There is no significant aortic stenosis or regurgitation.    There is moderate mitral regurgitation.    There is no evidence of pericardial effusion.    The aortic root measures 3.8 cm.          ASSESSMENT & PLAN      Third degree AV block    CAD (coronary artery disease)    TOOTIE (acute kidney injury)    Lactic acidosis    Acute cystitis without hematuria    Ischemic cardiomyopathy    Acute HFrEF (heart failure with reduced ejection fraction)    Shock liver       69 yo M with h/o COPD, DM, CAD, HTN, HLD, CKD presented to OSH on 6/27 where he was found to have 2 occluded vein grafts and underwent PCI to the Lcx.Patient came back to the hospital on 7/6 with chest pain, diagnosed with NSTEMI. Underwent cardiac cath and was found to have instent thrombosis. PTCA was done with an overlapping stent. Echo showed 30-35% which was a significant decreased from 55% earlier in June. On 7/9 patient developed a 3rd degree block. Received atropine, was attempted to be paced transcutaneously, then transvenously. He had a brief asystole. Was intubated. He underwent another LHC and was found to have a vasospasm distal to the stent. IABP was placed. Patient was transferred to our ICU.      Neuro - continue sedation. Patient is on propofol and fentanyl.   CV - monitor hemodynamics, IABP in place for coronary perfusion in the settings of CAD, recent stent and vasospasm. He is also on dopamine, wean as tolerated, would likely to minimize dopamine to avoid arrhythmogenicity. DAPT. Statin. Heparin gtt  Pulm - full vent support, wean as tolerated.    - noted TOOTIE on CKD, monitor closely kidney fn, doesn't require HD at this time, but his UO is low. Monitor lytes and replace.   GI - noted shock liver, likely from cardiac arrest, hypotension. Monitor liver enzymes, will check ammonia, coags. Will check KUB as on CXR dilated bowel loops noted. NPO.   ID - noted UTI, patient is on rocephin, continue currently, check procal  Hem onc - monitor for coagulopathy in the settings of shock liver, and s/p cardiac arrets.   Endo - monitor BG, goal 140-180      Bowel regimen: prn  Diet: NPO Diet NPO Type: Strict NPO  GI ppx: ppi  DVT PPX: VTE  Prophylaxis:  Pharmacologic & mechanical VTE prophylaxis orders are present.       Advance Directives: Code Status (Patient has no pulse and is not breathing): CPR (Attempt to Resuscitate)  Medical Interventions (Patient has pulse or is breathing): Full Support       Dispo: ICU    I have spent a total of 50 critical care minutes in the management of this patient, apart from any time taken to perform necessary procedures. Critical care time includes time reviewing chart, images, report of images, rounding with nurse, respiratory therapist and pharmacist, and discussions with available family at bedside.    Signed: Maribel Marcus MD  23:17 EDT 7/9/2025

## 2025-07-10 NOTE — PLAN OF CARE
Goal Outcome Evaluation:  Plan of Care Reviewed With: child        Progress: no change     RASS -2, + gag reflex. Propofol gtt 20 mcg/kg/min. Fentanyl gtt 100 mcg/hr.  Attempted to wean gtts for sedation vacation, pt. Did not tolerate hemodynamically. Pt. Remains intubated. Baloon pump in place. Pacing wires set at backup rate of 60, no pacing noted. Dopamine gtt 6 mcg/kg/min, weaning throughout shift. Heparin gtt 15 units/kg/hr. DP and PT pulses doppler.  ml.  Spoke with daughter via phone call 2x and face to face, updated on pt. Condition.

## 2025-07-10 NOTE — CONSULTS
[facility] at [facility]  CARDIAC ELECTROPHYSIOLOGY CONSULTATION NOTE    Christo Clifton  : 1955  MRN:[id number]  Home Phone:    Date of Admission:2025  Date of Consultation: 07/10/25  Primary Provider:  Lesa Rae APRN  Referring Provider: Chandrakant Jones MD  Reason for Consultation: heart block    IDENTIFICATION: A 70 y.o. male resident of [facility]    CC: Bradycardia      PROBLEM LIST:   Third-degree AV block  CAD  Remote CABG x 3 ~   [facility] 2025 Dr. Botello: Severe disease of proximal LCx s/p PCI + BRITT, occluded SVG to LCx, occluded SVG to RCA, patent but immature ZAVALA to LAD  [facility] 2025 Dr. Arevalo: 100% LCx ISR to recently placed stent s/p PCI plus additional single stent to distal edge of previous stent  [facility] 2025: Patent proximal LCx overlapping stents x2 with diffuse vasospasm noted distally, moderate mid LAD disease and severe mid to distal LAD disease, elevated LV EDP  Ischemic cardiomyopathy  TTE 6/3/2025: LVEF 56-60%, grade 1 diastolic dysfunction, mild aortic stenosis otherwise no significant VHD  TTE 2025: LVEF 30-35%, grade 2 diastolic dysfunction, moderate MR  Insulin-dependent diabetes mellitus  Hypertension  Dyslipidemia  COPD  Hypothyroidism      ALLERGIES:   Allergies   Allergen Reactions    Other        HPI:   Mr. Clifton is a 70-year-old male with the above medical history who cardiac EP was asked to evaluate regarding complete AV block.  See the above problem list for recent coronary interventions most notable for recent LCx stent with subsequent in-stent restenosis s/p repeat [facility] and second overlapping LCx stent.  The patient developed complete heart block on  a.m.  Patient received atropine with temporary improvement but eventually heart rate dropped back down to the 30s.  Transcutaneous pacing was attempted but patient became combative and was given IV Versed.  The patient developed complete asystole reportedly became  unresponsive.  He received brief ACLS reportedly and had ROSC.  The patient was back in third-degree heart block with agonal breathing leading to intubation.  Repeat left heart catheterization demonstrated  patent LCx stent but distal diffuse vasospasm.  Balloon pump was placed at that time and the patient was transferred to [facility].    Currently, the patient remains sedated on mechanical ventilation. He is conducting intrinsically now.      ROS: All systems have been reviewed and are negative with the exception of those mentioned in the HPI and problem list above.    HOME MEDICINES:   Current Outpatient Medications   Medication Instructions    [Paused] amLODIPine (NORVASC) 5 mg, Daily    aspirin 81 mg, Daily    atorvastatin (LIPITOR) 80 mg, Daily    cefTRIAXone 2,000 mg in sodium chloride 0.9 % 100 mL IVPB 2,000 mg, Intravenous, Every 24 Hours    DOPamine (INTROPIN) 400 mg/250 mL (1.6 mg/mL) in D5W infusion 2-20 mcg/kg/min, Intravenous, Titrated    escitalopram (LEXAPRO) 10 mg, Daily    fentaNYL 10 mcg/1 mL NS  mcg/hr ( mcg/hr), Intravenous, Titrated    fluticasone (FLONASE) 50 MCG/ACT nasal spray 1 spray, Nasal, Daily PRN    insulin glargine (LANTUS, SEMGLEE) 14 Units, Daily    levothyroxine (SYNTHROID, LEVOTHROID) 50 mcg, Daily    prasugrel (EFFIENT) 10 mg, Oral, Daily    propofol (DIPRIVAN) 10 mg/mL emulsion infusion 5-50 mcg/kg/min, Intravenous, Titrated       Surgical History:   Past Surgical History:   Procedure Laterality Date    AMPUTATION DIGIT Right 09/13/2017    Procedure: RIGHT 1ST TOE AMPUTATION ;  Surgeon: Lee Lee MD;  Location: [facility] OR;  Service:     CARDIAC CATHETERIZATION      CARDIAC CATHETERIZATION N/A 6/27/2025    Procedure: Left Heart Cath;  Surgeon: Mimi Botello MD;  Location:  [facility] CATH INVASIVE LOCATION;  Service: Cardiovascular;  Laterality: N/A;    CARDIAC CATHETERIZATION N/A 6/27/2025    Procedure: Percutaneous Coronary Intervention;  Surgeon: Mimi Botello  "MD;  Location:  [facility] CATH INVASIVE LOCATION;  Service: Cardiovascular;  Laterality: N/A;    CARDIAC CATHETERIZATION N/A 7/7/2025    Procedure: Left Heart Cath;  Surgeon: Anali Arevalo MD;  Location:  [facility] CATH INVASIVE LOCATION;  Service: Cardiovascular;  Laterality: N/A;    CARDIAC SURGERY      CIRCUMCISION      CORONARY ARTERY BYPASS GRAFT      HERNIA REPAIR      X2    INTRAVASCULAR ULTRASOUND N/A 6/27/2025    Procedure: Intravascular Ultrasound;  Surgeon: Mimi Botello MD;  Location:  [facility] CATH INVASIVE LOCATION;  Service: Cardiovascular;  Laterality: N/A;    CA INCISION & DRAINAGE LEG/ANKLE ABSCESS/HEMATOMA Right 05/24/2017    Procedure: Debridement of right first toe;  Surgeon: Ronald Perdue MD;  Location: [facility] OR;  Service: General    TOE SURGERY Left     debridement       Social History:   Social History     Socioeconomic History    Marital status:    Tobacco Use    Smoking status: Every Day     Current packs/day: 0.00     Average packs/day: 2.0 packs/day for 45.0 years (90.0 ttl pk-yrs)     Types: Cigarettes     Start date: 1965     Last attempt to quit: 2010     Years since quitting: 15.5    Smokeless tobacco: Never    Tobacco comments:     Trying to quit   Vaping Use    Vaping status: Never Used   Substance and Sexual Activity    Alcohol use: No    Drug use: No    Sexual activity: Defer       Family History:   Family History   Problem Relation Age of Onset    Diabetes Mother     Hypertension Mother     Diabetes Father        Objective     BP 90/41 (BP Location: Right arm, Patient Position: Lying)   Pulse 75   Temp 99 °F (37.2 °C) (Bladder)   Resp 16   Ht 180.3 cm (70.98\")   Wt 77.5 kg (170 lb 13.7 oz)   SpO2 96%   BMI 23.84 kg/m²     Intake/Output Summary (Last 24 hours) at 7/10/2025 0959  Last data filed at 7/10/2025 0600  Gross per 24 hour   Intake 100 ml   Output 810 ml   Net -710 ml       PHYSICAL EXAM:  CONSTITUTIONAL: Pt sedated on mech vent  CARDIOVASCULAR:  Regular rhythm " and normal rate, systolic murmur   RESPIRATORY: Clear to auscultation, normal respiratory effort, no wheezing, rales or rhonchi  EXTREMITIES: No gross deformities, no edema. Peripheral pulses are present and equal bilaterally  SKIN: Warm, dry. No bleeding, bruising or rash  NEUROLOGICAL: No gross focal deficits  PSYCHIATRIC: Normal mood and affect. Behavior is normal     Labs/Diagnostic Data  Results from last 7 days   Lab Units 07/10/25  0344 07/09/25  1906 07/09/25  1055   SODIUM mmol/L 136 130* 134*   POTASSIUM mmol/L 4.5 5.2 4.6   CHLORIDE mmol/L 101 96* 98   CO2 mmol/L 19.0* 18.5* 11.8*   BUN mg/dL 57.4* 52.7* 50.4*   CREATININE mg/dL 3.03* 3.44* 3.39*   GLUCOSE mg/dL 196* 283* 348*   CALCIUM mg/dL 7.8* 8.3* 8.3*     Results from last 7 days   Lab Units 07/07/25  1955 07/07/25  1846 07/07/25  1714   HSTROP T ng/L >10,000* >10,000* >10,000*     Results from last 7 days   Lab Units 07/10/25  0344 07/09/25  1906 07/09/25  1055   WBC 10*3/mm3 22.95* 26.27* 25.37*   HEMOGLOBIN g/dL 8.2* 9.1* 8.3*   HEMATOCRIT % 26.0* 28.9* 27.8*   PLATELETS 10*3/mm3 431 430 494*     Results from last 7 days   Lab Units 07/10/25  0344   MAGNESIUM mg/dL 2.2                 Results from last 7 days   Lab Units 07/10/25  0344   PROBNP pg/mL 24,260.0*     Results from last 7 days   Lab Units 07/10/25  0344 07/09/25  1915 07/09/25  1254   PROTIME Seconds 17.8* 18.4* 16.5*   INR  1.37* 1.43* 1.25*   APTT seconds 40.3* 36.5* 30.6       I personally reviewed the patient's EKG/Telemetry data    Radiology Data:   XR Chest 1 View  Result Date: 7/10/2025  1.Tubes and lines appear in good position. 2.Layering pleural effusions with bibasilar infiltrates or atelectasis. Electronically Signed: Ramiro Partida MD  7/10/2025 6:14 AM EDT  Workstation ID: [id number]    XR Abdomen KUB  Result Date: 7/10/2025  Gas and stool in the colon. Mild gaseous distention of the stomach. Gas-filled small bowel loops may reflect a mild ileus. Electronically Signed:  Ramiro Partida MD  7/10/2025 6:11 AM EDT  Workstation ID: [id number]    XR Chest 1 View  Result Date: 7/9/2025  Impression: Endotracheal tube projects 4 cm above the isaías. Stable appearance of additional support devices. Findings suggestive of mild pulmonary edema. Multifocal infection, including atypical/viral infection, can appear similar. Electronically Signed: Tab Cerrato MD  7/9/2025 8:14 PM EDT  Workstation ID: [id number]    US Arterial Doppler Lower Extremity Bilateral  Result Date: 7/9/2025  1. Dampened flow 2. Irregular waveforms but no occlusion.   This report was finalized on 7/9/2025 3:38 PM by Dr. Sky Roberts MD.      XR Chest 1 View  Result Date: 7/9/2025  Line placement as above    This report was finalized on 7/9/2025 10:50 AM by Dr. Sky Roberts MD.      XR Chest 1 View  Result Date: 7/9/2025  Appearance suggestive of pulmonary congestion    This report was finalized on 7/9/2025 7:34 AM by Dr. Sky Roberts MD.          Current Medications:    aspirin, 81 mg, Nasogastric, Daily  [Held by provider] atorvastatin, 80 mg, Oral, Daily  cefTRIAXone (ROCEPHIN) 2,000 mg in sodium chloride 0.9 % 100 mL IVPB-VTB, 2,000 mg, Intravenous, Q24H  escitalopram, 10 mg, Nasogastric, Daily  insulin regular, 3-14 Units, Subcutaneous, Q6H  levothyroxine, 50 mcg, Nasogastric, Daily  mupirocin, 1 Application, Each Nare, BID  pantoprazole, 40 mg, Intravenous, Q24H  prasugrel, 10 mg, Nasogastric, Daily  senna-docusate sodium, 2 tablet, Nasogastric, BID  sodium chloride, 10 mL, Intravenous, Q12H      DOPamine, 2-20 mcg/kg/min (Dosing Weight), Last Rate: 14 mcg/kg/min (07/10/25 0551)  fentanyl 10 mcg/mL,  mcg/hr, Last Rate: 100 mcg/hr (07/10/25 0537)  heparin, 14 Units/kg/hr, Last Rate: 14 Units/kg/hr (07/10/25 0550)  Pharmacy to Dose Heparin,   propofol, 5-50 mcg/kg/min (Dosing Weight), Last Rate: 20 mcg/kg/min (07/10/25 0550)        Assessment  Transient CHB presumed s/t ischemia  Temporary transvenous  pacemaker placed at OSH  CAD  CABG x3 ~2008  [facility] 6/27/2025 Dr. Botello: Severe disease of proximal LCx s/p PCI + BRITT, occluded SVG to LCx, occluded SVG to RCA, patent but immature ZAVALA to LAD  [facility] 7/7/2025 Dr. Arevalo: 100% LCx ISR to recently placed stent s/p PCI plus additional single stent to distal edge of previous stent  [facility] 7/9/2025: Patent proximal LCx overlapping stents x2 with diffuse vasospasm noted distally, moderate mid LAD disease and severe mid to distal LAD disease, elevated LV EDP      Plan  Patient is now conducting in NSR with short LBBB. Transient CHB likely from acute ischemia. Would keep temp PM in for another 24-48 hours in case CHB recurs but no indication for urgent pacemaker currently.  Given his left bundle branch block with QRS duration less than 150, could be a candidate for CRT plus or minus ICD at some point.  At the current time needs to work on weaning balloon pump and ventilator per interventional cardiology and ICU.  Cardiac EP will follow on as needed basis. Reach out for further recommendations or reevaluation       Scribed by David Boggs PA-C for Dr. Aaron Giatan MD on 07/10/25    I, Aaron Gaitan MD, personally performed the services described in this documentation as scribed by the above named individual in my presence, and it is both accurate and complete.  7/10/2025  14:53 EDT

## 2025-07-10 NOTE — PAYOR COMM NOTE
"[facility]  NPI:[id number]    Utilization Review  Contact: Rica John RN  Phone: [phone]  Fax:[phone]    DISCHARGE NOTIFICATION   [id number]       Christo Clifton (70 y.o. Male)       Date of Birth   1955    Social Security Number       Address   [address] [place] [address]    Home Phone       MRN   [id number]       Christianity   None    Marital Status                               Admission Date   7/6/2025    Admission Type   Emergency    Admitting Provider   Willis Toledo MD    Attending Provider       Department, Room/Bed   [facility] CRITICAL CARE, CC06/       Discharge Date   7/9/2025    Discharge Disposition   [facility] ([facility]    Discharge Destination                                 Attending Provider: (none)   Allergies: Other    Isolation: None   Infection: None   Code Status: CPR    Ht: 155.8 cm (61.32\")   Wt: 77.5 kg (170 lb 13.7 oz)    Admission Cmt: None   Principal Problem: Chest pain [R07.9]                   Active Insurance as of 7/6/2025       Primary Coverage       Payor Plan Insurance Group Employer/Plan Group    [employer] MEDICARE REPLACEMENT UHC MEDICARE ADVANTAGE SNP PPO [id number]       Payor Plan Address Payor Plan Phone Number Payor Plan Fax Number Effective Dates    [address] [address]   3/1/2025 - None Entered    [facility] [id number]         Subscriber Name Subscriber Birth Date Member ID       CHRISTO CLIFTON 1955 [id number]               Secondary Coverage       Payor Plan Insurance Group Employer/Plan Group    [facility] COMMUNITY PLAN [facility] COMMUNITY PLAN [facility] [place]       Payor Plan Address Payor Plan Phone Number Payor Plan Fax Number Effective Dates    [address]   1/1/2024 - None Entered    [facility] [address]         Subscriber Name Subscriber Birth Date Member ID       CHRISTO CLIFTON 1955 [id number]                     Emergency Contacts        (Rel.) Home Phone Work Phone " Mobile Phone    CHARLIE LOAIZA (Son) [phone] -- --    Betsey Lamb -- -- [phone]                 Discharge Summary        Carlos Perkins DO at 25 1416              [facility] HOSPITALIST MEDICINE DISCHARGE SUMMARY    Patient Identification:  Name:  Christo Clifton  Age:  70 y.o.  Sex:  male  :  1955  MRN:  [id number]  Visit Number:  [id number]    Date of Admission: 2025  Date of Discharge: 2025    PCP: Lesa Rae APRN    DISCHARGE DIAGNOSIS   Third-degree AV block  Cardiac arrest  Type I NSTEMI  Newly diagnosed HFrEF  UTI  Insulin-dependent type 2 diabetes mellitus  Hyperlipidemia  Essential hypertension      CONSULTS  Dr. Botello, Interventional Cardiology  Dr. Arevalo, Interventional Cardiology      PROCEDURES PERFORMED   Patient underwent left heart catheterization on 2025 secondary to type I NSTEMI.  Patient did undergo PCI of ostial left circumflex lesion with balloon angioplasty to pre-existing stent and placement of overlapping stent.  Please see procedure note for specific details.  Patient tolerated the procedure well with no postprocedural complications.  Patient did undergo temporary transvenous pacer placement on 2025 after developing third-degree AV block.  No procedure note is available for review at time of this dictation.  Per verbal discussion via telephone, interventional cardiology did place successful transvenous pacemaker with no complications.  Patient did undergo repeat left heart catheterization on 2025 after patient went into third-degree AV block with brief degeneration into cardiac arrest.  Left heart catheterization demonstrated patent stent in proximal left circumflex artery with diffuse coronary artery vasospasm distal to that stent.  Please see procedure note for specific details.      HOSPITAL COURSE  Mr. Clifton is a 70 y.o. male who presented to [facility] ED on 2025 with a chief complaint of chest  pain.  Patient has a past medical history markable for insulin-dependent type 2 diabetes mellitus, essential hypertension, hyperlipidemia, COPD and seizure disorder.  It should be noted patient was recently hospitalized in our facility from 6/27/2025 and discharged 6/28/2025 after having left heart catheterization.  During that left heart catheterization, patient was found to have 2 occluded vein grafts and did have PCI to left circumflex with stent placement.  Patient tolerated that procedure well and then was discharged home.  However, patient had sudden onset chest pain that began overnight on the night prior to admission.  He reported the pain as a burning and pressure type sensation in the retrosternal area radiating into his right arm.  Patient also reported associated dizziness with shortness of breath.  Initial evaluation in the emergency department did consist of basic laboratory work as well as physical exam and vital signs.  Initial vital signs found patient's blood pressure 102/40, respiratory rate 20, heart rate 68, temperature 97.6 °F with oxygen saturation 99% on room air.  Initial lab work also included troponin, the first of which was 357 and repeat was 311.  With this in mind, patient was diagnosed with unstable angina and suspected type I NSTEMI and admitted for further treatment and evaluation.    Interventional cardiology services were consulted who thoroughly evaluated the patient.  Recommendation was made to start patient on heparin drip and to proceed with repeat left heart catheterization.  Patient underwent left heart catheterization on 7/7/2025 which demonstrated in-stent thrombosis felt to be secondary to medical noncompliance.  Patient had balloon angioplasty to pre-existing proximal left circumflex stent with deployment of overlapping stent just distal to previous existing stent.  Patient tolerated the procedure well with no postprocedural complications.  Patient was observed for 24  hours and transthoracic echo then obtained.  Transthoracic echo demonstrated significantly reduced left ventricular systolic function with estimated EF of 30 to 35% with grade 2 diastolic dysfunction.  Patient did have elevating serum creatinine on 7/8/2025 and patient was observed for another 24 hours to address developing acute kidney injury.  On the morning of 7/9/2025, patient's renal function had worsened as evidenced by serum creatinine of 1.6 rising to 2.4.  Unfortunately, later on the morning of 7/9/2025, patient had abrupt change in cardiac rhythm.  Patient developed significant bradycardia and review of telemetry demonstrated what appeared to be consistent with third-degree AV block.  Stat EKG was obtained which did confirm third-degree AV block.  Interventional cardiology services were immediately contacted to inform them of this abrupt change and likely need for repeat left heart catheterization with suspicion of possible failure of recent stent placement.  Attempt was made at 1 mg IV atropine which briefly improved patient's heart rate to the low 50s and quickly settled back into third-degree AV block in the 30s.  Patient was having pacing pads placed on him and being prepared for transcutaneous pacing.  However, patient was somewhat combative and attempts were made at giving out Versed 1 mg IV x 1 dose.  While administering medication, patient was noted to go from third-degree AV block to what appeared to be complete asystole and patient became completely unresponsive.  YAAKOV IGLESIAS was called and patient did undergo brief round of ACLS.  Prior to the first 2 minutes of compressions being performed, patient did have spontaneous ROSC where he was moving his arms and trying to push nursing staff away from him.  Review of telemetry and AED demonstrated persistent third-degree AV block.  Patient was minimally responsive and did appear to have developed agonal breathing.  As such, as soon as pacing pads were  attached, transcutaneous pacing was initiated and patient was emergently intubated.  Patient was then transferred to the intensive care unit for further treatment and evaluation.    When patient arrived to the intensive care unit, he did develop hypotension requiring the initiation of dopamine.  There was some difficulty getting transcutaneous pacer to capture and initial settings with 60 mA had to be increased to 80 mA to ensure capture of 60 bpm.  Interventional cardiology then evaluated patient at bedside who recommended urgent transvenous pacemaker placement as well as repeat left heart catheterization.  Patient underwent both of these procedures on 7/9/2025 with details as listed above.  After completion of these procedures, recommendation was made to transfer patient to [facility] for higher level of care.  This case was discussed between interventional cardiology at [facility] and cardiology services at [facility].  Patient has been graciously accepted in transfer for further evaluation at this time.  At time of this dictation, patient is prepared for transport but our facility is having difficulty arranging transport at this time.  Patient will be transported to [facility] as soon as transportation can be arranged (due to inclement weather, no helicopters are flying at this time.  There are also no ground EMS crew's available per discussion with house supervisor).  The beforementioned plan was thoroughly discussed with patient's family who expressed their understanding and willingness to proceed with the beforementioned plan.    VITAL SIGNS:      07/06/25  1613 07/06/25  1935 07/07/25  0400   Weight: 67.7 kg (149 lb 4 oz) 77.5 kg (170 lb 13.7 oz) 77.5 kg (170 lb 13.7 oz)     Body mass index is 31.95 kg/m².    PHYSICAL EXAM:  General -patient is acutely ill and currently intubated and sedated  HEENT -head normocephalic and atraumatic  Lungs -clear to  auscultation bilaterally with no wheezes, rales or rhonchi appreciated  Cardiovascular -currently transvenous paced  GI -abdomen soft, nontender and nondistended  Neurological -intubated and sedated    DISCHARGE DISPOSITION   Stable    DISCHARGE MEDICATIONS:     Discharge Medications        PAUSE taking these medications        Instructions Start Date   amLODIPine 5 MG tablet  Wait to take this until your doctor or other care provider tells you to start again.  Commonly known as: NORVASC   5 mg, Daily             New Medications        Instructions Start Date   cefTRIAXone 2,000 mg in sodium chloride 0.9 % 100 mL IVPB   2,000 mg, Intravenous, Every 24 Hours   Start Date: July 10, 2025     DOPamine 400 mg/250 mL (1.6 mg/mL) in D5W infusion  Commonly known as: INTROPIN   2-20 mcg/kg/min (155-1,550 mcg/min), Intravenous, Titrated      fentaNYL 10 mcg/1 mL NS    mcg/hr ( mcg/hr), Intravenous, Titrated      prasugrel 10 MG tablet  Commonly known as: EFFIENT   60 mg, Oral, Once      prasugrel 10 MG tablet  Commonly known as: EFFIENT   10 mg, Oral, Daily   Start Date: July 10, 2025     propofol 10 mg/mL emulsion infusion  Commonly known as: DIPRIVAN   5-50 mcg/kg/min (387.5-3,875 mcg/min), Intravenous, Titrated             Continue These Medications        Instructions Start Date   aspirin 81 MG EC tablet   81 mg, Daily      atorvastatin 80 MG tablet  Commonly known as: LIPITOR   80 mg, Daily      escitalopram 10 MG tablet  Commonly known as: LEXAPRO   10 mg, Daily      fluticasone 50 MCG/ACT nasal spray  Commonly known as: FLONASE   1 spray, Nasal, Daily PRN      insulin glargine 100 UNIT/ML injection  Commonly known as: LANTUS, SEMGLEE   14 Units, Daily      levothyroxine 50 MCG tablet  Commonly known as: SYNTHROID, LEVOTHROID   50 mcg, Daily             Stop These Medications      clopidogrel 75 MG tablet  Commonly known as: PLAVIX                Your Scheduled Appointments      Sep 15, 2025 11:00 AM  Follow  Up with Weor Green MD  [facility] ([place]) [address]  [facility] [address] [phone]   -Bring photo ID, insurance card, and list of medications to appointment  -If testing was completed outside of [facility] then patient must bring images on a disc  -Copay will be collected at time of appointment                 Follow-up Information       Lesa Rae APRN .    Specialty: Nurse Practitioner  Contact information:  [address] [facility] [address] [phone]                             TEST  RESULTS PENDING AT DISCHARGE  Pending Labs       Order Current Status    Urine Culture - Urine, Urine, Clean Catch In process             The ASCVD Risk score ([place] DK, et al., 2019) failed to calculate for the following reasons:    The valid total cholesterol range is 130 to 320 mg/dL   Carlos Perkins DO  07/09/25  14:17 EDT    Please note that this discharge summary required more than 30 minutes to complete.    Please send a copy of this dictation to the following providers:  Lesa Rae APRN    Electronically signed by Carlos Perkins DO at 07/09/25 6572

## 2025-07-10 NOTE — CASE MANAGEMENT/SOCIAL WORK
Continued Stay Note  [facility]     Patient Name: Christo Clifton  MRN: [id number]  Today's Date: 7/10/2025    Admit Date: 7/9/2025    Plan: Home   Discharge Plan       Row Name 07/10/25 1251       Plan    Plan Home    Plan Comments Patient discussed in ICU MDR earlier today; continues on vent. I spoke with his son Roel over the phone in regards to discharge planning. Patient lives in a camper/with hook ups located in [facility]. Roel tells me, his sister Betsey plans to bring to her home in [place] when he leaves here. He reports his father as being IADL, no DME in use, home health or OPPT. Patient has listed duo certified insurance; [facility], has drug coverage. His PCP is Lesa Rae.  No known advanced directives. Family will transport to home.  will follow his hospitalization and assist with discharge planning needs    Final Discharge Disposition Code 01 - home or self-care                   Discharge Codes    No documentation.                       Hodan Murphy RN

## 2025-07-10 NOTE — PLAN OF CARE
Goal Outcome Evaluation:      Able to wean FiO2 down through the course of the shift, otherwise unable to reduce vent support at this time

## 2025-07-11 LAB
QT INTERVAL: 420 MS
QT INTERVAL: 612 MS
QTC INTERVAL: 493 MS
QTC INTERVAL: 569 MS

## 2025-07-11 NOTE — CONSULTS
"                  Clinical Nutrition     Patient Name: Christo Clifton  YOB: 1955  MRN: [id number]  Date of Encounter: 25 09:23 EDT  Admission date: 2025  Reason for Visit: MDR, Physician consult, EN    Assessment   Nutrition Assessment   Admission Diagnosis:  Complete heart block [I44.2]    Problem List:    Third degree AV block    CAD (coronary artery disease)    TOOTIE (acute kidney injury)    Lactic acidosis    Acute cystitis without hematuria    Ischemic cardiomyopathy    Acute HFrEF (heart failure with reduced ejection fraction)    Shock liver    Complete heart block      PMH:   He  has a past medical history of Anxiety and depression, Arthritis, COPD (chronic obstructive pulmonary disease), Coronary artery disease, Diabetes mellitus, Disease of thyroid gland, Dyslipidemia, History of transfusion, Hypertension, and Seizure disorder.    PSH:  He  has a past surgical history that includes Coronary artery bypass graft; Hernia repair; Cardiac surgery; Circumcision, non-; Toe Surgery (Left); pr incision & drainage leg/ankle abscess/hematoma (Right, 2017); Finger amputation (Right, 2017); Cardiac catheterization; Cardiac catheterization (N/A, 2025); INTRAVASCULAR ULTRASOUND (N/A, 2025); Cardiac catheterization (N/A, 2025); Cardiac catheterization (N/A, 2025); Cardiac catheterization (N/A, 2025); Cardiac electrophysiology procedure (N/A, 2025); and Cardiac catheterization (N/A, 2025).    Applicable Nutrition History:   Skin integrity:  DM ulcers x3 - WOC consult pending    () intubated  (7/10) KUB - \"Gas and stool in the colon. Mild gaseous distention of the stomach. Gas-filled small bowel loops may reflect a mild ileus\"      Labs    Labs Reviewed: Yes    Results from last 7 days   Lab Units 25  0304 07/10/25  0344 25  2209 25  1906 25  0038 25  1048   GLUCOSE mg/dL 168* 196*  --  283*   < >  --    BUN mg/dL 54.6* " 57.4*  --  52.7*   < >  --    CREATININE mg/dL 2.34* 3.03*  --  3.44*   < >  --    SODIUM mmol/L 138 136  --  130*   < >  --    CHLORIDE mmol/L 105 101  --  96*   < >  --    POTASSIUM mmol/L 4.1 4.5  --  5.2   < >  --    PHOSPHORUS mg/dL 5.5* 7.0*  --   --   --   --    MAGNESIUM mg/dL 2.3 2.2  --  2.4  --   --    ALT (SGPT) U/L 1,871* 1,839*  --  2,020*   < >  --    LACTATE mmol/L  --   --  1.4 2.5*  --  1.7    < > = values in this interval not displayed.       Results from last 7 days   Lab Units 07/11/25  0304 07/10/25  0344 07/09/25  1915 07/09/25  1906   ALBUMIN g/dL 2.8* 3.0*  --  3.4*   IONIZED CALCIUM mmol/L 1.15 1.02* 1.07*  --        Results from last 7 days   Lab Units 07/11/25  0526 07/10/25  2315 07/10/25  1728 07/10/25  1202 07/10/25  1123 07/10/25  0546   GLUCOSE mg/dL 177* 175* 172* 142* 133* 180*     Lab Results   Lab Value Date/Time    HGBA1C 9.10 (H) 10/15/2018 0926    HGBA1C 9.60 (H) 05/14/2018 0828         Results from last 7 days   Lab Units 07/10/25  0344   PROBNP pg/mL 24,260.0*       Medications    Medications Reviewed: yes    Scheduled Meds:aspirin, 81 mg, Nasogastric, Daily  [Held by provider] atorvastatin, 80 mg, Oral, Daily  cefTRIAXone (ROCEPHIN) 2,000 mg in sodium chloride 0.9 % 100 mL IVPB-VTB, 2,000 mg, Intravenous, Q24H  escitalopram, 10 mg, Nasogastric, Daily  hydrocortisone sodium succinate, 50 mg, Intravenous, Q6H  insulin regular, 3-14 Units, Subcutaneous, Q6H  levothyroxine, 50 mcg, Nasogastric, Daily  mupirocin, 1 Application, Each Nare, BID  pantoprazole, 40 mg, Intravenous, Q24H  prasugrel, 10 mg, Nasogastric, Daily  senna-docusate sodium, 2 tablet, Nasogastric, BID  sodium chloride, 10 mL, Intravenous, Q12H      Continuous Infusions:DOPamine, 2-20 mcg/kg/min (Dosing Weight), Last Rate: 2.5 mcg/kg/min (07/11/25 0602)  fentanyl 10 mcg/mL,  mcg/hr, Last Rate: 100 mcg/hr (07/10/25 1614)  heparin, 16 Units/kg/hr, Last Rate: 16 Units/kg/hr (07/10/25 2021)  Pharmacy to Dose  "Heparin,   propofol, 5-50 mcg/kg/min (Dosing Weight), Last Rate: 30 mcg/kg/min (07/11/25 8219)      PRN Meds:.  senna-docusate sodium **AND** polyethylene glycol **AND** [DISCONTINUED] bisacodyl **AND** bisacodyl    Calcium Replacement - Follow Nurse / BPA Driven Protocol    dextrose    fentaNYL    glucagon (human recombinant)    hydrALAZINE    Magnesium Cardiology Dose Replacement - Follow Nurse / BPA Driven Protocol    nitroglycerin    [DISCONTINUED] ondansetron ODT **OR** ondansetron    Pharmacy to Dose Heparin    Phosphorus Replacement - Follow Nurse / BPA Driven Protocol    Potassium Replacement - Follow Nurse / BPA Driven Protocol    sodium chloride    sodium chloride    Intake/Ouptut 24 hrs (0701 - 0700)   I&O's Reviewed: yes  Intake & Output (last day)         07/10 0701  07/11 0700 07/11 0701  07/12 0700    I.V. (mL/kg) 2654.9 (34.3)     NG/GT 60     IV Piggyback      Total Intake(mL/kg) 2714.9 (35)     Urine (mL/kg/hr) 2640 (1.4)     Emesis/NG output 200     Total Output 2840     Net -125.1                   Anthropometrics     Height: Height: 180.3 cm (70.98\")  Last Filed Weight: Weight: 77.5 kg (170 lb 13.7 oz) (07/09/25 1908)  Method: Weight Method: Bed scale  BMI: BMI (Calculated): 23.8    UBW:    Weight      Weight (kg) Weight (lbs) Weight Method   11/25/2024 75.932 kg  167 lb 6.4 oz     3/5/2025 75.66 kg  166 lb 12.8 oz     5/19/2025 75.116 kg  165 lb 9.6 oz     6/18/2025 75.479 kg  166 lb 6.4 oz     6/27/2025 77.1 kg  169 lb 15.6 oz  Bed scale    6/28/2025 67.7 kg  149 lb 4 oz  Bed scale    7/6/2025 77.5 kg  170 lb 13.7 oz  Bed scale    7/7/2025 77.5 kg  170 lb 13.7 oz  Bed scale    7/9/2025 77.5 kg  170 lb 13.7 oz  Bed scale      Weight change: No significant changes    Nutrition Focused Physical Exam     Date: 7/11    Unable to perform due to Hemodynamic instability     Subjective   Reported/Observed/Food/Nutrition Related History:     Pt intubated/sedated on MV; +propofoL. Requiring pressers x1. " "H/H trending down. Noted to have possible mild ileus on KUB. AST/ALT remain elevated. NKFA    Needs Assessment   Date: 7/11    Height used:Height: 180.3 cm (70.98\")  Weights used: 77.5 kg CBW    Estimated Calorie needs: ~ 1575 Kcal/day initial goal on vent  Method: 23-27 Kcals/KG = 5591-5558  Method:  MSJ (1550) x 1.2 = 1860  Method:  PSU = 1576  Ve: 7.69; Tmax: 36.3    Estimated Protein needs: ~ 90 g PRO/day w/ current renal fxn  Method: 1.0-1.2g/Kg CBW = 78-93    Estimated Fluid needs: ~ ml/day   Per clinical status    Current Nutrition Prescription     PO: NPO Diet NPO Type: Strict NPO  Oral Nutrition Supplement: N/A  Intake: N/A    Assessment & Plan   Nutrition Diagnosis   Date: 7/11 Updated:   Problem Inadequate oral intake    Etiology ARF/MV   Signs/Symptoms NPO   Status: New    Goal:   Nutrition to support treatment and Initiate EN      Nutrition Intervention      Follow treatment progress, Care plan reviewed, Nutrition support order placed    Initiate trophic feeds   Peptamen 1.5 @ 15mL/hr + flush of 30mL q2hrs  Infused at goal over 20 hrs this regimen provides: 300mL TGV, 450kcal (29% est needs), 20g protein (22% est needs), 0g fiber, 231mL TF FW (+ 300mL flush = 531mL total).   Current propofoL rate = 13.95mL/hr (368 kcal/day)   TF + propofoL = 818kcal (52% est needs)  Will adjust EN regimen as needs with current propofoL infusion    Adjust flush per clinical status  Close monitoring of electrolytes and replace per protocol    Monitoring/Evaluation:   Per protocol, I&O, Pertinent labs, EN delivery/tolerance, Weight, GI status, Symptoms, POC/GOC, Hemodynamic stability    Shelly Louise, MS,RD,LD  Time Spent: 40min  "

## 2025-07-11 NOTE — PROGRESS NOTES
"Intensive Care Follow-up     Hospital:  LOS: 2 days   Mr. Christo Clifton, 70 y.o. male is followed for:   Third degree AV block   Cardiac arrest  Cardiogenic shock         History of present illness:   70 year-old male with COPD, T2DM, CAD, HTN, questionable history of seizures, hypothyroidism, dyslipidemia, anxiety, and depression who was recently admitted at [facility] from 6/27-6/28 for LHC where he was found to have 2 occluded vein grafts and did have PCI to left circ with stent placement. Patient re-presented to [facility] ED on 7/6/25 with chest pain that began the night prior. He was admitted with an NSTEMI and underwent repeat LHC on 7/7/28 which demonstrated in-stent thrombus felt to be secondary to medical noncompliance. Balloon angioplasty performed to stent with deployment of overlapping stent just distal to previous stent. ECHO obtained 24 hours later which showed LVEF 30-35% with grade 2 diastolic dysfunction.      Stay complicated by worsening renal failure.     On the morning of 7/9, patient developed third-degree AV block. Atropine given with temporary improvement of HR to 50s / HR dropped back down to 30.  Attempted to transcutaneous pace patient when patient became combative and 1 mg IV Versed given. Patient developed complete asystole on monitor and became unresponsive. He received \"brief ACLS\" and had ROSC. Following this, patient back in third-degree AV block. He was agonally breathing and was intubated. Transcutaneous pacing was initiated.      Following this, Cardiology called who felt patient needed repeat LHC due to possible failure of stent. He underwent transvenous pacer placement on 7/9/25 and repeat LHC showing patent stent in proximal left circ artery with diffuse coronary artery vasospasm distal to stent.  Intra-aortic balloon pump placed.      [facility] contacted for transfer to higher level of care and he has been accepted.     Patient was noted to have worsening renal failure as well as elevated LFTs " suggestive of shock liver.  Patient was started on dopamine and was at 20 mics per KG per minute dopamine on arrival with 1:1 support on balloon pump.      Subjective   Interval History:  Overnight patient was able to be weaned from dopamine now at 2.5 mics.  Balloon pump is 1:2 support.  Urine output is improving.  Intermittent pacing is still required.  Apparently on sedation occasion patient did follow some simple commands and moves all extremities.               The patient's past medical, surgical and social history were reviewed and updated in Epic as appropriate.       Objective     Infusions:  DOPamine, 2-20 mcg/kg/min (Dosing Weight), Last Rate: 2.5 mcg/kg/min (07/11/25 0602)  fentanyl 10 mcg/mL,  mcg/hr, Last Rate: 100 mcg/hr (07/10/25 1614)  heparin, 16 Units/kg/hr, Last Rate: 16 Units/kg/hr (07/10/25 2021)  Pharmacy to Dose Heparin,   propofol, 5-50 mcg/kg/min (Dosing Weight), Last Rate: 30 mcg/kg/min (07/11/25 0759)      Medications:  aspirin, 81 mg, Nasogastric, Daily  [Held by provider] atorvastatin, 80 mg, Oral, Daily  cefTRIAXone (ROCEPHIN) 2,000 mg in sodium chloride 0.9 % 100 mL IVPB-VTB, 2,000 mg, Intravenous, Q24H  escitalopram, 10 mg, Nasogastric, Daily  hydrocortisone sodium succinate, 50 mg, Intravenous, Q6H  insulin regular, 3-14 Units, Subcutaneous, Q6H  levothyroxine, 50 mcg, Nasogastric, Daily  mupirocin, 1 Application, Each Nare, BID  pantoprazole, 40 mg, Intravenous, Q24H  prasugrel, 10 mg, Nasogastric, Daily  senna-docusate sodium, 2 tablet, Nasogastric, BID  sodium chloride, 10 mL, Intravenous, Q12H        Vital Sign Min/Max for last 24 hours  Temp  Min: 95.4 °F (35.2 °C)  Max: 99.7 °F (37.6 °C)   BP  Min: 80/30  Max: 135/44   Pulse  Min: 50  Max: 81   Resp  Min: 16  Max: 16   SpO2  Min: 93 %  Max: 100 %   No data recorded       Input/Output for last 24 hour shift  07/10 0701 - 07/11 0700  In: 2714.9 [I.V.:2654.9]  Out: 2840 [Urine:2640]   Mode: VC+/AC  FiO2 (%):  [30 %-35 %] 30  "%  S RR:  [16] 16  S VT:  [530 mL] 530 mL  PEEP/CPAP (cm H2O):  [5 cm H20] 5 cm H20  MAP (cm H2O):  [8.5-9.7] 8.9  Objective:  Vital signs: (most recent): Blood pressure (!) 115/39, pulse 52, temperature 95.4 °F (35.2 °C), temperature source Bladder, resp. rate 16, height 180.3 cm (70.98\"), weight 77.5 kg (170 lb 13.7 oz), SpO2 99%.            General Appearance:  Not in distress, no asynchrony with mechanical ventilator.  Head:  Pupils sluggish reactive & symmetrical B/L.  Neck:  Endotracheal tube clean.   Lungs:   B/L Breath sounds present with decreased breath sounds on bases, no wheezing heard, no crackles.   Heart: S1 and S2 present, no murmur, IABP counter pulsations heard.   Abdomen: Soft, nontender, no guarding or rigidity, bowel sounds hypoactive.  Extremities: Previous right great toe amputation, left dorsum of foot has healing ulcers with mild erythema around..  Pulses: Doppler positive and dampened on the left side.  Balloon pump in place in left femoral, pacemaker on the right venous access.  Neurologic:  Pt sedated on the vent. Not able to participate in full neurological exam    Ventilator settings: A/C: FiO2 30%    Results from last 7 days   Lab Units 07/11/25  0304 07/10/25  0344 07/09/25  1906   WBC 10*3/mm3 19.73* 22.95* 26.27*   HEMOGLOBIN g/dL 7.7* 8.2* 9.1*   PLATELETS 10*3/mm3 384 431 430     Results from last 7 days   Lab Units 07/11/25  0304 07/10/25  0344 07/09/25  1906   SODIUM mmol/L 138 136 130*   POTASSIUM mmol/L 4.1 4.5 5.2   CO2 mmol/L 19.0* 19.0* 18.5*   BUN mg/dL 54.6* 57.4* 52.7*   CREATININE mg/dL 2.34* 3.03* 3.44*   MAGNESIUM mg/dL 2.3 2.2 2.4   PHOSPHORUS mg/dL 5.5* 7.0*  --    GLUCOSE mg/dL 168* 196* 283*     Estimated Creatinine Clearance: 31.7 mL/min (A) (by C-G formula based on SCr of 2.34 mg/dL (H)).    Results from last 7 days   Lab Units 07/11/25  0331   PH, ARTERIAL pH units 7.368   PCO2, ARTERIAL mm Hg 32.9*   PO2 ART mm Hg 97.4       Images:   Chest x-ray reviewed " personally and showed endotracheal tube in mid trachea.  Right IJ catheter in good position without any pneumothorax.  Balloon pump tape appears to be distal to subclavian artery.  Bilateral small pleural effusions.  Slightly improved effusions bilaterally.    Abdominal x-ray showed nonspecific bowel gas pattern with moderate stool burden.    I reviewed the patient's results and images.     Urine culture grew more than 100,000 colonies of coag negative staph.    Assessment & Plan   Impression        Third degree AV block    CAD (coronary artery disease)    TOOTIE (acute kidney injury)    Lactic acidosis    Acute cystitis without hematuria    Ischemic cardiomyopathy    Acute HFrEF (heart failure with reduced ejection fraction)    Shock liver    Complete heart block       Plan        1.  Patient presented from outside facility with complete heart block, cardiogenic shock and multiorgan failure, intubated on mechanical ventilation with intra-aortic balloon pump support.  Patient currently appears to be in sinus rhythm.  Intermittent pacing still required.  Cardiology and EP team following.  Slightly improved hemodynamically overnight with weaning dopamine and balloon pump support.  If continues to do well then plan is to discontinue balloon pump later on today. Monitor peripheral vasculature.  Patient has PVD and has dampened pulses in the left lower extremity but no ischemic changes observed at this time.  2.  Patient with recent in-stent thrombosis complicated by medical noncompliance.  Continue aspirin, Effient.  On IV heparin infusion with balloon pump in place.  Will discuss further anticoagulation with cardiology team once colon pump is discontinued.  3.  Continue current ventilatory support. ABG improving.  Metabolic acidosis likely from ongoing renal failure .  4.  Component of UTI.  Urine cultures are growing coag negative staph.  Continue Rocephin.  WBC count slowly improving.  5.  Patient had low cortisol level  suggestive of critical illness related corticosteroid insufficiency.  Started on hydrocortisone.  6.  Continue levothyroxine for hypothyroidism.  7.  Monitor electrolytes and replace per ICU protocol.  Creatinine slightly improved.  Urine output is improving.  8.  Shock liver improving.  Continue to hold statins..  9.  Continue bowel regimen.  Start patient on trophic tube feeds.  10.  GI prophylaxis with Protonix.  11.  Insulin as needed for hyperglycemia management.  12.  Wound care consult for what appears like diabetic ulcers.  13.  Patient is currently sedated.  Will go ahead and wean sedation to assess mental status.  TTM was not initiated as it was a brief 1 round CPR.  Apparently patient wakes up and follow some simple commands.  14.  Hemoglobin slowly trending down.  No obvious source of bleeding.  Abdominal exam is benign.  Will recheck hemoglobin later on today and if continues to drop will give transfusion and will consider further workup per to rule out retroperitoneal bleed.    Patient remains with extremely guarded prognosis.  Family was updated yesterday.  We will continue to provide aggressive support.  Once cardiac status stabilizes will work toward weaning from mechanical ventilation.    Plan of care and goals reviewed with multidisciplinary/antibiotic stewardship team during rounds.   I discussed the patient's findings and my recommendations with nursing staff and consulting provider       Critical care time spent 42 min(exclusive of procedure time)  including high complexity decision making to assess, manipulate, and support vital organ system failure in this individual who has impairment of one or more vital organ systems such that there is a high probability of imminent or life threatening deterioration in the patient’s condition.  Above documentation reviewed and reflect accurate information as of 7/11/2025 including copied elements of the note.       Chandrakant Jones MD, [facility]  Pulmonary,  Critical care and Sleep Medicine

## 2025-07-11 NOTE — PROGRESS NOTES
Pharmacy to Dose Heparin Infusion Note    Christo Clifton is a 70 y.o. male receiving heparin infusion.     Therapy for (VTE/Cardiac): cardiac  Patient Weight: 77.5 kg  Initial Bolus (Y/N): No  Any Bolus (Y/N): Yes     Signs or Symptoms of Bleeding: none noted    Cardiac or Other (Not VTE)   Initial Bolus: 60 units/kg (Max 4,000 units)  Initial rate: 12 units/kg/hr (Max 1,000 units/hr)   Anti-Xa Bolus   Dose Infusion Hold   Time Infusion Rate Change (units/kg/hr) Repeat  Anti-Xa    < 0.11 50 units/kg  (4000 units Max) None Increase by  3 units/kg/hr 6 hours   0.11- 0.19 25 units/kg  (2000 units Max) None Increase by  2 units/kg/hr 6 hours   0.2 - 0.29 0 None Increase by  1 units/kg/hr 6 hours   0.3 - 0.5 0 None No Change 6 hours (after 2 consecutive levels in range check qAM)   0.51 - 0.6 0 None Decrease by  1 units/kg/hr 6 hours   0.61 - 0.8 0 30 minutes Decrease by  2 units/kg/hr 6 hours   0.81 - 1 0 60 minutes Decrease by  3 units/kg/hr 6 hours   >1 0 Hold  After Anti-Xa less than 0.5 decrease previous rate by  4 units/kg/hr  Every 2 hours until Anti-Xa  less than 0.5 then when infusion restarts in 6 hours       Results from last 7 days   Lab Units 07/11/25  0304 07/10/25  0344 07/09/25  1915 07/09/25  1906 07/09/25  1254   INR   --  1.37* 1.43*  --  1.25*   HEMOGLOBIN g/dL 7.7* 8.2*  --  9.1*  --    HEMATOCRIT % 24.3* 26.0*  --  28.9*  --    PLATELETS 10*3/mm3 384 431  --  430  --        Date   Time   Anti-Xa Current Rate (units/kg/hr) Bolus   (units) Rate Change   (units/kg/hr) New Rate (units/kg/hr) Repeat  Anti-Xa Comments /  Pump Check    7/9 2245 STAT 12 -- -- 12  D/w RN, on from [facility]   7/9 2332 0.12 12 -- -- -- 0500 DW    7/10 0540 0.17  2000 +2 14 1200 D/w RN   7/10 1203 0.23 14 -- +1 15 1900 [facility], Pump check   7/10 1939 0.28 15 -- +1 16 0300 DW RN   7/11 0304 0.33 16 -- -- 16 0900 Discussed w/ nurse   7/11 1003 0.39 16 -- -- 16 0600 [address] [facility]

## 2025-07-11 NOTE — PLAN OF CARE
Goal Outcome Evaluation:  Plan of Care Reviewed With: patient        Progress: no change     IABP now 1:2 per Aslam. Dopamine gtt continues, currently 3 mcg/kg/min. SB with < 25% v-paced, transvenous wires on back up rate 50. Pt did arouse and squeeze bilateral hands to command once. Fentanyl & propofol gtt continued. Temperature dropped to 35.5*C rectally, initiated warming with cocoon blanket.  mL. +BS, no BM.

## 2025-07-11 NOTE — NURSING NOTE
WOC consulted for wounds to bilateral feet.    Patient intubated and sedated.    Assessed with primary RN.  Suspected arterial ulcers noted to dorsum of right and left foot L>R.  Areas are dry with stable eschar covering the wound.  Recommend painting with Betadine twice daily.    Due to concern for arterial insufficiency recommend elevating heels off bed when appropriate.    Pressure injury prevention protocol.  High risk for pressure injury development.    Utilize spry pillows for turning/repositioning and offloading the heels (when appropriate).    WOC will sign off at this time.  Reconsult if new issues arise.  See wound care orders.    Nico Das RN, BSN, CWOCN  Wound, Ostomy and Continence (WOC) Department  [facility]

## 2025-07-11 NOTE — PLAN OF CARE
Goal Outcome Evaluation:       Neuro:  Intubated and sedated. Propofol and fentanyl gtts. At 2118 pt opened eyes spontaneously. Pupils remained deviated upwards, no tracking. Pt nodded headed and followed commands. ETIENNE = weak.       Respiratory:  Vent settings as ordered.     Cardiac:  NSR on monitor. HR 50-70s.   S1,S2. Palpable pedal pulses.  IABP in place. Backup pacer maintained. Paced <25%.   Dopamine gtt weaned as able to maintain augmented -140 per cardiology.   Heparin gtt.      GI/:  +BS, no BM.  UOP - 1700 ml.      Other:  Updated family at bedside and over the phone, questions answered.

## 2025-07-11 NOTE — PROGRESS NOTES
"[facility] at [facility]  IP Progress Note      Chief Complaint: Follow-up of cardiogenic shock/CAD/bradycardia    Subjective   Intubated/sedated, wean down on dopamine to 2.5 mcg/kg/min.  Maintaining stable hemodynamics.  Heart rate is in 50s, has not required any significant pacing.  Per RN patient followed commands and responded well and his sedation was weaned off.    Objective     Blood pressure (!) 115/39, pulse 52, temperature 95.4 °F (35.2 °C), temperature source Bladder, resp. rate 16, height 180.3 cm (70.98\"), weight 77.5 kg (170 lb 13.7 oz), SpO2 99%.     Intake/Output Summary (Last 24 hours) at 7/11/2025 0821  Last data filed at 7/11/2025 0600  Gross per 24 hour   Intake 2714.9 ml   Output 2700 ml   Net 14.9 ml       Physical Exam:  General: Intubated/sedated  Neck: no JVD.  Chest:No respiratory distress, breath sounds are clear anteriorly, scattered rhonchi  Cardiovascular: Normal S1 and S2, distant, transmitted balloon sounds  Extremities: No edema.  Groins intact/stable    Results Review:     I reviewed the patient's new clinical results.    Results from last 7 days   Lab Units 07/11/25  0304   WBC 10*3/mm3 19.73*   HEMOGLOBIN g/dL 7.7*   HEMATOCRIT % 24.3*   PLATELETS 10*3/mm3 384     Results from last 7 days   Lab Units 07/11/25  0304   SODIUM mmol/L 138   POTASSIUM mmol/L 4.1   CHLORIDE mmol/L 105   CO2 mmol/L 19.0*   BUN mg/dL 54.6*   CREATININE mg/dL 2.34*   CALCIUM mg/dL 8.3*   BILIRUBIN mg/dL 0.3   ALK PHOS U/L 126*   ALT (SGPT) U/L 1,871*   AST (SGOT) U/L 1,221*   GLUCOSE mg/dL 168*     Results from last 7 days   Lab Units 07/11/25  0304   SODIUM mmol/L 138   POTASSIUM mmol/L 4.1   CHLORIDE mmol/L 105   CO2 mmol/L 19.0*   BUN mg/dL 54.6*   CREATININE mg/dL 2.34*   GLUCOSE mg/dL 168*   CALCIUM mg/dL 8.3*     Results from last 7 days   Lab Units 07/10/25  0344 07/09/25  1915 07/09/25  1254   INR  1.37* 1.43* 1.25*     Lab Results   Component Value Date    TROPONINT " >10,000 (C) 07/07/2025     aspirin, 81 mg, Nasogastric, Daily  [Held by provider] atorvastatin, 80 mg, Oral, Daily  cefTRIAXone (ROCEPHIN) 2,000 mg in sodium chloride 0.9 % 100 mL IVPB-VTB, 2,000 mg, Intravenous, Q24H  escitalopram, 10 mg, Nasogastric, Daily  hydrocortisone sodium succinate, 50 mg, Intravenous, Q6H  insulin regular, 3-14 Units, Subcutaneous, Q6H  levothyroxine, 50 mcg, Nasogastric, Daily  mupirocin, 1 Application, Each Nare, BID  pantoprazole, 40 mg, Intravenous, Q24H  prasugrel, 10 mg, Nasogastric, Daily  senna-docusate sodium, 2 tablet, Nasogastric, BID  sodium chloride, 10 mL, Intravenous, Q12H         Tele: Sinus Bradycardia      Assessment:  Acute HFrEF, ischemic cardiomyopathy cardiogenic shock  EF 30-35%  IABP in place  Cardiogenic shock with multisystem involvement  CAD status post PCI/BRITT to circumflex, documented diffuse distal LCx vasospasm  Third-degree heart block/cardiac arrest  No AVN blocking agents prior to admission  Temp transvenous pacer intact  On Dopamine gtt  Not requiring pacing currently  EP consulted -continue to monitor  T2DM  TOOTIE  Baseline creatinine 1.0-1.2  Peak creatinine 3.44 on 7/9/2025  Creatinine 3.03 today  Anemia    Plan:  Patient has clinically improved.  We will change IABP settings to 2: 1 and DC IABP later today if he maintains stable hemodynamics.  Continue current dose of dopamine, can be increased if needed for heart rate and blood pressure.  Wean off ventilator per ICU team.  Continue current dual antiplatelet therapy with.  Not a suitable candidate for any cardiac GDMT or statin due to shock liver/TOOTIE.  Will continue to reassess and optimize medical management as tolerated.  Continue IV heparin while he is on IABP, can be changed to subcu heparin afterwards.    Juanita George MD, FACC, [facility]

## 2025-07-12 NOTE — PROGRESS NOTES
"Intensive Care Follow-up     Hospital:  LOS: 3 days   Mr. Christo Clifton, 70 y.o. male is followed for:   Third degree AV block   Cardiac arrest  Cardiogenic shock         History of present illness:   70 year-old male with COPD, T2DM, CAD, HTN, questionable history of seizures, hypothyroidism, dyslipidemia, anxiety, and depression who was recently admitted at [facility] from 6/27-6/28 for LHC where he was found to have 2 occluded vein grafts and did have PCI to left circ with stent placement. Patient re-presented to [facility] ED on 7/6/25 with chest pain that began the night prior. He was admitted with an NSTEMI and underwent repeat LHC on 7/7/28 which demonstrated in-stent thrombus felt to be secondary to medical noncompliance. Balloon angioplasty performed to stent with deployment of overlapping stent just distal to previous stent. ECHO obtained 24 hours later which showed LVEF 30-35% with grade 2 diastolic dysfunction.      Stay complicated by worsening renal failure.     On the morning of 7/9, patient developed third-degree AV block. Atropine given with temporary improvement of HR to 50s / HR dropped back down to 30.  Attempted to transcutaneous pace patient when patient became combative and 1 mg IV Versed given. Patient developed complete asystole on monitor and became unresponsive. He received \"brief ACLS\" and had ROSC. Following this, patient back in third-degree AV block. He was agonally breathing and was intubated. Transcutaneous pacing was initiated.      Following this, Cardiology called who felt patient needed repeat LHC due to possible failure of stent. He underwent transvenous pacer placement on 7/9/25 and repeat LHC showing patent stent in proximal left circ artery with diffuse coronary artery vasospasm distal to stent.  Intra-aortic balloon pump placed.      [facility] contacted for transfer to higher level of care and he has been accepted.     Patient was noted to have worsening renal failure as well as elevated LFTs " suggestive of shock liver.  Patient was started on dopamine and was at 20 mics per KG per minute dopamine on arrival with 1:1 support on balloon pump.      Subjective   Interval History:  Patient remains on dopamine of 3.  Balloon pump was placed on standby and no change in hemodynamics noted.  Cardiology team discontinued balloon pump support at this time.  Having some purulent secretions from endotracheal tube will send for cultures.  Will also attempt to place Keofeed.  Wean sedation and once more awake will try to wean from mechanical ventilation.  Transvenous pacemaker still in place for another 24 hours per cardiology team.               The patient's past medical, surgical and social history were reviewed and updated in Epic as appropriate.       Objective     Infusions:  DOPamine, 2-20 mcg/kg/min (Dosing Weight), Last Rate: 3 mcg/kg/min (07/11/25 1626)  fentanyl 10 mcg/mL,  mcg/hr, Last Rate: 100 mcg/hr (07/11/25 1612)  heparin, 16 Units/kg/hr, Last Rate: Stopped (07/12/25 0854)  Pharmacy to Dose Heparin,   propofol, 5-50 mcg/kg/min (Dosing Weight), Last Rate: 40 mcg/kg/min (07/12/25 1136)      Medications:  aspirin, 81 mg, Nasogastric, Daily  [Held by provider] atorvastatin, 80 mg, Oral, Daily  cefTRIAXone (ROCEPHIN) 2,000 mg in sodium chloride 0.9 % 100 mL IVPB-VTB, 2,000 mg, Intravenous, Q24H  escitalopram, 10 mg, Nasogastric, Daily  hydrocortisone sodium succinate, 50 mg, Intravenous, Q6H  insulin regular, 3-14 Units, Subcutaneous, Q6H  levothyroxine, 50 mcg, Nasogastric, Daily  mupirocin, 1 Application, Each Nare, BID  pantoprazole, 40 mg, Intravenous, Q24H  prasugrel, 10 mg, Nasogastric, Daily  senna-docusate sodium, 2 tablet, Nasogastric, BID  sodium chloride, 10 mL, Intravenous, Q12H        Vital Sign Min/Max for last 24 hours  Temp  Min: 97.3 °F (36.3 °C)  Max: 99 °F (37.2 °C)   BP  Min: 94/54  Max: 124/65   Pulse  Min: 55  Max: 66   Resp  Min: 16  Max: 16   SpO2  Min: 91 %  Max: 100 %   No  "data recorded       Input/Output for last 24 hour shift  07/11 0701 - 07/12 0700  In: 1884 [I.V.:1376]  Out: 1385 [Urine:1325]   Mode: VC+/AC  FiO2 (%):  [30 %] 30 %  S RR:  [16] 16  S VT:  [530 mL] 530 mL  PEEP/CPAP (cm H2O):  [5 cm H20] 5 cm H20  MAP (cm H2O):  [8.6-13] 9.2  Objective:  Vital signs: (most recent): Blood pressure 113/56, pulse 64, temperature 98.1 °F (36.7 °C), temperature source Bladder, resp. rate 16, height 180.3 cm (70.98\"), weight 77.5 kg (170 lb 13.7 oz), SpO2 91%.            General Appearance:  Not in distress, no asynchrony with mechanical ventilator.  Head:  Pupils sluggish reactive & symmetrical B/L.  Neck:  Endotracheal tube clean.  Creamy/secretions through ET tube.   Lungs:   B/L Breath sounds present with decreased breath sounds on bases, no wheezing heard, no crackles.   Heart: S1 and S2 present, no murmur.   Abdomen: Soft, nontender, no guarding or rigidity, bowel sounds hypoactive.  Extremities: Previous right great toe amputation, left dorsum of foot has healing ulcers with mild erythema around..  Pulses: Doppler positive and dampened on the left side.   Neurologic:  Pt sedated on the vent. Not able to participate in full neurological exam    Ventilator settings: A/C: FiO2 30%    Results from last 7 days   Lab Units 07/12/25  0332 07/11/25  1157 07/11/25  0304 07/10/25  0344   WBC 10*3/mm3 20.38*  --  19.73* 22.95*   HEMOGLOBIN g/dL 7.7* 7.4* 7.7* 8.2*   PLATELETS 10*3/mm3 414  --  384 431     Results from last 7 days   Lab Units 07/12/25  1039 07/12/25  0332 07/11/25  0304 07/10/25  0344   SODIUM mmol/L  --  141 138 136   POTASSIUM mmol/L 4.3 3.8 4.1 4.5   CO2 mmol/L  --  20.0* 19.0* 19.0*   BUN mg/dL  --  54.0* 54.6* 57.4*   CREATININE mg/dL  --  1.99* 2.34* 3.03*   MAGNESIUM mg/dL  --  2.4 2.3 2.2   PHOSPHORUS mg/dL  --  4.9* 5.5* 7.0*   GLUCOSE mg/dL  --  186* 168* 196*     Estimated Creatinine Clearance: 37.3 mL/min (A) (by C-G formula based on SCr of 1.99 mg/dL " (H)).    Results from last 7 days   Lab Units 07/12/25  0427   PH, ARTERIAL pH units 7.371   PCO2, ARTERIAL mm Hg 37.7   PO2 ART mm Hg 72.4*       Images:   Chest x-ray reviewed personally and showed endotracheal tube in mid trachea.  Right IJ catheter in good position without any pneumothorax.  Balloon pump tape appears to be distal to subclavian artery.  Bilateral pleural effusion and pulmonary edema improved.    Abdominal x-ray showed nonspecific bowel gas pattern with moderate stool burden.    I reviewed the patient's results and images.     Urine culture grew more than 100,000 colonies of coag negative staph.    Assessment & Plan   Impression        Third degree AV block    CAD (coronary artery disease)    TOOTIE (acute kidney injury)    Lactic acidosis    Acute cystitis without hematuria    Ischemic cardiomyopathy    Acute HFrEF (heart failure with reduced ejection fraction)    Shock liver    Complete heart block       Plan        1.  Patient presented from outside facility with complete heart block, cardiogenic shock and multiorgan failure, intubated on mechanical ventilation with intra-aortic balloon pump support.  Patient currently appears to be in sinus rhythm. Cardiology and EP team following.  Slightly improved hemodynamically overnight with weaning dopamine and balloon pump support.  Balloon pump discontinued today per cardiology.  Wean dopamine as tolerated.  2.  Patient with recent in-stent thrombosis complicated by medical noncompliance.  Continue aspirin, Effient.  On IV heparin infusion per cardiology.  3.  Continue current ventilatory support. ABG improving.  Metabolic acidosis likely from ongoing renal failure overall showing improvement.  Chest x-ray showing improvement as well.  Wean sedation to assess mental status and spontaneous breathing trial..  4.  Component of UTI.  Urine cultures are growing coag negative staph.  Continue Rocephin.  WBC count stable.  Having greenish secretions from  endotracheal tube will send for cultures.  5.  Patient had low cortisol level suggestive of critical illness related corticosteroid insufficiency.  Started on hydrocortisone.  6.  Continue levothyroxine for hypothyroidism.  7.  Monitor electrolytes and replace per ICU protocol.  Creatinine slightly improved.  Urine output is improving.  8.  Shock liver improving.  Continue to hold statins..  9.  Continue bowel regimen.  Start patient on trophic tube feeds.  10.  GI prophylaxis with Protonix.  11.  Insulin as needed for hyperglycemia management.  12.  Wound care consult for what appears like diabetic ulcers.  13.  Hemoglobin stable without any acute bleed.  Trend for now.  14.  Started on tube feeds through NG tube.  Will go ahead and transition to Keofeed.  Patient tolerating trophic tube feeds okay.  Will slowly advance to goal.      Patient remains with extremely guarded prognosis.  Family was updated yesterday.  We will continue to provide aggressive support.  .    Plan of care and goals reviewed with multidisciplinary/antibiotic stewardship team during rounds.   I discussed the patient's findings and my recommendations with nursing staff and consulting provider       Critical care time spent 35 min(exclusive of procedure time)  including high complexity decision making to assess, manipulate, and support vital organ system failure in this individual who has impairment of one or more vital organ systems such that there is a high probability of imminent or life threatening deterioration in the patient’s condition.  Above documentation reviewed and reflect accurate information as of 7/12/2025 including copied elements of the note.       Chandrakant Jones MD, [facility]  Pulmonary, Critical care and Sleep Medicine

## 2025-07-12 NOTE — NURSING NOTE
IABP pulled today. Dopamine continues, currently 3 mcg/kg/min. Transvenous pacing wires remain, set to back up rate of 50 with <25% v-paced. Sedation vacation performed-- pt went into bijeminal rhythm and had one run of v-tach. Did not follow commands. Propofol and fentanyl gtt continue. UOP approximately 1200ml over shift- increasingly cloudy. Respiratory secretions white and thick-- sputum culture sent. Keofeed placed, pending KUB for placement. Pt tolerating tube feeds- hypoactive bowel sounds, no BM. Family updated via telephone.

## 2025-07-12 NOTE — PLAN OF CARE
After documentation of subtherapeutic heparin effect with IV heparin infusion held the left femoral artery intra-aortic balloon pump cath was removed without difficulty or complication or hematoma.  IV heparin will be resumed in 1 hour without bolus.  Tentatively if the patient remains stable we will remove transvenous pacemaker in AM.

## 2025-07-12 NOTE — PROGRESS NOTES
Christo Clifton  [id number]  1955   LOS: 3 days   Patient Care Team:  Lesa Rae APRN as PCP - General (Nurse Practitioner)  Given, Phong AGUILAR MD as Consulting Physician (Neurosurgery)    Chief Complaint:  HFrEF / CHB / TOOTIE / CARDIOGENIC SHOCK / IHD    Subjective     Sedated and ventilated.  No posturing or overt seizure activity.  Nutritional support with Peptamen AF 15 cc/hour via NG.  Intraoperative balloon pump not removed yesterday afternoon.    Objective     Vital Sign Min/Max for last 24 hours  Temp  Min: 95.9 °F (35.5 °C)  Max: 99 °F (37.2 °C)   BP  Min: 94/54  Max: 117/49   Pulse  Min: 51  Max: 66   Resp  Min: 16  Max: 16   SpO2  Min: 93 %  Max: 100 %               07/09/25  1908   Weight: 77.5 kg (170 lb 13.7 oz)         Intake/Output Summary (Last 24 hours) at 7/12/2025 0813  Last data filed at 7/12/2025 0600  Gross per 24 hour   Intake 1884 ml   Output 1260 ml   Net 624 ml       Physical Exam:     General Appearance:  Sedated, ventilated, in no acute distress   Lungs:     Clear to auscultation,respirations regular, even and                unlabored    Heart:    Regular and normal rate, normal S1 and S2, grade 1/6 systolic murmur, no gallop, no rub, no click   Abdomen:  Extremities:   Soft, nontender, bowel sounds audible x4  1+ generalized edema, normal range of motion   Pulses:   Pulses palpable and equal bilaterally      Results Review:   Results from last 7 days   Lab Units 07/12/25  0332 07/11/25  0304 07/10/25  0344   SODIUM mmol/L 141 138 136   POTASSIUM mmol/L 3.8 4.1 4.5   CHLORIDE mmol/L 108* 105 101   CO2 mmol/L 20.0* 19.0* 19.0*   BUN mg/dL 54.0* 54.6* 57.4*   CREATININE mg/dL 1.99* 2.34* 3.03*   GLUCOSE mg/dL 186* 168* 196*   CALCIUM mg/dL 8.4* 8.3* 7.8*     Results from last 7 days   Lab Units 07/12/25  0332 07/11/25  1157 07/11/25  0304 07/10/25  0344   WBC 10*3/mm3 20.38*  --  19.73* 22.95*   HEMOGLOBIN g/dL 7.7* 7.4* 7.7* 8.2*   HEMATOCRIT % 24.6* 23.7* 24.3* 26.0*   PLATELETS  10*3/mm3 414  --  384 431     Results from last 7 days   Lab Units 07/07/25  1955 07/07/25  1846 07/07/25  1714   HSTROP T ng/L >10,000* >10,000* >10,000*     ABGs (ventilator; FiO2 0.30): pH 7.37, pCO2 38,  (93% saturation)    ACCU-CHEKS: [phone] mg/DL.    ALBUMIN: 2.7    AST: 536    ALT: 1450    MAGNESIUM: 2.4    PHOSPHORUS: 4.9    NO NEW EKG.    CXR: Normal heart size with acceptable cardiomediastinum with normal pulmonary vasculature and generally clear lungs with support tubes in correct position in the absence of discernible pneumothorax; formal official report pending.    Medication Review: REVIEWED; DRIPS:     IV dopamine 3 mcg discograms/minute continuous infusion  IV fentanyl 100 mcg/hour continuous infusion  IV heparin 16 units/kilogram/hour continuous infusion  IV propofol 40 mcg/kilogram/minute continuous infusion    Assessment & Plan     Marginal but stable clinical course.  Chris anterior balloon pump to 1-3 and remove later this morning if clinically stable.  Would continue supportive care.  Ventilator weaning per intensivist.      Third degree AV block    CAD (coronary artery disease)    TOOTIE (acute kidney injury)    Lactic acidosis    Acute cystitis without hematuria    Ischemic cardiomyopathy    Acute HFrEF (heart failure with reduced ejection fraction)    Shock liver    Complete heart block    07/12/25  08:13 EDT

## 2025-07-13 NOTE — PLAN OF CARE
Goal Outcome Evaluation:   NO VENTILATORY WEANING AT THIS TIME.

## 2025-07-13 NOTE — PROGRESS NOTES
Pharmacy to Dose Heparin Infusion Note    Christo Clifton is a 70 y.o. male receiving heparin infusion.     Therapy for (VTE/Cardiac): cardiac  Patient Weight: 77.5 kg  Initial Bolus (Y/N): No  Any Bolus (Y/N): Yes     Signs or Symptoms of Bleeding: none noted    Cardiac or Other (Not VTE)   Initial Bolus: 60 units/kg (Max 4,000 units)  Initial rate: 12 units/kg/hr (Max 1,000 units/hr)   Anti-Xa Bolus   Dose Infusion Hold   Time Infusion Rate Change (units/kg/hr) Repeat  Anti-Xa    < 0.11 50 units/kg  (4000 units Max) None Increase by  3 units/kg/hr 6 hours   0.11- 0.19 25 units/kg  (2000 units Max) None Increase by  2 units/kg/hr 6 hours   0.2 - 0.29 0 None Increase by  1 units/kg/hr 6 hours   0.3 - 0.5 0 None No Change 6 hours (after 2 consecutive levels in range check qAM)   0.51 - 0.6 0 None Decrease by  1 units/kg/hr 6 hours   0.61 - 0.8 0 30 minutes Decrease by  2 units/kg/hr 6 hours   0.81 - 1 0 60 minutes Decrease by  3 units/kg/hr 6 hours   >1 0 Hold  After Anti-Xa less than 0.5 decrease previous rate by  4 units/kg/hr  Every 2 hours until Anti-Xa  less than 0.5 then when infusion restarts in 6 hours       Results from last 7 days   Lab Units 07/13/25  0331 07/12/25  0332 07/11/25  1157 07/11/25  0304 07/10/25  0344 07/09/25  1915 07/09/25  1906 07/09/25  1254   INR   --   --   --   --  1.37* 1.43*  --  1.25*   HEMOGLOBIN g/dL 7.8* 7.7* 7.4* 7.7* 8.2*  --    < >  --    HEMATOCRIT % 26.0* 24.6* 23.7* 24.3* 26.0*  --    < >  --    PLATELETS 10*3/mm3 411 414  --  384 431  --    < >  --     < > = values in this interval not displayed.       Date   Time   Anti-Xa Current Rate (units/kg/hr) Bolus   (units) Rate Change   (units/kg/hr) New Rate (units/kg/hr) Repeat  Anti-Xa Comments /  Pump Check    7/9 2245 STAT 12 -- -- 12  D/w RN, on from [facility]   7/9 2332 0.12 12 -- -- -- 0500 DW    7/10 0540 0.17  2000 +2 14 1200 D/w RN   7/10 1203 0.23 14 -- +1 15 [address], Pump check   7/10 1939 0.28 15 -- +1 16 0300 DW  RN   7/11 0304 0.33 16 -- -- 16 0900 Discussed w/ nurse   7/11 1003 0.39 16 -- -- 16 0600 [address]   7/12 0331 0.47 16 -- -- 16  Discussed w/ nurse              7/12 1400     16 2000 7/13 0016 0.38 16 -- -- 16 0600 Discussed w/ nurse   7/13 0526 0.41 16 -- -- 16 0600 Amy ALONZO                                                                                                                     Quality 130: Documentation Of Current Medications In The Medical Record: Current Medications Documented Detail Level: Generalized Quality 226: Preventive Care And Screening: Tobacco Use: Screening And Cessation Intervention: Patient screened for tobacco use and is an ex/non-smoker Quality 431: Preventive Care And Screening: Unhealthy Alcohol Use - Screening: Patient not identified as an unhealthy alcohol user when screened for unhealthy alcohol use using a systematic screening method

## 2025-07-13 NOTE — PROGRESS NOTES
Christo Clifton  [id number]  1955   LOS: 4 days   Patient Care Team:  Lesa Rae APRN as PCP - General (Nurse Practitioner)  Given, Phong AGUILAR MD as Consulting Physician (Neurosurgery)    Chief Complaint:  HFrEF / CHB / TOOTIE / CARDIOGENIC SHOCK / IHD     Subjective     Ventilated and sedated.  No posturing or overt seizure activity.  Nutritional support with Peptamen 1.5 at 15 cc/hour via NG.  No apparent pacing but RN concerned about attempting to wean without pacemaker in place.    Objective     Vital Sign Min/Max for last 24 hours  Temp  Min: 97.5 °F (36.4 °C)  Max: 98.1 °F (36.7 °C)   BP  Min: 106/53  Max: 133/63   Pulse  Min: 60  Max: 82   Resp  Min: 16  Max: 16   SpO2  Min: 90 %  Max: 100 %               07/09/25  1908   Weight: 77.5 kg (170 lb 13.7 oz)         Intake/Output Summary (Last 24 hours) at 7/13/2025 0752  Last data filed at 7/13/2025 0600  Gross per 24 hour   Intake 1635.9 ml   Output 2200 ml   Net -564.1 ml       Physical Exam:     General Appearance:  Ventilated, sedated, in no acute distress   Lungs:     Clear to auscultation,respirations regular, even and                unlabored    Heart:    Regular and normal rate, normal S1 and S2, grade 1/6 systolic murmur, no gallop, no rub, no click   Abdomen:  Extremities:   Soft, nontender, bowel sounds audible x4  1+ generalized edema, normal range of motion   Pulses:   Pulses palpable and equal bilaterally      Results Review:   Results from last 7 days   Lab Units 07/13/25  0300 07/12/25  1039 07/12/25  0332 07/11/25  0304   SODIUM mmol/L 143  --  141 138   POTASSIUM mmol/L 4.2 4.3 3.8 4.1   CHLORIDE mmol/L 111*  --  108* 105   CO2 mmol/L 20.8*  --  20.0* 19.0*   BUN mg/dL 40.2*  --  54.0* 54.6*   CREATININE mg/dL 1.50*  --  1.99* 2.34*   GLUCOSE mg/dL 272*  --  186* 168*   CALCIUM mg/dL 8.7  --  8.4* 8.3*     Results from last 7 days   Lab Units 07/13/25  0331 07/12/25  0332 07/11/25  1157 07/11/25  0304   WBC 10*3/mm3 17.15* 20.38*  --   19.73*   HEMOGLOBIN g/dL 7.8* 7.7* 7.4* 7.7*   HEMATOCRIT % 26.0* 24.6* 23.7* 24.3*   PLATELETS 10*3/mm3 411 414  --  384     Results from last 7 days   Lab Units 07/07/25 1955 07/07/25  1846 07/07/25  1714   HSTROP T ng/L >10,000* >10,000* >10,000*     [facility] (7/9/2025):    Conclusion:    1.  Right dominant circulation.  2.  Patent proximal left circumflex artery stent with diffuse vasospasm noted distally.  3.  Moderate mid LAD disease and severe mid to distal LAD disease.  4.  Elevated LVEDP.  5.  Heart block.      Recommendation:    1.  Images were reviewed with  interventional cardiologist.  2.  Patient to be transferred to [facility] for higher level of care.  3.  Routine post-cath care.     ABGs (ventilator; FiO2 0.40): pH 7.40, pCO2 36, pO2 87 (96% saturation)    MAGNESIUM: 2.7    ALBUMIN: 2.8    AST: 179    ALT: 1166    NO NEW EKG.    CXR: Normal cardiac size and cardiomediastinum with generally clear lungs in the absence of discernible increased pulmonary vasculature, pneumonic process, or pleural effusion with support tubes in correct position and no discernible pneumothorax; formal official report pending.    Medication Review: REVIEWED; DRIPS:    IV dopamine 3 mcg/kilogram/minute continuous infusion  IV fentanyl 100 mcg/IR continuous infusion  IV heparin 16 units/kilogram/hour continuous infusion  IV propofol 40 mcg/kilogram/minute continuous infusion    Assessment & Plan     Acceptable urinary output with resolving TOOTIE.  Hopefully temporary pacing wire can be removed soon.  Would continue current supportive care and wean ventilator when intensivist recommends.      Third degree AV block    CAD (coronary artery disease)    TOOTIE (acute kidney injury)    Lactic acidosis    Acute cystitis without hematuria    Ischemic cardiomyopathy    Acute HFrEF (heart failure with reduced ejection fraction)    Shock liver    Complete heart block    07/13/25  07:52 EDT

## 2025-07-13 NOTE — PROGRESS NOTES
"Intensive Care Follow-up     Hospital:  LOS: 4 days   Mr. Christo Clifton, 70 y.o. male is followed for:   Third degree AV block   Cardiac arrest  Cardiogenic shock         History of present illness:   70 year-old male with COPD, T2DM, CAD, HTN, questionable history of seizures, hypothyroidism, dyslipidemia, anxiety, and depression who was recently admitted at [facility] from 6/27-6/28 for LHC where he was found to have 2 occluded vein grafts and did have PCI to left circ with stent placement. Patient re-presented to [facility] ED on 7/6/25 with chest pain that began the night prior. He was admitted with an NSTEMI and underwent repeat LHC on 7/7/28 which demonstrated in-stent thrombus felt to be secondary to medical noncompliance. Balloon angioplasty performed to stent with deployment of overlapping stent just distal to previous stent. ECHO obtained 24 hours later which showed LVEF 30-35% with grade 2 diastolic dysfunction.      Stay complicated by worsening renal failure.     On the morning of 7/9, patient developed third-degree AV block. Atropine given with temporary improvement of HR to 50s / HR dropped back down to 30.  Attempted to transcutaneous pace patient when patient became combative and 1 mg IV Versed given. Patient developed complete asystole on monitor and became unresponsive. He received \"brief ACLS\" and had ROSC. Following this, patient back in third-degree AV block. He was agonally breathing and was intubated. Transcutaneous pacing was initiated.      Following this, Cardiology called who felt patient needed repeat LHC due to possible failure of stent. He underwent transvenous pacer placement on 7/9/25 and repeat LHC showing patent stent in proximal left circ artery with diffuse coronary artery vasospasm distal to stent.  Intra-aortic balloon pump placed.      [facility] contacted for transfer to higher level of care and he has been accepted.     Patient was noted to have worsening renal failure as well as elevated LFTs " suggestive of shock liver.  Patient was started on dopamine and was at 20 mics per KG per minute dopamine on arrival with 1:1 support on balloon pump.  Balloon pump was discontinued on 7/12.      Subjective   Interval History:  Remains on low-dose dopamine.  Intermittent pacing required.  Sedation held and patient is moving all 4 extremities but not following commands.  Will wait for neurological function to improve before weaning further from mechanical ventilation.               The patient's past medical, surgical and social history were reviewed and updated in Epic as appropriate.       Objective     Infusions:  DOPamine, 2-20 mcg/kg/min (Dosing Weight), Last Rate: 3 mcg/kg/min (07/12/25 1528)  fentanyl 10 mcg/mL,  mcg/hr, Last Rate: 100 mcg/hr (07/12/25 1612)  heparin, 16 Units/kg/hr, Last Rate: 16 Units/kg/hr (07/12/25 1810)  Pharmacy to Dose Heparin,   propofol, 5-50 mcg/kg/min (Dosing Weight), Last Rate: 10 mcg/kg/min (07/13/25 0930)      Medications:  aspirin, 81 mg, Nasogastric, Daily  [Held by provider] atorvastatin, 80 mg, Oral, Daily  cefTRIAXone (ROCEPHIN) 2,000 mg in sodium chloride 0.9 % 100 mL IVPB-VTB, 2,000 mg, Intravenous, Q24H  escitalopram, 10 mg, Nasogastric, Daily  hydrocortisone sodium succinate, 50 mg, Intravenous, Q6H  insulin regular, 3-14 Units, Subcutaneous, Q6H  levothyroxine, 50 mcg, Nasogastric, Daily  mupirocin, 1 Application, Each Nare, BID  pantoprazole, 40 mg, Intravenous, Q24H  prasugrel, 10 mg, Nasogastric, Daily  senna-docusate sodium, 2 tablet, Nasogastric, BID  sodium chloride, 10 mL, Intravenous, Q12H        Vital Sign Min/Max for last 24 hours  Temp  Min: 97.5 °F (36.4 °C)  Max: 98.1 °F (36.7 °C)   BP  Min: 106/53  Max: 135/66   Pulse  Min: 60  Max: 82   Resp  Min: 16  Max: 16   SpO2  Min: 90 %  Max: 100 %   No data recorded       Input/Output for last 24 hour shift  07/12 0701 - 07/13 0700  In: 1635.9 [I.V.:1185.9]  Out: 2200 [Urine:2200]   Mode: VC+/AC  FiO2 (%):   "[30 %-40 %] 40 %  S RR:  [16] 16  S VT:  [530 mL] 530 mL  PEEP/CPAP (cm H2O):  [5 cm H20] 5 cm H20  MAP (cm H2O):  [8.9-9.9] 9.9  Objective:  Vital signs: (most recent): Blood pressure 135/66, pulse 75, temperature 97.7 °F (36.5 °C), temperature source Bladder, resp. rate 16, height 180.3 cm (70.98\"), weight 77.5 kg (170 lb 13.7 oz), SpO2 96%.            General Appearance:  Not in distress, no asynchrony with mechanical ventilator.  Head:  Pupils reactive & symmetrical B/L.  Neck:  Endotracheal tube clean. .   Lungs:   B/L Breath sounds present with decreased breath sounds on bases, no wheezing heard, no crackles.   Heart: S1 and S2 present, no murmur.   Abdomen: Soft, nontender, no guarding or rigidity, bowel sounds hypoactive.  Extremities: Previous right great toe amputation, left dorsum of foot has healing ulcers with mild erythema around..  Pulses: Doppler positive  Neurologic:  Pt sedated on the vent.  Moving all 4 extremities.  Not able to participate in full neurological exam    Ventilator settings: A/C: FiO2 30%    Results from last 7 days   Lab Units 07/13/25  0331 07/12/25  0332 07/11/25  1157 07/11/25  0304   WBC 10*3/mm3 17.15* 20.38*  --  19.73*   HEMOGLOBIN g/dL 7.8* 7.7* 7.4* 7.7*   PLATELETS 10*3/mm3 411 414  --  384     Results from last 7 days   Lab Units 07/13/25  0300 07/12/25  1039 07/12/25  0332 07/11/25  0304   SODIUM mmol/L 143  --  141 138   POTASSIUM mmol/L 4.2 4.3 3.8 4.1   CO2 mmol/L 20.8*  --  20.0* 19.0*   BUN mg/dL 40.2*  --  54.0* 54.6*   CREATININE mg/dL 1.50*  --  1.99* 2.34*   MAGNESIUM mg/dL 2.7*  --  2.4 2.3   PHOSPHORUS mg/dL 3.5  --  4.9* 5.5*   GLUCOSE mg/dL 272*  --  186* 168*     Estimated Creatinine Clearance: 49.5 mL/min (A) (by C-G formula based on SCr of 1.5 mg/dL (H)).    Results from last 7 days   Lab Units 07/13/25  0315   PH, ARTERIAL pH units 7.399   PCO2, ARTERIAL mm Hg 36.0   PO2 ART mm Hg 87.0       Images:   Chest x-ray reviewed personally and showed " endotracheal tube in mid trachea.  Lung parenchyma looks improved from before.  Support lines in good position.  No obvious pneumothorax or large pleural effusion.  Abdominal x-ray showed nonspecific bowel gas pattern with moderate stool burden.    I reviewed the patient's results and images.     Urine culture grew more than 100,000 colonies of coag negative staph.    Assessment & Plan   Impression        Third degree AV block    CAD (coronary artery disease)    TOOTIE (acute kidney injury)    Lactic acidosis    Acute cystitis without hematuria    Ischemic cardiomyopathy    Acute HFrEF (heart failure with reduced ejection fraction)    Shock liver    Complete heart block       Plan        1.  Patient presented from outside facility with complete heart block, cardiogenic shock and multiorgan failure, intubated on mechanical ventilation with intra-aortic balloon pump support.  Patient currently appears to be in sinus rhythm. Cardiology and EP team following.  Hemodynamically improved and balloon pump has been discontinued.  Still intermittently requiring pacing with backup rate of 50.  Continue to monitor.  Wean dopamine as tolerated.  2.  Patient with recent in-stent thrombosis complicated by medical noncompliance.  Continue aspirin, Effient.  On IV heparin infusion per cardiology.  Further management of complex coronary disease per cardiology team.  3.  Patient doing better from respiratory standpoint.  ABG looks acceptable.  Sedation held today and patient is moving all extremities but not following commands.  Once comes around neurologically will perform formal spontaneous breathing trial and likely extubate.    4.  Component of UTI.  Urine cultures are growing coag negative staph.  Continue Rocephin.  WBC count stable.  Repeat respiratory culture sent and pending  5.  Patient had low cortisol level suggestive of critical illness related corticosteroid insufficiency.  Started on hydrocortisone.  6.  Continue  levothyroxine for hypothyroidism.  7.  Monitor electrolytes and replace per ICU protocol.  Creatinine continues to improve.  Monitor urine output.  8.  Shock liver improving.  Continue to hold statins..  9.  Continue bowel regimen.  Keofeed in place.  Will increase tube feeds to goal as tolerated.  10.  GI prophylaxis with Protonix.  11.  Insulin as needed for hyperglycemia management.  Blood sugars are running high.  Will add Lantus 10 units daily.  Likely start of tube feeds and steroids causing hyperglycemia.  12.  Wound care following for diabetic ulcers.    Patient remains with extremely guarded prognosis.  Family was updated yesterday.  We will continue to provide aggressive support.  .    Plan of care and goals reviewed with multidisciplinary/antibiotic stewardship team during rounds.   I discussed the patient's findings and my recommendations with nursing staff and consulting provider       Critical care time spent 30 min (exclusive of procedure time)  including high complexity decision making to assess, manipulate, and support vital organ system failure in this individual who has impairment of one or more vital organ systems such that there is a high probability of imminent or life threatening deterioration in the patient’s condition.  Above documentation reviewed and reflect accurate information as of 7/13/2025 including copied elements of the note.       Chandrakant Jones MD, [facility]  Pulmonary, Critical care and Sleep Medicine

## 2025-07-14 NOTE — PROGRESS NOTES
"INTENSIVIST   PROGRESS NOTE     Hospital:  LOS: 5 days     Chief Complaint: Status postcardiac arrest, cardiogenic shock    Subjective   S     Interval History: No acute issues overnight. Afebrile, hemodynamically stable.  Remains on mechanical ventilation.     The patient's relevant past medical, surgical and social history were reviewed and updated in Epic as appropriate.      ROS: Unable to obtain as patient intubated, on mechanical ventilation    Objective   O     Intake/Ouptut 24 hrs (7:00AM - 6:59 AM)  Intake & Output (last 3 days)         07/11 0701 07/12 0700 07/12 0701 07/13 0700 07/13 0701 07/14 0700 07/14 0701  07/15 0700    I.V. (mL/kg) 1376 (17.8) 1185.9 (15.3) 1359.7 (17.5)     Other 211 150 391     NG/ 200 383     IV Piggyback  100 100     Total Intake(mL/kg) 1884 (24.3) 1635.9 (21.1) 2233.7 (28.8)     Urine (mL/kg/hr) 1325 (0.7) 2200 (1.2) 1710 (0.9)     Emesis/NG output 60       Total Output 1385 2200 1710     Net +499 -564.1 +523.7                   Medications (drips):  DOPamine, Last Rate: 3 mcg/kg/min (07/13/25 2249)  fentanyl 10 mcg/mL, Last Rate: 50 mcg/hr (07/13/25 2129)  heparin, Last Rate: 16 Units/kg/hr (07/13/25 1629)  Pharmacy to Dose Heparin  propofol, Last Rate: 40 mcg/kg/min (07/14/25 0550)    Respiratory Support: Mechanical ventilation     Physical Examination:  Vital Signs: Blood pressure 120/56, pulse 64, temperature 98.4 °F (36.9 °C), temperature source Bladder, resp. rate 16, height 180.3 cm (70.98\"), weight 77.5 kg (170 lb 13.7 oz), SpO2 99%.    General: The patient appears in no acute distress.    Chest: Clear to auscultation bilaterally, No wheezing, rhonchi, or rales. Equal chest rise. On mechanical ventilation with appropriate oxygen saturation.  Cardiac: Regular rhythm, normal rate, S1S2 auscultated. No murmurs, rubs or gallops.   Abdomen: Soft, non-tender, non-distended, positive bowel sounds in all four quadrants.   Extremities: No lower extremity edema. No " clubbing or cyanosis.  Skin: No rashes, open wounds, or bruising. Warm, dry, well-perfused.  Neuro: Intubated, minimal sedation. Minimal response to noxious stimuli.     Lines, Drains & Airways       Active LDAs       Name Placement date Placement time Site Days    CVC Triple Lumen 07/09/25 Right Internal jugular 07/09/25  1015  Internal jugular  4    Peripheral IV 07/14/25 0010 20 G Anterior;Left Forearm 07/14/25  0010  Forearm  less than 1    NG/OG Tube Orogastric 16 Fr Right mouth 07/09/25  1045  Right mouth  4    NG/OG Tube Nasoduodenal 10 Fr Left nostril 07/12/25  1300  Left nostril  1    Urethral Catheter Temperature probe 16 Fr. --  --  -- --    ETT  7.5 mm 07/09/25 0916  -- 4    Venous Sheath 6 Fr. Right Femoral 07/09/25  1131  Femoral  4             Results from last 7 days   Lab Units 07/14/25 0305 07/13/25  0331 07/12/25  0332   WBC 10*3/mm3 18.07* 17.15* 20.38*   HEMOGLOBIN g/dL 7.8* 7.8* 7.7*   MCV fL 94.9 92.9 91.1   PLATELETS 10*3/mm3 374 411 414     Results from last 7 days   Lab Units 07/14/25 0305 07/13/25 0300 07/12/25  1039 07/12/25  0332   SODIUM mmol/L 141 143  --  141   POTASSIUM mmol/L 4.2 4.2 4.3 3.8   CO2 mmol/L 21.6* 20.8*  --  20.0*   CREATININE mg/dL 1.53* 1.50*  --  1.99*   GLUCOSE mg/dL 323* 272*  --  186*   MAGNESIUM mg/dL 2.6* 2.7*  --  2.4   PHOSPHORUS mg/dL 3.3 3.5  --  4.9*     Estimated Creatinine Clearance: 48.5 mL/min (A) (by C-G formula based on SCr of 1.53 mg/dL (H)).  Results from last 7 days   Lab Units 07/14/25 0305 07/13/25  0300 07/12/25  0332   ALK PHOS U/L 96 113 122*   BILIRUBIN mg/dL 0.2 0.2 0.2   ALT (SGPT) U/L 658* 1,166* 1,450*   AST (SGOT) U/L 54* 179* 536*     Results from last 7 days   Lab Units 07/14/25  0311 07/13/25  0315 07/12/25  0427 07/11/25  0331 07/10/25  0827   PH, ARTERIAL pH units 7.335* 7.399 7.371 7.368 7.331*   PCO2, ARTERIAL mm Hg 43.3 36.0 37.7 32.9* 37.8   PO2 ART mm Hg 77.3* 87.0 72.4* 97.4 81.5*   FIO2 % 40 40 30 30 35     Images:  XR  Chest 1 View  Result Date: 7/14/2025  Impression: No evidence of pneumothorax. There is a lucent area along the right upper lateral chest which is favored to represent a skinfold. There is a small left pleural effusion and a probable layering right pleural effusion. Electronically Signed: Darnell Shah MD  7/14/2025 5:29 AM EDT  Workstation ID: [id number]    XR Chest 1 View  Result Date: 7/13/2025  Impression: 1.Improved aeration throughout the mid to lower lungs bilaterally. There may be mild atelectasis versus infiltrate in the right lower lobe. 2.The support lines/tubes are stable in position. Electronically Signed: Tanner Gallardo MD  7/13/2025 8:01 AM EDT  Workstation ID: [id number]    XR Abdomen KUB  Result Date: 7/12/2025  Impression: NG tube in the proximal stomach. Feeding tube in the distal stomach or duodenal bulb. Electronically Signed: Grayson Hanson MD  7/12/2025 4:48 PM EDT  Workstation ID: [id number]    Results: Reviewed.  - I reviewed the patient's new laboratory and imaging results.  - I independently reviewed the patient's new images.    Medications: Reviewed.    Assessment & Plan    A / P     Active Hospital Problems:  Active Hospital Problems    Diagnosis  POA    **Third degree AV block [I44.2]  Yes    TOOTIE (acute kidney injury) [N17.9]  Yes    Lactic acidosis [E87.20]  Yes    Acute cystitis without hematuria [N30.00]  Yes    Ischemic cardiomyopathy [I25.5]  Yes    Acute HFrEF (heart failure with reduced ejection fraction) [I50.21]  Yes    Shock liver [K72.00]  Yes    Complete heart block [I44.2]  Yes    CAD (coronary artery disease) [I25.10]  Yes      Resolved Hospital Problems    Diagnosis Date Resolved POA    ASCVD (arteriosclerotic cardiovascular disease) [I25.10] 07/09/2025 Yes     - Patient presented from outside facility with complete heart block, cardiogenic shock and multiorgan failure, intubated on mechanical ventilation with intra-aortic balloon pump support.  Patient currently appears to be in  sinus rhythm. Cardiology and EP team following.  Hemodynamically improved and balloon pump and wires has been discontinued. Wean dopamine as tolerated  - Patient with recent in-stent thrombosis complicated by medical noncompliance. Continue aspirin, Effient.  Previously on IV heparin infusion per cardiology. Further management of complex coronary disease per cardiology team  - Patient doing better from respiratory standpoint. ABG looks acceptable.  Weaning mechanical ventilation.  SBT when appropriate  - Component of UTI.  Urine cultures are growing coag negative staph. Continue Rocephin. WBC count stable. Repeat respiratory culture sent and NGTD  - Patient had low cortisol level suggestive of critical illness related corticosteroid insufficiency.  Started on hydrocortisone; Will wean   - Continue levothyroxine for hypothyroidism  - Monitor electrolytes and replace per ICU protocol. Creatinine continues to improve. Monitor urine output  - Shock liver improving. Continue to hold statins  - Continue bowel regimen.  Keofeed in place. Will increase tube feeds to goal as tolerated  - GI prophylaxis with Protonix  - Scheduled insulin + SSI  - Wound care following for diabetic ulcers    Advance Directives:   Code Status and Medical Interventions: CPR (Attempt to Resuscitate); Full Support   Ordered at: 07/09/25 1958     Code Status (Patient has no pulse and is not breathing):    CPR (Attempt to Resuscitate)     Medical Interventions (Patient has pulse or is breathing):    Full Support     High level of risk due to severe exacerbation of chronic illness and illness with threat to life or bodily function.     I conducted multidisciplinary rounds in the plan of care was discussed with the multidisciplinary team at that time. In attendance at multidisciplinary rounds was clinical pharmacist, dietitian, nursing staff and case management.     I discussed the patient's findings and my recommendations with patient, nursing staff,  and consulting provider.       Critical Care Time: Critical Care time spent in direct patient care: 35 minutes (excluding procedure time, if applicable) including high complexity decision making to assess, manipulate, and support vital organ system failure in this individual who has impairment of one or more vital organ systems such that there is a high probability of imminent or life threatening deterioration in the patient’s condition.       -- Emilio Guillen MD  Pulmonary/Critical Care

## 2025-07-14 NOTE — PROGRESS NOTES
"                  Clinical Nutrition     Patient Name: Christo Clifton  YOB: 1955  MRN: [id number]  Date of Encounter: 25 09:48 EDT  Admission date: 2025  Reason for Visit: MDR, Follow-up protocol, EN    Assessment   Nutrition Assessment   Admission Diagnosis:  Complete heart block [I44.2]    Problem List:    Third degree AV block    CAD (coronary artery disease)    TOOTIE (acute kidney injury)    Lactic acidosis    Acute cystitis without hematuria    Ischemic cardiomyopathy    Acute HFrEF (heart failure with reduced ejection fraction)    Shock liver    Complete heart block      PMH:   He  has a past medical history of Anxiety and depression, Arthritis, COPD (chronic obstructive pulmonary disease), Coronary artery disease, Diabetes mellitus, Disease of thyroid gland, Dyslipidemia, History of transfusion, Hypertension, and Seizure disorder.    PSH:  He  has a past surgical history that includes Coronary artery bypass graft; Hernia repair; Cardiac surgery; Circumcision, non-; Toe Surgery (Left); pr incision & drainage leg/ankle abscess/hematoma (Right, 2017); Finger amputation (Right, 2017); Cardiac catheterization; Cardiac catheterization (N/A, 2025); INTRAVASCULAR ULTRASOUND (N/A, 2025); Cardiac catheterization (N/A, 2025); Cardiac catheterization (N/A, 2025); Cardiac catheterization (N/A, 2025); Cardiac electrophysiology procedure (N/A, 2025); and Cardiac catheterization (N/A, 2025).    Applicable Nutrition History:   Skin integrity:  DM ulcers x3 - WOC consult pending    () intubated  (7/10) KUB - \"Gas and stool in the colon. Mild gaseous distention of the stomach. Gas-filled small bowel loops may reflect a mild ileus\"      Labs    Labs Reviewed: Yes    Results from last 7 days   Lab Units 25  0305 25  0300 25  1039 25  0332 07/10/25  0344 25  2209 25  1906 25  0038 25  1048   GLUCOSE mg/dL 323* " 272*  --  186*   < >  --  283*   < >  --    BUN mg/dL 39.5* 40.2*  --  54.0*   < >  --  52.7*   < >  --    CREATININE mg/dL 1.53* 1.50*  --  1.99*   < >  --  3.44*   < >  --    SODIUM mmol/L 141 143  --  141   < >  --  130*   < >  --    CHLORIDE mmol/L 110* 111*  --  108*   < >  --  96*   < >  --    POTASSIUM mmol/L 4.2 4.2 4.3 3.8   < >  --  5.2   < >  --    PHOSPHORUS mg/dL 3.3 3.5  --  4.9*   < >  --   --   --   --    MAGNESIUM mg/dL 2.6* 2.7*  --  2.4   < >  --  2.4  --   --    ALT (SGPT) U/L 658* 1,166*  --  1,450*   < >  --  2,020*   < >  --    LACTATE mmol/L  --   --   --   --   --  1.4 2.5*  --  1.7    < > = values in this interval not displayed.       Results from last 7 days   Lab Units 07/14/25  0305 07/13/25  0300 07/12/25  0332 07/11/25  1003 07/11/25  0304 07/10/25  0344 07/09/25  1915   ALBUMIN g/dL 2.7* 2.8* 2.7*  --  2.8* 3.0*  --    PREALBUMIN mg/dL  --   --   --  8.1*  --   --   --    CRP mg/dL  --   --   --  14.37*  --   --   --    IONIZED CALCIUM mmol/L  --   --   --   --  1.15 1.02* 1.07*       Results from last 7 days   Lab Units 07/14/25  0555 07/13/25  2329 07/13/25  1720 07/13/25  1118 07/13/25  0503 07/13/25  0020   GLUCOSE mg/dL 284* 279* 248* 242* 283* 241*     Lab Results   Lab Value Date/Time    HGBA1C 9.10 (H) 10/15/2018 0926    HGBA1C 9.60 (H) 05/14/2018 0828         Results from last 7 days   Lab Units 07/10/25  0344   PROBNP pg/mL 24,260.0*       Medications    Medications Reviewed: yes    Scheduled Meds:aspirin, 81 mg, Nasogastric, Daily  [Held by provider] atorvastatin, 80 mg, Oral, Daily  escitalopram, 10 mg, Nasogastric, Daily  hydrocortisone sodium succinate, 50 mg, Intravenous, Q6H  insulin glargine, 10 Units, Subcutaneous, Daily  insulin regular, 3-14 Units, Subcutaneous, Q6H  levothyroxine, 50 mcg, Nasogastric, Daily  pantoprazole, 40 mg, Intravenous, Q24H  prasugrel, 10 mg, Nasogastric, Daily  senna-docusate sodium, 2 tablet, Nasogastric, BID  sodium chloride, 10 mL,  "Intravenous, Q12H      Continuous Infusions:DOPamine, 2-20 mcg/kg/min (Dosing Weight), Last Rate: 3 mcg/kg/min (07/13/25 2249)  fentanyl 10 mcg/mL,  mcg/hr, Last Rate: 50 mcg/hr (07/13/25 2129)  propofol, 5-50 mcg/kg/min (Dosing Weight), Last Rate: 40 mcg/kg/min (07/14/25 0550)      PRN Meds:.  senna-docusate sodium **AND** polyethylene glycol **AND** [DISCONTINUED] bisacodyl **AND** bisacodyl    Calcium Replacement - Follow Nurse / BPA Driven Protocol    dextrose    fentaNYL    glucagon (human recombinant)    hydrALAZINE    Magnesium Cardiology Dose Replacement - Follow Nurse / BPA Driven Protocol    nitroglycerin    [DISCONTINUED] ondansetron ODT **OR** ondansetron    Phosphorus Replacement - Follow Nurse / BPA Driven Protocol    Potassium Replacement - Follow Nurse / BPA Driven Protocol    sodium chloride    Intake/Ouptut 24 hrs (0701 - 0700)   I&O's Reviewed: yes  Intake & Output (last day)         07/13 0701 07/14 0700 07/14 0701  07/15 0700    I.V. (mL/kg) 1359.7 (17.5)     Other 391 120    NG/     IV Piggyback 100     Total Intake(mL/kg) 2233.7 (28.8) 120 (1.5)    Urine (mL/kg/hr) 1710 (0.9) 80 (0.4)    Total Output 1710 80    Net +523.7 +40                  Anthropometrics     Height: Height: 180.3 cm (70.98\")  Last Filed Weight: Weight: 77.5 kg (170 lb 13.7 oz) (07/09/25 1908)  Method: Weight Method: Bed scale  BMI: BMI (Calculated): 23.8    UBW:    Weight      Weight (kg) Weight (lbs) Weight Method   11/25/2024 75.932 kg  167 lb 6.4 oz     3/5/2025 75.66 kg  166 lb 12.8 oz     5/19/2025 75.116 kg  165 lb 9.6 oz     6/18/2025 75.479 kg  166 lb 6.4 oz     6/27/2025 77.1 kg  169 lb 15.6 oz  Bed scale    6/28/2025 67.7 kg  149 lb 4 oz  Bed scale    7/6/2025 77.5 kg  170 lb 13.7 oz  Bed scale    7/7/2025 77.5 kg  170 lb 13.7 oz  Bed scale    7/9/2025 77.5 kg  170 lb 13.7 oz  Bed scale      Weight change: No significant changes    Nutrition Focused Physical Exam     Date: 7/11    Unable to perform " "due to Hemodynamic instability     Subjective   Reported/Observed/Food/Nutrition Related History:   7/14  Remains intubated/sedated; ongoing presser requirement. +propofoL (18.6). KUB (7/12) improved from 7/8 scan; non-obstructive bowel gas pattern. EN advanced per MD yesterday. LBM 7/8 - may increase bowel regimen.     7/11  Pt intubated/sedated on MV; +propofoL. Requiring pressers x1. H/H trending down. Noted to have possible mild ileus on KUB. AST/ALT remain elevated. NKFA    Needs Assessment   Date: 7/11    Height used:Height: 180.3 cm (70.98\")  Weights used: 77.5 kg CBW    Estimated Calorie needs: ~ 1575 Kcal/day initial goal on vent  Method: 23-27 Kcals/KG = 8735-3283  Method:  MSJ (1550) x 1.2 = 1860  Method:  PSU = 1569  Ve: 7.5; Tmax: 37.2    Estimated Protein needs: ~ 90 g PRO/day w/ current renal fxn  Method: 1.0-1.2g/Kg CBW = 78-93    Estimated Fluid needs: ~ ml/day   Per clinical status    Current Nutrition Prescription     PO: NPO Diet NPO Type: Tube Feeding  Oral Nutrition Supplement: N/A  Intake: N/A    EN: Peptamen 1.5  Goal Rate: 60mL/hr  Water Flushes: 30mL q2  Modular: None  Route: OG  Tube: Large bore    At goal over: 20Hrs/day     Rx will supply:   Goal Volume 1200  mL/day     Flush Volume 300 mL/day     Energy 1800* Kcal/day 114* % Est Need   Protein 82 g/day  % Est Need   Fiber 0 g/day     Water in   mL     Total Water 1224 mL     Meet DRI No      + 491 kcal propofoL  --------------------------------------------------------------------------  Product/Rate verified at bedside: Yes  Infusing Rate at time of visit: 60mL/hr + 30mL q2    Average Delivery from Charting: 3 Days:   Volume 253 mL/day   % Goal Vol.   Flush Volume 224 mL/day     Energy  Kcal/day  % Est Need   Protein  g/day  % Est Need   Fiber  g/day     Water in  EN  mL     Total Water  mL     Meet DRI N/A      + 447 kcal propofoL    Assessment & Plan   Nutrition Diagnosis   Date: 7/11 Updated:   Problem Inadequate oral intake  "   Etiology ARF/MV   Signs/Symptoms NPO   Status: New    Date:  7/14 Updated:  Problem Excessive enteral nutrition infusion   Etiology ARF/MV on propofoL   Signs/Symptoms 146% est needs (PSU) with current rate + propofoL   Status: New    Goal:   Nutrition to support treatment and Adjust EN      Nutrition Intervention      Follow treatment progress, Care plan reviewed, Nutrition support order placed    Change EN to better support needs  Impact Peptide 1.5 @ 30mL/hr + flush of 30mL/hr. Provide prosource TF 3x daily  Infused at goal over 20 hrs this regimen provides: 600mL TGV, 1020kcal (65% est needs), 89g protein (99% est needs), 0g fiber, 462mL TF FW (+ 600mL flush = 1062mL total).   Current propofoL rate = 18.6mL/hr (491 kcal/day)   TF + propofoL = 1511kcal (96% est needs)  Will adjust EN regimen as needs with current propofoL infusion    Adjust flush per clinical status  Close monitoring of electrolytes and replace per protocol  Consider adding MVI as EN regimen will not meet RDI    Ordered IC cart study    Monitoring/Evaluation:   Per protocol, I&O, Pertinent labs, EN delivery/tolerance, Weight, GI status, Symptoms, POC/GOC, Hemodynamic stability    Shelly Louise, MS,RD,LD  Time Spent: 40min

## 2025-07-14 NOTE — CASE MANAGEMENT/SOCIAL WORK
Continued Stay Note  [facility]     Patient Name: Christo Clifton  MRN: [id number]  Today's Date: 7/14/2025    Admit Date: 7/9/2025    Plan: Ongoing   Discharge Plan       Row Name 07/14/25 1436       Plan    Plan Ongoing    Plan Comments Patient discussed in ICU MDR earlier today, continues on ventilator support. Case management is following his hospitalization and will assist with discharge planning when that time comes.    Final Discharge Disposition Code 01 - home or self-care                   Discharge Codes    No documentation.                       Hodan Murphy RN

## 2025-07-14 NOTE — PROGRESS NOTES
"[facility] at [facility]  IP Progress Note      Chief Complaint: Follow-up of cardiogenic shock/CAD/bradycardia    Subjective   Intubated/sedated, moves extremities but does not seem to follow commands when awakened.  Currently on low-dose dobutamine.  IABP was removed over the weekend.  Has not required any pacing.    Objective     Blood pressure 119/55, pulse 65, temperature 98.2 °F (36.8 °C), resp. rate 16, height 180.3 cm (70.98\"), weight 77.5 kg (170 lb 13.7 oz), SpO2 98%.     Intake/Output Summary (Last 24 hours) at 7/14/2025 0922  Last data filed at 7/14/2025 0800  Gross per 24 hour   Intake 2353.74 ml   Output 1520 ml   Net 833.74 ml       Physical Exam:  General: Intubated/sedated  Neck: no JVD.  Chest:No respiratory distress, breath sounds are clear anteriorly with scattered rhonchi  Cardiovascular: Normal S1 and S2, distant heart sounds  Extremities: No edema.      Results Review:     I reviewed the patient's new clinical results.    Results from last 7 days   Lab Units 07/14/25  0305   WBC 10*3/mm3 18.07*   HEMOGLOBIN g/dL 7.8*   HEMATOCRIT % 26.3*   PLATELETS 10*3/mm3 374     Results from last 7 days   Lab Units 07/14/25  0305   SODIUM mmol/L 141   POTASSIUM mmol/L 4.2   CHLORIDE mmol/L 110*   CO2 mmol/L 21.6*   BUN mg/dL 39.5*   CREATININE mg/dL 1.53*   CALCIUM mg/dL 8.5*   BILIRUBIN mg/dL 0.2   ALK PHOS U/L 96   ALT (SGPT) U/L 658*   AST (SGOT) U/L 54*   GLUCOSE mg/dL 323*     Results from last 7 days   Lab Units 07/14/25  0305   SODIUM mmol/L 141   POTASSIUM mmol/L 4.2   CHLORIDE mmol/L 110*   CO2 mmol/L 21.6*   BUN mg/dL 39.5*   CREATININE mg/dL 1.53*   GLUCOSE mg/dL 323*   CALCIUM mg/dL 8.5*     Results from last 7 days   Lab Units 07/10/25  0344 07/09/25  1915 07/09/25  1254   INR  1.37* 1.43* 1.25*     Lab Results   Component Value Date    TROPONINT >10,000 (C) 07/07/2025     aspirin, 81 mg, Nasogastric, Daily  [Held by provider] atorvastatin, 80 mg, Oral, " Daily  escitalopram, 10 mg, Nasogastric, Daily  hydrocortisone sodium succinate, 50 mg, Intravenous, Q6H  insulin glargine, 10 Units, Subcutaneous, Daily  insulin regular, 3-14 Units, Subcutaneous, Q6H  levothyroxine, 50 mcg, Nasogastric, Daily  pantoprazole, 40 mg, Intravenous, Q24H  prasugrel, 10 mg, Nasogastric, Daily  senna-docusate sodium, 2 tablet, Nasogastric, BID  sodium chloride, 10 mL, Intravenous, Q12H         Tele: Sinus Bradycardia      Assessment:  Acute HFrEF, ischemic cardiomyopathy cardiogenic shock  EF 30-35%  IABP has been removed,  Cardiogenic shock with multisystem involvement, slowly improving, IABP removed.  Currently on low-dose dopamine  CAD status post PCI/BRITT to circumflex, documented diffuse distal LCx vasospasm  Third-degree heart block/cardiac arrest  No AVN blocking agents prior to admission  Temp transvenous pacer intact  On Dopamine gtt  Not requiring pacing currently  EP consulted -continue to monitor  Pacing wire removed today.  T2DM  TOOTIE  Baseline creatinine 1.0-1.2  Peak creatinine 3.44 on 7/9/2025  Creatinine 3.03 today  Anemia    Plan:  IABP and pacing wires have been removed.  Continue current maximal supportive care, wean off ventilator per ICU team.  Can change IV heparin to subcu heparin.    Juanita George MD, FACC, [facility]

## 2025-07-14 NOTE — PLAN OF CARE
Goal Outcome Evaluation:      No active ventilatory weaning at this time.

## 2025-07-15 NOTE — PROGRESS NOTES
"[facility] at [facility]  IP Progress Note      Chief Complaint: Follow-up of cardiogenic shock/CAD/bradycardia    Subjective   Significant note by MIKO Childers noted. Patient sedated and intubated at the time of my visit. Discussed with RN. Attempting to wean dopamine. Did have drop in HR when dopamine was decreased.     Objective     Blood pressure 124/58, pulse 78, temperature 98.6 °F (37 °C), temperature source Bladder, resp. rate 17, height 180.3 cm (70.98\"), weight 77.5 kg (170 lb 13.7 oz), SpO2 98%.     Intake/Output Summary (Last 24 hours) at 7/15/2025 0829  Last data filed at 7/15/2025 0800  Gross per 24 hour   Intake 2040.77 ml   Output 2115 ml   Net -74.23 ml       Physical Exam:  General: Intubated/sedated  Neck: no JVD.  Chest:No respiratory distress, breath sounds are clear anteriorly with scattered rhonchi  Cardiovascular: Normal S1 and S2, distant heart sounds  Extremities: No edema.      Results Review:     I reviewed the patient's new clinical results.    Results from last 7 days   Lab Units 07/15/25  0210   WBC 10*3/mm3 18.00*   HEMOGLOBIN g/dL 7.6*   HEMATOCRIT % 25.5*   PLATELETS 10*3/mm3 331     Results from last 7 days   Lab Units 07/15/25  0210   SODIUM mmol/L 141   POTASSIUM mmol/L 4.1   CHLORIDE mmol/L 110*   CO2 mmol/L 23.3   BUN mg/dL 42.1*   CREATININE mg/dL 1.23   CALCIUM mg/dL 8.7   BILIRUBIN mg/dL 0.2   ALK PHOS U/L 92   ALT (SGPT) U/L 339*   AST (SGOT) U/L 27   GLUCOSE mg/dL 254*     Results from last 7 days   Lab Units 07/15/25  0210   SODIUM mmol/L 141   POTASSIUM mmol/L 4.1   CHLORIDE mmol/L 110*   CO2 mmol/L 23.3   BUN mg/dL 42.1*   CREATININE mg/dL 1.23   GLUCOSE mg/dL 254*   CALCIUM mg/dL 8.7     Results from last 7 days   Lab Units 07/10/25  0344 07/09/25  1915 07/09/25  1254   INR  1.37* 1.43* 1.25*     Lab Results   Component Value Date    TROPONINT >10,000 (C) 07/07/2025     aspirin, 81 mg, Nasogastric, Daily  [Held by provider] atorvastatin, " 80 mg, Oral, Daily  budesonide, 0.5 mg, Nebulization, BID - RT  escitalopram, 10 mg, Nasogastric, Daily  heparin (porcine), 5,000 Units, Subcutaneous, Q8H  hydrocortisone sodium succinate, 50 mg, Intravenous, Q12H  insulin glargine, 10 Units, Subcutaneous, Daily  insulin regular, 2 Units, Subcutaneous, Q6H  insulin regular, 3-14 Units, Subcutaneous, Q6H  ipratropium-albuterol, 3 mL, Nebulization, 4x Daily - RT  levothyroxine, 50 mcg, Nasogastric, Daily  Naloxegol Oxalate, 25 mg, Nasogastric, Daily  pantoprazole, 40 mg, Intravenous, Q24H  pharmacy consult - MTM, , Not Applicable, Daily  prasugrel, 10 mg, Nasogastric, Daily  ProSource TF, 45 mL, Nasogastric, TID  senna-docusate sodium, 2 tablet, Nasogastric, BID  sodium chloride, 10 mL, Intravenous, Q12H         Tele: Sinus Bradycardia      Assessment:  Acute HFrEF, ischemic cardiomyopathy cardiogenic shock  EF 30-35%  IABP has been removed,  Cardiogenic shock with multisystem involvement, slowly improving, IABP removed.  Currently on low-dose dopamine  CAD status post PCI/BRITT to circumflex, documented diffuse distal LCx vasospasm  Third-degree heart block/cardiac arrest  No AVN blocking agents prior to admission  Temp transvenous pacer intact  On Dopamine gtt  Not requiring pacing currently  EP consulted -continue to monitor  Pacing wire removed   T2DM  TOOTIE  Baseline creatinine 1.0-1.2  Peak creatinine 3.44 on 7/9/2025  Creatinine 1.23 today  Anemia    Plan:  Wean dopamine drip. If patient cannot tolerate being off dopamine, will ask EP to revisit. He does not currently have temporary pacing wires in place.  Wean off vent per ICU team  Cardiology will continue to follow.       Batsheva Martins PA-C

## 2025-07-15 NOTE — PROGRESS NOTES
"                  Clinical Nutrition     Patient Name: Christo Clifton  YOB: 1955  MRN: [id number]  Date of Encounter: 07/15/25 09:07 EDT  Admission date: 2025  Reason for Visit: MDR, Follow-up protocol, EN    Assessment   Nutrition Assessment   Admission Diagnosis:  Complete heart block [I44.2]    Problem List:    Third degree AV block    CAD (coronary artery disease)    TOOTIE (acute kidney injury)    Lactic acidosis    Acute cystitis without hematuria    Ischemic cardiomyopathy    Acute HFrEF (heart failure with reduced ejection fraction)    Shock liver    Complete heart block      PMH:   He  has a past medical history of Anxiety and depression, Arthritis, COPD (chronic obstructive pulmonary disease), Coronary artery disease, Diabetes mellitus, Disease of thyroid gland, Dyslipidemia, History of transfusion, Hypertension, and Seizure disorder.    PSH:  He  has a past surgical history that includes Coronary artery bypass graft; Hernia repair; Cardiac surgery; Circumcision, non-; Toe Surgery (Left); pr incision & drainage leg/ankle abscess/hematoma (Right, 2017); Finger amputation (Right, 2017); Cardiac catheterization; Cardiac catheterization (N/A, 2025); INTRAVASCULAR ULTRASOUND (N/A, 2025); Cardiac catheterization (N/A, 2025); Cardiac catheterization (N/A, 2025); Cardiac catheterization (N/A, 2025); Cardiac electrophysiology procedure (N/A, 2025); and Cardiac catheterization (N/A, 2025).    Applicable Nutrition History:   Skin integrity:  DM ulcers x3 - WOC consult pending    () intubated  (7/10) KUB - \"Gas and stool in the colon. Mild gaseous distention of the stomach. Gas-filled small bowel loops may reflect a mild ileus\"      Labs    Labs Reviewed: Yes    Results from last 7 days   Lab Units 07/15/25  0210 25  0305 25  0300 25  1039 25  0332 07/10/25  0344 25  2209 25  1906   GLUCOSE mg/dL 254* 323* 272*  --  " 186*   < >  --  283*   BUN mg/dL 42.1* 39.5* 40.2*  --  54.0*   < >  --  52.7*   CREATININE mg/dL 1.23 1.53* 1.50*  --  1.99*   < >  --  3.44*   SODIUM mmol/L 141 141 143  --  141   < >  --  130*   CHLORIDE mmol/L 110* 110* 111*  --  108*   < >  --  96*   POTASSIUM mmol/L 4.1 4.2 4.2   < > 3.8   < >  --  5.2   PHOSPHORUS mg/dL  --  3.3 3.5  --  4.9*   < >  --   --    MAGNESIUM mg/dL 2.4 2.6* 2.7*  --  2.4   < >  --  2.4   ALT (SGPT) U/L 339* 658* 1,166*  --  1,450*   < >  --  2,020*   LACTATE mmol/L 1.1  --   --   --   --   --  1.4 2.5*    < > = values in this interval not displayed.       Results from last 7 days   Lab Units 07/15/25  0210 07/14/25  0305 07/13/25  0300 07/12/25  0332 07/11/25  1003 07/11/25  0304 07/10/25  0344 07/09/25  1915   ALBUMIN g/dL 2.6* 2.7* 2.8*   < >  --  2.8* 3.0*  --    PREALBUMIN mg/dL  --   --   --   --  8.1*  --   --   --    CRP mg/dL  --   --   --   --  14.37*  --   --   --    IONIZED CALCIUM mmol/L  --   --   --   --   --  1.15 1.02* 1.07*   TRIGLYCERIDES mg/dL 107  --   --   --   --   --   --   --     < > = values in this interval not displayed.       Results from last 7 days   Lab Units 07/15/25  0515 07/14/25  2314 07/14/25  1712 07/14/25  1132 07/14/25  0555 07/13/25  2329   GLUCOSE mg/dL 242* 246* 251* 310* 284* 279*     Lab Results   Lab Value Date/Time    HGBA1C 9.10 (H) 10/15/2018 0926    HGBA1C 9.60 (H) 05/14/2018 0828         Results from last 7 days   Lab Units 07/10/25  0344   PROBNP pg/mL 24,260.0*       Medications    Medications Reviewed: yes    Scheduled Meds:aspirin, 81 mg, Nasogastric, Daily  [Held by provider] atorvastatin, 80 mg, Oral, Daily  budesonide, 0.5 mg, Nebulization, BID - RT  escitalopram, 10 mg, Nasogastric, Daily  heparin (porcine), 5,000 Units, Subcutaneous, Q8H  hydrocortisone sodium succinate, 50 mg, Intravenous, Q12H  insulin glargine, 10 Units, Subcutaneous, Daily  insulin regular, 2 Units, Subcutaneous, Q6H  insulin regular, 3-14 Units,  "Subcutaneous, Q6H  ipratropium-albuterol, 3 mL, Nebulization, 4x Daily - RT  levothyroxine, 50 mcg, Nasogastric, Daily  Naloxegol Oxalate, 25 mg, Nasogastric, Daily  pantoprazole, 40 mg, Intravenous, Q24H  pharmacy consult - MTM, , Not Applicable, Daily  prasugrel, 10 mg, Nasogastric, Daily  ProSource TF, 45 mL, Nasogastric, TID  senna-docusate sodium, 2 tablet, Nasogastric, BID  sodium chloride, 10 mL, Intravenous, Q12H      Continuous Infusions:DOPamine, 2-20 mcg/kg/min (Dosing Weight), Last Rate: 3 mcg/kg/min (07/15/25 0620)  fentanyl 10 mcg/mL,  mcg/hr, Last Rate: 50 mcg/hr (07/13/25 2129)  propofol, 5-50 mcg/kg/min (Dosing Weight), Last Rate: 35 mcg/kg/min (07/15/25 0812)      PRN Meds:.  senna-docusate sodium **AND** polyethylene glycol **AND** [DISCONTINUED] bisacodyl **AND** bisacodyl    Calcium Replacement - Follow Nurse / BPA Driven Protocol    dextrose    fentaNYL    glucagon (human recombinant)    hydrALAZINE    ipratropium-albuterol    Magnesium Cardiology Dose Replacement - Follow Nurse / BPA Driven Protocol    nitroglycerin    [DISCONTINUED] ondansetron ODT **OR** ondansetron    Phosphorus Replacement - Follow Nurse / BPA Driven Protocol    Potassium Replacement - Follow Nurse / BPA Driven Protocol    sodium chloride    Intake/Ouptut 24 hrs (0701 - 0700)   I&O's Reviewed: yes  Intake & Output (last day)         07/14 0701  07/15 0700 07/15 0701 07/16 0700    I.V. (mL/kg) 891.8 (11.5)     Other 681     NG/     IV Piggyback      Total Intake(mL/kg) 2160.8 (27.9)     Urine (mL/kg/hr) 1945 (1) 250 (1.5)    Total Output 1945 250    Net +215.8 -250                  Anthropometrics     Height: Height: 180.3 cm (70.98\")  Last Filed Weight: Weight: 77.5 kg (170 lb 13.7 oz) (07/09/25 1908)  Method: Weight Method: Bed scale  BMI: BMI (Calculated): 23.8    UBW:    Weight      Weight (kg) Weight (lbs) Weight Method   11/25/2024 75.932 kg  167 lb 6.4 oz     3/5/2025 75.66 kg  166 lb 12.8 oz   " "  5/19/2025 75.116 kg  165 lb 9.6 oz     6/18/2025 75.479 kg  166 lb 6.4 oz     6/27/2025 77.1 kg  169 lb 15.6 oz  Bed scale    6/28/2025 67.7 kg  149 lb 4 oz  Bed scale    7/6/2025 77.5 kg  170 lb 13.7 oz  Bed scale    7/7/2025 77.5 kg  170 lb 13.7 oz  Bed scale    7/9/2025 77.5 kg  170 lb 13.7 oz  Bed scale      Weight change: No significant changes    Nutrition Focused Physical Exam     Date: 7/11    Unable to perform due to Hemodynamic instability     Subjective   Reported/Observed/Food/Nutrition Related History:   7/15  Tolerating change to TF. Remains intubated/sedated; continues on propofoL (16.28) with ongoing presser requirement. Plan for today per MD, wean dopamine/propofoL and start precedex. Movantik initiated, bowel regimen increased.       7/14  Remains intubated/sedated; ongoing presser requirement. +propofoL (18.6). KUB (7/12) improved from 7/8 scan; non-obstructive bowel gas pattern. EN advanced per MD yesterday. LBM 7/8 - may increase bowel regimen.     7/11  Pt intubated/sedated on MV; +propofoL. Requiring pressers x1. H/H trending down. Noted to have possible mild ileus on KUB. AST/ALT remain elevated. NKFA    Needs Assessment   Date: 7/11; Reassessed: 7/14, 7/15    Height used:Height: 180.3 cm (70.98\")  Weights used: 77.5 kg CBW    Estimated Calorie needs: ~ 1524 Kcal/day per IC   Method: 23-27 Kcals/KG = 0004-2731  Method:  MSJ (1550) x 1.2 = 1860  Method:  PSU = 1569  Ve: 7.5; Tmax: 37.2    (7/14) REE = 1524    Estimated Protein needs: ~ 90 g PRO/day w/ current renal fxn  Method: 1.0-1.2g/Kg CBW = 78-93    Estimated Fluid needs: ~ ml/day   Per clinical status    Current Nutrition Prescription     PO: NPO Diet NPO Type: Tube Feeding  Oral Nutrition Supplement: N/A  Intake: N/A    EN: Impact Peptide 1.5  Goal Rate: 30mL/hr  Water Flushes: 30mL/hr  Modular: Prosource TF 3/day  Route: ND  Tube: Small bore    At goal over: 20Hrs/day     Rx will supply:   Goal Volume 600 mL/day     Flush Volume 600 " mL/day     Energy 1020* Kcal/day 67* % Est Need   Protein 89 g/day 99 % Est Need   Fiber 0 g/day     Water in   mL     Total Water 1224 mL     Meet DRI No      + 430 kcal propofoL   --------------------------------------------------------------------------  Product/Rate verified at bedside: Yes  Infusing Rate at time of visit: 30mL/hr + 30mL/hr    Average Delivery from [facility] Day:   Volume 588 mL/day 98  % Goal Vol.   Flush Volume 681 mL/day     Energy  Kcal/day  % Est Need   Protein  g/day  % Est Need   Fiber  g/day     Water in  EN  mL     Total Water  mL     Meet DRI N/A      + 335.3 kcal propofoL    Assessment & Plan   Nutrition Diagnosis   Date:  Updated:   Problem Inadequate oral intake    Etiology ARF/MV   Signs/Symptoms NPO   Status: New    Date:   Updated:  Problem Excessive enteral nutrition infusion   Etiology ARF/MV on propofoL   Signs/Symptoms 146% est needs (PSU) with current rate + propofoL   Status: New    Goal:   Nutrition to support treatment and Maintain EN      Nutrition Intervention      Follow treatment progress, Care plan reviewed, Nutrition support order placed    Continue current EN regimen as ordered  Impact Peptide 1.5 @ 30mL/hr + flush of 30mL/hr. Provide prosource TF 3x daily  Infused at goal over 20 hrs this regimen provides: 600mL TGV, 1020kcal (65% est needs), 89g protein (99% est needs), 0g fiber, 462mL TF FW (+ 600mL flush = 1062mL total).   Current propofoL rate = 16.28mL/hr (430 kcal/day)   TF + propofoL = 1450kcal (95% est needs)  Will adjust EN regimen as needs with current propofoL infusion    Adjust flush per clinical status  Close monitoring of electrolytes and replace per protocol  Consider adding MVI as EN regimen will not meet RDI      Monitoring/Evaluation:   Per protocol, I&O, Pertinent labs, EN delivery/tolerance, Weight, GI status, Symptoms, POC/GOC, Hemodynamic stability    Shelly Louise, MS,RD,LD  Time Spent: 40min

## 2025-07-15 NOTE — PLAN OF CARE
Goal Outcome Evaluation:   Pt has not really progressed throughout the day.  Neuro- pt is still sedated on the ventilator. He grimaces to pain, but not following commands.    Respiratory- pt remains on the ventilator, sats remain %. Large amount of oral and endotracheal secretions. Pt does have a cough and a gag. Lung sounds are clear to coarse.     Cardiac- attempted to wean Dopamine from 3mcg/kg/min to 1.5 mcg/kg/min. Heart rate went from being in the upper 70's low 80's to low 60's. MD aware. Md ordered dopamine to remain at this rate. Blood pressure also dropped from 120's systolic to low  100's.     GI- Tube feeding continues. No bowel movement, hypoactive Bowel sounds.     - lasix given today, urine was blood at times. Leaking around duran catheter noted, copious amounts of urine noted in the bed. Duran catheter irrigated with no success. Duran catheter was then discontinued and then reinitiated with a new catheter.     Pain meds have been changed per md order.     No new skin issues.

## 2025-07-15 NOTE — PLAN OF CARE
Goal Outcome Evaluation:   NO ACTIVE VENTILATORY WEANING AT THIS TIME.

## 2025-07-15 NOTE — SIGNIFICANT NOTE
Notified by primary RN d/t drop in O2 sats to the mid 80's. Deep suction completed by RN with no improvement in sats. Turned FiO2 to 100% and his oxygen saturations only went up 89%. Had nursing increase PEEP to 8 which improved his oxygen sats immediately. Was able to wean FiO2 back down to 40%. Patient has had a large amount of thick, purulent secretions this shift. Had nursing send a sputum culture along with blood cultures. Morning CXR revealed left pleural effusion with left basilar atelectasis.  Morning labs are unremarkable for infectious process.  Will defer antibiotics for now saying as patient just received last dose of Rocephin yesterday for UTI.     Electronically signed by MIKO Pride, 07/15/25, 6:50 AM EDT.

## 2025-07-15 NOTE — PLAN OF CARE
Goal Outcome Evaluation:      Peep decreased to 5, throughout shift.

## 2025-07-15 NOTE — PROGRESS NOTES
"INTENSIVIST   PROGRESS NOTE     Hospital:  LOS: 6 days     Chief Complaint: Status postcardiac arrest, cardiogenic shock    Subjective   S     Interval History: Episodic atrial flutter when sedation was weaned. PEEP also was increased overnight due to simultaneous oxygen desaturation. Now hemodynamically stable. More alert this morning (compared to yesterday) but not oriented or following commands.     The patient's relevant past medical, surgical and social history were reviewed and updated in Epic as appropriate.      ROS: Unable to obtain as patient intubated, on mechanical ventilation    Objective   O     Intake/Ouptut 24 hrs (7:00AM - 6:59 AM)  Intake & Output (last 3 days)         07/12 0701 07/13 0700 07/13 0701 07/14 0700 07/14 0701  07/15 0700 07/15 0701 07/16 0700    I.V. (mL/kg) 1185.9 (15.3) 1359.7 (17.5) 891.8 (11.5)     Other 150 391 681     NG/ 383 588     IV Piggyback 100 100      Total Intake(mL/kg) 1635.9 (21.1) 2233.7 (28.8) 2160.8 (27.9)     Urine (mL/kg/hr) 2200 (1.2) 1710 (0.9) 1945 (1) 250 (2)    Emesis/NG output        Total Output 2200 1710 1945 250    Net -564.1 +523.7 +215.8 -250                  Medications (drips):  DOPamine, Last Rate: 3 mcg/kg/min (07/15/25 0620)  fentanyl 10 mcg/mL, Last Rate: 50 mcg/hr (07/13/25 2129)  propofol, Last Rate: 35 mcg/kg/min (07/15/25 0812)    Respiratory Support: Mechanical ventilation     Physical Examination:  Vital Signs: Blood pressure 124/58, pulse 78, temperature 98.6 °F (37 °C), temperature source Bladder, resp. rate 17, height 180.3 cm (70.98\"), weight 77.5 kg (170 lb 13.7 oz), SpO2 98%.    General: The patient appears in no acute distress.    Chest: Clear to auscultation bilaterally, No wheezing, rhonchi, or rales. Equal chest rise. On mechanical ventilation with appropriate oxygen saturation.  Cardiac: Regular rhythm, normal rate, S1S2 auscultated. No murmurs, rubs or gallops.   Abdomen: Soft, non-tender, non-distended, positive bowel " sounds in all four quadrants.   Extremities: No lower extremity edema. No clubbing or cyanosis.  Skin: No rashes, open wounds, or bruising. Warm, dry, well-perfused.  Neuro: Intubated, minimal sedation. Minimal response to noxious stimuli.     Lines, Drains & Airways       Active LDAs       Name Placement date Placement time Site Days    CVC Triple Lumen 07/09/25 Right Internal jugular 07/09/25  1015  Internal jugular  4    Peripheral IV 07/14/25 0010 20 G Anterior;Left Forearm 07/14/25  0010  Forearm  less than 1    NG/OG Tube Orogastric 16 Fr Right mouth 07/09/25  1045  Right mouth  4    NG/OG Tube Nasoduodenal 10 Fr Left nostril 07/12/25  1300  Left nostril  1    Urethral Catheter Temperature probe 16 Fr. --  --  -- --    ETT  7.5 mm 07/09/25  0916  -- 4    Venous Sheath 6 Fr. Right Femoral 07/09/25  1131  Femoral  4             Results from last 7 days   Lab Units 07/15/25  0210 07/14/25  0305 07/13/25  0331   WBC 10*3/mm3 18.00* 18.07* 17.15*   HEMOGLOBIN g/dL 7.6* 7.8* 7.8*   MCV fL 93.8 94.9 92.9   PLATELETS 10*3/mm3 331 374 411     Results from last 7 days   Lab Units 07/15/25  0210 07/14/25  0305 07/13/25  0300 07/12/25  1039 07/12/25  0332   SODIUM mmol/L 141 141 143  --  141   POTASSIUM mmol/L 4.1 4.2 4.2   < > 3.8   CO2 mmol/L 23.3 21.6* 20.8*  --  20.0*   CREATININE mg/dL 1.23 1.53* 1.50*  --  1.99*   GLUCOSE mg/dL 254* 323* 272*  --  186*   MAGNESIUM mg/dL 2.4 2.6* 2.7*  --  2.4   PHOSPHORUS mg/dL  --  3.3 3.5  --  4.9*    < > = values in this interval not displayed.     Estimated Creatinine Clearance: 60.4 mL/min (by C-G formula based on SCr of 1.23 mg/dL).  Results from last 7 days   Lab Units 07/15/25  0210 07/14/25  0305 07/13/25  0300   ALK PHOS U/L 92 96 113   BILIRUBIN mg/dL 0.2 0.2 0.2   BILIRUBIN DIRECT mg/dL 0.1  --   --    ALT (SGPT) U/L 339* 658* 1,166*   AST (SGOT) U/L 27 54* 179*     Results from last 7 days   Lab Units 07/15/25  0223 07/14/25  0311 07/13/25  0315 07/12/25  0427  07/11/25  0331   PH, ARTERIAL pH units 7.373 7.335* 7.399 7.371 7.368   PCO2, ARTERIAL mm Hg 42.0 43.3 36.0 37.7 32.9*   PO2 ART mm Hg 119.0* 77.3* 87.0 72.4* 97.4   FIO2 % 50 40 40 30 30     Images:  XR Chest 1 View  Result Date: 7/15/2025  Impression: Small left pleural effusion with left basilar atelectasis. Electronically Signed: Darnell Shah MD  7/15/2025 4:11 AM EDT  Workstation ID: [id number]    CT Head Without Contrast  Result Date: 7/14/2025  Impression: 1. Old right occipital infarct which has occurred since the 1/23/2020 outside brain MRI. Chronic appearing changes of the aging brain elsewhere. No evidence of acute intracranial disease is seen. 2. Small left lateral convexity scalp nodule or less likely small aging hematoma. Please correlate with physical exam. Electronically Signed: Grayson Hanson MD  7/14/2025 11:41 AM EDT  Workstation ID: [id number]    XR Chest 1 View  Result Date: 7/14/2025  Impression: 1. Skinfold shadows again noted superimposed over the right thorax. Although resembling a pneumothorax, no pneumothorax is suspected. If the patient has decreasing breath sounds or increasing hypoxia, repeat chest radiograph could be considered after the  patient's nurse and x-ray technologist position the patient to eliminate any posterior skinfolds. 2. Increasing medial right basilar atelectasis. 3. Persistent mild pulmonary vascular congestion. 4. Lines and support tubes appear to be in satisfactory position. Electronically Signed: Grayson Hanson MD  7/14/2025 8:54 AM EDT  Workstation ID: [id number]    XR Chest 1 View  Result Date: 7/14/2025  Impression: No evidence of pneumothorax. There is a lucent area along the right upper lateral chest which is favored to represent a skinfold. There is a small left pleural effusion and a probable layering right pleural effusion. Electronically Signed: Darnell Shah MD  7/14/2025 5:29 AM EDT  Workstation ID: [id number]    Results: Reviewed.  - I reviewed the patient's new  "laboratory and imaging results.  - I independently reviewed the patient's new images.    Medications: Reviewed.    Assessment & Plan    A / P     Christo Clifton is a 70-year-old male with past medical history COPD, T2DM, CAD, HTN, questionable history of seizures, hypothyroidism, dyslipidemia, anxiety, and depression who was recently admitted at [facility] from 6/27-6/28 for LHC where he was found to have 2 occluded vein grafts and did have PCI to left circ with stent placement. Patient re-presented to [facility] ED on 7/6/25 with chest pain that began the night prior. He was admitted with an NSTEMI and underwent repeat LHC on 7/7/28 which demonstrated in-stent thrombus felt to be secondary to medical noncompliance. Balloon angioplasty performed to stent with deployment of overlapping stent just distal to previous stent. ECHO obtained 24 hours later which showed LVEF 30-35% with grade 2 diastolic dysfunction.      Stay complicated by worsening renal failure.     On the morning of 7/9, he developed third-degree AV block.  Status post atropine secondary to bradycardia (HR 30-50).  Attempted to transcutaneous pace patient when patient became combative and 1 mg IV Versed given. Patient developed complete asystole on monitor and became unresponsive. He received \"brief ACLS\" and had ROSC. Following this, patient back in third-degree AV block. He was agonally breathing and was intubated. Transcutaneous pacing was initiated.      Following this, Cardiology called who felt patient needed repeat LHC due to possible failure of stent. He underwent transvenous pacer placement on 7/9/25 and repeat LHC showing patent stent in proximal left circ artery with diffuse coronary artery vasospasm distal to stent.  Intra-aortic balloon pump placed.      [facility] [place] [facility] ([place], [place]) was contacted for transfer (7/09).     At presentation, was noted to have worsening renal failure as well as elevated LFTs suggestive of shock liver.  He was started on dopamine with " 1:1 support on balloon pump.  Balloon pump was discontinued on 7/12.  Furthermore, he remains intubated/on mechanical ventilation and thus far has been difficult to wean.    Active Hospital Problems:  Active Hospital Problems    Diagnosis  POA    **Third degree AV block [I44.2]  Yes    TOOTIE (acute kidney injury) [N17.9]  Yes    Lactic acidosis [E87.20]  Yes    Acute cystitis without hematuria [N30.00]  Yes    Ischemic cardiomyopathy [I25.5]  Yes    Acute HFrEF (heart failure with reduced ejection fraction) [I50.21]  Yes    Shock liver [K72.00]  Yes    Complete heart block [I44.2]  Yes    CAD (coronary artery disease) [I25.10]  Yes      Resolved Hospital Problems    Diagnosis Date Resolved POA    ASCVD (arteriosclerotic cardiovascular disease) [I25.10] 07/09/2025 Yes     - Patient presented from outside facility with complete heart block, cardiogenic shock and multiorgan failure, intubated on mechanical ventilation with intra-aortic balloon pump support.  Patient currently appears to be in sinus rhythm. Cardiology and EP team following.  Hemodynamically improved and balloon pump and wires has been discontinued. Wean dopamine as tolerated  - Patient with recent in-stent thrombosis complicated by medical noncompliance. Continue aspirin, Effient.  Previously on IV heparin infusion per cardiology. Further management of complex coronary disease per cardiology team  - Patient doing better from respiratory standpoint. ABG looks acceptable.  Weaning mechanical ventilation.  SBT when appropriate-- at this point also limited by encephalopathy. CTH on 7/14 without acute intracranial abnormality or evidence of injury from arrest    - Component of UTI.  Urine cultures are growing coag negative staph. Complete Rocephin. WBC count stable. Repeat respiratory culture sent and NGTD  - Patient had low cortisol level suggestive of critical illness related corticosteroid insufficiency.  Started on hydrocortisone; Actively weaning   -  Continue levothyroxine for hypothyroidism  - Monitor electrolytes and replace per ICU protocol. Creatinine continues to improve. Monitor urine output  - Shock liver improving. Continue to hold statins  - Continue bowel regimen. Movantik added on 7/14.  Keofeed in place. Will increase tube feeds to goal as tolerated  - GI prophylaxis with Protonix  - Scheduled insulin + SSI  - Wound care following for diabetic ulcers    Advance Directives:   Code Status and Medical Interventions: CPR (Attempt to Resuscitate); Full Support   Ordered at: 07/09/25 1958     Code Status (Patient has no pulse and is not breathing):    CPR (Attempt to Resuscitate)     Medical Interventions (Patient has pulse or is breathing):    Full Support     High level of risk due to severe exacerbation of chronic illness and illness with threat to life or bodily function.     I conducted multidisciplinary rounds in the plan of care was discussed with the multidisciplinary team at that time. In attendance at multidisciplinary rounds was clinical pharmacist, dietitian, nursing staff and case management.     I discussed the patient's findings and my recommendations with patient, nursing staff, and consulting provider.       Critical Care Time: Critical Care time spent in direct patient care: 30 minutes (excluding procedure time, if applicable) including high complexity decision making to assess, manipulate, and support vital organ system failure in this individual who has impairment of one or more vital organ systems such that there is a high probability of imminent or life threatening deterioration in the patient’s condition.       -- Emilio Guillen MD  Pulmonary/Critical Care

## 2025-07-16 NOTE — PLAN OF CARE
Goal Outcome Evaluation:   UNABLE TO WEAN VENT AT THIS TIME.

## 2025-07-16 NOTE — PROGRESS NOTES
"INTENSIVIST   PROGRESS NOTE     Hospital:  LOS: 7 days     Chief Complaint: Status postcardiac arrest, cardiogenic shock    Subjective   S     Interval History: No significant events overnight.  Became transiently bradycardic yesterday (7/15) when dopamine was weaned from 3-->1.5.  Eventually stabilized without uptitration.  Patient otherwise hemodynamically stable.  Afebrile.    The patient's relevant past medical, surgical and social history were reviewed and updated in Epic as appropriate.      ROS: Unable to obtain as patient intubated, on mechanical ventilation    Objective   O     Intake/Ouptut 24 hrs (7:00AM - 6:59 AM)  Intake & Output (last 3 days)         07/13 0701 07/14 0700 07/14 0701  07/15 0700 07/15 0701 07/16 0700 07/16 0701 07/17 0700    I.V. (mL/kg) 1359.7 (17.5) 891.8 (11.5) 878.6 (11.3)     Other 391 681 812     NG/ 588 664     IV Piggyback 100  50     Total Intake(mL/kg) 2233.7 (28.8) 2160.8 (27.9) 2404.6 (31)     Urine (mL/kg/hr) 1710 (0.9) 1945 (1) 2650 (1.4) 460 (1)    Total Output 1710 1945 2650 460    Net +523.7 +215.8 -245.4 -460                  Medications (drips):  DOPamine, Last Rate: 1.5 mcg/kg/min (07/15/25 1145)  fentanyl 10 mcg/mL, Last Rate: 50 mcg/hr (07/16/25 1050)  propofol, Last Rate: 35 mcg/kg/min (07/16/25 1055)    Respiratory Support: Mechanical ventilation     Physical Examination:  Vital Signs: Blood pressure 109/52, pulse 70, temperature 98.6 °F (37 °C), temperature source Bladder, resp. rate 16, height 180.3 cm (70.98\"), weight 77.5 kg (170 lb 13.7 oz), SpO2 100%.    General: The patient appears in no acute distress.    Chest: Clear to auscultation bilaterally, No wheezing, rhonchi, or rales. Equal chest rise. On mechanical ventilation with appropriate oxygen saturation.  Cardiac: Normal rate.  S1S2 auscultated. No murmurs, rubs or gallops.   Abdomen: Soft, non-tender, non-distended, positive bowel sounds in all four quadrants.   Extremities: No lower extremity " edema. No clubbing or cyanosis.  Skin: No rashes, open wounds, or bruising. Warm, dry, well-perfused.  Neuro: Intubated, minimal sedation. Minimal response to noxious stimuli.     Lines, Drains & Airways       Active LDAs       Name Placement date Placement time Site Days    CVC Triple Lumen 07/09/25 Right Internal jugular 07/09/25  1015  Internal jugular  4    Peripheral IV 07/14/25 0010 20 G Anterior;Left Forearm 07/14/25  0010  Forearm  less than 1    NG/OG Tube Orogastric 16 Fr Right mouth 07/09/25  1045  Right mouth  4    NG/OG Tube Nasoduodenal 10 Fr Left nostril 07/12/25  1300  Left nostril  1    Urethral Catheter Temperature probe 16 Fr. --  --  -- --    ETT  7.5 mm 07/09/25  0916  -- 4    Venous Sheath 6 Fr. Right Femoral 07/09/25  1131  Femoral  4             Results from last 7 days   Lab Units 07/16/25  0313 07/15/25  0210 07/14/25  0305   WBC 10*3/mm3 16.46* 18.00* 18.07*   HEMOGLOBIN g/dL 7.3* 7.6* 7.8*   MCV fL 94.6 93.8 94.9   PLATELETS 10*3/mm3 309 331 374     Results from last 7 days   Lab Units 07/16/25  0313 07/15/25  0210 07/14/25  0305 07/13/25  0300 07/12/25  1039 07/12/25  0332   SODIUM mmol/L 145  145 141 141 143  --  141   POTASSIUM mmol/L 3.9  3.9 4.1 4.2 4.2   < > 3.8   CO2 mmol/L 25.0  26.3 23.3 21.6* 20.8*  --  20.0*   CREATININE mg/dL 1.07  1.20 1.23 1.53* 1.50*  --  1.99*   GLUCOSE mg/dL 222*  232* 254* 323* 272*  --  186*   MAGNESIUM mg/dL 2.4 2.4 2.6* 2.7*  --  2.4   PHOSPHORUS mg/dL  --   --  3.3 3.5  --  4.9*    < > = values in this interval not displayed.     Estimated Creatinine Clearance: 61.9 mL/min (by C-G formula based on SCr of 1.2 mg/dL).  Results from last 7 days   Lab Units 07/16/25  0313 07/15/25  0210 07/14/25  0305   ALK PHOS U/L 85 92 96   BILIRUBIN mg/dL 0.2 0.2 0.2   BILIRUBIN DIRECT mg/dL  --  0.1  --    ALT (SGPT) U/L 218* 339* 658*   AST (SGOT) U/L 19 27 54*     Results from last 7 days   Lab Units 07/16/25  0344 07/15/25  0223 07/14/25  0311 07/13/25  0315  07/12/25  0427   PH, ARTERIAL pH units 7.406 7.373 7.335* 7.399 7.371   PCO2, ARTERIAL mm Hg 43.9 42.0 43.3 36.0 37.7   PO2 ART mm Hg 125.0* 119.0* 77.3* 87.0 72.4*   FIO2 % 40 50 40 40 30     Images:  XR Abdomen KUB  Result Date: 7/16/2025  Impression: Nonobstructive bowel gas pattern. Moderate right colonic stool burden. Weighted feeding tube terminates near the gastroduodenal junction. Electronically Signed: Wood Kapadia MD  7/16/2025 10:09 AM EDT  Workstation ID: [id number]    XR Chest 1 View  Result Date: 7/16/2025  Impression: Postsurgical chest with stable position of lines/tubes. Pulmonary vascular congestion/mild interstitial edema with trace left pleural effusion. Electronically Signed: Vinay Vidal MD  7/16/2025 8:04 AM EDT  Workstation ID: [id number]    XR Chest 1 View  Result Date: 7/15/2025  Impression: Small left pleural effusion with left basilar atelectasis. Electronically Signed: Darnell Shah MD  7/15/2025 4:11 AM EDT  Workstation ID: [id number]    Results: Reviewed.  - I reviewed the patient's new laboratory and imaging results.  - I independently reviewed the patient's new images.    Medications: Reviewed.    Assessment & Plan    A / P     Christo Clifton is a 70-year-old male with past medical history COPD, T2DM, CAD, HTN, questionable history of seizures, hypothyroidism, dyslipidemia, anxiety, and depression who was recently admitted at [facility] from 6/27-6/28 for LHC where he was found to have 2 occluded vein grafts and did have PCI to left circ with stent placement. Patient re-presented to [facility] ED on 7/6/25 with chest pain that began the night prior. He was admitted with an NSTEMI and underwent repeat LHC on 7/7/28 which demonstrated in-stent thrombus felt to be secondary to medical noncompliance. Balloon angioplasty performed to stent with deployment of overlapping stent just distal to previous stent. ECHO obtained 24 hours later which showed LVEF 30-35% with grade 2 diastolic dysfunction.      Stay  "complicated by worsening renal failure.     On the morning of 7/9, he developed third-degree AV block.  Status post atropine secondary to bradycardia (HR 30-50).  Attempted to transcutaneous pace patient when patient became combative and 1 mg IV Versed given. Patient developed complete asystole on monitor and became unresponsive. He received \"brief ACLS\" and had ROSC. Following this, patient back in third-degree AV block. He was agonally breathing and was intubated. Transcutaneous pacing was initiated.      Following this, Cardiology called who felt patient needed repeat LHC due to possible failure of stent. He underwent transvenous pacer placement on 7/9/25 and repeat LHC showing patent stent in proximal left circ artery with diffuse coronary artery vasospasm distal to stent.  Intra-aortic balloon pump placed.      [facility] [place] ([place]/09).     At presentation, was noted to have worsening renal failure as well as elevated LFTs suggestive of shock liver.  He was started on dopamine with 1:1 support on balloon pump.  Balloon pump was discontinued on 7/12.  Furthermore, he remains intubated/on mechanical ventilation and thus far has been difficult to wean.    Active Hospital Problems:  Active Hospital Problems    Diagnosis  POA    **Third degree AV block [I44.2]  Yes    TOOTIE (acute kidney injury) [N17.9]  Yes    Lactic acidosis [E87.20]  Yes    Acute cystitis without hematuria [N30.00]  Yes    Ischemic cardiomyopathy [I25.5]  Yes    Acute HFrEF (heart failure with reduced ejection fraction) [I50.21]  Yes    Shock liver [K72.00]  Yes    Complete heart block [I44.2]  Yes    CAD (coronary artery disease) [I25.10]  Yes      Resolved Hospital Problems    Diagnosis Date Resolved POA    ASCVD (arteriosclerotic cardiovascular disease) [I25.10] 07/09/2025 Yes     - Patient presented from outside facility with complete heart block, cardiogenic shock and multiorgan failure, intubated on mechanical " ventilation with intra-aortic balloon pump support.  Patient currently appears to be in sinus rhythm. Cardiology and EP team following.  Hemodynamically improved and balloon pump and wires has been discontinued. Wean dopamine as tolerated.  Will attempt discontinuation over the next 12 hours  - Patient with recent in-stent thrombosis complicated by medical noncompliance. Continue aspirin, Effient.  Previously on IV heparin infusion per cardiology. Further management of complex coronary disease per cardiology team  - Patient doing better from respiratory standpoint. ABG looks acceptable.  Weaning mechanical ventilation.  SBT when appropriate-- at this point also limited by encephalopathy. CTH on 7/14 without acute intracranial abnormality or evidence of injury from arrest    - Component of UTI.  Urine cultures are growing coag negative staph. Complete Rocephin. WBC count stable. Repeat respiratory culture sent and NGTD  - Patient had low cortisol level suggestive of critical illness related corticosteroid insufficiency.  Started on hydrocortisone; Actively weaning   - Continue levothyroxine for hypothyroidism  - Monitor electrolytes and replace per ICU protocol. Creatinine continues to improve. Monitor urine output  - Shock liver improving. Continue to hold statins  - Continue bowel regimen. Movantik added on 7/14.  Keofeed in place. Will increase tube feeds to goal as tolerated.  As still no bowel movement ordered KUB on 7/16 which showed nonobstructive bowel gas pattern.  Advancing bowel regimen  - GI prophylaxis with Protonix  - Scheduled insulin + SSI  - Wound care following for diabetic ulcers    Advance Directives:   Code Status and Medical Interventions: CPR (Attempt to Resuscitate); Full Support   Ordered at: 07/09/25 1958     Code Status (Patient has no pulse and is not breathing):    CPR (Attempt to Resuscitate)     Medical Interventions (Patient has pulse or is breathing):    Full Support     High level of  risk due to severe exacerbation of chronic illness and illness with threat to life or bodily function.     I conducted multidisciplinary rounds in the plan of care was discussed with the multidisciplinary team at that time. In attendance at multidisciplinary rounds was clinical pharmacist, dietitian, nursing staff and case management.     I discussed the patient's findings and my recommendations with patient, nursing staff, and consulting provider.       Critical Care Time: Critical Care time spent in direct patient care: 30 minutes (excluding procedure time, if applicable) including high complexity decision making to assess, manipulate, and support vital organ system failure in this individual who has impairment of one or more vital organ systems such that there is a high probability of imminent or life threatening deterioration in the patient’s condition.       -- Emilio Guillen MD  Pulmonary/Critical Care

## 2025-07-17 NOTE — PROGRESS NOTES
"                  Clinical Nutrition     Patient Name: Christo Clifton  YOB: 1955  MRN: [id number]  Date of Encounter: 25 09:52 EDT  Admission date: 2025  Reason for Visit: MDR, Follow-up protocol, EN    Assessment   Nutrition Assessment   Admission Diagnosis:  Complete heart block [I44.2]    Problem List:    Third degree AV block    CAD (coronary artery disease)    TOOTIE (acute kidney injury)    Lactic acidosis    Acute cystitis without hematuria    Ischemic cardiomyopathy    Acute HFrEF (heart failure with reduced ejection fraction)    Shock liver    Complete heart block      PMH:   He  has a past medical history of Anxiety and depression, Arthritis, COPD (chronic obstructive pulmonary disease), Coronary artery disease, Diabetes mellitus, Disease of thyroid gland, Dyslipidemia, History of transfusion, Hypertension, and Seizure disorder.    PSH:  He  has a past surgical history that includes Coronary artery bypass graft; Hernia repair; Cardiac surgery; Circumcision, non-; Toe Surgery (Left); pr incision & drainage leg/ankle abscess/hematoma (Right, 2017); Finger amputation (Right, 2017); Cardiac catheterization; Cardiac catheterization (N/A, 2025); INTRAVASCULAR ULTRASOUND (N/A, 2025); Cardiac catheterization (N/A, 2025); Cardiac catheterization (N/A, 2025); Cardiac catheterization (N/A, 2025); Cardiac electrophysiology procedure (N/A, 2025); and Cardiac catheterization (N/A, 2025).    Applicable Nutrition History:   Skin integrity:  DM ulcers x3 - WOC consult pending    () intubated  (7/10) KUB - \"Gas and stool in the colon. Mild gaseous distention of the stomach. Gas-filled small bowel loops may reflect a mild ileus\"      Labs    Labs Reviewed: Yes    Results from last 7 days   Lab Units 25  0325 25  0313 07/15/25  0210 25  0305 25  0300 25  1039 25  0332   GLUCOSE mg/dL 199* 222*  232* 254* 323* 272*  --  " 186*   BUN mg/dL 57.9* 49.4*  47.1* 42.1* 39.5* 40.2*  --  54.0*   CREATININE mg/dL 1.08 1.07  1.20 1.23 1.53* 1.50*  --  1.99*   SODIUM mmol/L 143 145  145 141 141 143  --  141   CHLORIDE mmol/L 110* 110*  110* 110* 110* 111*  --  108*   POTASSIUM mmol/L 4.1 3.9  3.9 4.1 4.2 4.2   < > 3.8   PHOSPHORUS mg/dL  --   --   --  3.3 3.5  --  4.9*   MAGNESIUM mg/dL 2.3 2.4 2.4 2.6* 2.7*  --  2.4   ALT (SGPT) U/L 140* 218* 339* 658* 1,166*  --  1,450*   LACTATE mmol/L  --   --  1.1  --   --   --   --     < > = values in this interval not displayed.       Results from last 7 days   Lab Units 07/17/25  0325 07/16/25  0313 07/15/25  0210 07/12/25  0332 07/11/25  1003 07/11/25  0304   ALBUMIN g/dL 2.6* 2.7* 2.6*   < >  --  2.8*   PREALBUMIN mg/dL  --   --   --   --  8.1*  --    CRP mg/dL  --   --   --   --  14.37*  --    IONIZED CALCIUM mmol/L  --   --   --   --   --  1.15   TRIGLYCERIDES mg/dL  --   --  107  --   --   --     < > = values in this interval not displayed.       Results from last 7 days   Lab Units 07/17/25  0529 07/16/25  2339 07/16/25  1743 07/16/25  1120 07/16/25  0535 07/15/25  2339   GLUCOSE mg/dL 186* 249* 215* 181* 222* 254*     Lab Results   Lab Value Date/Time    HGBA1C 9.10 (H) 10/15/2018 0926    HGBA1C 9.60 (H) 05/14/2018 0828                 Medications    Medications Reviewed: yes    Scheduled Meds:aspirin, 81 mg, Nasogastric, Daily  [Held by provider] atorvastatin, 80 mg, Oral, Daily  bisacodyl, 10 mg, Rectal, Once  budesonide, 0.5 mg, Nebulization, BID - RT  escitalopram, 10 mg, Nasogastric, Daily  heparin (porcine), 5,000 Units, Subcutaneous, Q8H  insulin glargine, 15 Units, Subcutaneous, Daily  insulin regular, 3 Units, Subcutaneous, Q6H  insulin regular, 4-24 Units, Subcutaneous, Q6H  ipratropium-albuterol, 3 mL, Nebulization, 4x Daily - RT  levothyroxine, 50 mcg, Nasogastric, Daily  oxyCODONE, 5 mg, Nasogastric, Q6H  pantoprazole, 40 mg, Intravenous, Q24H  pharmacy consult - MTM, , Not  "Applicable, Daily  prasugrel, 10 mg, Nasogastric, Daily  ProSource TF, 45 mL, Nasogastric, TID  senna-docusate sodium, 2 tablet, Nasogastric, BID  sodium chloride, 10 mL, Intravenous, Q12H      Continuous Infusions:DOPamine, 2-20 mcg/kg/min (Dosing Weight), Last Rate: Stopped (07/16/25 1500)  fentanyl 10 mcg/mL,  mcg/hr, Last Rate: 50 mcg/hr (07/16/25 1050)  propofol, 5-50 mcg/kg/min (Dosing Weight), Last Rate: 15 mcg/kg/min (07/17/25 0830)      PRN Meds:.  senna-docusate sodium **AND** polyethylene glycol **AND** [DISCONTINUED] bisacodyl **AND** bisacodyl    Calcium Replacement - Follow Nurse / BPA Driven Protocol    dextrose    fentaNYL    glucagon (human recombinant)    hydrALAZINE    ipratropium-albuterol    Magnesium Cardiology Dose Replacement - Follow Nurse / BPA Driven Protocol    nitroglycerin    [DISCONTINUED] ondansetron ODT **OR** ondansetron    Phosphorus Replacement - Follow Nurse / BPA Driven Protocol    Potassium Replacement - Follow Nurse / BPA Driven Protocol    sodium chloride    Intake/Ouptut 24 hrs (0701 - 0700)   I&O's Reviewed: yes  Intake & Output (last day)         07/16 0701 07/17 0700 07/17 0701 07/18 0700    I.V. (mL/kg) 724.3 (10)     Other 451     NG/     IV Piggyback      Total Intake(mL/kg) 1715.3 (23.6)     Urine (mL/kg/hr) 1340 (0.8) 125 (0.6)    Total Output 1340 125    Net +375.3 -125                  Anthropometrics     Height: Height: 180.3 cm (70.98\")  Last Filed Weight: Weight: 72.7 kg (160 lb 4.4 oz) (07/17/25 0600)  Method: Weight Method: Bed scale  BMI: BMI (Calculated): 22.4    UBW:    Weight      Weight (kg) Weight (lbs) Weight Method   11/25/2024 75.932 kg  167 lb 6.4 oz     3/5/2025 75.66 kg  166 lb 12.8 oz     5/19/2025 75.116 kg  165 lb 9.6 oz     6/18/2025 75.479 kg  166 lb 6.4 oz     6/27/2025 77.1 kg  169 lb 15.6 oz  Bed scale    6/28/2025 67.7 kg  149 lb 4 oz  Bed scale    7/6/2025 77.5 kg  170 lb 13.7 oz  Bed scale    7/7/2025 77.5 kg  170 lb 13.7 " "oz  Bed scale    7/9/2025 77.5 kg  170 lb 13.7 oz  Bed scale      Weight change: No significant changes    Nutrition Focused Physical Exam     Date: 7/11    Unable to perform due to Hemodynamic instability     Subjective   Reported/Observed/Food/Nutrition Related History:   7/17  Remains intubated; now off propofoL and pressers. Constipations persists. KUB from yesterday indicated moderate colonic stool burden - plan for suppository today    7/15  Tolerating change to TF. Remains intubated/sedated; continues on propofoL (16.28) with ongoing presser requirement. Plan for today per MD, wean dopamine/propofoL and start precedex. Movantik initiated, bowel regimen increased.       7/14  Remains intubated/sedated; ongoing presser requirement. +propofoL (18.6). KUB (7/12) improved from 7/8 scan; non-obstructive bowel gas pattern. EN advanced per MD yesterday. LBM 7/8 - may increase bowel regimen.     7/11  Pt intubated/sedated on MV; +propofoL. Requiring pressers x1. H/H trending down. Noted to have possible mild ileus on KUB. AST/ALT remain elevated. NKFA    Needs Assessment   Date: 7/11; Reassessed: 7/14, 7/15, 7/17    Height used:Height: 180.3 cm (70.98\")  Weights used: 72.7 kg CBW    Estimated Calorie needs: ~ 1800 Kcal/day  Method: 23-27 Kcals/KG = 8382-3277  Method:  MSJ (1511) x 1.2 = 1813  Method:  PSU = 1811  Ve: 11.6; Tmax: 37.2    (7/14) REE = 1524    Estimated Protein needs: ~ 87 g PRO/day w/ current renal fxn  Method: 1.0-1.2g/Kg CBW = 73-87    Estimated Fluid needs: ~ ml/day   Per clinical status    Current Nutrition Prescription     PO: NPO Diet NPO Type: Tube Feeding  Oral Nutrition Supplement: N/A  Intake: N/A    EN: Impact Peptide 1.5  Goal Rate: 30mL/hr  Water Flushes: 30mL/hr  Modular: Prosource TF 3/day  Route: ND  Tube: Small bore    At goal over: 20Hrs/day     Rx will supply:   Goal Volume 600 mL/day     Flush Volume 600 mL/day     Energy 1020* Kcal/day 67* % Est Need   Protein 89 g/day 99 % Est Need "   Fiber 0 g/day     Water in   mL     Total Water 1224 mL     Meet DRI No         --------------------------------------------------------------------------  Product/Rate verified at bedside: Yes  Infusing Rate at time of visit: 30mL/hr + 30mL/hr    Average Delivery from [facility] Day:   Volume 480 mL/day 80  % Goal Vol.   Flush Volume 451 mL/day     Energy  Kcal/day  % Est Need   Protein  g/day  % Est Need   Fiber  g/day     Water in  EN  mL     Total Water  mL     Meet DRI N/A      + 376 kcal propofoL    Assessment & Plan   Nutrition Diagnosis   Date:  Updated:   Problem Inadequate oral intake    Etiology ARF/MV   Signs/Symptoms NPO   Status: New    Date:   Updated:   Problem Inadequate enteral nutrition infusion   Etiology ARF/MV    Signs/Symptoms Off propofoL   Status: New    Goal:   Nutrition to support treatment and Adjust EN      Nutrition Intervention      Follow treatment progress, Care plan reviewed, Nutrition support order placed    Change EN regimen to better meet needs without propofoL  Peptamen 1.5 @ 30mL/hr and advance by 15mL q4hrs as tolerated to goal of 60mL/hr. Flush of 30mL q2hr.  Infused at goal over 20 hrs this regimen provides: 1200mL TGV, 1800kcal (100% est needs), 82g protein (94% est needs), 0g fiber, 924mL TF FW (+ 300mL flush = 1224mL total).     Adjust flush per clinical status  Close monitoring of electrolytes and replace per protocol    Monitoring/Evaluation:   Per protocol, I&O, Pertinent labs, EN delivery/tolerance, Weight, GI status, Symptoms, POC/GOC, Hemodynamic stability    Shelly Louise, MS,RD,LD  Time Spent: 40min

## 2025-07-17 NOTE — PROGRESS NOTES
"[facility]  IP Progress Note      Chief Complaint: Follow-up of cardiogenic shock/CAD/bradycardia    Subjective   Remains intubated and sedated.  Needed some extra sedation due to tachycardia.  Off vasopressors.  Objective     Blood pressure 106/61, pulse (!) 144, temperature 100.4 °F (38 °C), temperature source Bladder, resp. rate 19, height 180.3 cm (70.98\"), weight 72.7 kg (160 lb 4.4 oz), SpO2 97%.     Intake/Output Summary (Last 24 hours) at 7/17/2025 1420  Last data filed at 7/17/2025 1000  Gross per 24 hour   Intake 1971.3 ml   Output 955 ml   Net 1016.3 ml       Physical Exam:  General: No acute distress.   Neck: no JVD.  Chest:No respiratory distress, breath sounds are slightly diminished at bases, scattered rhonchi  Cardiovascular: Normal S1 and S2, distant heart sound  Extremities: Trace edema.    Results Review:     I reviewed the patient's new clinical results.    Results from last 7 days   Lab Units 07/17/25  0325   WBC 10*3/mm3 17.71*   HEMOGLOBIN g/dL 7.1*   HEMATOCRIT % 23.9*   PLATELETS 10*3/mm3 288     Results from last 7 days   Lab Units 07/17/25  0325   SODIUM mmol/L 143   POTASSIUM mmol/L 4.1   CHLORIDE mmol/L 110*   CO2 mmol/L 27.0   BUN mg/dL 57.9*   CREATININE mg/dL 1.08   CALCIUM mg/dL 8.8   BILIRUBIN mg/dL 0.2   ALK PHOS U/L 84   ALT (SGPT) U/L 140*   AST (SGOT) U/L 20   GLUCOSE mg/dL 199*     Results from last 7 days   Lab Units 07/17/25  0325   SODIUM mmol/L 143   POTASSIUM mmol/L 4.1   CHLORIDE mmol/L 110*   CO2 mmol/L 27.0   BUN mg/dL 57.9*   CREATININE mg/dL 1.08   GLUCOSE mg/dL 199*   CALCIUM mg/dL 8.8           Lab Results   Component Value Date    TROPONINT >10,000 (C) 07/07/2025         Results from last 7 days   Lab Units 07/15/25  0210   TRIGLYCERIDES mg/dL 107     aspirin, 81 mg, Nasogastric, Daily  [Held by provider] atorvastatin, 80 mg, Oral, Daily  budesonide, 0.5 mg, Nebulization, BID - RT  escitalopram, 10 mg, Nasogastric, " Daily  heparin (porcine), 5,000 Units, Subcutaneous, Q8H  insulin glargine, 15 Units, Subcutaneous, Daily  insulin regular, 4-24 Units, Subcutaneous, Q6H  insulin regular, 5 Units, Subcutaneous, Q6H  ipratropium-albuterol, 3 mL, Nebulization, 4x Daily - RT  levothyroxine, 50 mcg, Nasogastric, Daily  oxyCODONE, 5 mg, Nasogastric, Q6H  pantoprazole, 40 mg, Intravenous, Q24H  pharmacy consult - MTM, , Not Applicable, Daily  prasugrel, 10 mg, Nasogastric, Daily  senna-docusate sodium, 2 tablet, Nasogastric, BID  sodium chloride, 10 mL, Intravenous, Q12H       Tele: Sinus Rythym      Assessment:  Acute HFrEF, ischemic cardiomyopathy cardiogenic shock  EF 30-35%  IABP has been removed,  Cardiogenic shock with multisystem involvement, slowly improving, IABP removed.  Currently on low-dose dopamine  CAD status post PCI/BRITT to circumflex, documented diffuse distal LCx vasospasm  Third-degree heart block/cardiac arrest  No AVN blocking agents prior to admission  Temp transvenous pacer intact  On Dopamine gtt  Not requiring pacing currently  EP consulted -continue to monitor  Pacing wire removed   T2DM  TOOTIE/resolved  Baseline creatinine 1.0-1.2  Peak creatinine 3.44 on 7/9/2025  Creatinine has normalized.  Anemia    Plan:  Continue current management and supportive care.  Wean off sedation and then wean off ventilator per ICU team  Continue current dual antiplatelet therapy with aspirin and prasugrel.  Monitor for recovery of liver function before restarting statin therapy.  We will continue to optimize GDMT as tolerated.  Holding of beta-blockers at present due to bradycardia requiring pacemaker, holding of ACE inhibitor/ARB/Entresto due to recent hypotension requiring vasopressors.  We will wait till he comes off respirator and then gradually advance meds.    Juanita George MD, FACC, [facility]

## 2025-07-17 NOTE — PLAN OF CARE
Goal Outcome Evaluation:         Propofol weaned off this am, patient only followed commands to sticking his tongue out until 1900 when he gripped with both hands to command.  He is now spontaneously moving his upper extremities, his eyes are open but deviated upwards. Precedex gtt (@.4mcg/kg/hr) and fentanyl gtt (@50mcg/hr) infusing and titrating as tolerated. With sedation weaned he had tachycardia with sustaining heart rates of 120-150's. EEG completed, MRI ordered.    Persistent leakage around duran catheter despite irrigation.  Duran d/c'd with large clots and tissue expelled with removal.  Catheter changed to an 18fr coude catheter, irrigated post insertion and no leakage noted.      No BM, suppository given.

## 2025-07-17 NOTE — PROGRESS NOTES
"INTENSIVIST   PROGRESS NOTE     Hospital:  LOS: 8 days     Chief Complaint: Status postcardiac arrest, cardiogenic shock    Subjective   S     Interval History: No significant events overnight. Successfully weaned off dopamine (7/16) and has remained hemodynamically stable. On mechanical ventilation. Sedation being weaned. More awake on assessment this morning but not following commands.     The patient's relevant past medical, surgical and social history were reviewed and updated in Epic as appropriate.      ROS: Unable to obtain as patient intubated, on mechanical ventilation    Objective   O     Intake/Ouptut 24 hrs (7:00AM - 6:59 AM)  Intake & Output (last 3 days)         07/14 0701  07/15 0700 07/15 0701 07/16 0700 07/16 0701 07/17 0700 07/17 0701 07/18 0700    I.V. (mL/kg) 891.8 (11.5) 878.6 (11.3) 724.3 (10)     Other 681 812 451 131    NG/ 664 540 125    IV Piggyback  50      Total Intake(mL/kg) 2160.8 (27.9) 2404.6 (31) 1715.3 (23.6) 256 (3.5)    Urine (mL/kg/hr) 1945 (1) 2650 (1.4) 1340 (0.8) 225 (0.6)    Total Output 1945 2650 1340 225    Net +215.8 -245.4 +375.3 +31                  Medications (drips):  DOPamine, Last Rate: Stopped (07/16/25 1500)  fentanyl 10 mcg/mL, Last Rate: 50 mcg/hr (07/16/25 1050)  propofol, Last Rate: Stopped (07/17/25 0915)    Respiratory Support: Mechanical ventilation     Physical Examination:  Vital Signs: Blood pressure 98/53, pulse (!) 146, temperature 97.9 °F (36.6 °C), temperature source Bladder, resp. rate 23, height 180.3 cm (70.98\"), weight 72.7 kg (160 lb 4.4 oz), SpO2 98%.    General: The patient appears in no acute distress.    Chest: Clear to auscultation bilaterally, No wheezing, rhonchi, or rales. Equal chest rise. On mechanical ventilation with appropriate oxygen saturation.   Cardiac: Normal rate.  S1S2 auscultated. No murmurs, rubs or gallops.   Abdomen: Soft, non-tender, non-distended, positive bowel sounds in all four quadrants.   Extremities: No " lower extremity edema. No clubbing or cyanosis.  Skin: No rashes, open wounds, or bruising. Warm, dry, well-perfused.  Neuro: Intubated, minimal sedation.  Alert.  Not oriented.  Response/localizes to noxious stimuli    Lines, Drains & Airways       Active LDAs       Name Placement date Placement time Site Days    CVC Triple Lumen 07/09/25 Right Internal jugular 07/09/25  1015  Internal jugular  4    Peripheral IV 07/14/25 0010 20 G Anterior;Left Forearm 07/14/25  0010  Forearm  less than 1    NG/OG Tube Orogastric 16 Fr Right mouth 07/09/25  1045  Right mouth  4    NG/OG Tube Nasoduodenal 10 Fr Left nostril 07/12/25  1300  Left nostril  1    Urethral Catheter Temperature probe 16 Fr. --  --  -- --    ETT  7.5 mm 07/09/25  0916  -- 4    Venous Sheath 6 Fr. Right Femoral 07/09/25  1131  Femoral  4        Results from last 7 days   Lab Units 07/17/25  0325 07/16/25  0313 07/15/25  0210   WBC 10*3/mm3 17.71* 16.46* 18.00*   HEMOGLOBIN g/dL 7.1* 7.3* 7.6*   MCV fL 94.8 94.6 93.8   PLATELETS 10*3/mm3 288 309 331     Results from last 7 days   Lab Units 07/17/25  0325 07/16/25  0313 07/15/25  0210 07/14/25  0305 07/13/25  0300 07/12/25  1039 07/12/25  0332   SODIUM mmol/L 143 145  145 141 141 143  --  141   POTASSIUM mmol/L 4.1 3.9  3.9 4.1 4.2 4.2   < > 3.8   CO2 mmol/L 27.0 25.0  26.3 23.3 21.6* 20.8*  --  20.0*   CREATININE mg/dL 1.08 1.07  1.20 1.23 1.53* 1.50*  --  1.99*   GLUCOSE mg/dL 199* 222*  232* 254* 323* 272*  --  186*   MAGNESIUM mg/dL 2.3 2.4 2.4 2.6* 2.7*  --  2.4   PHOSPHORUS mg/dL  --   --   --  3.3 3.5  --  4.9*    < > = values in this interval not displayed.     Estimated Creatinine Clearance: 64.5 mL/min (by C-G formula based on SCr of 1.08 mg/dL).  Results from last 7 days   Lab Units 07/17/25  0325 07/16/25  0313 07/15/25  0210   ALK PHOS U/L 84 85 92   BILIRUBIN mg/dL 0.2 0.2 0.2   BILIRUBIN DIRECT mg/dL  --   --  0.1   ALT (SGPT) U/L 140* 218* 339*   AST (SGOT) U/L 20 19 27     Results from  last 7 days   Lab Units 07/17/25  0424 07/16/25  0344 07/15/25  0223 07/14/25  0311 07/13/25  0315   PH, ARTERIAL pH units 7.443 7.406 7.373 7.335* 7.399   PCO2, ARTERIAL mm Hg 40.3 43.9 42.0 43.3 36.0   PO2 ART mm Hg 130.0* 125.0* 119.0* 77.3* 87.0   FIO2 % 40 40 50 40 40     Images:  XR Abdomen KUB  Result Date: 7/16/2025  Impression: Nonobstructive bowel gas pattern. Moderate right colonic stool burden. Weighted feeding tube terminates near the gastroduodenal junction. Electronically Signed: Wood Kapadia MD  7/16/2025 10:09 AM EDT  Workstation ID: [id number]    XR Chest 1 View  Result Date: 7/16/2025  Impression: Postsurgical chest with stable position of lines/tubes. Pulmonary vascular congestion/mild interstitial edema with trace left pleural effusion. Electronically Signed: Vinay Vidal MD  7/16/2025 8:04 AM EDT  Workstation ID: [id number]    Results: Reviewed.  - I reviewed the patient's new laboratory and imaging results.  - I independently reviewed the patient's new images.    Medications: Reviewed.    Assessment & Plan    A / P     Christo Clifton is a 70M with past medical history COPD, T2DM, CAD, HTN, questionable history of seizures, hypothyroidism, dyslipidemia, anxiety, and depression who was recently admitted at [facility] from 6/27-6/28 for LHC where he was found to have 2 occluded vein grafts and did have PCI to left circ with stent placement. Patient re-presented to [facility] ED on 7/6/25 with chest pain that began the night prior. He was admitted with an NSTEMI and underwent repeat LHC on 7/7/28 which demonstrated in-stent thrombus felt to be secondary to medical noncompliance. Balloon angioplasty performed to stent with deployment of overlapping stent just distal to previous stent. ECHO obtained 24 hours later which showed LVEF 30-35% with grade 2 diastolic dysfunction.      Stay complicated by worsening renal failure.      On the morning of 7/9, he developed third-degree AV block. Status post atropine  "secondary to bradycardia (HR 30-50).  Attempted to transcutaneous pace patient when patient became combative and 1 mg IV Versed given. Patient developed complete asystole on monitor and became unresponsive. He received \"brief ACLS\" and had ROSC. Following this, patient back in third-degree AV block. He was agonally breathing and was intubated. Transcutaneous pacing was initiated.       Following this, Cardiology called who felt patient needed repeat LHC due to possible failure of stent. He underwent transvenous pacer placement on 7/9/25 and repeat LHC showing patent stent in proximal left circ artery with diffuse coronary artery vasospasm distal to stent.  Intra-aortic balloon pump placed.       [facility] [place] ([place]).        At presentation, was noted to have worsening renal failure as well as elevated LFTs suggestive of shock liver. He was started on dopamine with 1:1 support on balloon pump. Balloon pump was discontinued on 7/12. Furthermore, he remains intubated/on mechanical ventilation and thus far has been difficult to wean.     Active Hospital Problems:  Active Hospital Problems    Diagnosis  POA    **Third degree AV block [I44.2]  Yes    TOOTIE (acute kidney injury) [N17.9]  Yes    Lactic acidosis [E87.20]  Yes    Acute cystitis without hematuria [N30.00]  Yes    Ischemic cardiomyopathy [I25.5]  Yes    Acute HFrEF (heart failure with reduced ejection fraction) [I50.21]  Yes    Shock liver [K72.00]  Yes    Complete heart block [I44.2]  Yes    CAD (coronary artery disease) [I25.10]  Yes      Resolved Hospital Problems    Diagnosis Date Resolved POA    ASCVD (arteriosclerotic cardiovascular disease) [I25.10] 07/09/2025 Yes     - Patient presented from outside facility with complete heart block, cardiogenic shock and multiorgan failure, intubated on mechanical ventilation with intra-aortic balloon pump support.  Patient currently appears to be in sinus rhythm. Cardiology and EP team " following.  Hemodynamically improved and balloon pump and wires has been discontinued. Weaned dopamine as tolerated; Stopped on 7/16    - Patient with recent in-stent thrombosis complicated by medical noncompliance.Continue aspirin, Effient. Previously on IV heparin infusion per cardiology. Further management of complex coronary disease per cardiology team    - Patient doing better from respiratory standpoint. ABG acceptable. Weaning mechanical ventilation. SBT when appropriate-- at this point also limited by encephalopathy. CTH on 7/14 without acute intracranial abnormality or evidence of injury from arrest.  Aggressively weaned sedation over the course of 7/17.  Transitioned to propofol to Precedex with hope to better prepare patient for safe extubation.  Patient did stick out tongue on command multiple occasions for bedside nursing.  Moved upper/lower extremities bilaterally but not on command.  Ordered EEG and MRI.    - Component of UTI. Urine cultures are growing coag negative staph. Complete Rocephin. WBC count stable. Repeat respiratory culture sent and NGTD    - Patient had low cortisol level suggestive of critical illness related corticosteroid insufficiency. Started on hydrocortisone. Actively weaning and will stop when able     - Continue levothyroxine for hypothyroidism    - Monitor electrolytes and replace per ICU protocol. Creatinine continues to improve. Monitor urine output    - Shock liver improving. Continue to hold statins    - Continue bowel regimen. Movantik added on 7/14. Keofeed in place. Will increase tube feeds to goal as tolerated. As still no bowel movement ordered KUB on 7/16 which showed nonobstructive bowel gas pattern.  Advancing bowel regimen    - GI prophylaxis with Protonix    - Scheduled insulin + SSI    - Wound care following for diabetic ulcers    Advance Directives:   Code Status and Medical Interventions: CPR (Attempt to Resuscitate); Full Support   Ordered at: 07/09/25 1958      Code Status (Patient has no pulse and is not breathing):    CPR (Attempt to Resuscitate)     Medical Interventions (Patient has pulse or is breathing):    Full Support     High level of risk due to severe exacerbation of chronic illness and illness with threat to life or bodily function.      I conducted multidisciplinary rounds in the plan of care was discussed with the multidisciplinary team at that time. In attendance at multidisciplinary rounds was clinical pharmacist, dietitian, nursing staff and case management.      I discussed the patient's findings and my recommendations with patient, nursing staff, and consulting provider.       Critical Care Time: Critical Care time spent in direct patient care: 30 minutes (excluding procedure time, if applicable) including high complexity decision making to assess, manipulate, and support vital organ system failure in this individual who has impairment of one or more vital organ systems such that there is a high probability of imminent or life threatening deterioration in the patient’s condition.       -- Emilio Guillen MD   Pulmonary/Critical Care       Note: Updated patient's son on 7/17 PM via telephone.  Expressed concern about patient's neurologic status.

## 2025-07-17 NOTE — PLAN OF CARE
Problem: Mechanical Ventilation Invasive  Goal: Mechanical Ventilation Liberation  Outcome: Not Progressing   Goal Outcome Evaluation:               Unable to decrease ventilator support . Pt not following commands at this time.

## 2025-07-17 NOTE — PLAN OF CARE
Goal Outcome Evaluation:  Plan of Care Reviewed With: family        Progress: improving   Remains on the Vent with 40% o2 peep of 5 rate of 16 and tv530. Dopamine drip dc/d and b/p and heart rate remained stable with map above 65. Tolerating tube feeds at 30ml with 30 ml /hr flush of free water. Propofol and Fentanyl infusing for sedation. Urine is dark brownish/yellow . Continues to have leakage around duran catheter. Blood sugars remain elevated with bolus and basal dose in place. Will open eyes to voice but not follow commands.

## 2025-07-18 NOTE — CASE MANAGEMENT/SOCIAL WORK
Continued Stay Note  [facility]     Patient Name: Christo Clifton  MRN: [id number]  Today's Date: 7/18/2025    Admit Date: 7/9/2025    Plan: Ongoing   Discharge Plan       Row Name 07/18/25 1342       Plan    Plan Ongoing    Plan Comments Patient discussed in ICU MDR earlier today, continues on ventilator support.  is following his hospitalization and will assist with discharge planning needs.    Final Discharge Disposition Code 01 - home or self-care                   Discharge Codes    No documentation.                       Hodan Murphy RN

## 2025-07-18 NOTE — PROGRESS NOTES
"[facility]  IP Progress Note      Chief Complaint: Follow-up of cardiogenic shock/CAD/bradycardia    Subjective   Intubated/sedated on Precedex, has been weaned off all vasopressors.  Stable hemodynamics.    Objective     Blood pressure 116/68, pulse 102, temperature 97.9 °F (36.6 °C), temperature source Axillary, resp. rate 24, height 180.3 cm (70.98\"), weight 72.7 kg (160 lb 4.4 oz), SpO2 100%.     Intake/Output Summary (Last 24 hours) at 7/18/2025 0839  Last data filed at 7/18/2025 0600  Gross per 24 hour   Intake 1990.36 ml   Output 830 ml   Net 1160.36 ml       Physical Exam:  General: No apparent distress.  Neck: no JVD.  Chest:No respiratory distress, breath sounds are clear anteriorly with scattered rhonchi.  Cardiovascular: Normal S1 and S2, distant heart sounds  Extremities: No edema.      Results Review:     I reviewed the patient's new clinical results.    Results from last 7 days   Lab Units 07/18/25  0309   WBC 10*3/mm3 21.06*   HEMOGLOBIN g/dL 7.8*   HEMATOCRIT % 26.3*   PLATELETS 10*3/mm3 312     Results from last 7 days   Lab Units 07/18/25  0309   SODIUM mmol/L 146*   POTASSIUM mmol/L 4.6   CHLORIDE mmol/L 112*   CO2 mmol/L 23.4   BUN mg/dL 65.0*   CREATININE mg/dL 1.29*   CALCIUM mg/dL 9.1   BILIRUBIN mg/dL 0.4   ALK PHOS U/L 91   ALT (SGPT) U/L 153*   AST (SGOT) U/L 61*   GLUCOSE mg/dL 279*     Results from last 7 days   Lab Units 07/18/25  0309   SODIUM mmol/L 146*   POTASSIUM mmol/L 4.6   CHLORIDE mmol/L 112*   CO2 mmol/L 23.4   BUN mg/dL 65.0*   CREATININE mg/dL 1.29*   GLUCOSE mg/dL 279*   CALCIUM mg/dL 9.1           Lab Results   Component Value Date    TROPONINT >10,000 (C) 07/07/2025         Results from last 7 days   Lab Units 07/15/25  0210   TRIGLYCERIDES mg/dL 107     aspirin, 81 mg, Nasogastric, Daily  [Held by provider] atorvastatin, 80 mg, Oral, Daily  budesonide, 0.5 mg, Nebulization, BID - RT  escitalopram, 10 mg, Nasogastric, Daily  heparin " (porcine), 5,000 Units, Subcutaneous, Q8H  insulin glargine, 15 Units, Subcutaneous, Daily  insulin regular, 4-24 Units, Subcutaneous, Q6H  insulin regular, 5 Units, Subcutaneous, Q6H  ipratropium-albuterol, 3 mL, Nebulization, 4x Daily - RT  levothyroxine, 50 mcg, Nasogastric, Daily  oxyCODONE, 5 mg, Nasogastric, Q6H  pantoprazole, 40 mg, Intravenous, Q24H  pharmacy consult - MTM, , Not Applicable, Daily  prasugrel, 10 mg, Nasogastric, Daily  senna-docusate sodium, 2 tablet, Nasogastric, BID  sodium chloride, 10 mL, Intravenous, Q12H       Tele: Sinus Rythym      Assessment:  Acute HFrEF, ischemic cardiomyopathy cardiogenic shock  EF 30-35%  IABP has been removed,  Cardiogenic shock with multisystem involvement, slowly improving, IABP removed.  Currently on low-dose dopamine  CAD status post PCI/BRITT to circumflex, documented diffuse distal LCx vasospasm  Third-degree heart block/cardiac arrest  No AVN blocking agents prior to admission  Temp transvenous pacer intact  On Dopamine gtt  Not requiring pacing currently  EP consulted -continue to monitor  Pacing wire removed   T2DM  TOOTIE/resolved  Baseline creatinine 1.0-1.2  Peak creatinine 3.44 on 7/9/2025  Creatinine has normalized.  Anemia    Plan:  Continue current management and supportive care.  Wean off sedation and then wean off ventilator per ICU team  Continue current dual antiplatelet therapy with aspirin and prasugrel.  Monitor for recovery of liver function before restarting statin therapy.    We will continue to optimize GDMT as tolerated.  Holding of beta-blockers at present due to bradycardia requiring pacemaker, holding of ACE inhibitor/ARB/Entresto due to recent hypotension requiring vasopressors.    We will wait till he comes off respirator and then gradually advance meds depending on his heart rate and blood pressure post ambulation.    Juanita Goerge MD, FACC, [facility]

## 2025-07-18 NOTE — PROGRESS NOTES
Nutrition Services    Patient Name:  Christo Clifton  YOB: 1955  MRN: [id number]  Admit Date:  7/9/2025    Noted to be mildly hypernatremic however suspect falsely depressed with hyperglycemia. Discussed in MDR, plan to increase flush to 30mL/hr. Continue other nutrition interventions as ordered. Will continue to monitor per protocol. Please consult with any urgent nutrition needs.     Electronically signed by:  Shelly Louise MS,RD,LD  07/18/25 09:47 EDT

## 2025-07-18 NOTE — PROGRESS NOTES
"INTENSIVIST   PROGRESS NOTE     Hospital:  LOS: 9 days     Chief Complaint: Status postcardiac arrest, cardiogenic shock    Subjective   S     Interval History: Patient continues to require mechanical ventilation.  Upon sedation wean (7/18) patient wakes without purposeful interaction.  During sedation vacation on 7/18, patient became hypertensive, tachycardic and hypoxic--desatting to upper 70s/low 80s.  When benzodiazepine was administered patient became more synchronous and oxygen saturations returned to greater than 90%.  In addition, intermittently spiked fevers (Tmax 102.7).  Infectious workup initiated and empirically started on Zosyn.  Remains off vasopressor support.    The patient's relevant past medical, surgical and social history were reviewed and updated in Epic as appropriate.      ROS: Unable to obtain as patient intubated, on mechanical ventilation    Objective   O     Intake/Ouptut 24 hrs (7:00AM - 6:59 AM)  Intake & Output (last 3 days)         07/15 0701 07/16 0700 07/16 0701 07/17 0700 07/17 0701 07/18 0700 07/18 0701 07/19 0700    I.V. (mL/kg) 878.6 (11.3) 724.3 (10) 524.4 (7.2)     Other 812 451 461     NG/ 395 1116     IV Piggyback 50       Total Intake(mL/kg) 2404.6 (31) 1715.3 (23.6) 1990.4 (27.4)     Urine (mL/kg/hr) 2650 (1.4) 1340 (0.8) 955 (0.5)     Total Output 2650 1340 955     Net -245.4 +375.3 +1035.4             Urine Unmeasured Occurrence   5 x           Medications (drips):  dexmedetomidine, Last Rate: 0.8 mcg/kg/hr (07/18/25 0401)  DOPamine, Last Rate: Stopped (07/16/25 1500)  fentanyl 10 mcg/mL, Last Rate: 50 mcg/hr (07/17/25 1746)  propofol, Last Rate: Stopped (07/17/25 0915)    Respiratory Support: Mechanical ventilation     Physical Examination:  Vital Signs: Blood pressure 116/68, pulse 102, temperature 97.9 °F (36.6 °C), temperature source Axillary, resp. rate 24, height 180.3 cm (70.98\"), weight 72.7 kg (160 lb 4.4 oz), SpO2 100%.    General: The patient appears " Discharge Planning Assessment   Mookie     Patient Name: Morgan Quick  MRN: [id number]  Today's Date: 11/5/2024    Admit Date: 11/4/2024    Plan: From [facility]. SNF recommended and SNF list given; choice needed. Once accepted will need precert and pharmacy changed. PASRR needed   Discharge Needs Assessment       Row Name 11/05/24 1525       Living Environment    People in Home other (see comments);facility resident    Name(s) of People in Home Lives at [facility]    Unique Family Situation LTC    Current Living Arrangements extended care facility;assisted living facility    Duration at Residence 2 years    Potentially Unsafe Housing Conditions none    In the past 12 months has the electric, gas, oil, or water company threatened to shut off services in your home? No    Primary Care Provided by self    Provides Primary Care For no one    Family Caregiver if Needed other (see comments)  facility staff    Quality of Family Relationships helpful;involved;supportive    Able to Return to Prior Arrangements yes       Resource/Environmental Concerns    Resource/Environmental Concerns none    Transportation Concerns none       Transportation Needs    In the past 12 months, has lack of transportation kept you from medical appointments or from getting medications? no    In the past 12 months, has lack of transportation kept you from meetings, work, or from getting things needed for daily living? No       Food Insecurity    Within the past 12 months, you worried that your food would run out before you got the money to buy more. Never true    Within the past 12 months, the food you bought just didn't last and you didn't have money to get more. Never true       Transition Planning    Patient/Family Anticipates Transition to inpatient rehabilitation facility    Patient/Family Anticipated Services at Transition none;skilled nursing    Transportation Anticipated health plan transportation;family or friend will provide        Discharge Needs Assessment    Readmission Within the Last 30 Days no previous admission in last 30 days    Equipment Currently Used at Home rollator;shower chair;oxygen;walker, rolling    Concerns to be Addressed discharge planning    Anticipated Changes Related to Illness none                   Discharge Plan       Row Name 11/05/24 1528       Plan    Plan From [facility]. SNF recommended and SNF list given; choice needed. Once accepted will need precert and pharmacy changed. PASRR needed    Patient/Family in Agreement with Plan yes    Plan Comments Barriers: Pending PT and OT evals. TLSO ordered from [facility] and delivery pending.  NeuroSurgery Following. IP Hospitalist consult. CM met with Mrs. Quick with sister at bedside.  Mrs. Quick is drowsy but when awake is a/o. She resides with her  at [facility] and has for 2  years. She has a rolling walker, rollator walker, oxygen with portable tanks from [facility] [place] and a wheelchair. CM discussed the order for TLSO which was sent to [facility]. CM discussed SNF recommendation and list given to sister with Medicare.gov info and CM contact info and request for 3 choices. She was at [facility] in the past but was not happy with the food. CM will need to follow up for choices. Precert and PASRR will be needed                  Continued Care and Services - Admitted Since 11/4/2024       Durable Medical Equipment       Service Provider Request Status Services Address Phone Fax Patient Preferred    [facility] - Request Sent -- [id number], [facility] [address] [phone] --       Internal Comment last updated by Marivel Billy 11/5/2024 1423    TLSO                                  Demographic Summary       Row Name 11/05/24 1522       General Information    Admission Type observation    Arrived From emergency department    Required Notices Provided Observation Status Notice    Referral Source  in no acute distress.    Chest: Clear to auscultation bilaterally, No wheezing, rhonchi, or rales. Equal chest rise. On mechanical ventilation with appropriate oxygen saturation.   Cardiac: Sinus tachycardia on telemetry.  S1S2 auscultated. No murmurs, rubs or gallops.   Abdomen: Soft, non-tender, non-distended, positive bowel sounds in all four quadrants.   Extremities: No lower extremity edema. No clubbing or cyanosis.  Skin: No rashes, open wounds, or bruising. Warm, dry, well-perfused.  Neuro: Intubated, minimal sedation.  Only responsive to noxious stimuli.  Does not follow command.    Lines, Drains & Airways       Active LDAs       Name Placement date Placement time Site Days    CVC Triple Lumen 07/09/25 Right Internal jugular 07/09/25  1015  Internal jugular  4    Peripheral IV 07/14/25 0010 20 G Anterior;Left Forearm 07/14/25  0010  Forearm  less than 1    NG/OG Tube Orogastric 16 Fr Right mouth 07/09/25  1045  Right mouth  4    NG/OG Tube Nasoduodenal 10 Fr Left nostril 07/12/25  1300  Left nostril  1    Urethral Catheter Temperature probe 16 Fr. --  --  -- --    ETT  7.5 mm 07/09/25  0916  -- 4    Venous Sheath 6 Fr. Right Femoral 07/09/25  1131  Femoral  4        Results from last 7 days   Lab Units 07/18/25  0309 07/17/25  0325 07/16/25  0313   WBC 10*3/mm3 21.06* 17.71* 16.46*   HEMOGLOBIN g/dL 7.8* 7.1* 7.3*   MCV fL 93.3 94.8 94.6   PLATELETS 10*3/mm3 312 288 309     Results from last 7 days   Lab Units 07/18/25  0309 07/17/25  0325 07/16/25  0313 07/15/25  0210 07/14/25  0305 07/13/25  0300 07/12/25  1039 07/12/25  0332   SODIUM mmol/L 146* 143 145  145   < > 141 143  --  141   POTASSIUM mmol/L 4.6 4.1 3.9  3.9   < > 4.2 4.2   < > 3.8   CO2 mmol/L 23.4 27.0 25.0  26.3   < > 21.6* 20.8*  --  20.0*   CREATININE mg/dL 1.29* 1.08 1.07  1.20   < > 1.53* 1.50*  --  1.99*   GLUCOSE mg/dL 279* 199* 222*  232*   < > 323* 272*  --  186*   MAGNESIUM mg/dL 2.6* 2.3 2.4   < > 2.6* 2.7*  --  2.4   PHOSPHORUS  admission list    Reason for Consult discharge planning    Preferred Language English       Contact Information    Permission Granted to Share Info With                    Functional Status       Row Name 11/05/24 1524       Functional Status    Usual Activity Tolerance moderate    Current Activity Tolerance fair       Functional Status, IADL    Medications assistive person    Meal Preparation completely dependent    Laundry completely dependent    Shopping completely dependent    IADL Comments [facility] staff assist                          Marivel LEE,RN Case Manager  [facility]  Phone: desk- [phone] cell- [phone]    mg/dL  --   --   --   --  3.3 3.5  --  4.9*    < > = values in this interval not displayed.     Estimated Creatinine Clearance: 54 mL/min (A) (by C-G formula based on SCr of 1.29 mg/dL (H)).  Results from last 7 days   Lab Units 07/18/25  0309 07/17/25  0325 07/16/25  0313 07/15/25  0210   ALK PHOS U/L 91 84 85 92   BILIRUBIN mg/dL 0.4 0.2 0.2 0.2   BILIRUBIN DIRECT mg/dL  --   --   --  0.1   ALT (SGPT) U/L 153* 140* 218* 339*   AST (SGOT) U/L 61* 20 19 27     Results from last 7 days   Lab Units 07/18/25  0314 07/17/25  0424 07/16/25  0344 07/15/25  0223 07/14/25  0311   PH, ARTERIAL pH units 7.427 7.443 7.406 7.373 7.335*   PCO2, ARTERIAL mm Hg 37.6 40.3 43.9 42.0 43.3   PO2 ART mm Hg 85.7 130.0* 125.0* 119.0* 77.3*   FIO2 % 40 40 40 50 40     Images:  XR Abdomen KUB  Result Date: 7/16/2025  Impression: Nonobstructive bowel gas pattern. Moderate right colonic stool burden. Weighted feeding tube terminates near the gastroduodenal junction. Electronically Signed: Wood Kapadia MD  7/16/2025 10:09 AM EDT  Workstation ID: [id number]    Results: Reviewed.  - I reviewed the patient's new laboratory and imaging results.  - I independently reviewed the patient's new images.    Medications: Reviewed.    Assessment & Plan    A / P     Christo Clifton is a 70M with past medical history COPD, T2DM, CAD, HTN, questionable history of seizures, hypothyroidism, dyslipidemia, anxiety, and depression who was recently admitted at [facility] from 6/27-6/28 for LHC where he was found to have 2 occluded vein grafts and did have PCI to left circ with stent placement. Patient re-presented to [facility] ED on 7/6/25 with chest pain that began the night prior. He was admitted with an NSTEMI and underwent repeat LHC on 7/7/28 which demonstrated in-stent thrombus felt to be secondary to medical noncompliance. Balloon angioplasty performed to stent with deployment of overlapping stent just distal to previous stent. ECHO obtained 24 hours later which showed  "LVEF 30-35% with grade 2 diastolic dysfunction.      Stay complicated by worsening renal failure.      On the morning of 7/9, he developed third-degree AV block. Status post atropine secondary to bradycardia (HR 30-50).  Attempted to transcutaneous pace patient when patient became combative and 1 mg IV Versed given. Patient developed complete asystole on monitor and became unresponsive. He received \"brief ACLS\" and had ROSC. Following this, patient back in third-degree AV block. He was agonally breathing and was intubated. Transcutaneous pacing was initiated.       Following this, Cardiology called who felt patient needed repeat LHC due to possible failure of stent. He underwent transvenous pacer placement on 7/9/25 and repeat LHC showing patent stent in proximal left circ artery with diffuse coronary artery vasospasm distal to stent.  Intra-aortic balloon pump placed.       [facility] [place] ([place]).        At presentation, was noted to have worsening renal failure as well as elevated LFTs suggestive of shock liver. He was started on dopamine with 1:1 support on balloon pump. Balloon pump was discontinued on 7/12. He remains intubated/on mechanical ventilation and thus far has been difficult to wean.  Very guarded neurologic status.    Active Hospital Problems:  Active Hospital Problems    Diagnosis  POA    **Third degree AV block [I44.2]  Yes    TOOTIE (acute kidney injury) [N17.9]  Yes    Lactic acidosis [E87.20]  Yes    Acute cystitis without hematuria [N30.00]  Yes    Ischemic cardiomyopathy [I25.5]  Yes    Acute HFrEF (heart failure with reduced ejection fraction) [I50.21]  Yes    Shock liver [K72.00]  Yes    Complete heart block [I44.2]  Yes    CAD (coronary artery disease) [I25.10]  Yes      Resolved Hospital Problems    Diagnosis Date Resolved POA    ASCVD (arteriosclerotic cardiovascular disease) [I25.10] 07/09/2025 Yes     - Patient presented from outside facility with complete " heart block, cardiogenic shock and multiorgan failure, intubated on mechanical ventilation with intra-aortic balloon pump support.  Patient currently appears to be in sinus tachycardia. Cardiology and EP team following.  Hemodynamically improved and balloon pump and wires has been discontinued.  Dopamine successfully stopped on 7/16    - Patient with recent in-stent thrombosis complicated by medical noncompliance.Continue aspirin, Effient. Anticoagulation and management of complex coronary disease per cardiology team    - Patient stable from respiratory standpoint. Weaning mechanical ventilation as able. SBT when appropriate-- at this point chiefly limited by encephalopathy. CTH (7/14) without acute intracranial abnormality or evidence of injury from arrest; (+) Evidence of old stroke.  Significant effort to change/wean sedation over course of week without improvement in encephalopathy.  Patient wakes but has never been oriented or consistently followed commands.  Furthermore, when all sedation weaned becomes dyssynchronous with mechanical ventilation accompanied by progressive respiratory failure + worsened hemodynamic (tachycardia w/ labile BP).  Propofol switched to Precedex. For workup of ongoing encephalopathy, ordered EEG and MRI on 7/17. EEG with slow background activity and diffuse fast beta activity probably due to medication like benzodiazepines. MRI pending. Neurology consulted     - From an infectious standpoint, believed to have initial component of UTI. Urine cultures (7/08) grew coag negative staph. Completed Rocephin (7/13). WBC count stable--elevation thought to be complicated by steroids which were stopped. Repeat respiratory culture sent and NGTD. Repeated cultures on 7/18 and started on Zosyn empirically given new fevers. Lactate 1.7 (7/18). Urine was not collected. Vega exchanged on 7/17 due to poor output, leaking around meatus and suspected clot. On visualization, urine appears turbid w/  sediment     - Patient had low cortisol level suggestive of critical illness related corticosteroid insufficiency. Started on hydrocortisone which was weaned and ultimately stopped (7/15)    - Continue levothyroxine for hypothyroidism    - For TOOTIE will monitor creatinine, urine output and electrolytes while in the ICU. Electrolyte replacement per unit protocol. Creatinine improved but bumped today (7/18). Monitor urine output. Vega exchanged (7/17)    - Shock liver improving. Continue to hold statins    - Continue bowel regimen. Movantik added on 7/14. Keofeed in place. Will increase tube feeds to goal as tolerated. As still no bowel movement ordered KUB on 7/16 which showed nonobstructive bowel gas pattern.  Advancing bowel regimen    - GI prophylaxis with Protonix    - Scheduled insulin + SSI    - Wound care following for diabetic ulcers    Advance Directives:   Code Status and Medical Interventions: CPR (Attempt to Resuscitate); Full Support   Ordered at: 07/09/25 1958     Code Status (Patient has no pulse and is not breathing):    CPR (Attempt to Resuscitate)     Medical Interventions (Patient has pulse or is breathing):    Full Support     High level of risk due to severe exacerbation of chronic illness and illness with threat to life or bodily function.      I conducted multidisciplinary rounds in the plan of care was discussed with the multidisciplinary team at that time. In attendance at multidisciplinary rounds was clinical pharmacist, dietitian, nursing staff and case management.      I discussed the patient's findings and my recommendations with patient, nursing staff, and consulting provider.       Critical Care Time: Critical Care time spent in direct patient care: 45 minutes (excluding procedure time, if applicable) including high complexity decision making to assess, manipulate, and support vital organ system failure in this individual who has impairment of one or more vital organ systems such that  there is a high probability of imminent or life threatening deterioration in the patient’s condition.       -- Emilio Guillen MD   Pulmonary/Critical Care       Note: Updated patient's son on 7/17 PM via telephone.  Expressed concern about patient's neurologic status.  Updated on [ordered] EEG and MRI.  Informed that neurology would be involved.  Planned in-person meeting on 7/18; however, never showed during shift

## 2025-07-18 NOTE — CONSULTS
Neurology    Referring provider:   Chandrakant Jones MD  [address] [place] [address]    Reason for Consultation: Unresponsive    Chief complaint: Ventilated    History of present illness: 70-year-old man seen for Dr. Guillen for evaluation of Unresponsiveness.    The patient that was admitted on July 9 with a complete heart block progressing to asystole.    He had cardiac intervention and has been unresponsive since.    Yesterday on a sedation vacation he was able to stick out his tongue and pulled back in on request and that repeatedly.    He was restless and moving all extremities symmetrically but now is developed a fever and is not doing much of anything.    Apparently has a history of a COPD, type 2 diabetes, coronary disease and hypertension.    He has also had seizures in the past and is taking Dilantin but not taking medicine now.    He is also treated for hypothyroidism dyslipidemia anxiety and depression.    Apparently he was noncompliant with admission here.        Review of Systems: Unable to elicit    Home meds:   Medications Prior to Admission   Medication Sig Dispense Refill Last Dose/Taking    aspirin 81 MG EC tablet Take 1 tablet by mouth Daily.       atorvastatin (LIPITOR) 80 MG tablet Take 1 tablet by mouth Daily.       clopidogrel (PLAVIX) 75 MG tablet Take 1 tablet by mouth Daily.       escitalopram (LEXAPRO) 10 MG tablet Take 1 tablet by mouth Daily.       insulin glargine (LANTUS, SEMGLEE) 100 UNIT/ML injection Inject 14 Units under the skin into the appropriate area as directed Daily.       levothyroxine (SYNTHROID, LEVOTHROID) 50 MCG tablet Take 1 tablet by mouth Daily.          History  Past Medical History:   Diagnosis Date    Anxiety and depression     Arthritis     COPD (chronic obstructive pulmonary disease)     Coronary artery disease     Diabetes mellitus     Disease of thyroid gland     Dyslipidemia     History of transfusion     Hypertension     Seizure disorder     Pt  states last seizure x1 mo.   ,   Past Surgical History:   Procedure Laterality Date    AMPUTATION DIGIT Right 09/13/2017    Procedure: RIGHT 1ST TOE AMPUTATION ;  Surgeon: Lee Lee MD;  Location:  [facility];  Service:     CARDIAC CATHETERIZATION      CARDIAC CATHETERIZATION N/A 6/27/2025    Procedure: Left Heart Cath;  Surgeon: Mimi Botello MD;  Location:  [facility];  Service: Cardiovascular;  Laterality: N/A;    CARDIAC CATHETERIZATION N/A 6/27/2025    Procedure: Percutaneous Coronary Intervention;  Surgeon: Mimi Botello MD;  Location:  [facility];  Service: Cardiovascular;  Laterality: N/A;    CARDIAC CATHETERIZATION N/A 7/7/2025    Procedure: Left Heart Cath;  Surgeon: Anali Arevalo MD;  [facility]:  [facility];  Service: Cardiovascular;  Laterality: N/A;    CARDIAC CATHETERIZATION N/A 7/9/2025    Procedure: Coronary angiography;  Surgeon: Mimi Botello MD;  [facility]:  [facility];  Service: Cardiovascular;  Laterality: N/A;    CARDIAC CATHETERIZATION N/A 7/9/2025    Procedure: Intra-Aortic Baloon Pump Insertion;  Surgeon: Mimi Botello MD;  [facility]:  [facility];  Service: Cardiovascular;  Laterality: N/A;    CARDIAC ELECTROPHYSIOLOGY PROCEDURE N/A 7/9/2025    Procedure: Temporary Pacemaker;  Surgeon: Mimi Botello MD;  [facility]:  [facility];  Service: Cardiovascular;  Laterality: N/A;    CARDIAC SURGERY      CIRCUMCISION      CORONARY ARTERY BYPASS GRAFT      HERNIA REPAIR      X2    INTRAVASCULAR ULTRASOUND N/A 6/27/2025    Procedure: Intravascular Ultrasound;  Surgeon: Mimi Botello MD;  [facility];  Service: Cardiovascular;  Laterality: N/A;    RI INCISION & DRAINAGE LEG/ANKLE ABSCESS/HEMATOMA Right 05/24/2017    Procedure: Debridement of right first toe;  Surgeon: Ronald Perdue MD;  Location:  [facility];  Service: General    TOE  "SURGERY Left     debridement   ,   Family History   Problem Relation Age of Onset    Diabetes Mother     Hypertension Mother     Diabetes Father    ,   Social History     Tobacco Use    Smoking status: Every Day     Current packs/day: 0.00     Average packs/day: 2.0 packs/day for 45.0 years (90.0 ttl pk-yrs)     Types: Cigarettes     Start date: 1965     Last attempt to quit: 2010     Years since quitting: 15.5    Smokeless tobacco: Never    Tobacco comments:     Trying to quit   Vaping Use    Vaping status: Never Used   Substance Use Topics    Alcohol use: No    Drug use: No    and Allergies:  Patient has no active allergies.,    Vital Signs   Blood pressure 117/64, pulse (!) 123, temperature (!) 101.6 °F (38.7 °C), resp. rate 27, height 180.3 cm (70.98\"), weight 72.7 kg (160 lb 4.4 oz), SpO2 97%.  Body mass index is 22.36 kg/m².    Physical Exam:   General: Unresponsive.  On low-dose fentanyl.  Tmax is 101.6.  Pulse is 123.  Blood pressure 117/64.              Head: No trauma              Neck: Supple              Resp: Ventilated              Cor: Regular rhythm              Extremities: No edema              Skin: Normal warm and dry              Neuro: Mentally the patient is unresponsive.  He does not follow commands does not respond to noxious stimulation.    He is not moving any of his extremities.    Deep tendon reflexes are plus minus.    Has no withdrawal to pain.        Results Review: EEG shows mild to moderate generalized slowing with no focal changes.  No epileptic activity is identified.    MRI is pending.    Labs:  Lab Results (last 48 hours)       Procedure Component Value Units Date/Time    Prealbumin [[id number]]  (Abnormal) Collected: 07/18/25 0309    Specimen: Blood Updated: 07/18/25 0916     Prealbumin 16.3 mg/dL     POC Glucose Once [[id number]]  (Abnormal) Collected: 07/18/25 0533    Specimen: Blood Updated: 07/18/25 0534     Glucose 317 mg/dL     Magnesium [[id number]]  (Abnormal) Collected: " 07/18/25 0309    Specimen: Blood Updated: 07/18/25 0358     Magnesium 2.6 mg/dL     Comprehensive Metabolic Panel [[id number]]  (Abnormal) Collected: 07/18/25 0309    Specimen: Blood Updated: 07/18/25 0358     Glucose 279 mg/dL      BUN 65.0 mg/dL      Creatinine 1.29 mg/dL      Sodium 146 mmol/L      Potassium 4.6 mmol/L      Chloride 112 mmol/L      CO2 23.4 mmol/L      Calcium 9.1 mg/dL      Total Protein 6.4 g/dL      Albumin 2.9 g/dL      ALT (SGPT) 153 U/L      AST (SGOT) 61 U/L      Alkaline Phosphatase 91 U/L      Total Bilirubin 0.4 mg/dL      Globulin 3.5 gm/dL      Comment: Calculated Result        A/G Ratio 0.8 g/dL      BUN/Creatinine Ratio 50.4     Anion Gap 10.6 mmol/L      eGFR 59.3 mL/min/1.73     Narrative:      GFR Categories in Chronic Kidney Disease (CKD)              GFR Category          GFR (mL/min/1.73)    Interpretation  G1                    90 or greater        Normal or high (1)  G2                    60-89                Mild decrease (1)  G3a                   45-59                Mild to moderate decrease  G3b                   30-44                Moderate to severe decrease  G4                    15-29                Severe decrease  G5                    14 or less           Kidney failure    (1)In the absence of evidence of kidney disease, neither GFR category G1 or G2 fulfill the criteria for CKD.    eGFR calculation 2021 CKD-EPI creatinine equation, which does not include race as a factor    C-reactive Protein [[id number]]  (Abnormal) Collected: 07/18/25 0309    Specimen: Blood Updated: 07/18/25 0358     C-Reactive Protein 15.90 mg/dL     CBC (No Diff) [[id number]]  (Abnormal) Collected: 07/18/25 0309    Specimen: Blood Updated: 07/18/25 0337     WBC 21.06 10*3/mm3      RBC 2.82 10*6/mm3      Hemoglobin 7.8 g/dL      Hematocrit 26.3 %      MCV 93.3 fL      MCH 27.7 pg      MCHC 29.7 g/dL      RDW 16.5 %      RDW-SD 56.3 fl      MPV 10.8 fL      Platelets 312 10*3/mm3     Blood Gas,  Arterial With Co-Ox [[id number]]  (Abnormal) Collected: 07/18/25 0314    Specimen: Arterial Blood Updated: 07/18/25 0314     Site Right Radial     Emmanuel's Test N/A     pH, Arterial 7.427 pH units      pCO2, Arterial 37.6 mm Hg      pO2, Arterial 85.7 mm Hg      HCO3, Arterial 24.8 mmol/L      Base Excess, Arterial 0.4 mmol/L      Hemoglobin, Blood Gas 8.3 g/dL      Comment: 84 Value below reference range        Hematocrit, Blood Gas 25.5 %      Oxyhemoglobin 96.2 %      Methemoglobin 0.00 %      Carboxyhemoglobin 1.3 %      CO2 Content 25.9 mmol/L      Temperature 37.0     Barometric Pressure for Blood Gas --     Comment: N/A        Modality Ventilator     FIO2 40 %      Ventilator Mode VC+/AC     Rate 0 Breaths/minute      PEEP 5.0     PIP 0 cmH2O      Comment: Meter: [id number]     :  [id number]        IPAP 0     EPAP 0     pH, Temp Corrected 7.427 pH Units      pCO2, Temperature Corrected 37.6 mm Hg      pO2, Temperature Corrected 85.7 mm Hg     Blood Culture - Blood, Blood, Central Line [[id number]]  (Normal) Collected: 07/15/25 0223    Specimen: Blood, Central Line Updated: 07/18/25 0245     Blood Culture No growth at 3 days    Narrative:      Less than seven (7) mL's of blood was collected.  Insufficient quantity may yield false negative results.    Blood Culture - Blood, Arm, Left [[id number]]  (Normal) Collected: 07/15/25 0223    Specimen: Blood from Arm, Left Updated: 07/18/25 0245     Blood Culture No growth at 3 days    Narrative:      Less than seven (7) mL's of blood was collected.  Insufficient quantity may yield false negative results.    POC Glucose Once [[id number]]  (Abnormal) Collected: 07/17/25 2317    Specimen: Blood Updated: 07/17/25 2318     Glucose 277 mg/dL     POC Glucose Once [[id number]]  (Abnormal) Collected: 07/17/25 1731    Specimen: Blood Updated: 07/17/25 1748     Glucose 223 mg/dL     Respiratory Culture - Sputum, ET Suction [[id number]] Collected: 07/15/25 0050    Specimen:  Sputum from ET Suction Updated: 07/17/25 1321     Respiratory Culture Light growth (2+) Normal respiratory falguni. No S. aureus or Pseudomonas aeruginosa detected. Final report.     Gram Stain Many (4+) WBCs seen      Rare (1+) Epithelial cells seen      Moderate (3+) Mixed bacterial morphotypes seen on Gram Stain    POC Glucose Once [[id number]]  (Abnormal) Collected: 07/17/25 1104    Specimen: Blood Updated: 07/17/25 1119     Glucose 205 mg/dL     POC Glucose Once [[id number]]  (Abnormal) Collected: 07/17/25 0529    Specimen: Blood Updated: 07/17/25 0530     Glucose 186 mg/dL     Blood Gas, Arterial With Co-Ox [[id number]]  (Abnormal) Collected: 07/17/25 0424    Specimen: Arterial Blood Updated: 07/17/25 0424     Site Right Radial     Emmanuel's Test N/A     pH, Arterial 7.443 pH units      pCO2, Arterial 40.3 mm Hg      pO2, Arterial 130.0 mm Hg      HCO3, Arterial 27.5 mmol/L      Base Excess, Arterial 3.1 mmol/L      Hemoglobin, Blood Gas 7.2 g/dL      Comment: 84 Value below reference range        Hematocrit, Blood Gas 22.1 %      Oxyhemoglobin 98.8 %      Methemoglobin -0.10 %      Carboxyhemoglobin 1.2 %      CO2 Content 28.8 mmol/L      Temperature 37.0     Barometric Pressure for Blood Gas --     Comment: N/A        Modality Ventilator     FIO2 40 %      Ventilator Mode VC+/AC     Rate 0 Breaths/minute      PEEP 5.0     PIP 0 cmH2O      Comment: Meter: [id number]     :  [id number]        IPAP 0     EPAP 0     pH, Temp Corrected 7.443 pH Units      pCO2, Temperature Corrected 40.3 mm Hg      pO2, Temperature Corrected 130 mm Hg     Magnesium [[id number]]  (Normal) Collected: 07/17/25 0325    Specimen: Blood Updated: 07/17/25 0352     Magnesium 2.3 mg/dL     Comprehensive Metabolic Panel [[id number]]  (Abnormal) Collected: 07/17/25 0325    Specimen: Blood Updated: 07/17/25 0352     Glucose 199 mg/dL      BUN 57.9 mg/dL      Creatinine 1.08 mg/dL      Sodium 143 mmol/L      Potassium 4.1 mmol/L       Chloride 110 mmol/L      CO2 27.0 mmol/L      Calcium 8.8 mg/dL      Total Protein 5.5 g/dL      Albumin 2.6 g/dL      ALT (SGPT) 140 U/L      AST (SGOT) 20 U/L      Alkaline Phosphatase 84 U/L      Total Bilirubin 0.2 mg/dL      Globulin 2.9 gm/dL      Comment: Calculated Result        A/G Ratio 0.9 g/dL      BUN/Creatinine Ratio 53.6     Anion Gap 6.0 mmol/L      eGFR 73.4 mL/min/1.73     Narrative:      GFR Categories in Chronic Kidney Disease (CKD)              GFR Category          GFR (mL/min/1.73)    Interpretation  G1                    90 or greater        Normal or high (1)  G2                    60-89                Mild decrease (1)  G3a                   45-59                Mild to moderate decrease  G3b                   30-44                Moderate to severe decrease  G4                    15-29                Severe decrease  G5                    14 or less           Kidney failure    (1)In the absence of evidence of kidney disease, neither GFR category G1 or G2 fulfill the criteria for CKD.    eGFR calculation 2021 CKD-EPI creatinine equation, which does not include race as a factor    CBC (No Diff) [[id number]]  (Abnormal) Collected: 07/17/25 0325    Specimen: Blood Updated: 07/17/25 0333     WBC 17.71 10*3/mm3      RBC 2.52 10*6/mm3      Hemoglobin 7.1 g/dL      Hematocrit 23.9 %      MCV 94.8 fL      MCH 28.2 pg      MCHC 29.7 g/dL      RDW 16.4 %      RDW-SD 56.8 fl      MPV 10.0 fL      Platelets 288 10*3/mm3     POC Glucose Once [[id number]]  (Abnormal) Collected: 07/16/25 2339    Specimen: Blood Updated: 07/16/25 2340     Glucose 249 mg/dL     POC Glucose Once [[id number]]  (Abnormal) Collected: 07/16/25 1743    Specimen: Blood Updated: 07/16/25 1744     Glucose 215 mg/dL     POC Glucose Once [[id number]]  (Abnormal) Collected: 07/16/25 1120    Specimen: Blood Updated: 07/16/25 1407     Glucose 181 mg/dL             Rads:  Imaging Results (Last 48 Hours)       Procedure Component Value Units  Date/Time    XR Chest 1 View [[id number]] Collected: 07/18/25 0814     Updated: 07/18/25 0821    Narrative:      XR CHEST 1 VW    Date of Exam: 7/18/2025 3:31 AM EDT    Indication: f/u respiratory illness.    Comparison: Chest radiograph 7/16/2025    Findings:  Similar positions of the ET tube, right IJ central catheter gastric and enteric tubes compared to prior exam. Prior median sternotomy and CABG. Heart size normal. Improving hazy densities projecting over the lower lobes likely resolving pleural   effusions, left greater than right. Persistent minimal interstitial opacities suggesting edema. Platelike left basilar atelectasis. No worsening consolidation. No visualized pneumothorax. Degenerative related osseous change.      Impression:      Impression:  1. Slightly improved pleural effusions.  2. Similar minimal interstitial pulmonary edema.  3. Support devices without significant change. No visualized pneumothorax.      Electronically Signed: David Ponce MD    7/18/2025 8:18 AM EDT    Workstation ID: [id number]              Assessment: Hypoxic encephalopathy.    Recent cardiac arrest with resuscitation.           Plan:    Review the MRI.  When available        Comment:   Prognosis is guarded.    He apparently was showing some positive signs yesterday but with current fever he is not doing well.        I discussed the patient's findings and my recommendations with nursing staff and primary care team      Martin Momin MD  07/18/25  11:57 EDT

## 2025-07-19 NOTE — PROGRESS NOTES
[facility] Neurology    Progress Note    Patient Name: Christo Clifton  : 1955  MRN: [id number]  Primary Care Physician:  Lesa Rae APRN  Date of admission: 2025    Subjective     Reason for consult: Unresponsive    History of Present Illness   Intermittent fevers, started on Zosyn.  Attempted to wean sedation but he became hypertensive, tachycardic and hypoxic.    Restless    Review of Systems   Unable to obtain    Objective     Vitals:   Temp:  [98.6 °F (37 °C)-102.7 °F (39.3 °C)] 99.2 °F (37.3 °C)  Heart Rate:  [] 111  Resp:  [20-25] 21  BP: ()/(49-83) 108/63  FiO2 (%):  [40 %] 40 %    Neurological Examination sedated on Precedex  Mental status: Eyes open, does not appear to close them any  Speech/language: intubated, attempts to follow one-step commands  Cranial nerves: PERRL. Gaze conjugate and midline. Does not blink to threat. Does not track. Face appears symmetric.     Motor: Nonpurposeful spontaneous movement.     Reflexes: Brisk patellar reflexes bilaterally, no toe on the right to assess Babinski, mute on the left    Sensation: No withdrawal to nailbed pressure in any extremity        Current Medications    Current Facility-Administered Medications:     acetaminophen (TYLENOL) 160 MG/5ML oral solution 650 mg, 650 mg, Nasogastric, Q6H PRN, Olayinka Guillen MD    aspirin chewable tablet 81 mg, 81 mg, Nasogastric, Daily, Janina Funez, PharmD, 81 mg at 25 0840    [Held by provider] atorvastatin (LIPITOR) tablet 80 mg, 80 mg, Oral, Daily, Maribel Marcus MD    sennosides-docusate (PERICOLACE) 8.6-50 MG per tablet 2 tablet, 2 tablet, Nasogastric, BID, 2 tablet at 25 0840 **AND** polyethylene glycol (MIRALAX) packet 17 g, 17 g, Nasogastric, Daily PRN, 17 g at 25 0851 **AND** [DISCONTINUED] bisacodyl (DULCOLAX) EC tablet 5 mg, 5 mg, Oral, Daily PRN **AND** bisacodyl (DULCOLAX) suppository 10 mg, 10 mg, Rectal, Daily PRN, Chandrakant Jones MD    budesonide  (PULMICORT) nebulizer solution 0.5 mg, 0.5 mg, Nebulization, BID - RT, Shey Schaefer R, APRN, 0.5 mg at 07/19/25 0817    Calcium Replacement - Follow Nurse / BPA Driven Protocol, , Not Applicable, PRN, Yasmany Minor PA-C    dexmedeTOMIDine (PRECEDEX) 400 mcg in 100 mL NS infusion, 0.2-1.5 mcg/kg/hr, Intravenous, Titrated, Olayinka Guillen MD, Last Rate: 9.1 mL/hr at 07/19/25 0906, 0.5 mcg/kg/hr at 07/19/25 0906    dextrose (D50W) (25 g/50 mL) IV injection 25 g, 25 g, Intravenous, Q15 Min PRN, Yasmany Minor PA-C    DOPamine 400 mg in 250 mL D5W infusion, 2-20 mcg/kg/min (Dosing Weight), Intravenous, Titrated, Maribel Marcus MD, Stopped at 07/16/25 1500    escitalopram (LEXAPRO) tablet 10 mg, 10 mg, Nasogastric, Daily, Janina Funez, PharmD, 10 mg at 07/19/25 0839    fentaNYL (SUBLIMAZE) bolus from bag 10 mcg/mL injection 50 mcg, 50 mcg, Intravenous, Q30 Min PRN, Olayinka Guillen MD, 50 mcg at 07/11/25 1617    fentaNYL 2500 mcg/250 mL NS infusion,  mcg/hr, Intravenous, Titrated, Olayinka Guillen MD, Last Rate: 10 mL/hr at 07/19/25 0945, 100 mcg/hr at 07/19/25 0945    glucagon (GLUCAGEN) injection 1 mg, 1 mg, Intramuscular, Q15 Min PRN, Yasmany Minor PA-C    heparin (porcine) 5000 UNIT/ML injection 5,000 Units, 5,000 Units, Subcutaneous, Q8H, Juanita George MD, 5,000 Units at 07/19/25 0644    hydrALAZINE (APRESOLINE) injection 10 mg, 10 mg, Intravenous, Q4H PRN, Yasmany Minor PA-C    insulin glargine (LANTUS, SEMGLEE) injection 20 Units, 20 Units, Subcutaneous, Daily, Olayinka Guillen MD, 20 Units at 07/19/25 0840    insulin regular (humuLIN R,novoLIN R) injection 4-24 Units, 4-24 Units, Subcutaneous, Q6H, Olayinka Guillen MD, 16 Units at 07/19/25 0644    insulin regular (humuLIN R,novoLIN R) injection 8 Units, 8 Units, Subcutaneous, Q6H, Olayinka Guillen MD, 8 Units at 07/19/25 0644    ipratropium-albuterol (DUO-NEB) nebulizer solution 3 mL, 3 mL,  Nebulization, Q4H PRN, Shey Schaefer, APRN, 3 mL at 07/15/25 0056    ipratropium-albuterol (DUO-NEB) nebulizer solution 3 mL, 3 mL, Nebulization, 4x Daily - RT, Shey Schaefer, APRN, 3 mL at 07/19/25 0811    levothyroxine (SYNTHROID, LEVOTHROID) tablet 50 mcg, 50 mcg, Nasogastric, Daily, Janina Funez, PharmD, 50 mcg at 07/19/25 0840    Magnesium Cardiology Dose Replacement - Follow Nurse / BPA Driven Protocol, , Not Applicable, PRN, Yasmany Minor PA-C    nitroglycerin (NITROSTAT) SL tablet 0.4 mg, 0.4 mg, Sublingual, Q5 Min PRN, Maribel Marcus MD    [DISCONTINUED] ondansetron ODT (ZOFRAN-ODT) disintegrating tablet 4 mg, 4 mg, Oral, Q6H PRN **OR** ondansetron (ZOFRAN) injection 4 mg, 4 mg, Intravenous, Q6H PRN, Yasmany Minor PA-C    oxyCODONE (ROXICODONE) 5 MG/5ML solution 5 mg, 5 mg, Nasogastric, Q6H, Olayinka Guillen MD, 5 mg at 07/19/25 0644    pantoprazole (PROTONIX) injection 40 mg, 40 mg, Intravenous, Q24H, Yasmany Minor PA-C, 40 mg at 07/19/25 0840    Pharmacy Consult - MTM, , Not Applicable, Daily, Too Kwok, PharmD    Phosphorus Replacement - Follow Nurse / BPA Driven Protocol, , Not Applicable, PRN, Yasmany Minor PA-C    piperacillin-tazobactam (ZOSYN) 3.375 g IVPB in 100 mL NS MBP (CD), 3.375 g, Intravenous, Q8H, Olayinka Guillen MD, 3.375 g at 07/19/25 1017    Potassium Replacement - Follow Nurse / BPA Driven Protocol, , Not Applicable, PRN, Yasmany Minor PA-C    prasugrel (EFFIENT) tablet 10 mg, 10 mg, Nasogastric, Daily, Janina Funez, PharmD, 10 mg at 07/19/25 0839    propofol (DIPRIVAN) infusion 10 mg/mL 100 mL, 5-50 mcg/kg/min (Dosing Weight), Intravenous, Titrated, Maribel Marcus MD, Stopped at 07/17/25 0915    sodium chloride 0.9 % flush 10 mL, 10 mL, Intravenous, Q12H, Yasmany Minor PA-C, 10 mL at 07/19/25 0914    sodium chloride 0.9 % flush 10 mL, 10 mL, Intravenous, PRN, Iván,  Yasmany Guzman PA-C    Laboratory Results:   Lab Results   Component Value Date    GLUCOSE 293 (H) 07/19/2025    CALCIUM 8.5 (L) 07/19/2025     (H) 07/19/2025    K 4.1 07/19/2025    CO2 21.0 (L) 07/19/2025     (H) 07/19/2025    BUN 89.2 (H) 07/19/2025    CREATININE 1.44 (H) 07/19/2025    EGFRIFNONA 61 10/26/2020    BCR 61.9 (H) 07/19/2025    ANIONGAP 12.0 07/19/2025     Lab Results   Component Value Date    WBC 27.35 (H) 07/19/2025    HGB 7.1 (L) 07/19/2025    HCT 23.9 (L) 07/19/2025    MCV 94.8 07/19/2025     07/19/2025     Lab Results   Component Value Date    CHOL 103 06/27/2025    CHOL 142 10/15/2018    CHOL 153 05/14/2018     Lab Results   Component Value Date    HDL 30 (L) 06/27/2025    HDL 39 (L) 10/15/2018    HDL 30 (L) 05/14/2018     Lab Results   Component Value Date    LDL 56 06/27/2025    LDL 44 10/15/2018    LDL  05/14/2018      Comment:      Unable to calculate     Lab Results   Component Value Date    TRIG 107 07/15/2025    TRIG 81 06/27/2025    TRIG 293 (H) 10/15/2018     Lab Results   Component Value Date    HGBA1C 9.10 (H) 10/15/2018     Lab Results   Component Value Date    FOLATE 18.70 (H) 05/28/2024     Lab Results   Component Value Date    ZCELWGWM10 332 05/28/2024     Lactate 1.8  Pro-Alex 0.39    Images/Procedures   CT head 7/14/2025  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  Generalized atrophy.    MRI brain without contrast 7/19/2025  Mild chronic small vessel ischemic change and chronic cortical infarct in the right occipital lobe    Routine EEG 7/17/2025  Slow background activity with diffuse fast beta, likely medication effect    Assessment / Plan   Active Hospital Problems:  History of seizures, concern for breakthrough  Toxic metabolic encephalopathy  Cardiogenic shock    Brief Patient Summary:  Christo Clifton is a 70 y.o. male with history of seizures, anxiety, depression, hypothyroidism, type 2 diabetes, CAD, hypertension, dyslipidemia, COPD who presented to [facility] ICU  as a transfer from [place] on 7/9 with complete heart block, cardiogenic shock and multiorgan failure.  CT head and MRI brain shows old cortical infarct in the right occipital lobe.  Routine EEG showed generalized slowing.    On exam, patient is sedated on Precedex, restless, moves mostly upper extremities with flexion/extension at the elbows, turns head side-to-side repeatedly, keeps eyes open but does not appear to be alert.  Seems to try to follow commands, will flicker eyes when asked to close eyes, attempted to stick out tongue and give a thumbs up.      Given his history of seizures but not currently on medication I am concerned for possible seizure activity contributing to his mental status.  Would recommend obtaining continuous EEG to rule out nonconvulsive seizures overnight.    Plan:   - Continuous EEG  - Okay to continue Precedex for now  - Can consider starting Seroquel to help transition off Precedex  - Delirium precautions  - Seizure precautions      I have discussed the above with the patient's, bedside RN  Time spent with patient: 45 minutes in face-to-face evaluation and management of the patient.      Mann Garber DO, MS

## 2025-07-19 NOTE — PLAN OF CARE
Goal Outcome Evaluation:           Problem: Mechanical Ventilation Invasive  Goal: Mechanical Ventilation Liberation  Outcome: Not Progressing           Unable to decrease ventilator support d/t sedation requirement.

## 2025-07-19 NOTE — PROGRESS NOTES
"[facility] at [facility]  IP Progress Note      Chief Complaint: Follow-up of cardiogenic shock/CAD/bradycardia    Subjective   Intubated/sedated on Precedex, has been weaned off all vasopressors.  Stable hemodynamics.  Intermittent sinus tachycardia with agitation.    Objective     Blood pressure 109/69, pulse (!) 121, temperature 99.9 °F (37.7 °C), temperature source Axillary, resp. rate 21, height 180.3 cm (70.98\"), weight 72.7 kg (160 lb 4.4 oz), SpO2 100%.     Intake/Output Summary (Last 24 hours) at 7/19/2025 0921  Last data filed at 7/19/2025 0600  Gross per 24 hour   Intake 2669.17 ml   Output 1610 ml   Net 1059.17 ml       Physical Exam:  General: Sedated.  Neck: no JVD.  Chest:No respiratory distress, breath sounds are clear anteriorly with scattered rhonchi.  Cardiovascular: Normal S1 and S2, distant heart sounds  Extremities: No edema.      Results Review:     I reviewed the patient's new clinical results.    Results from last 7 days   Lab Units 07/19/25  0408   WBC 10*3/mm3 27.35*   HEMOGLOBIN g/dL 7.1*   HEMATOCRIT % 23.9*   PLATELETS 10*3/mm3 277     Results from last 7 days   Lab Units 07/19/25  0408 07/18/25  0309   SODIUM mmol/L 147* 146*   POTASSIUM mmol/L 4.1 4.6   CHLORIDE mmol/L 114* 112*   CO2 mmol/L 21.0* 23.4   BUN mg/dL 89.2* 65.0*   CREATININE mg/dL 1.44* 1.29*   CALCIUM mg/dL 8.5* 9.1   BILIRUBIN mg/dL  --  0.4   ALK PHOS U/L  --  91   ALT (SGPT) U/L  --  153*   AST (SGOT) U/L  --  61*   GLUCOSE mg/dL 293* 279*     Results from last 7 days   Lab Units 07/19/25  0408   SODIUM mmol/L 147*   POTASSIUM mmol/L 4.1   CHLORIDE mmol/L 114*   CO2 mmol/L 21.0*   BUN mg/dL 89.2*   CREATININE mg/dL 1.44*   GLUCOSE mg/dL 293*   CALCIUM mg/dL 8.5*           Lab Results   Component Value Date    TROPONINT >10,000 (C) 07/07/2025         Results from last 7 days   Lab Units 07/15/25  0210   TRIGLYCERIDES mg/dL 107     aspirin, 81 mg, Nasogastric, Daily  [Held by provider] " atorvastatin, 80 mg, Oral, Daily  budesonide, 0.5 mg, Nebulization, BID - RT  escitalopram, 10 mg, Nasogastric, Daily  heparin (porcine), 5,000 Units, Subcutaneous, Q8H  insulin glargine, 20 Units, Subcutaneous, Daily  insulin regular, 4-24 Units, Subcutaneous, Q6H  insulin regular, 8 Units, Subcutaneous, Q6H  ipratropium-albuterol, 3 mL, Nebulization, 4x Daily - RT  levothyroxine, 50 mcg, Nasogastric, Daily  oxyCODONE, 5 mg, Nasogastric, Q6H  pantoprazole, 40 mg, Intravenous, Q24H  pharmacy consult - MTM, , Not Applicable, Daily  piperacillin-tazobactam, 3.375 g, Intravenous, Q8H  prasugrel, 10 mg, Nasogastric, Daily  senna-docusate sodium, 2 tablet, Nasogastric, BID  sodium chloride, 10 mL, Intravenous, Q12H       Tele: Sinus Rythym      Assessment:  Acute HFrEF, ischemic cardiomyopathy cardiogenic shock  EF 30-35%  IABP has been removed,  Cardiogenic shock with multisystem involvement, slowly improving, IABP removed.  Currently on low-dose dopamine  CAD status post PCI/BRITT to circumflex, documented diffuse distal LCx vasospasm  Third-degree heart block/cardiac arrest  No AVN blocking agents prior to admission  Temp transvenous pacer intact  On Dopamine gtt  Not requiring pacing currently  EP consulted -continue to monitor  Pacing wire removed   T2DM  TOOTIE/resolved  Baseline creatinine 1.0-1.2  Peak creatinine 3.44 on 7/9/2025  Creatinine has normalized.  Anemia    Plan:  Continue current management and supportive care.  sedation and ventilator per ICU team  Continue current dual antiplatelet therapy with aspirin and prasugrel.  Monitor for recovery of liver function before restarting statin therapy.    We will continue to optimize GDMT as tolerated.  Holding of beta-blockers at present due to bradycardia requiring pacemaker, holding of ACE inhibitor/ARB/Entresto due to recent hypotension requiring vasopressors.    Trending neurologic function.

## 2025-07-19 NOTE — PROGRESS NOTES
Pulmonary/Critical Care Follow-up     LOS: 10 days   Patient Care Team:  Lesa Rae APRN as PCP - General (Nurse Practitioner)  Given, Phong AGUILAR MD as Consulting Physician (Neurosurgery)    Chief Complaint: Postcardiac arrest, cardiogenic shock      Subjective     History reviewed and updated in EMR as indicated.    Interval History:     Patient remains on mechanical ventilation.  He is on Precedex and fentanyl drips today secondary to agitation per nursing.  He was down to just 25 mcg/h of fentanyl yesterday and was off of Precedex and was quite agitated and not tolerating ventilation well.  Today, he has his eyes open looking mostly upwards, moving spontaneously x 4 but not following any commands for me.    History taken from: Chart, staff    PMH/FH/Social History were reviewed and updated appropriately in the electronic medical record.     Review of Systems:    Review of 14 systems was completed with positives and pertinent negatives noted in the subjective section.  All other systems reviewed and are negative.   Exceptions are noted below:    Unable to obtain secondary to endotracheal intubation.      Objective     Vital Signs  Temp:  [98.7 °F (37.1 °C)-99.9 °F (37.7 °C)] 99.4 °F (37.4 °C)  Heart Rate:  [] 82  Resp:  [16-23] 16  BP: ()/(49-83) 99/62  FiO2 (%):  [40 %] 40 %  07/18 0701 - 07/19 0700  In: 2669.2 [I.V.:761.2]  Out: 2310 [Urine:2310]  Body mass index is 22.36 kg/m².  Mode: VC+/AC  FiO2 (%):  [40 %] 40 %  S RR:  [16] 16  S VT:  [530 mL] 530 mL  PEEP/CPAP (cm H2O):  [5 cm H20] 5 cm H20  MAP (cm H2O):  [7.3-13] 12  IV drips:  dexmedetomidine, Last Rate: 0.5 mcg/kg/hr (07/19/25 0906)  fentanyl 10 mcg/mL, Last Rate: 100 mcg/hr (07/19/25 1202)  phenylephrine, Last Rate: 0.7 mcg/kg/min (07/19/25 1340)       Physical Exam:     Constitutional:   On ventilator, in no acute distress   Head:   Normocephalic, atraumatic   Eyes:           Lids and lashes normal, conjunctivae and sclerae  normal.  PER.  Upward gaze.   ENMT:  Ears appear intact with no abnormalities noted     Lips normal.  Oral endotracheal tube in place.   Neck:  Trachea midline, no JVD   Lungs/Resp:    On ventilator, symmetric chest rise, no crepitus, clear to      auscultation bilaterally.               Heart/CV:   Regular rhythm and normal rate, no murmur   Abdomen/GI:    Soft, nontender, nondistended   :    Deferred   Extremities/MSK:  No clubbing or cyanosis.  No edema.     Pulses:  Pulses palpable and equal bilaterally   Skin:  No bleeding, bruising or rash   Heme/Lymph:  No cervical or supraclavicular adenopathy.   Neurologic:    Psychiatric:    Moves all extremities with no obvious focal motor deficit.  Does not follow commands.  Mildly sedated.    The above physical exam findings were reviewed and reflect my exam findings as of today's exam.   Electronically signed by:  David Mccloud MD  07/19/25  15:39 EDT      Results Review:     I reviewed the patient's new clinical results.   Results from last 7 days   Lab Units 07/19/25  0408 07/18/25  0309 07/17/25 0325 07/16/25  0313   SODIUM mmol/L 147* 146* 143 145  145   POTASSIUM mmol/L 4.1 4.6 4.1 3.9  3.9   CHLORIDE mmol/L 114* 112* 110* 110*  110*   CO2 mmol/L 21.0* 23.4 27.0 25.0  26.3   BUN mg/dL 89.2* 65.0* 57.9* 49.4*  47.1*   CREATININE mg/dL 1.44* 1.29* 1.08 1.07  1.20   CALCIUM mg/dL 8.5* 9.1 8.8 8.9  8.8   BILIRUBIN mg/dL  --  0.4 0.2 0.2   ALK PHOS U/L  --  91 84 85   ALT (SGPT) U/L  --  153* 140* 218*   AST (SGOT) U/L  --  61* 20 19   GLUCOSE mg/dL 293* 279* 199* 222*  232*     Results from last 7 days   Lab Units 07/19/25  1301 07/19/25  0408 07/18/25  0309 07/17/25  0325   WBC 10*3/mm3  --  27.35* 21.06* 17.71*   HEMOGLOBIN g/dL 7.0* 7.1* 7.8* 7.1*   HEMATOCRIT % 24.0* 23.9* 26.3* 23.9*   PLATELETS 10*3/mm3  --  277 312 288     Results from last 7 days   Lab Units 07/19/25  0431   PH, ARTERIAL pH units 7.404   PO2 ART mm Hg 108.0   PCO2, ARTERIAL mm  Hg 37.1   HCO3 ART mmol/L 23.2     Results from last 7 days   Lab Units 07/19/25  0408 07/18/25  0309 07/17/25  0325 07/15/25  0210 07/14/25  0305 07/13/25  0300   MAGNESIUM mg/dL 2.6* 2.6* 2.3   < > 2.6* 2.7*   PHOSPHORUS mg/dL  --   --   --   --  3.3 3.5    < > = values in this interval not displayed.       I reviewed the patient's new imaging including images and reports.    Chest x-ray shows cardiomegaly with bilateral mild basilar airspace opacities possibly infiltrate versus atelectasis and small left pleural effusion.    Medication Review:   aspirin, 81 mg, Nasogastric, Daily  [Held by provider] atorvastatin, 80 mg, Oral, Daily  budesonide, 0.5 mg, Nebulization, BID - RT  escitalopram, 10 mg, Nasogastric, Daily  heparin (porcine), 5,000 Units, Subcutaneous, Q8H  insulin glargine, 20 Units, Subcutaneous, Daily  insulin regular, 4-24 Units, Subcutaneous, Q6H  insulin regular, 8 Units, Subcutaneous, Q6H  ipratropium-albuterol, 3 mL, Nebulization, 4x Daily - RT  levothyroxine, 50 mcg, Nasogastric, Daily  oxyCODONE, 5 mg, Nasogastric, Q6H  pantoprazole, 40 mg, Intravenous, Q24H  pharmacy consult - MTM, , Not Applicable, Daily  piperacillin-tazobactam, 3.375 g, Intravenous, Q8H  prasugrel, 10 mg, Nasogastric, Daily  senna-docusate sodium, 2 tablet, Nasogastric, BID  sodium chloride, 10 mL, Intravenous, Q12H      dexmedetomidine, 0.2-1.5 mcg/kg/hr, Last Rate: 0.5 mcg/kg/hr (07/19/25 0906)  fentanyl 10 mcg/mL,  mcg/hr, Last Rate: 100 mcg/hr (07/19/25 1202)  phenylephrine, 0.5-3 mcg/kg/min, Last Rate: 0.7 mcg/kg/min (07/19/25 1340)        Assessment & Plan         Third degree AV block    CAD (coronary artery disease)    TOOTIE (acute kidney injury)    Lactic acidosis    Acute cystitis without hematuria    Ischemic cardiomyopathy    Acute HFrEF (heart failure with reduced ejection fraction)    Shock liver    Complete heart block    70 y.o. active smoker with 90-pack-year history with history of HTN, HLD, T2DM,  COPD, hypothyroidism, possible history of seizures, anxiety/depression, admitted at [facility] 6/27-28/2025 with left heart cath showing 2 occluded vein grafts and PCI to left circumflex with stent placement.  He Altaf presented to [facility] ED on 7/6/2025 with chest pain and was admitted with NSTEMI and had repeat LHC 7/7/2025 with in-stent thrombosis felt to be secondary to medical noncompliance.  Balloon angioplasty was performed with deployment of overlapping stent just distal to the previous stent.  Echo obtained 24 hours later showed LVEF of 30 to 35% with grade 2 diastolic dysfunction.    That course was complicated by renal failure.    On the morning of 7/9, he developed third-degree AV block and was given atropine secondary to bradycardia.  Attempts were made at transcutaneous pacing and patient became combative and 1 mg of Versed was given.  He developed asystole on monitor and became unresponsive and had a brief ACLS with ROSC.  Following this the patient was back in third-degree AV block and was agonal he breathing and was intubated.  He was initiated on transcutaneous pacing.    After this, cardiology was called who felt the patient needed a repeat LHC due to possible failure of stent.  He underwent transvenous pacemaker placement on 7/9/2025 and repeat LHC showed patent stent in the proximal left circumflex artery with diffuse coronary artery vasospasm distal to the stent.  Intra-aortic balloon pump was placed.  [facility] was contacted and he was transferred on 7/9/2025.    At admission here, patient was noted to have worsening renal failure and elevated LFTs suggestive of shock liver.  He was started on dopamine with one-to-one support on balloon pump.  Balloon pump was discontinued on 7/12.  He remains encephalopathic.    Plan:  1.  For AMS/encephalopathy: Unclear etiology.  Will attempt to wean sedation although he develops some agitation.  Neurology following.  MRI with old stroke.  2.  For  respiratory failure: Continue mechanical ventilation.  Wean settings as tolerated.  Not ready for extubation secondary to neurologic status.  3.  For possible UTI: Cultures on 7/8 grew coag negative staph and he completed Rocephin on 7/13.  Started on Zosyn empirically on 7/18 secondary to new fevers.  Continue Zosyn for now and follow-up cultures.  4.  For hypothyroidism: Continue levothyroxine.  5.  For TOOTIE: Slightly worsened.  Gentle fluids/albumin.  Watch fluid balance and labs.  Check proBNP.  6.  For congestive heart failure/arrhythmias: Cardiology/EP following.  7.  For shock: Mainly cardiogenic.  Likely also had critical illness related corticosteroid insufficiency and was started on hydrocortisone which was weaned and ultimately stopped on 7/15/2025.  8.  For shock liver: Had been improving.  Recheck labs in AM.    Patient is critically ill secondary to multiorgan failure and has high risk of life-threatening decompensation in condition.   As such, the patient requires continuous monitoring and frequent reassessment for consideration of adjustment in management to minimize this risk.  I personally reassessed the patient multiple times today.    Critical care time : 45 minutes spent by me personally and independently.(This excludes time spent performing separately reportable procedures and services).  Critical care time includes high complexity decision making to assess, manipulate, and support vital organ system failure in this individual who has impairment of one or more vital organ systems such that there is a high probability of imminent or life threatening deterioration in the patient’s condition.    Electronically signed by:    David Mccloud MD  07/19/25  15:39 EDT      *. Please note that portions of this note were completed with [employer] - a voice recognition program.

## 2025-07-20 NOTE — PROGRESS NOTES
"[facility] at [facility]  IP Progress Note      Chief Complaint: Follow-up of cardiogenic shock/CAD/bradycardia    Subjective   Intubated/sedated on Precedex, phenylephrine at 0.7 mcg/kg/min..  Stable hemodynamics.  Intermittent sinus tachycardia with agitation.  EEG in process.    Objective     Blood pressure 100/62, pulse 75, temperature 97.4 °F (36.3 °C), temperature source Esophageal, resp. rate 21, height 180.3 cm (70.98\"), weight 72.7 kg (160 lb 4.4 oz), SpO2 100%.     Intake/Output Summary (Last 24 hours) at 7/20/2025 0844  Last data filed at 7/20/2025 0700  Gross per 24 hour   Intake 4035 ml   Output 1125 ml   Net 2910 ml       Physical Exam:  General: Sedated.  Neck: no JVD.  Chest:No respiratory distress, breath sounds are clear anteriorly with scattered rhonchi.  Cardiovascular: Normal S1 and S2, distant heart sounds  Extremities: No edema.      Results Review:     I reviewed the patient's new clinical results.    Results from last 7 days   Lab Units 07/20/25  0533   WBC 10*3/mm3 37.30*   HEMOGLOBIN g/dL 7.2*   HEMATOCRIT % 25.3*   PLATELETS 10*3/mm3 360     Results from last 7 days   Lab Units 07/20/25  0533   SODIUM mmol/L 147*   POTASSIUM mmol/L 4.7   CHLORIDE mmol/L 113*   CO2 mmol/L 21.1*   BUN mg/dL 85.9*   CREATININE mg/dL 1.80*   CALCIUM mg/dL 8.8   BILIRUBIN mg/dL 0.5   ALK PHOS U/L 70   ALT (SGPT) U/L 179*   AST (SGOT) U/L 137*   GLUCOSE mg/dL 350*     Results from last 7 days   Lab Units 07/20/25  0533   SODIUM mmol/L 147*   POTASSIUM mmol/L 4.7   CHLORIDE mmol/L 113*   CO2 mmol/L 21.1*   BUN mg/dL 85.9*   CREATININE mg/dL 1.80*   GLUCOSE mg/dL 350*   CALCIUM mg/dL 8.8           Lab Results   Component Value Date    TROPONINT >10,000 (C) 07/07/2025         Results from last 7 days   Lab Units 07/15/25  0210   TRIGLYCERIDES mg/dL 107     aspirin, 81 mg, Nasogastric, Daily  [Held by provider] atorvastatin, 80 mg, Oral, Daily  budesonide, 0.5 mg, Nebulization, BID - " RT  escitalopram, 10 mg, Nasogastric, Daily  heparin (porcine), 5,000 Units, Subcutaneous, Q8H  insulin glargine, 20 Units, Subcutaneous, Daily  insulin regular, 4-24 Units, Subcutaneous, Q6H  insulin regular, 8 Units, Subcutaneous, Q6H  ipratropium-albuterol, 3 mL, Nebulization, 4x Daily - RT  levothyroxine, 50 mcg, Nasogastric, Daily  oxyCODONE, 5 mg, Nasogastric, Q6H  pantoprazole, 40 mg, Intravenous, Q24H  pharmacy consult - MTM, , Not Applicable, Daily  piperacillin-tazobactam, 3.375 g, Intravenous, Q8H  prasugrel, 10 mg, Nasogastric, Daily  senna-docusate sodium, 2 tablet, Nasogastric, BID  sodium chloride, 10 mL, Intravenous, Q12H       Tele: Sinus Rythym      Assessment:  Acute HFrEF, ischemic cardiomyopathy cardiogenic shock  EF 30-35%  IABP has been removed,  Cardiogenic shock with multisystem involvement, slowly improving, IABP removed.  Currently on low-dose dopamine  CAD status post PCI/BRITT to circumflex, documented diffuse distal LCx vasospasm  Third-degree heart block/cardiac arrest  No AVN blocking agents prior to admission  Temp transvenous pacer intact  On Dopamine gtt  Not requiring pacing currently  EP consulted -continue to monitor  Pacing wire removed   T2DM  TOOTIE/resolved  Baseline creatinine 1.0-1.2  Peak creatinine 3.44 on 7/9/2025  Creatinine has normalized.  Anemia    Plan:  Continue current management and supportive care.  sedation and ventilator per ICU team  Continue current dual antiplatelet therapy with aspirin and prasugrel.  Monitor for recovery of liver function before restarting statin therapy.    We will continue to optimize GDMT as tolerated.  Holding of beta-blockers at present due to bradycardia requiring pacemaker, holding of ACE inhibitor/ARB/Entresto due to recent hypotension requiring vasopressors.    Trending neurologic function.  EEG evaluation ongoing.

## 2025-07-20 NOTE — PROGRESS NOTES
[facility] Neurology    Progress Note    Patient Name: Christo Clifton  : 1955  MRN: [id number]  Primary Care Physician:  Lesa Rae APRN  Date of admission: 2025    Subjective     Reason for consult: Unresponsive    History of Present Illness   WBC jumped to 37.  Continues to be restless    Review of Systems   Unable to obtain    Objective     Vitals:   Temp:  [97.4 °F (36.3 °C)-100.4 °F (38 °C)] 100.4 °F (38 °C)  Heart Rate:  [] 88  Resp:  [16-23] 21  BP: ()/(43-77) 89/48  FiO2 (%):  [40 %] 40 %    Neurological Examination sedated on Precedex  Mental status: Eyes open, does not appear to close them any, restless  Speech/language: intubated, does not follow commands  Cranial nerves: PERRL. Gaze conjugate and midline. Does not blink to threat. Does not track. Face appears symmetric.     Motor: Nonpurposeful spontaneous movement.    Reflexes: Brisk patellar reflexes bilaterally, no toe on the right to assess Babinski, mute on the left    Sensation: No withdrawal to nailbed pressure in any extremity        Current Medications    Current Facility-Administered Medications:     acetaminophen (TYLENOL) 160 MG/5ML oral solution 650 mg, 650 mg, Nasogastric, Q6H PRN, Olayinka Guillen MD, 650 mg at 25 0847    aspirin chewable tablet 81 mg, 81 mg, Nasogastric, Daily, Janina Funez, PharmD, 81 mg at 25 0943    [Held by provider] atorvastatin (LIPITOR) tablet 80 mg, 80 mg, Oral, Daily, Maribel Marcus MD    sennosides-docusate (PERICOLACE) 8.6-50 MG per tablet 2 tablet, 2 tablet, Nasogastric, BID, 2 tablet at 25 0840 **AND** polyethylene glycol (MIRALAX) packet 17 g, 17 g, Nasogastric, Daily PRN, 17 g at 25 0851 **AND** [DISCONTINUED] bisacodyl (DULCOLAX) EC tablet 5 mg, 5 mg, Oral, Daily PRN **AND** bisacodyl (DULCOLAX) suppository 10 mg, 10 mg, Rectal, Daily PRN, Chandrakant Jones MD, 10 mg at 25 0859    budesonide (PULMICORT) nebulizer solution 0.5 mg, 0.5 mg,  Nebulization, BID - RT, Shey Schaefer, APRN, 0.5 mg at 07/20/25 0753    Calcium Replacement - Follow Nurse / BPA Driven Protocol, , Not Applicable, PRN, Yasmany Minor PA-C    dexmedeTOMIDine (PRECEDEX) 400 mcg in 100 mL NS infusion, 0.2-1.5 mcg/kg/hr, Intravenous, Titrated, Olayinka Guillen MD, Last Rate: 12.7 mL/hr at 07/20/25 0231, 0.7 mcg/kg/hr at 07/20/25 0231    dextrose (D50W) (25 g/50 mL) IV injection 25 g, 25 g, Intravenous, Q15 Min PRN, Yasmany Minor PA-C    escitalopram (LEXAPRO) tablet 10 mg, 10 mg, Nasogastric, Daily, Janina Funez, PharmD, 10 mg at 07/20/25 0840    fentaNYL (SUBLIMAZE) bolus from bag 10 mcg/mL injection 50 mcg, 50 mcg, Intravenous, Q30 Min PRN, Olayinka Guillen MD, 50 mcg at 07/11/25 1617    fentaNYL 2500 mcg/250 mL NS infusion,  mcg/hr, Intravenous, Titrated, Ana Rosa Hall, APRN, Stopped at 07/20/25 0916    glucagon (GLUCAGEN) injection 1 mg, 1 mg, Intramuscular, Q15 Min PRN, Yasmany Minor PA-C    heparin (porcine) 5000 UNIT/ML injection 5,000 Units, 5,000 Units, Subcutaneous, Q8H, Juanita George MD, 5,000 Units at 07/20/25 0654    hydrALAZINE (APRESOLINE) injection 10 mg, 10 mg, Intravenous, Q4H PRN, Yasmany Minor PA-C    insulin glargine (LANTUS, SEMGLEE) injection 20 Units, 20 Units, Subcutaneous, Daily, Olayinka Guillen MD, 20 Units at 07/20/25 0839    insulin regular (humuLIN R,novoLIN R) injection 4-24 Units, 4-24 Units, Subcutaneous, Q6H, Olayinka Guillen MD, 20 Units at 07/20/25 0654    insulin regular (humuLIN R,novoLIN R) injection 8 Units, 8 Units, Subcutaneous, Q6H, Olayinka Guillen MD, 8 Units at 07/20/25 0654    ipratropium-albuterol (DUO-NEB) nebulizer solution 3 mL, 3 mL, Nebulization, Q4H PRN, Shey Schaefer APRN, 3 mL at 07/15/25 0056    ipratropium-albuterol (DUO-NEB) nebulizer solution 3 mL, 3 mL, Nebulization, 4x Daily - RT, Shey Schaefer APRN, 3 mL at 07/20/25 0745    levothyroxine  (SYNTHROID, LEVOTHROID) tablet 50 mcg, 50 mcg, Nasogastric, Daily, Janina Funez, PharmD, 50 mcg at 07/20/25 0840    Magnesium Cardiology Dose Replacement - Follow Nurse / BPA Driven Protocol, , Not Applicable, PRN, Yasmany Minor PA-C    nitroglycerin (NITROSTAT) SL tablet 0.4 mg, 0.4 mg, Sublingual, Q5 Min PRN, Maribel Marcus MD    [DISCONTINUED] ondansetron ODT (ZOFRAN-ODT) disintegrating tablet 4 mg, 4 mg, Oral, Q6H PRN **OR** ondansetron (ZOFRAN) injection 4 mg, 4 mg, Intravenous, Q6H PRN, Yasmany Minor PA-C    pantoprazole (PROTONIX) injection 40 mg, 40 mg, Intravenous, Q24H, Yasmany Minor PA-C, 40 mg at 07/20/25 0840    Pharmacy Consult - MT, , Not Applicable, Daily, Too Kwok, PharmD    phenylephrine (NATASHA-SYNEPHRINE) 50 mg in sodium chloride 0.9 % 250 mL infusion, 0.5-3 mcg/kg/min, Intravenous, Titrated, Ana Rosa Hall APRN, Last Rate: 15.3 mL/hr at 07/20/25 0231, 0.7 mcg/kg/min at 07/20/25 0231    Phosphorus Replacement - Follow Nurse / BPA Driven Protocol, , Not Applicable, PRN, Yasmany Minor PA-C    piperacillin-tazobactam (ZOSYN) 3.375 g IVPB in 100 mL NS MBP (CD), 3.375 g, Intravenous, Q8H, Olayinka Guillen MD, 3.375 g at 07/20/25 0947    Potassium Replacement - Follow Nurse / BPA Driven Protocol, , Not Applicable, PRN, Yasmany Minor PA-C    prasugrel (EFFIENT) tablet 10 mg, 10 mg, Nasogastric, Daily, Janina Funez, PharmD, 10 mg at 07/20/25 0840    sodium chloride 0.9 % flush 10 mL, 10 mL, Intravenous, Q12H, Yasmany Minor PA-C, 10 mL at 07/20/25 0943    sodium chloride 0.9 % flush 10 mL, 10 mL, Intravenous, PRN, Yasmany Minor PA-C    Laboratory Results:   Lab Results   Component Value Date    GLUCOSE 350 (H) 07/20/2025    CALCIUM 8.8 07/20/2025     (H) 07/20/2025    K 4.7 07/20/2025    CO2 21.1 (L) 07/20/2025     (H) 07/20/2025    BUN 85.9 (H) 07/20/2025    CREATININE  1.80 (H) 07/20/2025    EGFRIFNONA 61 10/26/2020    BCR 47.7 (H) 07/20/2025    ANIONGAP 12.9 07/20/2025     Lab Results   Component Value Date    WBC 37.30 (C) 07/20/2025    HGB 7.2 (L) 07/20/2025    HCT 25.3 (L) 07/20/2025    MCV 98.1 (H) 07/20/2025     07/20/2025     Lab Results   Component Value Date    CHOL 103 06/27/2025    CHOL 142 10/15/2018    CHOL 153 05/14/2018     Lab Results   Component Value Date    HDL 30 (L) 06/27/2025    HDL 39 (L) 10/15/2018    HDL 30 (L) 05/14/2018     Lab Results   Component Value Date    LDL 56 06/27/2025    LDL 44 10/15/2018    LDL  05/14/2018      Comment:      Unable to calculate     Lab Results   Component Value Date    TRIG 107 07/15/2025    TRIG 81 06/27/2025    TRIG 293 (H) 10/15/2018     Lab Results   Component Value Date    HGBA1C 9.10 (H) 10/15/2018     Lab Results   Component Value Date    FOLATE 18.70 (H) 05/28/2024     Lab Results   Component Value Date    WPRKTENY07 332 05/28/2024     Lactate 1.8  Pro-Alex 0.39    Blood cultures 1/2 bottles with abnormal stain    Images/Procedures   CT head 7/14/2025  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  Generalized atrophy.    MRI brain without contrast 7/19/2025  Mild chronic small vessel ischemic change and chronic cortical infarct in the right occipital lobe    Routine EEG 7/17/2025  Slow background activity with diffuse fast beta, likely medication effect    Continuous EEG 7/19 through 7/20/2025  Supressed slow background noted through the record  No seizure noted     Assessment / Plan   Active Hospital Problems:  History of seizures, concern for breakthrough  Toxic metabolic encephalopathy  Cardiogenic shock  Suspected hypoxic encephalopathy    Brief Patient Summary:  Christo Clifton is a 70 y.o. male with history of seizures, anxiety, depression, hypothyroidism, type 2 diabetes, CAD, hypertension, dyslipidemia, COPD who presented to [facility] ICU as a transfer from [place] on 7/9 with complete heart block, cardiogenic shock and  multiorgan failure.  CT head and MRI brain shows old cortical infarct in the right occipital lobe.  Routine EEG showed generalized slowing.    Exam similar to previous, continues to not follow commands, moves all extremities spontaneously but nonpurposeful he, mildly restless.  He is currently on a Precedex drip, will attempt to wean and start Seroquel.  Continuous EEG showed generalized slowing without seizures.    Unclear etiology of his encephalopathy.  Possible hypoxic injury second to 2 brief asystole, multiple cardiac issues.  May benefit from goals of care discussion with family if no significant improvement    Plan:   - Discontinue EEG  - Start Seroquel 25 mg nightly and attempt to wean off Precedex  - Delirium precautions  - Seizure precautions      I have discussed the above with the patient's, bedside RN, intensivist  Time spent with patient: 45 minutes in face-to-face evaluation and management of the patient.      Mann Garber DO, MS

## 2025-07-20 NOTE — PROGRESS NOTES
Pulmonary/Critical Care Follow-up     LOS: 11 days   Patient Care Team:  Lesa Rae APRN as PCP - General (Nurse Practitioner)  Given, Phong AGUILAR MD as Consulting Physician (Neurosurgery)    Chief Complaint: Postcardiac arrest, cardiogenic shock      Subjective     History reviewed and updated in EMR as indicated.    Interval History:     Patient remains on mechanical ventilation.  He remained on Precedex and fentanyl drips this a.m.  Fentanyl drip was held and subsequently stopped Precedex drip.  Patient was able to follow some limited commands to close eyes and seemed to try to stick his tongue out.  He moved his hand when asked to give a thumbs up.  He became agitated and I ordered some intermittent doses of IV Versed as needed for anxiety/agitation.  He is having a large amount of secretions I do not think he is stable for extubation.  I spoke with neurology.    History taken from: Chart, staff    PMH/FH/Social History were reviewed and updated appropriately in the electronic medical record.     Review of Systems:    Review of 14 systems was completed with positives and pertinent negatives noted in the subjective section.  All other systems reviewed and are negative.   Exceptions are noted below:    Unable to obtain secondary to endotracheal intubation.      Objective     Vital Signs  Temp:  [97.4 °F (36.3 °C)-101.5 °F (38.6 °C)] 99.8 °F (37.7 °C)  Heart Rate:  [] 98  Resp:  [16-23] 17  BP: ()/(43-74) 105/66  FiO2 (%):  [40 %] 40 %  07/19 0701 - 07/20 0700  In: 4035 [I.V.:1873]  Out: 1125 [Urine:1125]  Body mass index is 22.36 kg/m².  Mode: VC+/AC  FiO2 (%):  [40 %] 40 %  S RR:  [16] 16  S VT:  [530 mL] 530 mL  PEEP/CPAP (cm H2O):  [5 cm H20] 5 cm H20  NV SUP:  [10 cm H20] 10 cm H20  MAP (cm H2O):  [8.2-18] 10  IV drips:  dexmedetomidine, Last Rate: 1 mcg/kg/hr (07/20/25 1557)  fentanyl 10 mcg/mL, Last Rate: Stopped (07/20/25 0916)  phenylephrine, Last Rate: 1 mcg/kg/min (07/20/25 4464)        Physical Exam:     Constitutional:   On ventilator, mildly agitated at time   Head:   Normocephalic, atraumatic   Eyes:           Lids and lashes normal, conjunctivae and sclerae normal.  PER.  Upward gaze.   ENMT:  Ears appear intact with no abnormalities noted     Lips normal.  Oral endotracheal tube in place.   Neck:  Trachea midline, no JVD   Lungs/Resp:    On ventilator, symmetric chest rise, no crepitus, mild rhonchi bilaterally.               Heart/CV:   Regular rhythm and normal rate, no murmur   Abdomen/GI:    Soft, nontender, nondistended   :    Deferred   Extremities/MSK:  No clubbing or cyanosis.  No edema.     Pulses:  Pulses palpable and equal bilaterally   Skin:  No bleeding, bruising or rash   Heme/Lymph:  No cervical or supraclavicular adenopathy.   Neurologic:    Psychiatric:    Moves all extremities with no obvious focal motor deficit.  Seemed to follow limited commands.  Mildly agitated at time.    The above physical exam findings were reviewed and reflect my exam findings as of today's exam.   Electronically signed by:  David Mccloud MD  07/20/25  18:32 EDT      Results Review:     I reviewed the patient's new clinical results.   Results from last 7 days   Lab Units 07/20/25  0533 07/19/25  0408 07/18/25  0309 07/17/25  0325   SODIUM mmol/L 147* 147* 146* 143   POTASSIUM mmol/L 4.7 4.1 4.6 4.1   CHLORIDE mmol/L 113* 114* 112* 110*   CO2 mmol/L 21.1* 21.0* 23.4 27.0   BUN mg/dL 85.9* 89.2* 65.0* 57.9*   CREATININE mg/dL 1.80* 1.44* 1.29* 1.08   CALCIUM mg/dL 8.8 8.5* 9.1 8.8   BILIRUBIN mg/dL 0.5  --  0.4 0.2   ALK PHOS U/L 70  --  91 84   ALT (SGPT) U/L 179*  --  153* 140*   AST (SGOT) U/L 137*  --  61* 20   GLUCOSE mg/dL 350* 293* 279* 199*     Results from last 7 days   Lab Units 07/20/25  0533 07/19/25  1301 07/19/25  0408 07/18/25  0309   WBC 10*3/mm3 37.30*  --  27.35* 21.06*   HEMOGLOBIN g/dL 7.2* 7.0* 7.1* 7.8*   HEMATOCRIT % 25.3* 24.0* 23.9* 26.3*   PLATELETS 10*3/mm3 360  --   277 312     Results from last 7 days   Lab Units 07/19/25  0431   PH, ARTERIAL pH units 7.404   PO2 ART mm Hg 108.0   PCO2, ARTERIAL mm Hg 37.1   HCO3 ART mmol/L 23.2     Results from last 7 days   Lab Units 07/19/25  0408 07/18/25  0309 07/17/25  0325 07/15/25  0210 07/14/25  0305   MAGNESIUM mg/dL 2.6* 2.6* 2.3   < > 2.6*   PHOSPHORUS mg/dL  --   --   --   --  3.3    < > = values in this interval not displayed.       I reviewed the patient's new imaging including images and reports.    Chest x-ray 7/19/2025 shows cardiomegaly with bilateral mild basilar airspace opacities possibly infiltrate versus atelectasis and small left pleural effusion.    Medication Review:   aspirin, 81 mg, Nasogastric, Daily  [Held by provider] atorvastatin, 80 mg, Oral, Daily  budesonide, 0.5 mg, Nebulization, BID - RT  escitalopram, 10 mg, Nasogastric, Daily  heparin (porcine), 5,000 Units, Subcutaneous, Q8H  insulin glargine, 20 Units, Subcutaneous, Daily  insulin regular, 4-24 Units, Subcutaneous, Q6H  insulin regular, 8 Units, Subcutaneous, Q6H  ipratropium-albuterol, 3 mL, Nebulization, 4x Daily - RT  levothyroxine, 50 mcg, Nasogastric, Daily  pantoprazole, 40 mg, Intravenous, Q24H  pharmacy consult - MTM, , Not Applicable, Daily  piperacillin-tazobactam, 3.375 g, Intravenous, Q8H  prasugrel, 10 mg, Nasogastric, Daily  QUEtiapine, 25 mg, Nasogastric, Nightly  senna-docusate sodium, 2 tablet, Nasogastric, BID  sodium chloride, 10 mL, Intravenous, Q12H      dexmedetomidine, 0.2-1.5 mcg/kg/hr, Last Rate: 1 mcg/kg/hr (07/20/25 1557)  fentanyl 10 mcg/mL,  mcg/hr, Last Rate: Stopped (07/20/25 0916)  phenylephrine, 0.5-3 mcg/kg/min, Last Rate: 1 mcg/kg/min (07/20/25 1723)        Assessment & Plan         Third degree AV block    CAD (coronary artery disease)    TOOTIE (acute kidney injury)    Lactic acidosis    Acute cystitis without hematuria    Ischemic cardiomyopathy    Acute HFrEF (heart failure with reduced ejection fraction)     Shock liver    Complete heart block    70 y.o. active smoker with 90-pack-year history with history of HTN, HLD, T2DM, COPD, hypothyroidism, possible history of seizures, anxiety/depression, admitted at [facility] 6/27-28/2025 with left heart cath showing 2 occluded vein grafts and PCI to left circumflex with stent placement.  He Altaf presented to [facility] ED on 7/6/2025 with chest pain and was admitted with NSTEMI and had repeat LHC 7/7/2025 with in-stent thrombosis felt to be secondary to medical noncompliance.  Balloon angioplasty was performed with deployment of overlapping stent just distal to the previous stent.  Echo obtained 24 hours later showed LVEF of 30 to 35% with grade 2 diastolic dysfunction.    That course was complicated by renal failure.    On the morning of 7/9, he developed third-degree AV block and was given atropine secondary to bradycardia.  Attempts were made at transcutaneous pacing and patient became combative and 1 mg of Versed was given.  He developed asystole on monitor and became unresponsive and had a brief ACLS with ROSC.  Following this the patient was back in third-degree AV block and was agonal he breathing and was intubated.  He was initiated on transcutaneous pacing.    After this, cardiology was called who felt the patient needed a repeat LHC due to possible failure of stent.  He underwent transvenous pacemaker placement on 7/9/2025 and repeat LHC showed patent stent in the proximal left circumflex artery with diffuse coronary artery vasospasm distal to the stent.  Intra-aortic balloon pump was placed.  [facility] was contacted and he was transferred on 7/9/2025.    At admission here, patient was noted to have worsening renal failure and elevated LFTs suggestive of shock liver.  He was started on dopamine with one-to-one support on balloon pump.  Balloon pump was discontinued on 7/12.  He remains encephalopathic.    Patient seems to be following limited commands today when  sedation is completely held.    Still too many secretions for extubation attempt/formal weaning.    Plan:  1.  For AMS/encephalopathy: Unclear etiology.  Slightly improved off of sedation but he is agitated on ventilator.  Neurology following.  MRI with old stroke.  Hold fentanyl drip at this point if we can and minimize Precedex.  Give intermittent doses of low-dose midazolam.  2.  For respiratory failure: Continue mechanical ventilation.  Wean settings as tolerated.  Not ready for extubation secondary to secretions.  Neurologic status is also an issue and I would prefer him to be more awake.  3.  For possible UTI/ID: Cultures on 7/8 grew coag negative staph and he completed Rocephin on 7/13.  Started on Zosyn empirically on 7/18 secondary to new fevers.  Continue Zosyn for now and follow-up cultures.  Blood culture 7/18/2025 showed coag negative staph likely contaminant.  Repeat respiratory culture.  4.  For hypothyroidism: Continue levothyroxine.  5.  For TOOTIE: Slightly worsened.  Gentle fluids/albumin.  Watch fluid balance and labs.  Check proBNP.  6.  For congestive heart failure/arrhythmias: Cardiology/EP following.  7.  For shock: Mainly cardiogenic.  Likely also had critical illness related corticosteroid insufficiency and was started on hydrocortisone which was weaned and ultimately stopped on 7/15/2025.  8.  For shock liver: Had been improving.  Slightly worse today.    Patient is critically ill secondary to multiorgan failure and has high risk of life-threatening decompensation in condition.   As such, the patient requires continuous monitoring and frequent reassessment for consideration of adjustment in management to minimize this risk.  I personally reassessed the patient multiple times today.    Critical care time : 40 minutes spent by me personally and independently.(This excludes time spent performing separately reportable procedures and services).  Critical care time includes high complexity decision  making to assess, manipulate, and support vital organ system failure in this individual who has impairment of one or more vital organ systems such that there is a high probability of imminent or life threatening deterioration in the patient’s condition.    Electronically signed by:    David Mccloud MD  07/20/25  18:32 EDT      *. Please note that portions of this note were completed with [employer] - a voice recognition program.

## 2025-07-21 NOTE — PROGRESS NOTES
Pulmonary/Critical Care Follow-up     LOS: 12 days   Patient Care Team:  Lesa Rae APRN as PCP - General (Nurse Practitioner)  Given, Phong AGUILAR MD as Consulting Physician (Neurosurgery)    Chief Complaint: Postcardiac arrest, cardiogenic shock      Subjective     History reviewed and updated in EMR as indicated.    Interval History:     Patient remains off of fentanyl.  He is on Precedex.  He had an elevated lactic acid last night.  Today he has A-fib with RVR.  Obtain CT abdomen and pelvis which shows an enlarged gallbladder and constipation.  Denies trial of spontaneous breathing this morning, but I switched him back to a rate due to the A-fib with RVR.  He did follow some limited commands for me.  I spoke with neurology.    History taken from: Chart, staff    PMH/FH/Social History were reviewed and updated appropriately in the electronic medical record.     Review of Systems:    Review of 14 systems was completed with positives and pertinent negatives noted in the subjective section.  All other systems reviewed and are negative.   Exceptions are noted below:    Unable to obtain secondary to endotracheal intubation.      Objective     Vital Signs  Temp:  [98.7 °F (37.1 °C)-101.5 °F (38.6 °C)] 99.5 °F (37.5 °C)  Heart Rate:  [] 95  Resp:  [16-30] 22  BP: ()/(26-94) 112/66  FiO2 (%):  [35 %-40 %] 35 %  07/20 0701 - 07/21 0700  In: 1005   Out: 925 [Urine:925]  Body mass index is 22.36 kg/m².  Mode: VC+/AC  FiO2 (%):  [35 %-40 %] 35 %  S RR:  [16] 16  S VT:  [530 mL] 530 mL  PEEP/CPAP (cm H2O):  [5 cm H20] 5 cm H20  NE SUP:  [10 cm H20] 10 cm H20  MAP (cm H2O):  [7.1-23] 7.1  IV drips:  dexmedetomidine, Last Rate: 1 mcg/kg/hr (07/21/25 0313)  fentanyl 10 mcg/mL, Last Rate: Stopped (07/20/25 0916)  phenylephrine, Last Rate: 1.8 mcg/kg/min (07/21/25 0330)       Physical Exam:     Constitutional:   On ventilator, mildly agitated   Head:   Normocephalic, atraumatic   Eyes:           Lids and lashes  normal, conjunctivae and sclerae normal.  PER.  Upward gaze.   ENMT:  Ears appear intact with no abnormalities noted     Lips normal.  Oral endotracheal tube in place.   Neck:  Trachea midline, no JVD   Lungs/Resp:    On ventilator, symmetric chest rise, no crepitus, mild rhonchi bilaterally.               Heart/CV:   Irregularly irregular and tachycardic, no murmur   Abdomen/GI:    Soft, nontender, nondistended   :    Deferred   Extremities/MSK:  No clubbing or cyanosis.  No edema.     Pulses:  Pulses palpable and equal bilaterally   Skin:  No bleeding, bruising or rash   Heme/Lymph:  No cervical or supraclavicular adenopathy.   Neurologic:    Psychiatric:    Moves all extremities with no obvious focal motor deficit.  Followed commands to close eyes and stick tongue out.  Mildly agitated at time.    The above physical exam findings were reviewed and reflect my exam findings as of today's exam.   Electronically signed by:  David Mccloud MD  07/21/25  08:31 EDT      Results Review:     I reviewed the patient's new clinical results.   Results from last 7 days   Lab Units 07/21/25  0016 07/20/25  0533 07/19/25  0408 07/18/25  0309   SODIUM mmol/L 147* 147* 147* 146*   POTASSIUM mmol/L 4.8 4.7 4.1 4.6   CHLORIDE mmol/L 115* 113* 114* 112*   CO2 mmol/L 18.7* 21.1* 21.0* 23.4   BUN mg/dL 89.6* 85.9* 89.2* 65.0*   CREATININE mg/dL 1.90* 1.80* 1.44* 1.29*   CALCIUM mg/dL 8.5* 8.8 8.5* 9.1   BILIRUBIN mg/dL 0.5 0.5  --  0.4   ALK PHOS U/L 76 70  --  91   ALT (SGPT) U/L 294* 179*  --  153*   AST (SGOT) U/L 213* 137*  --  61*   GLUCOSE mg/dL 293* 350* 293* 279*     Results from last 7 days   Lab Units 07/21/25  0016 07/20/25  0533 07/19/25  1301 07/19/25  0408   WBC 10*3/mm3 31.65* 37.30*  --  27.35*   HEMOGLOBIN g/dL 6.8* 7.2* 7.0* 7.1*   HEMATOCRIT % 24.1* 25.3* 24.0* 23.9*   PLATELETS 10*3/mm3 348 360  --  277     Results from last 7 days   Lab Units 07/21/25  0014   PH, ARTERIAL pH units 7.346*   PO2 ART mm Hg 151.0*    PCO2, ARTERIAL mm Hg 33.6*   HCO3 ART mmol/L 18.4*     Results from last 7 days   Lab Units 07/19/25  0408 07/18/25  0309 07/17/25  0325   MAGNESIUM mg/dL 2.6* 2.6* 2.3       I reviewed the patient's new imaging including images and reports.    CT abdomen pelvis 7/21/2025 reviewed and shows constipation and enlarged gallbladder.  Radiologist's impression follows:    Impression:     1. Practically complete lower lobe atelectasis, and moderate right lower lobe atelectasis. Moderate to large pleural effusions, incompletely included on this study.     2. Distended gallbladder, but no definite gallbladder inflammatory changes.     3. Relatively atrophic left kidney. Heterogeneous appearance of the left renal pelvis, possibly artifactual. Debris and tumor are both considerations.     4. Suspected mild colonic ileus. Minimal if any right colon mucosal thickening and no apparent large or small bowel inflammation.     5. Large bladder tumor increased from 2/20/2025, and small debris or hematocrit level in the dependent portion of the bladder.     6. Subtle, irregular shaped approximately 2.4 x 1.3 cm right lobe liver lesion nonspecific.       Chest x-ray 7/19/2025 shows cardiomegaly with bilateral mild basilar airspace opacities possibly infiltrate versus atelectasis and small left pleural effusion.    Medication Review:   aspirin, 81 mg, Nasogastric, Daily  [Held by provider] atorvastatin, 80 mg, Oral, Daily  budesonide, 0.5 mg, Nebulization, BID - RT  escitalopram, 10 mg, Nasogastric, Daily  heparin (porcine), 5,000 Units, Subcutaneous, Q8H  insulin glargine, 20 Units, Subcutaneous, Daily  insulin regular, 4-24 Units, Subcutaneous, Q6H  insulin regular, 8 Units, Subcutaneous, Q6H  ipratropium-albuterol, 3 mL, Nebulization, 4x Daily - RT  levothyroxine, 50 mcg, Nasogastric, Daily  pantoprazole, 40 mg, Intravenous, Q24H  pharmacy consult - MTM, , Not Applicable, Daily  piperacillin-tazobactam, 3.375 g, Intravenous,  Q8H  prasugrel, 10 mg, Nasogastric, Daily  QUEtiapine, 25 mg, Nasogastric, Nightly  senna-docusate sodium, 2 tablet, Nasogastric, BID  sodium chloride, 10 mL, Intravenous, Q12H      dexmedetomidine, 0.2-1.5 mcg/kg/hr, Last Rate: 1 mcg/kg/hr (07/21/25 0313)  fentanyl 10 mcg/mL,  mcg/hr, Last Rate: Stopped (07/20/25 0916)  phenylephrine, 0.5-3 mcg/kg/min, Last Rate: 1.8 mcg/kg/min (07/21/25 0338)        Assessment & Plan         Third degree AV block    CAD (coronary artery disease)    TOOTIE (acute kidney injury)    Lactic acidosis    Acute cystitis without hematuria    Ischemic cardiomyopathy    Acute HFrEF (heart failure with reduced ejection fraction)    Shock liver    Complete heart block    70 y.o. active smoker with 90-pack-year history with history of HTN, HLD, T2DM, COPD, hypothyroidism, possible history of seizures, anxiety/depression, admitted at [facility] [place] 6/27-28/2025 with left heart cath showing 2 occluded vein grafts and PCI to left circumflex with stent placement.  He Altaf presented to [facility] [place] ED on 7/6/2025 with chest pain and was admitted with NSTEMI and had repeat C 7/7/2025 with in-stent thrombosis felt to be secondary to medical noncompliance.  Balloon angioplasty was performed with deployment of overlapping stent just distal to the previous stent.  Echo obtained 24 hours later showed LVEF of 30 to 35% with grade 2 diastolic dysfunction.    That course was complicated by renal failure.    On the morning of 7/9, he developed third-degree AV block and was given atropine secondary to bradycardia.  Attempts were made at transcutaneous pacing and patient became combative and 1 mg of Versed was given.  He developed asystole on monitor and became unresponsive and had a brief ACLS with ROSC.  Following this the patient was back in third-degree AV block and was agonal he breathing and was intubated.  He was initiated on transcutaneous pacing.    After this, cardiology was called who felt the patient needed a  repeat LHC due to possible failure of stent.  He underwent transvenous pacemaker placement on 7/9/2025 and repeat LHC showed patent stent in the proximal left circumflex artery with diffuse coronary artery vasospasm distal to the stent.  Intra-aortic balloon pump was placed.  [facility] was contacted and he was transferred on 7/9/2025.    At admission here, patient was noted to have worsening renal failure and elevated LFTs suggestive of shock liver.  He was started on dopamine with one-to-one support on balloon pump.  Balloon pump was discontinued on 7/12.  He remains encephalopathic.    Patient seems to be following limited commands today when sedation is completely held.    Was considering ventilator weaning today however patient developed A-fib with RVR.  Also has enlarged gallbladder on CT abdomen, and leukocytosis, and I will check an ultrasound.  Blood cultures 7/20/2025 with no growth.  Patient on Zosyn.  Lactic acid elevated overnight and we will follow this.    Plan:  1.  For AMS/encephalopathy: Unclear etiology.  Slightly improved off of sedation but he is agitated on ventilator.  Neurology following.  MRI with old stroke.  Hold fentanyl drip at this point if we can and minimize Precedex.  Give intermittent doses of low-dose midazolam.  2.  For respiratory failure: Continue mechanical ventilation.  Wean settings as tolerated.  Not ready for extubation secondary to secretions.  Neurologic status is also an issue and I would prefer him to be more awake.  3.  For possible UTI/ID: Cultures on 7/8 grew coag negative staph and he completed Rocephin on 7/13.  Started on Zosyn empirically on 7/18 secondary to new fevers.  Continue Zosyn for now and follow-up cultures.  Blood culture 7/18/2025 showed coag negative staph likely contaminant.  Repeated respiratory culture.  4.  For hypothyroidism: Continue levothyroxine.  5.  For TOOTIE: Slightly worsened.  Gentle fluids/albumin.  Watch fluid balance and  labs.  Check proBNP.  6.  For congestive heart failure/arrhythmias: Cardiology/EP following.  7.  For shock: Mainly cardiogenic.  Likely also had critical illness related corticosteroid insufficiency and was started on hydrocortisone which was weaned and ultimately stopped on 7/15/2025.  Watch lactic acid level.  8.  For shock liver: Had been improving.  Worse over past 2 days.    Patient is critically ill secondary to multiorgan failure and has high risk of life-threatening decompensation in condition.   As such, the patient requires continuous monitoring and frequent reassessment for consideration of adjustment in management to minimize this risk.  I personally reassessed the patient multiple times today.    Critical care time : 38 minutes spent by me personally and independently.(This excludes time spent performing separately reportable procedures and services).  Critical care time includes high complexity decision making to assess, manipulate, and support vital organ system failure in this individual who has impairment of one or more vital organ systems such that there is a high probability of imminent or life threatening deterioration in the patient’s condition.    Electronically signed by:    David Mccloud MD  07/21/25  08:31 EDT      *. Please note that portions of this note were completed with [employer] - a voice recognition program.

## 2025-07-21 NOTE — PROGRESS NOTES
"  [facility]  PROGRESS NOTE    Date of Admission: 7/9/2025  Date of Service: 07/21/25    Primary Care Physician: Lesa Rae APRN    Chief Complaint: Follow-up of cardiogenic shock/CAD/bradycardia     Problem List:   Third degree AV block    CAD (coronary artery disease)    TOOTIE (acute kidney injury)    Lactic acidosis    Acute cystitis without hematuria    Ischemic cardiomyopathy    Acute HFrEF (heart failure with reduced ejection fraction)    Shock liver    Complete heart block      Subjective      Intubated. Just came back from CT at the time of my visit. Afib with rates in the low 100s.    After my visit, received call from RN stating patients rate was up to the 140s and his Hernandez was increased to 2.       Objective   Vitals: BP (!) 136/34 (BP Location: Right arm, Patient Position: Lying)   Pulse (!) 130   Temp 99.1 °F (37.3 °C) (Esophageal)   Resp 23   Ht 180.3 cm (70.98\")   Wt 72.7 kg (160 lb 4.4 oz)   SpO2 98%   BMI 22.36 kg/m²     Physical Exam:  GENERAL: in no acute distress. Intubated, sedated on precedex   HEENT: no jugular venous distention  HEART: Irregular rhythm, tachycardic rate, and no murmurs, gallops, or rubs.   LUNGS: Clear to auscultation bilaterally. No wheezing, rales or rhonchi.  EXTREMITIES: No clubbing, cyanosis, or edema noted.     Results:  Results from last 7 days   Lab Units 07/21/25  0016 07/20/25  0533 07/19/25  1301 07/19/25  0408   WBC 10*3/mm3 31.65* 37.30*  --  27.35*   HEMOGLOBIN g/dL 6.8* 7.2* 7.0* 7.1*   HEMATOCRIT % 24.1* 25.3* 24.0* 23.9*   PLATELETS 10*3/mm3 348 360  --  277     Results from last 7 days   Lab Units 07/21/25  0016 07/20/25  0533 07/19/25  0408   SODIUM mmol/L 147* 147* 147*   POTASSIUM mmol/L 4.8 4.7 4.1   CHLORIDE mmol/L 115* 113* 114*   CO2 mmol/L 18.7* 21.1* 21.0*   BUN mg/dL 89.6* 85.9* 89.2*   CREATININE mg/dL 1.90* 1.80* 1.44*   GLUCOSE mg/dL 293* 350* 293*      Lab Results   Component Value Date    CHOL " 103 06/27/2025    TRIG 107 07/15/2025    HDL 30 (L) 06/27/2025    LDL 56 06/27/2025     (H) 07/21/2025     (H) 07/21/2025         Results from last 7 days   Lab Units 07/15/25  0210   TRIGLYCERIDES mg/dL 107                     Results from last 7 days   Lab Units 07/19/25  0408   PROBNP pg/mL 47,389.0*         Intake/Output Summary (Last 24 hours) at 7/21/2025 1247  Last data filed at 7/21/2025 1000  Gross per 24 hour   Intake 1365 ml   Output 875 ml   Net 490 ml       I personally reviewed the patient's EKG/Telemetry data    Current Medications:  aspirin, 81 mg, Nasogastric, Daily  [Held by provider] atorvastatin, 80 mg, Oral, Daily  budesonide, 0.5 mg, Nebulization, BID - RT  escitalopram, 10 mg, Nasogastric, Daily  heparin (porcine), 5,000 Units, Subcutaneous, Q8H  insulin glargine, 20 Units, Subcutaneous, Daily  insulin regular, 4-24 Units, Subcutaneous, Q6H  insulin regular, 8 Units, Subcutaneous, Q6H  ipratropium-albuterol, 3 mL, Nebulization, 4x Daily - RT  levothyroxine, 50 mcg, Nasogastric, Daily  pantoprazole, 40 mg, Intravenous, Q24H  pharmacy consult - MTM, , Not Applicable, Daily  piperacillin-tazobactam, 4.5 g, Intravenous, Q8H  prasugrel, 10 mg, Nasogastric, Daily  QUEtiapine, 25 mg, Nasogastric, Nightly  senna-docusate sodium, 2 tablet, Nasogastric, BID      dexmedetomidine, 0.2-1.5 mcg/kg/hr, Last Rate: 1.5 mcg/kg/hr (07/21/25 1220)  fentanyl 10 mcg/mL,  mcg/hr, Last Rate: Stopped (07/20/25 0916)  phenylephrine, 0.5-3 mcg/kg/min, Last Rate: 2 mcg/kg/min (07/21/25 1026)        Assessment:   Acute HFrEF, ischemic cardiomyopathy cardiogenic shock, EF 30-35%. Initially required IABP, now removed  Cardiogenic shock with multisystem involvement, slowly improving, IABP removed.  Currently on low-dose dopamine  CAD status post PCI/BRITT to circumflex, documented diffuse distal LCx vasospasm  Third-degree heart block/cardiac arrest  No AVN blocking agents prior to admission  Temp  transvenous pacer intact  On Dopamine gtt  Not requiring pacing currently  EP consulted -continue to monitor  Pacing wire removed   T2DM  TOOTIE/resolved  Baseline creatinine 1.0-1.2  Peak creatinine 3.44 on 7/9/2025  Creatinine has normalized.    Plan:   Discussed with Dr. Zaidi and recs as below:  Dig 250x1 for better rate control. Monitor renal function. Check dig level in next 24-48 hours.   Initiate amiodarone at decreased dose. Monitor LFTs.   Bumex for diuresis  Transfuse. Maintain hemoglobin > 8.  Low threshold to involve nephrology.   Monitor tele for heart block and serial ECG for QT prolongation.  Continue current dual antiplatelet therapy with aspirin and prasugrel. Monitor for recovery of liver function before restarting statin therapy.   Holding of ACE inhibitor/ARB/Entresto due to recent hypotension requiring vasopressors.     Case discussed with Dr. Zaidi and with RN.     Batsheva Martins PA-C      STAFF CARDIOLOGIST:      SUMMARY:    Patient seen with Genaro, complicated course due to cardiogenic shock      PERTINENT:    Hemoglobin 7  Creatinine 1.9  AST/ALT: 213/294      IMPRESSION:    Ischemic cardiomyopathy  Acute on chronic renal failure  Anemia  Atrial fibrillation      RECOMMENDATIONS:    We will go ahead and gingerly load him with amiodarone 150 mg .  Will also go ahead and give him 1 dose of digoxin which can be repeated if he does not slow down.  Blood transfusion to keep hemoglobin around 10.  Diurese for venous congestion  May benefit from mechanical support.        I have seen and examined the patient, reviewed the above note, necessary changes were made and I agree with the final note.   Destin Zaidi MD

## 2025-07-21 NOTE — PLAN OF CARE
Goal Outcome Evaluation:      SBT today, per MD, possible SBT tomorrow.

## 2025-07-21 NOTE — PROGRESS NOTES
Neurology       Patient Care Team:  Lesa Rae APRN as PCP - General (Nurse Practitioner)  Phong Whiting MD as Consulting Physician (Neurosurgery)    Chief complaint: Restless    History: The patient back from CT of the abdomen and pelvis.    She is quite restless at the moment.    Is moving all extremities but not following commands.      Past Medical History:   Diagnosis Date    Anxiety and depression     Arthritis     COPD (chronic obstructive pulmonary disease)     Coronary artery disease     Diabetes mellitus     Disease of thyroid gland     Dyslipidemia     History of transfusion     Hypertension     Seizure disorder     Pt states last seizure x1 mo.       Vital Signs   Vitals:    07/21/25 0900 07/21/25 0934 07/21/25 0936 07/21/25 0946   BP: 102/70 100/64 100/64 98/61   BP Location:  Right arm     Patient Position:  Lying     Pulse: 106 96 104    Resp: 18 16 16 23   Temp: 98.5 °F (36.9 °C) 97.6 °F (36.4 °C) 97.6 °F (36.4 °C) 99 °F (37.2 °C)   TempSrc:  Esophageal Esophageal Esophageal   SpO2: 95% 100% 100%    Weight:       Height:           Physical Exam:   General: Eyes open    Abdomen is mildly distended with active bowel sounds.                  Neuro: Moving about and restless.        Results Review:  Continuous EEG showed suppression with no seizure activity.      Results from last 7 days   Lab Units 07/21/25  0016   WBC 10*3/mm3 31.65*   HEMOGLOBIN g/dL 6.8*   HEMATOCRIT % 24.1*   PLATELETS 10*3/mm3 348     Results from last 7 days   Lab Units 07/21/25  0016 07/20/25  0533 07/19/25  0408 07/18/25  0309   SODIUM mmol/L 147* 147* 147* 146*   POTASSIUM mmol/L 4.8 4.7 4.1 4.6   CHLORIDE mmol/L 115* 113* 114* 112*   CO2 mmol/L 18.7* 21.1* 21.0* 23.4   BUN mg/dL 89.6* 85.9* 89.2* 65.0*   CREATININE mg/dL 1.90* 1.80* 1.44* 1.29*   CALCIUM mg/dL 8.5* 8.8 8.5* 9.1   BILIRUBIN mg/dL 0.5 0.5  --  0.4   ALK PHOS U/L 76 70  --  91   ALT (SGPT) U/L 294* 179*  --  153*   AST (SGOT) U/L 213* 137*  --  61*    GLUCOSE mg/dL 293* 350* 293* 279*       Imaging Results (Last 24 Hours)       Procedure Component Value Units Date/Time    CT Abdomen Pelvis Without Contrast - In process [[id number]] Resulted: 07/21/25 1039     Updated: 07/21/25 1052    This result has not been signed. Information might be incomplete.      XR Chest 1 View [[id number]] Collected: 07/21/25 0034     Updated: 07/21/25 0038    Narrative:      XR CHEST 1 VW    Date of Exam: 7/21/2025 12:11 AM EDT    Indication: f/u respiratory illness.    Comparison: 7/19/2025.    Findings:  Stable right internal jugular central venous catheter, endotracheal tube, feeding tube and gastric suction tube. There are increased hazy and interstitial opacities noted throughout both lungs that may relate to interstitial edema or atypical pneumonia.   No pneumothorax. Probable small left pleural effusion. Heart size is stable. Pulmonary vasculature is indistinct. There is evidence of prior median sternotomy.      Impression:      Impression:  1.Increased hazy and interstitial opacities throughout both lungs that may relate to interstitial edema or atypical pneumonia.  2.Stable support lines and tubes.          Electronically Signed: Joon Gerber MD    7/21/2025 12:35 AM EDT    Workstation ID: [id number]            Assessment:  Agitated delirium postcardiac arrest with multiorgan failure.        Plan:  Versed as needed seems appropriate    Comment:  Guarded prognosis given multiorgan failure         I discussed the patient's findings and my recommendations with nursing staff and primary care team    Martin Momin MD  07/21/25  11:16 EDT

## 2025-07-21 NOTE — CASE MANAGEMENT/SOCIAL WORK
Continued Stay Note  [facility]     Patient Name: Christo Clifton  MRN: [id number]  Today's Date: 7/21/2025    Admit Date: 7/9/2025    Plan: TBD   Discharge Plan       Row Name 07/21/25 1021       Plan    Plan TBD    Patient/Family in Agreement with Plan unable to assess    Plan Comments Discussed in MDR. Patient is receiving 2 units PRBC's today. Plan is To Be Determined pending PT/OT recs. CM will continue to follow.    Final Discharge Disposition Code 01 - home or self-care                   Discharge Codes    No documentation.                       Ruperto Celeste RN

## 2025-07-21 NOTE — PROGRESS NOTES
"                  Clinical Nutrition     Patient Name: Christo Clifton  YOB: 1955  MRN: [id number]  Date of Encounter: 25 18:35 EDT  Admission date: 2025  Reason for Visit: Follow-up protocol, EN    Assessment   Nutrition Assessment   Admission Diagnosis:  Complete heart block [I44.2]    Problem List:    Third degree AV block    CAD (coronary artery disease)    TOOTIE (acute kidney injury)    Lactic acidosis    Acute cystitis without hematuria    Ischemic cardiomyopathy    Acute HFrEF (heart failure with reduced ejection fraction)    Shock liver    Complete heart block      PMH:   He  has a past medical history of Anxiety and depression, Arthritis, COPD (chronic obstructive pulmonary disease), Coronary artery disease, Diabetes mellitus, Disease of thyroid gland, Dyslipidemia, History of transfusion, Hypertension, and Seizure disorder.    PSH:  He  has a past surgical history that includes Coronary artery bypass graft; Hernia repair; Cardiac surgery; Circumcision, non-; Toe Surgery (Left); pr incision & drainage leg/ankle abscess/hematoma (Right, 2017); Finger amputation (Right, 2017); Cardiac catheterization; Cardiac catheterization (N/A, 2025); INTRAVASCULAR ULTRASOUND (N/A, 2025); Cardiac catheterization (N/A, 2025); Cardiac catheterization (N/A, 2025); Cardiac catheterization (N/A, 2025); Cardiac electrophysiology procedure (N/A, 2025); and Cardiac catheterization (N/A, 2025).    Applicable Nutrition History:   Skin integrity:  DM ulcers x3 - WOC consult pending    () intubated  (7/10) KUB - \"Gas and stool in the colon. Mild gaseous distention of the stomach. Gas-filled small bowel loops may reflect a mild ileus\"      Labs    Labs Reviewed: Yes    Results from last 7 days   Lab Units 25  1403 25  0705 25  0344 25  0016 25  0533 25  0408 25  1145 25  0309 25  0325   GLUCOSE mg/dL  --   --   " --  293* 350* 293*  --  279* 199*   BUN mg/dL  --   --   --  89.6* 85.9* 89.2*  --  65.0* 57.9*   CREATININE mg/dL  --   --   --  1.90* 1.80* 1.44*  --  1.29* 1.08   SODIUM mmol/L  --   --   --  147* 147* 147*  --  146* 143   CHLORIDE mmol/L  --   --   --  115* 113* 114*  --  112* 110*   POTASSIUM mmol/L  --   --   --  4.8 4.7 4.1  --  4.6 4.1   MAGNESIUM mg/dL  --   --   --   --   --  2.6*  --  2.6* 2.3   ALT (SGPT) U/L  --   --   --  294* 179*  --   --  153* 140*   LACTATE mmol/L 2.5* 4.9* 2.5* 3.0*  --  1.8   < >  --   --     < > = values in this interval not displayed.       Results from last 7 days   Lab Units 07/21/25  0016 07/20/25  0533 07/18/25  0309 07/16/25  0313 07/15/25  0210   ALBUMIN g/dL 2.8* 2.9* 2.9*   < > 2.6*   PREALBUMIN mg/dL  --   --  16.3*  --   --    CRP mg/dL  --   --  15.90*  --   --    TRIGLYCERIDES mg/dL  --   --   --   --  107    < > = values in this interval not displayed.       Results from last 7 days   Lab Units 07/21/25  1728 07/21/25  1134 07/21/25  0534 07/20/25  2356 07/20/25  1718 07/20/25  1118   GLUCOSE mg/dL 102 163* 214* 296* 233* 310*     Lab Results   Lab Value Date/Time    HGBA1C 9.10 (H) 10/15/2018 0926    HGBA1C 9.60 (H) 05/14/2018 0828         Results from last 7 days   Lab Units 07/19/25  0408   PROBNP pg/mL 47,389.0*         Medications    Medications Reviewed: yes    Scheduled Meds:aspirin, 81 mg, Nasogastric, Daily  [Held by provider] atorvastatin, 80 mg, Oral, Daily  budesonide, 0.5 mg, Nebulization, BID - RT  escitalopram, 10 mg, Nasogastric, Daily  heparin (porcine), 5,000 Units, Subcutaneous, Q8H  insulin glargine, 20 Units, Subcutaneous, Daily  insulin regular, 4-24 Units, Subcutaneous, Q6H  insulin regular, 8 Units, Subcutaneous, Q6H  ipratropium-albuterol, 3 mL, Nebulization, 4x Daily - RT  levothyroxine, 50 mcg, Nasogastric, Daily  pantoprazole, 40 mg, Intravenous, Q24H  pharmacy consult - MTM, , Not Applicable, Daily  piperacillin-tazobactam, 4.5 g,  "Intravenous, Q8H  prasugrel, 10 mg, Nasogastric, Daily  QUEtiapine, 25 mg, Nasogastric, Nightly  senna-docusate sodium, 2 tablet, Nasogastric, BID      Continuous Infusions:dexmedetomidine, 0.2-1.5 mcg/kg/hr, Last Rate: 1.5 mcg/kg/hr (07/21/25 1412)  fentanyl 10 mcg/mL,  mcg/hr, Last Rate: Stopped (07/20/25 0916)  phenylephrine, 0.5-3 mcg/kg/min, Last Rate: 2 mcg/kg/min (07/21/25 1729)      PRN Meds:.  acetaminophen    senna-docusate sodium **AND** polyethylene glycol **AND** [DISCONTINUED] bisacodyl **AND** bisacodyl    Calcium Replacement - Follow Nurse / BPA Driven Protocol    dextrose    glucagon (human recombinant)    hydrALAZINE    ipratropium-albuterol    Magnesium Cardiology Dose Replacement - Follow Nurse / BPA Driven Protocol    midazolam    nitroglycerin    [DISCONTINUED] ondansetron ODT **OR** ondansetron    Phosphorus Replacement - Follow Nurse / BPA Driven Protocol    Potassium Replacement - Follow Nurse / BPA Driven Protocol    sodium chloride    Intake/Ouptut 24 hrs (0701 - 0700)   I&O's Reviewed: yes  Intake & Output (last day)         07/20 0701 07/21 0700 07/21 0701 07/22 0700    I.V. (mL/kg)  1842 (25.3)    Blood  360    Other 301     NG/     IV Piggyback 100 200    Total Intake(mL/kg) 1005 (13.8) 2402 (33)    Urine (mL/kg/hr) 925 (0.5) 835 (1)    Total Output 925 835    Net +80 +1567              No stool recorded    Anthropometrics     Height: Height: 180.3 cm (70.98\")  Last Filed Weight: Weight: 72.7 kg (160 lb 4.4 oz) (07/17/25 0600)  Method: Weight Method: Bed scale  BMI: BMI (Calculated): 22.4    UBW:    Weight      Weight (kg) Weight (lbs) Weight Method   11/25/2024 75.932 kg  167 lb 6.4 oz     3/5/2025 75.66 kg  166 lb 12.8 oz     5/19/2025 75.116 kg  165 lb 9.6 oz     6/18/2025 75.479 kg  166 lb 6.4 oz     6/27/2025 77.1 kg  169 lb 15.6 oz  Bed scale    6/28/2025 67.7 kg  149 lb 4 oz  Bed scale    7/6/2025 77.5 kg  170 lb 13.7 oz  Bed scale    7/7/2025 77.5 kg  170 lb 13.7 " "oz  Bed scale    7/9/2025 77.5 kg  170 lb 13.7 oz  Bed scale      Weight change: No significant changes    Nutrition Focused Physical Exam     Date: 7/11    Unable to perform due to Hemodynamic instability     Subjective   Reported/Observed/Food/Nutrition Related History:     7/21  EN held. LFT's increasing. Cont intubated.     7/17  Remains intubated; now off propofoL and pressers. Constipations persists. KUB from yesterday indicated moderate colonic stool burden - plan for suppository today    7/15  Tolerating change to TF. Remains intubated/sedated; continues on propofoL (16.28) with ongoing presser requirement. Plan for today per MD, wean dopamine/propofoL and start precedex. Movantik initiated, bowel regimen increased.       7/14  Remains intubated/sedated; ongoing presser requirement. +propofoL (18.6). KUB (7/12) improved from 7/8 scan; non-obstructive bowel gas pattern. EN advanced per MD yesterday. LBM 7/8 - may increase bowel regimen.     7/11  Pt intubated/sedated on MV; +propofoL. Requiring pressers x1. H/H trending down. Noted to have possible mild ileus on KUB. AST/ALT remain elevated. NKFA    Needs Assessment   Date: 7/11; Reassessed: 7/14, 7/15, 7/17, 7/21    Height used:Height: 180.3 cm (70.98\")  Weights used: 72.7 kg CBW    Estimated Calorie needs: ~ 2000 Kcal/day  Method: 23-27 Kcals/KG = 5939-7970  Method:  MSJ (1509) x 1.2 = 1811  Method:  PSU = 2033  Ve: 18.3; Tmax: 37.3    (7/14) REE = 1524    Estimated Protein needs: ~ 87 g PRO/day w/ current renal fxn  Method: 1.0-1.2g/Kg CBW = 73-87    Estimated Fluid needs: ~ ml/day   Per clinical status    Current Nutrition Prescription     PO: NPO Diet NPO Type: Tube Feeding  Oral Nutrition Supplement: N/A  Intake: N/A    EN: Peptamen 1.5  Goal Rate: 60mL/hr  Water Flushes: 30mL/hr  Modular: None  Route: ND  Tube: Small bore    At goal over: 20Hrs/day     Rx will supply:   Goal Volume 1200 mL/day     Flush Volume 600 mL/day     Energy 1800 Kcal/day 100 % " Est Need as of    Protein 82 g/day 94 % Est Need   Fiber 0 g/day     Water in   mL     Total Water 1524 mL     Meet DRI Yes         --------------------------------------------------------------------------  Product/Rate verified at bedside: No  Infusing Rate at time of visit: EN on hold at time of visit    Average Delivery from [facility] Days:   Volume 937 mL/day 78  % Goal Vol.   Flush Volume 496 mL/day     Energy 1406 Kcal/day 78 % Est Need as of    Protein 64 g/day 74 % Est Need   Fiber 0 g/day     Water in   mL     Total Water 1217 mL     Meet DRI No          Assessment & Plan   Nutrition Diagnosis   Date:  Updated:   Problem Inadequate oral intake    Etiology ARF/MV   Signs/Symptoms NPO   Status: Active EN order in place, but currently on hold    Goal:   Nutrition to support treatment and Adjust EN as appropriate      Nutrition Intervention      Follow treatment progress, Care plan reviewed, EN rec prepared      If restart EN consider  Peptamen 1.5 @ 30mL/hr and advance by 15mL q4hrs as tolerated to goal of 65mL/hr. Flush of 30mL q2hr.  Infused at goal over 20 hrs this regimen provides: 1300mL TGV, 1950 Kcal (98% est need), 88g protein (101% est need), 0g fiber, 1001mL Water in EN (+ 300mL flush = 1601mL Total Free Water).     Adjust flush per clinical status  Close monitoring of electrolytes and replace per protocol    Monitoring/Evaluation:   Per protocol, I&O, Pertinent labs, EN delivery/tolerance, Weight, GI status, Symptoms, POC/GOC, Hemodynamic stability    Saira Brennan RD  Time Spent: 35 min

## 2025-07-22 LAB
ARTERIAL PATENCY WRIST A: ABNORMAL
ATMOSPHERIC PRESS: ABNORMAL MM[HG]
BASE EXCESS BLDA CALC-SCNC: -3.6 MMOL/L (ref 0–2)
BDY SITE: ABNORMAL
BODY TEMPERATURE: 37
CO2 BLDA-SCNC: 20.4 MMOL/L (ref 22–33)
COHGB MFR BLD: 1 % (ref 0–2)
EPAP: 0
HCO3 BLDA-SCNC: 19.5 MMOL/L (ref 20–26)
HCT VFR BLD CALC: 30 % (ref 38–51)
HGB BLDA-MCNC: 9.8 G/DL (ref 13.5–17.5)
INHALED O2 CONCENTRATION: 100 %
IPAP: 0
METHGB BLD QL: 0.1 % (ref 0–1.5)
MODALITY: ABNORMAL
OXYHGB MFR BLDV: ABNORMAL %
PAW @ PEAK INSP FLOW SETTING VENT: 0 CMH2O
PCO2 BLDA: 28.1 MM HG (ref 35–45)
PCO2 TEMP ADJ BLD: 28.1 MM HG (ref 35–48)
PEEP RESPIRATORY: 5 CM[H2O]
PH BLDA: 7.45 PH UNITS (ref 7.35–7.45)
PH, TEMP CORRECTED: 7.45 PH UNITS
PO2 BLDA: 214 MM HG (ref 83–108)
PO2 TEMP ADJ BLD: 214 MM HG (ref 83–108)
TOTAL RATE: 0 BREATHS/MINUTE
VENTILATOR MODE: ABNORMAL
VT ON VENT VENT: 0.53 ML

## 2025-07-22 NOTE — NURSING NOTE
Image guided abscess drain placed by Dr Cross 8.5 Urdu Locking drain to bulb suction placed; with 240 mls removed. Labs obtained and sent by ct tech. Dressing applied clean dry intact. Bedside report given to ICU nurse, CHERI Kim

## 2025-07-22 NOTE — CASE MANAGEMENT/SOCIAL WORK
Continued Stay Note  [facility]     Patient Name: Christo Clifton  MRN: [id number]  Today's Date: 7/22/2025    Admit Date: 7/9/2025    Plan: Ongoing   Discharge Plan       Row Name 07/22/25 0814       Plan    Plan Ongoing    Patient/Family in Agreement with Plan unable to assess    Plan Comments Checked on patient at bedside. Plan is TBD pending PT/OT recs. Patient is sedated and intubated. CM will continue to follow.    Final Discharge Disposition Code 01 - home or self-care                   Discharge Codes    No documentation.                       Ruperto Celeste RN

## 2025-07-22 NOTE — PRE-PROCEDURE NOTE
[facility]   Interventional Radiology H&P    Patient Name: Christo Clifton  : 1955  MRN: [id number]  Primary Care Physician:  Lesa Rae APRN  Referring Physician: Chandrakant Jones MD  Date of admission: 2025    Subjective   Subjective     HPI:  Christo Clifton is a 70 y.o. male currently sedated and intubated.  Interventional radiology has been consulted for percutaneous cholecystostomy tube placement.    Review of Systems:   Constitutional no fever,  no weight loss       Otolaryngeal no difficulty swallowing   Cardiovascular no chest pain   Pulmonary no cough, no sputum production   Gastrointestinal no constipation, no diarrhea                         Personal History       Past Medical/Surgical History:   Past Medical History:   Diagnosis Date    Anxiety and depression     Arthritis     COPD (chronic obstructive pulmonary disease)     Coronary artery disease     Diabetes mellitus     Disease of thyroid gland     Dyslipidemia     History of transfusion     Hypertension     Seizure disorder     Pt states last seizure x1 mo.     Past Surgical History:   Procedure Laterality Date    AMPUTATION DIGIT Right 2017    Procedure: RIGHT 1ST TOE AMPUTATION ;  Surgeon: Lee Lee MD;  Location: [facility];  Service:     CARDIAC CATHETERIZATION      CARDIAC CATHETERIZATION N/A 2025    Procedure: Left Heart Cath;  Surgeon: Mimi Botello MD;  [facility] CATH INVASIVE LOC[facility];  Service: Cardiovascular;  Laterality: N/A;    CARDIAC CATHETERIZATION N/A 2025    Procedure: Percutaneous Coronary Intervention;  Surgeon: Mimi Botello MD;  Location: [facility] CATH INVASIVE LOCATION;  Service: Cardiovascular;  Laterality: N/A;    CARDIAC CATHETERIZATION N/A 2025    Procedure: Left Heart Cath;  Surgeon: Anali Arevalo MD;  Location: [facility] CATH INVASIVE LOCATION;  Service: Cardiovascular;  Laterality: N/A;    CARDIAC CATHETERIZATION N/A 2025    Procedure: Coronary angiography;   Surgeon: Mimi Botello MD;  Location: [facility] CATH INVASIVE LOCATION;  Service: Cardiovascular;  Laterality: N/A;    CARDIAC CATHETERIZATION N/A 7/9/2025    Procedure: Intra-Aortic Baloon Pump Insertion;  Surgeon: Mimi Botello MD;  Location:  [facility] CATH INVASIVE LOCATION;  Service: Cardiovascular;  Laterality: N/A;    CARDIAC ELECTROPHYSIOLOGY PROCEDURE N/A 7/9/2025    Procedure: Temporary Pacemaker;  Surgeon: Mimi Botello MD;  Location: [facility] CATH INVASIVE LOCATION;  Service: Cardiovascular;  Laterality: N/A;    CARDIAC SURGERY      CIRCUMCISION      CORONARY ARTERY BYPASS GRAFT      HERNIA REPAIR      X2    INTRAVASCULAR ULTRASOUND N/A 6/27/2025    Procedure: Intravascular Ultrasound;  Surgeon: Mimi Botello MD;  Location: [facility] CATH INVASIVE LOCATION;  Service: Cardiovascular;  Laterality: N/A;    VA INCISION & DRAINAGE LEG/ANKLE ABSCESS/HEMATOMA Right 05/24/2017    Procedure: Debridement of right first toe;  Surgeon: Ronald Perdue MD;  Location: [facility] OR;  Service: General    TOE SURGERY Left     debridement       Social History:  reports that he has been smoking cigarettes. He started smoking about 60 years ago. He has a 90 pack-year smoking history. He has never used smokeless tobacco. He reports that he does not drink alcohol and does not use drugs.    Medications:  Medications Prior to Admission   Medication Sig Dispense Refill Last Dose/Taking    aspirin 81 MG EC tablet Take 1 tablet by mouth Daily.       atorvastatin (LIPITOR) 80 MG tablet Take 1 tablet by mouth Daily.       clopidogrel (PLAVIX) 75 MG tablet Take 1 tablet by mouth Daily.       escitalopram (LEXAPRO) 10 MG tablet Take 1 tablet by mouth Daily.       insulin glargine (LANTUS, SEMGLEE) 100 UNIT/ML injection Inject 14 Units under the skin into the appropriate area as directed Daily.       levothyroxine (SYNTHROID, LEVOTHROID) 50 MCG tablet Take 1 tablet by mouth Daily.        Current medications:  [START ON  7/23/2025] amiodarone, 200 mg, Nasogastric, Once   Followed by  [START ON 7/23/2025] amiodarone, 200 mg, Nasogastric, Q8H   Followed by  [START ON 7/30/2025] amiodarone, 200 mg, Nasogastric, Q12H   Followed by  [START ON 8/13/2025] amiodarone, 200 mg, Nasogastric, Daily  aspirin, 81 mg, Nasogastric, Daily  [Held by provider] atorvastatin, 80 mg, Oral, Daily  budesonide, 0.5 mg, Nebulization, BID - RT  escitalopram, 10 mg, Nasogastric, Daily  heparin (porcine), 5,000 Units, Subcutaneous, Q8H  insulin glargine, 20 Units, Subcutaneous, Daily  insulin regular, 4-24 Units, Subcutaneous, Q6H  insulin regular, 8 Units, Subcutaneous, Q6H  ipratropium-albuterol, 3 mL, Nebulization, 4x Daily - RT  levothyroxine, 50 mcg, Nasogastric, Daily  pantoprazole, 40 mg, Intravenous, Q24H  pharmacy consult - [place], , Not Applicable, Daily  piperacillin-tazobactam, 4.5 g, Intravenous, Q8H  prasugrel, 10 mg, Nasogastric, Daily  QUEtiapine, 25 mg, Nasogastric, Nightly  senna-docusate sodium, 2 tablet, Nasogastric, BID  vancomycin (dosing per levels), , Not Applicable, Daily  vancomycin, 25 mg/kg, Intravenous, Once      Current IV drips:  amiodarone, 1 mg/min, Last Rate: 1 mg/min (07/22/25 0941)   Followed by  amiodarone, 0.5 mg/min  dexmedetomidine, 0.2-1.5 mcg/kg/hr, Last Rate: Stopped (07/21/25 2200)  fentanyl 10 mcg/mL,  mcg/hr, Last Rate: Stopped (07/20/25 0916)  Pharmacy to dose vancomycin,   phenylephrine, 0.5-3 mcg/kg/min, Last Rate: 2 mcg/kg/min (07/22/25 1056)        Allergies:  No Active Allergies    Objective    Objective     Vitals:   Temp:  [98.1 °F (36.7 °C)-100.7 °F (38.2 °C)] 100 °F (37.8 °C)  Heart Rate:  [] 120  Resp:  [14-25] 23  BP: ()/(54-98) 123/80  Arterial Line BP: ()/() 110/72  FiO2 (%):  [30 %-35 %] 30 %      Physical Exam:  Limited secondary to patient condition       Result Review        Result Review:     Sodium   Date Value Ref Range Status   07/22/2025 150 (H) 136 - 145 mmol/L  "Final   07/21/2025 147 (H) 136 - 145 mmol/L Final   07/20/2025 147 (H) 136 - 145 mmol/L Final       Potassium   Date Value Ref Range Status   07/22/2025 4.2 3.5 - 5.2 mmol/L Final   07/21/2025 4.8 3.5 - 5.2 mmol/L Final   07/20/2025 4.7 3.5 - 5.2 mmol/L Final       Chloride   Date Value Ref Range Status   07/22/2025 117 (H) 98 - 107 mmol/L Final   07/21/2025 115 (H) 98 - 107 mmol/L Final   07/20/2025 113 (H) 98 - 107 mmol/L Final       No results found for: \"PLASMABICARB\"    BUN   Date Value Ref Range Status   07/22/2025 87.4 (H) 8.0 - 23.0 mg/dL Final   07/21/2025 89.6 (H) 8.0 - 23.0 mg/dL Final   07/20/2025 85.9 (H) 8.0 - 23.0 mg/dL Final       Creatinine   Date Value Ref Range Status   07/22/2025 1.65 (H) 0.76 - 1.27 mg/dL Final   07/21/2025 1.90 (H) 0.76 - 1.27 mg/dL Final   07/20/2025 1.80 (H) 0.76 - 1.27 mg/dL Final       Calcium   Date Value Ref Range Status   07/22/2025 8.5 (L) 8.6 - 10.5 mg/dL Final   07/21/2025 8.5 (L) 8.6 - 10.5 mg/dL Final   07/20/2025 8.8 8.6 - 10.5 mg/dL Final           No components found for: \"GLUCOSE.*\"  Results from last 7 days   Lab Units 07/22/25  0351   WBC 10*3/mm3 29.29*   HEMOGLOBIN g/dL 9.7*   HEMATOCRIT % 31.2*   PLATELETS 10*3/mm3 361      Results from last 7 days   Lab Units 07/22/25  0351   INR  1.46*           Assessment / Plan     Assesment:  70-year-old male with a history of a very distended gallbladder on imaging and markedly elevated white count.  Interventional radiology has been consulted for cholecystostomy tube placement.      Plan:   Image guided cholecystostomy tube placement with local anesthesia as the patient is currently intubated and sedated.    The risks and benefits of the procedure were discussed with the patient.    Electronically signed by Ilan Cross MD, 07/22/25, 1:10 PM EDT.    "

## 2025-07-22 NOTE — PROCEDURES
Insert Arterial Line    Date/Time: 7/22/2025 4:11 AM    Performed by: Shey Schaefer APRN  Authorized by: Shey Schaefer APRN    [facility] Protocol:     Emergent situation      Relevant documents present and verified: Yes      Test results available and properly labeled: Yes      Imaging studies available: Yes      Required items: Required blood products, implants, devices and special equipment available      Patient identity confirmed:  Arm band and hospital-assigned identification number    Time out: Immediately prior to the procedure a time out was called    A time out verifies correct patient, procedure, equipment, support staff and site/side marked as required:   Preparation:     Preparation: Patient was prepped and draped in usual sterile fashion    Indications:     Indications: multiple ABGs, respiratory failure and hemodynamic monitoring    Location:     Location:  Right radial  Anesthesia:     Patient sedated: No    Procedure Details:     Ultrasound Guidance: yes  The ultrasound was used for evaluation of possible access sites.  Vessel patency was confirmed with the ultrasound.  Needle entry into vessel was visualized in realtime with the ultrasound.       Emmanuel's test normal?: Yes      Needle gauge:  20    Seldinger technique: Seldinger technique used      Number of attempts:  1  Post-procedure:     Post-procedure:  Line sutured and dressing applied    Post-procedure CMS:  Normal     patient tolerated the procedure well with no immediate complications

## 2025-07-22 NOTE — PLAN OF CARE
Goal Outcome Evaluation: Unable to wean vent at this time

## 2025-07-22 NOTE — POST-PROCEDURE NOTE
IR POST OP NOTE    Physician:Ilan Cross MD      Procedure: Image guided cholecystostomy tube placement      Pre Op DX: Acalculous cholecystitis      Post Op DX: Same      Anesthesia: Local anesthesia only      Findings: Markedly enlarged and distended gallbladder.  8.5 Citizen of [place] cholecystostomy tube placed.      Complications: No immediate      Provider Signature: Ilan Cross MD    07/22/25  14:57 EDT

## 2025-07-22 NOTE — PROGRESS NOTES
Pulmonary/Critical Care Follow-up     LOS: 13 days   Patient Care Team:  Lesa Rae APRN as PCP - General (Nurse Practitioner)  Given, Phong AGUILAR MD as Consulting Physician (Neurosurgery)    Chief Complaint: Postcardiac arrest, cardiogenic shock      Subjective     History reviewed and updated in EMR as indicated.    Interval History:     Patient remains off of fentanyl.  He is off Precedex today.  Lactic acid level has been up and down.  He also has had intermittent A-fib with RVR.  Gallbladder ultrasound with possible acalculous cholecystitis.  Does not follow commands for me today.  Tmax 100.7 Fahrenheit.    History taken from: Chart, staff    PMH/FH/Social History were reviewed and updated appropriately in the electronic medical record.     Review of Systems:    Review of 14 systems was completed with positives and pertinent negatives noted in the subjective section.  All other systems reviewed and are negative.   Exceptions are noted below:    Unable to obtain secondary to endotracheal intubation.      Objective     Vital Signs  Temp:  [97.6 °F (36.4 °C)-100.7 °F (38.2 °C)] 100 °F (37.8 °C)  Heart Rate:  [] 138  Resp:  [14-25] 20  BP: ()/(34-98) 123/80  Arterial Line BP: ()/() 110/72  FiO2 (%):  [30 %-35 %] 30 %  07/21 0701 - 07/22 0700  In: 4135 [I.V.:2417]  Out: 2535 [Urine:2535]  Body mass index is 22.36 kg/m².  Mode: VC+/AC  FiO2 (%):  [30 %-35 %] 30 %  S RR:  [16] 16  S VT:  [530 mL] 530 mL  PEEP/CPAP (cm H2O):  [5 cm H20] 5 cm H20  OR SUP:  [10 cm H20] 10 cm H20  MAP (cm H2O):  [7.2-17] 15  IV drips:  amiodarone   Followed by  amiodarone  dexmedetomidine, Last Rate: Stopped (07/21/25 2200)  fentanyl 10 mcg/mL, Last Rate: Stopped (07/20/25 0916)  phenylephrine, Last Rate: 2 mcg/kg/min (07/22/25 0415)       Physical Exam:     Constitutional:   On ventilator, mildly agitated   Head:   Normocephalic, atraumatic   Eyes:           Lids and lashes normal, conjunctivae and sclerae  normal.  PER.  Upward gaze.   ENMT:  Ears appear intact with no abnormalities noted     Lips normal.  Oral endotracheal tube in place.   Neck:  Trachea midline, no JVD   Lungs/Resp:    On ventilator, symmetric chest rise, no crepitus, mild rhonchi bilaterally.               Heart/CV:   Irregularly irregular and tachycardic, no murmur   Abdomen/GI:    Soft, nontender, nondistended   :    Deferred   Extremities/MSK:  No clubbing or cyanosis.  No edema.     Pulses:  Pulses palpable and equal bilaterally   Skin:  No bleeding, bruising or rash   Heme/Lymph:  No cervical or supraclavicular adenopathy.   Neurologic:    Psychiatric:    Moves all extremities with no obvious focal motor deficit.  Does not follow commands today.  Mildly agitated    The above physical exam findings were reviewed and reflect my exam findings as of today's exam.   Electronically signed by:  David Mccloud MD  07/22/25  09:32 EDT      Results Review:     I reviewed the patient's new clinical results.   Results from last 7 days   Lab Units 07/22/25  0351 07/21/25  0016 07/20/25  0533   SODIUM mmol/L 150* 147* 147*   POTASSIUM mmol/L 4.2 4.8 4.7   CHLORIDE mmol/L 117* 115* 113*   CO2 mmol/L 17.9* 18.7* 21.1*   BUN mg/dL 87.4* 89.6* 85.9*   CREATININE mg/dL 1.65* 1.90* 1.80*   CALCIUM mg/dL 8.5* 8.5* 8.8   BILIRUBIN mg/dL 0.9 0.5 0.5   ALK PHOS U/L 101 76 70   ALT (SGPT) U/L 670* 294* 179*   AST (SGOT) U/L 483* 213* 137*   GLUCOSE mg/dL 104* 293* 350*     Results from last 7 days   Lab Units 07/22/25  0351 07/21/25  1402 07/21/25  0016   WBC 10*3/mm3 29.29* 31.73* 31.65*   HEMOGLOBIN g/dL 9.7* 9.5* 6.8*   HEMATOCRIT % 31.2* 31.3* 24.1*   PLATELETS 10*3/mm3 361 353 348     Results from last 7 days   Lab Units 07/21/25  2347   PH, ARTERIAL pH units 7.416   PO2 ART mm Hg 92.7   PCO2, ARTERIAL mm Hg 23.9*   HCO3 ART mmol/L 15.3*     Results from last 7 days   Lab Units 07/22/25  0351 07/19/25  0408 07/18/25  0309   MAGNESIUM mg/dL 2.6* 2.6* 2.6*        I reviewed the patient's new imaging including images and reports.    CT abdomen pelvis 7/21/2025 reviewed and shows constipation and enlarged gallbladder.  Radiologist's impression follows:    Impression:     1. Practically complete lower lobe atelectasis, and moderate right lower lobe atelectasis. Moderate to large pleural effusions, incompletely included on this study.     2. Distended gallbladder, but no definite gallbladder inflammatory changes.     3. Relatively atrophic left kidney. Heterogeneous appearance of the left renal pelvis, possibly artifactual. Debris and tumor are both considerations.     4. Suspected mild colonic ileus. Minimal if any right colon mucosal thickening and no apparent large or small bowel inflammation.     5. Large bladder tumor increased from 2/20/2025, and small debris or hematocrit level in the dependent portion of the bladder.     6. Subtle, irregular shaped approximately 2.4 x 1.3 cm right lobe liver lesion nonspecific.       Chest x-ray 7/19/2025 shows cardiomegaly with bilateral mild basilar airspace opacities possibly infiltrate versus atelectasis and small left pleural effusion.    Medication Review:   amiodarone, 150 mg, Intravenous, Once   Followed by  [START ON 7/23/2025] amiodarone, 200 mg, Nasogastric, Once   Followed by  [START ON 7/23/2025] amiodarone, 200 mg, Nasogastric, Q8H   Followed by  [START ON 7/30/2025] amiodarone, 200 mg, Nasogastric, Q12H   Followed by  [START ON 8/13/2025] amiodarone, 200 mg, Nasogastric, Daily  aspirin, 81 mg, Nasogastric, Daily  [Held by provider] atorvastatin, 80 mg, Oral, Daily  budesonide, 0.5 mg, Nebulization, BID - RT  escitalopram, 10 mg, Nasogastric, Daily  heparin (porcine), 5,000 Units, Subcutaneous, Q8H  insulin glargine, 20 Units, Subcutaneous, Daily  insulin regular, 4-24 Units, Subcutaneous, Q6H  insulin regular, 8 Units, Subcutaneous, Q6H  ipratropium-albuterol, 3 mL, Nebulization, 4x Daily - RT  levothyroxine, 50 mcg,  Nasogastric, Daily  pantoprazole, 40 mg, Intravenous, Q24H  pharmacy consult - MTM, , Not Applicable, Daily  piperacillin-tazobactam, 4.5 g, Intravenous, Q8H  prasugrel, 10 mg, Nasogastric, Daily  QUEtiapine, 25 mg, Nasogastric, Nightly  senna-docusate sodium, 2 tablet, Nasogastric, BID      amiodarone, 1 mg/min   Followed by  amiodarone, 0.5 mg/min  dexmedetomidine, 0.2-1.5 mcg/kg/hr, Last Rate: Stopped (07/21/25 2200)  fentanyl 10 mcg/mL,  mcg/hr, Last Rate: Stopped (07/20/25 0916)  phenylephrine, 0.5-3 mcg/kg/min, Last Rate: 2 mcg/kg/min (07/22/25 0415)        Assessment & Plan         Third degree AV block    CAD (coronary artery disease)    TOOTIE (acute kidney injury)    Lactic acidosis    Acute cystitis without hematuria    Ischemic cardiomyopathy    Acute HFrEF (heart failure with reduced ejection fraction)    Shock liver    Complete heart block    70 y.o. active smoker with 90-pack-year history with history of HTN, HLD, T2DM, COPD, hypothyroidism, possible history of seizures, anxiety/depression, admitted at [facility] 6/27-28/2025 with left heart cath showing 2 occluded vein grafts and PCI to left circumflex with stent placement.  He Altaf presented to [facility] ED on 7/6/2025 with chest pain and was admitted with NSTEMI and had repeat C 7/7/2025 with in-stent thrombosis felt to be secondary to medical noncompliance.  Balloon angioplasty was performed with deployment of overlapping stent just distal to the previous stent.  Echo obtained 24 hours later showed LVEF of 30 to 35% with grade 2 diastolic dysfunction.    That course was complicated by renal failure.    On the morning of 7/9, he developed third-degree AV block and was given atropine secondary to bradycardia.  Attempts were made at transcutaneous pacing and patient became combative and 1 mg of Versed was given.  He developed asystole on monitor and became unresponsive and had a brief ACLS with ROSC.  Following this the patient was back in third-degree AV  block and was agonal he breathing and was intubated.  He was initiated on transcutaneous pacing.    After this, cardiology was called who felt the patient needed a repeat LHC due to possible failure of stent.  He underwent transvenous pacemaker placement on 7/9/2025 and repeat LHC showed patent stent in the proximal left circumflex artery with diffuse coronary artery vasospasm distal to the stent.  Intra-aortic balloon pump was placed.  [facility] was contacted and he was transferred on 7/9/2025.    At admission here, patient was noted to have worsening renal failure and elevated LFTs suggestive of shock liver.  He was started on dopamine with one-to-one support on balloon pump.  Balloon pump was discontinued on 7/12.  He remains encephalopathic.    Patient seems to be following limited commands today when sedation is completely held.    Patient is having some hemodynamic instability with A-fib with RVR and hypotension requiring phenylephrine.  Enlarged gallbladder on CT scan and ultrasound concerning for acalculous cholecystitis in the setting of elevated white blood cell count and concern for infection.  I consulted general surgery who recommends a percutaneous drain.  I will order this.      Plan:  1.  For AMS/encephalopathy: Unclear etiology.  Slightly improved off of sedation but he is agitated on ventilator.  Neurology following.  MRI with old stroke.  Hold fentanyl drip at this point if we can and minimize Precedex.  Give intermittent doses of low-dose midazolam.  2.  For respiratory failure: Continue mechanical ventilation.  Wean settings as tolerated.  Not ready for extubation secondary to mental status issues and hemodynamic instability.    3.  For possible UTI/ID: Cultures on 7/8 grew coag negative staph and he completed Rocephin on 7/13.  Started on Zosyn empirically on 7/18 secondary to new fevers.  Continue Zosyn for now and follow-up cultures.  Blood culture 7/18/2025 showed coag  negative staph likely contaminant.  Repeated respiratory culture.  4.  For hypothyroidism: Continue levothyroxine.  5.  For TOOTIE: Slightly worsened.  Gentle fluids/albumin.  Watch fluid balance and labs.  Check proBNP.  6.  For congestive heart failure/arrhythmias: Cardiology/EP following.  7.  For shock: Mainly cardiogenic.  Likely also had critical illness related corticosteroid insufficiency and was started on hydrocortisone which was weaned and ultimately stopped on 7/15/2025.  Watch lactic acid level.  8.  For enlarged gallbladder/concern for acalculous cholecystitis/elevated LFTs: Possibly could be due to shock liver.  With enlarged gallbladder and concern for acalculous cholecystitis, consulted general surgery who recommends percutaneous drain.  I will order this.    Patient is critically ill secondary to multiorgan failure and has high risk of life-threatening decompensation in condition.   As such, the patient requires continuous monitoring and frequent reassessment for consideration of adjustment in management to minimize this risk.  I personally reassessed the patient multiple times today.    Critical care time : 35 minutes spent by me personally and independently.(This excludes time spent performing separately reportable procedures and services).  Critical care time includes high complexity decision making to assess, manipulate, and support vital organ system failure in this individual who has impairment of one or more vital organ systems such that there is a high probability of imminent or life threatening deterioration in the patient’s condition.    Electronically signed by:    David Mccloud MD  07/22/25  09:32 EDT      *. Please note that portions of this note were completed with [employer] - a voice recognition program.

## 2025-07-22 NOTE — PROGRESS NOTES
Christo Clifton  [id number]  1955   LOS: 13 days   Patient Care Team:  Lesa Rae APRN as PCP - General (Nurse Practitioner)  Johanne, Phong AGUILAR MD as Consulting Physician (Neurosurgery)    Chief Complaint:  NSTEMI / RESPIRATORY FAILURE / FEVER / CHB / TOOTIE / AGITATED DELIRIUM POSTCARDIAC ARREST & MULTIORGAN FAILURE / RECURRENT ATRIAL TACHYARRHYTHMIA    Subjective     Off sedation on ventilator with restless agitated activity.  No discernible posturing or seizure activity.  NG feedings on hold.  Oximetry 96% on ventilator (FiO2 0.30).    Objective     Vital Sign Min/Max for last 24 hours  Temp  Min: 97.6 °F (36.4 °C)  Max: 100.7 °F (38.2 °C)   BP  Min: 88/60  Max: 147/87   Pulse  Min: 65  Max: 154   Resp  Min: 14  Max: 25   SpO2  Min: 92 %  Max: 100 %               07/09/25  1908 07/17/25  0600   Weight: 77.5 kg (170 lb 13.7 oz) 72.7 kg (160 lb 4.4 oz)         Intake/Output Summary (Last 24 hours) at 7/22/2025 0741  Last data filed at 7/22/2025 0600  Gross per 24 hour   Intake 4135 ml   Output 2535 ml   Net 1600 ml       Physical Exam:     General Appearance:  Restless, agitated, in no acute distress   Lungs:   Diminished breath sounds with scattered rhonchi and wheezes bibasilar crackles/dullness,respirations regular, even and unlabored    Heart:    Regular and rapid rate, normal S1 and S2, grade 1/6 systolic murmur, no gallop, no rub, no click   Abdomen:  Extremities:   Soft, nontender, bowel sounds audible x4  2+ generalized edema, normal range of motion   Pulses:   Pulses palpable and equal bilaterally      Results Review:   Results from last 7 days   Lab Units 07/22/25  0351 07/21/25  0016 07/20/25  0533   SODIUM mmol/L 150* 147* 147*   POTASSIUM mmol/L 4.2 4.8 4.7   CHLORIDE mmol/L 117* 115* 113*   CO2 mmol/L 17.9* 18.7* 21.1*   BUN mg/dL 87.4* 89.6* 85.9*   CREATININE mg/dL 1.65* 1.90* 1.80*   GLUCOSE mg/dL 104* 293* 350*   CALCIUM mg/dL 8.5* 8.5* 8.8     Results from last 7 days   Lab Units  07/22/25  0351 07/21/25  1402 07/21/25  0016   WBC 10*3/mm3 29.29* 31.73* 31.65*   HEMOGLOBIN g/dL 9.7* 9.5* 6.8*   HEMATOCRIT % 31.2* 31.3* 24.1*   PLATELETS 10*3/mm3 361 353 348     proBNP: 00287.0    MAGNESIUM: 2.6    ALBUMIN: 2.8    AST: 483    ALT: 670    LACTATE: 1.7    AMMONIA: 33    INR: 1.47    NO NEW EKG.    CXR:    Findings:    Endotracheal tube and right IJ line remain in good position. Feeding tube extends below the diaphragm. Heart size stable status post sternotomy. Small left pleural effusion is present. Mild bibasilar atelectasis is not significantly changed. No   pneumothorax is identified.     IMPRESSION: Small left pleural effusion with mild bibasilar atelectasis.    ABDOMINOPELVIC CT SCAN:    Findings:    Prior study report noted worrisome 5.6 cm bladder tumor concerning for carcinoma. History today indicates postcardiac arrest, cardiogenic shock, possible UTI, shock liver     Appearance of the lung bases suggest complete atelectasis of left lower lobe, and moderate right lower lobe with adjacent moderate to large pleural effusions.     Feeding tube is seen in the duodenum. Liver morphology appears grossly normal. There is a hypodense 2.4 x 1.3 cm right lobe liver lesion, subtle on previous outside scan, and not present on 2015 scan, today's image 43/2. Spleen is not enlarged. Pancreas   and adrenal glands appear within the limits.      Gallbladder is distended. Its slightly indistinct margins are attributable to motion related blurring and also anasarca, seen throughout the scan elsewhere.     There is a large left upper pole renal cyst as on the prior study. Left kidney appears relatively atrophic. There is decreasing prominence of the renal pelves still enlarged, but improved compared to the prior study. There is a mildly heterogeneous   appearance of the left renal pelvis, particularly seen on images 70 through 77, questionable for debris, possibly just artifactual.     No upper abdominal  free air, ascites or adenopathy is seen.     Regarding the lower abdomen and pelvis, no ureteral dilatation is seen. There is mild water density pelvic ascites, and again the previously noted large bladder mass. Bladder mass appears increased from 5.3 x 4.4 cm in axial cross-section to   approximately 6.8 x 5.0 cm. There is a suggestion of some hyperdense debris or hemorrhage in the most dependent portion of the bladder. Vega balloon catheter is seen in place.     Bowel loops are generally normal in caliber, considered upper limits of normal, particularly the nondependent loops. There is minimal if any right colon wall thickening and no obvious inflammation. No small bowel inflammation or dilatation is seen.     Prostate is mildly prominent in size. Seminal vesicles appear normal. No intrapelvic adenopathy is seen. Bony structures appear to be intact.     IMPRESSIONS:    1. Practically complete lower lobe atelectasis, and moderate right lower lobe atelectasis. Moderate to large pleural effusions, incompletely included on this study.     2. Distended gallbladder, but no definite gallbladder inflammatory changes.     3. Relatively atrophic left kidney. Heterogeneous appearance of the left renal pelvis, possibly artifactual. Debris and tumor are both considerations.     4. Suspected mild colonic ileus. Minimal if any right colon mucosal thickening and no apparent large or small bowel inflammation.     5. Large bladder tumor increased from 2/20/2025, and small debris or hematocrit level in the dependent portion of the bladder.     6. Subtle, irregular shaped approximately 2.4 x 1.3 cm right lobe liver lesion nonspecific.    HEAD CT SCAN:    Findings:    Superficial soft tissues appear within normal limits. The calvarium is intact.  Paranasal sinuses and mastoid air cells appear well aerated. There is thinning of the orbital lenses bilaterally suggesting prior lens replacement. There is no acute   intracranial  hemorrhage.  No mass effect or midline shift.  No abnormal extra-axial collections.  Gray-white differentiation is within normal limits. There is mild patchy white matter hypoattenuation. Stable chronic right occipital lobe infarct. No new   hypoattenuating lesions in the brain. Mild intracranial vascular calcification. The ventricles appear normal in size and configuration for the patient's age.     IMPRESSIONS:    1.No acute intracranial abnormality.  2.Mild chronic small vessel ischemic change and chronic right occipital lobe infarct.    Medication Review: REVIEWED; DRIP = IV phenylephrine 2 mcg/kilogram/minute continuous infusion    Assessment & Plan     Recurrent sustained atrial flutter.  Will give IV digoxin 250 mcg x 1 and initiate IV amiodarone per protocol.  Persistent fluid sequestration secondary to suboptimal nutrition and low serum albumin with significant hypernatremia.      Third degree AV block    CAD (coronary artery disease)    TOOTIE (acute kidney injury)    Lactic acidosis    Acute cystitis without hematuria    Ischemic cardiomyopathy    Acute HFrEF (heart failure with reduced ejection fraction)    Shock liver    Complete heart block    07/22/25  07:41 EDT

## 2025-07-22 NOTE — CONSULTS
Patient Name:  Christo Clifton  YOB: 1955  [id number]       Patient Care Team:  Lesa Rae APRN as PCP - General (Nurse Practitioner)  Phong Whiting MD as Consulting Physician (Neurosurgery)      General Surgery Consult Note     Date of Consultation: 07/22/25    Referring Physician : David Mccloud MD    Reason for Consult : Questionable cholecystitis    Subjective     I have been asked to see  Christo Clifton , a 70 y.o. male in consultation for questionable cholecystitis.  He has a known past medical history of hypertension, hyperlipidemia, type 2 diabetes mellitus, COPD, hypothyroidism, tobacco abuse, anxiety depression, and known coronary artery disease.  He was found to have NSTEMI with an in-stent thrombosis felt to be secondary to medical noncompliance.  He had significant post procedural grade 2 diastolic dysfunction with an ejection fraction of 30 to 35%. His course was also complicated by renal failure.  He developed a V-block (third-degree) and was given atropine and asystole was found on his monitoring.  He underwent a brief ACLS with return of spontaneous circulation.  He was agonal in breathing and was intubated.  He had a transvenous pacemaker placed on 7/9/2025 and an intra-aortic balloon pump was placed.  He was transferred to our facility on 7/9/2025.  He had elevated LFTs with shock liver.  He was started on dopamine, and the balloon pump eventually was discontinued on 7/12/2025.  He remains encephalopathic.  He continues to have hemodynamic instability with A-fib RVR and hypotension requiring phenylephrine.  In order to workup and a persistently elevated white count, he underwent CT scan yesterday which demonstrated an enlarged gallbladder and an ultrasound concerning for possible acalculous cholecystitis versus gallbladder distention.  We have been asked to see him for management of his gallbladder.  There is no family at the bedside, and all history is obtained from the  hospital chart.      Allergy: No Active Allergies    Medications:  [START ON 7/23/2025] amiodarone, 200 mg, Nasogastric, Once   Followed by  [START ON 7/23/2025] amiodarone, 200 mg, Nasogastric, Q8H   Followed by  [START ON 7/30/2025] amiodarone, 200 mg, Nasogastric, Q12H   Followed by  [START ON 8/13/2025] amiodarone, 200 mg, Nasogastric, Daily  aspirin, 81 mg, Nasogastric, Daily  [Held by provider] atorvastatin, 80 mg, Oral, Daily  budesonide, 0.5 mg, Nebulization, BID - RT  escitalopram, 10 mg, Nasogastric, Daily  heparin (porcine), 5,000 Units, Subcutaneous, Q8H  insulin glargine, 20 Units, Subcutaneous, Daily  insulin regular, 4-24 Units, Subcutaneous, Q6H  insulin regular, 8 Units, Subcutaneous, Q6H  ipratropium-albuterol, 3 mL, Nebulization, 4x Daily - RT  levothyroxine, 50 mcg, Nasogastric, Daily  pantoprazole, 40 mg, Intravenous, Q24H  pharmacy consult - MTM, , Not Applicable, Daily  piperacillin-tazobactam, 4.5 g, Intravenous, Q8H  prasugrel, 10 mg, Nasogastric, Daily  QUEtiapine, 25 mg, Nasogastric, Nightly  senna-docusate sodium, 2 tablet, Nasogastric, BID  vancomycin (dosing per levels), , Not Applicable, Daily  vancomycin, 25 mg/kg, Intravenous, Once      amiodarone, 1 mg/min, Last Rate: 1 mg/min (07/22/25 0941)   Followed by  amiodarone, 0.5 mg/min  dexmedetomidine, 0.2-1.5 mcg/kg/hr, Last Rate: Stopped (07/21/25 2200)  fentanyl 10 mcg/mL,  mcg/hr, Last Rate: Stopped (07/20/25 0916)  Pharmacy to dose vancomycin,   phenylephrine, 0.5-3 mcg/kg/min, Last Rate: 2 mcg/kg/min (07/22/25 1056)      No current facility-administered medications on file prior to encounter.     Current Outpatient Medications on File Prior to Encounter   Medication Sig    aspirin 81 MG EC tablet Take 1 tablet by mouth Daily.    atorvastatin (LIPITOR) 80 MG tablet Take 1 tablet by mouth Daily.    clopidogrel (PLAVIX) 75 MG tablet Take 1 tablet by mouth Daily.    escitalopram (LEXAPRO) 10 MG tablet Take 1 tablet by mouth  Daily.    insulin glargine (LANTUS, SEMGLEE) 100 UNIT/ML injection Inject 14 Units under the skin into the appropriate area as directed Daily.    levothyroxine (SYNTHROID, LEVOTHROID) 50 MCG tablet Take 1 tablet by mouth Daily.       PMHx:   Past Medical History:   Diagnosis Date    Anxiety and depression     Arthritis     COPD (chronic obstructive pulmonary disease)     Coronary artery disease     Diabetes mellitus     Disease of thyroid gland     Dyslipidemia     History of transfusion     Hypertension     Seizure disorder     Pt states last seizure x1 mo.       Past Surgical History:  Past Surgical History:   Procedure Laterality Date    AMPUTATION DIGIT Right 09/13/2017    Procedure: RIGHT 1ST TOE AMPUTATION ;  Surgeon: Lee Lee MD;  Location: [facility] OR;  Service:     CARDIAC CATHETERIZATION      CARDIAC CATHETERIZATION N/A 6/27/2025    Procedure: Left Heart Cath;  Surgeon: Mimi Botello MD;  Location: [facility] CATH INVASIVE LOCATION;  Service: Cardiovascular;  Laterality: N/A;    CARDIAC CATHETERIZATION N/A 6/27/2025    Procedure: Percutaneous Coronary Intervention;  Surgeon: Mimi Botello MD;  Location: [facility] CATH INVASIVE LOCATION;  Service: Cardiovascular;  Laterality: N/A;    CARDIAC CATHETERIZATION N/A 7/7/2025    Procedure: Left Heart Cath;  Surgeon: Anali Arevalo MD;  Location: [facility] CATH INVASIVE LOCATION;  Service: Cardiovascular;  Laterality: N/A;    CARDIAC CATHETERIZATION N/A 7/9/2025    Procedure: Coronary angiography;  Surgeon: Mimi Botello MD;  Location: [facility] CATH INVASIVE LOCATION;  Service: Cardiovascular;  Laterality: N/A;    CARDIAC CATHETERIZATION N/A 7/9/2025    Procedure: Intra-Aortic Baloon Pump Insertion;  Surgeon: Mimi Botello MD;  Location: [facility] CATH INVASIVE LOCATION;  Service: Cardiovascular;  Laterality: N/A;    CARDIAC ELECTROPHYSIOLOGY PROCEDURE N/A 7/9/2025    Procedure: Temporary Pacemaker;  Surgeon: Mimi Boetllo MD;  Location: [facility]  "CATH INVASIVE LOCATION;  Service: Cardiovascular;  Laterality: N/A;    CARDIAC SURGERY      CIRCUMCISION      CORONARY ARTERY BYPASS GRAFT      HERNIA REPAIR      X2    INTRAVASCULAR ULTRASOUND N/A 6/27/2025    Procedure: Intravascular Ultrasound;  Surgeon: Mimi Botello MD;  Location: [facility] CATH INVASIVE LOCATION;  Service: Cardiovascular;  Laterality: N/A;    MS INCISION & DRAINAGE LEG/ANKLE ABSCESS/HEMATOMA Right 05/24/2017    Procedure: Debridement of right first toe;  Surgeon: Ronald Perdue MD;  Location: [facility] OR;  Service: General    TOE SURGERY Left     debridement         Family History: Noncontributory     Social History:     Tobacco use:  Current everyday smoker   EtOH use : Denies    Illicit drug use: Denies      Review of Systems   Review of systems could not be obtained due to   patient intubated.           Objective     Physical Exam:      Vital Signs  /80 (BP Location: Left arm, Patient Position: Lying)   Pulse 116   Temp 100 °F (37.8 °C) (Esophageal)   Resp 23   Ht 180.3 cm (70.98\")   Wt 72.7 kg (160 lb 4.4 oz)   SpO2 97%   BMI 22.36 kg/m²     Intake/Output Summary (Last 24 hours) at 7/22/2025 1259  Last data filed at 7/22/2025 0600  Gross per 24 hour   Intake 3415 ml   Output 2200 ml   Net 1215 ml         Physical Exam:    Head: Normocephalic, atraumatic.   Eyes: Pupils equal, round, react to light and accommodation.   Mouth: Oral mucosa without lesions, intubated  Neck: No masses, lymphadenopathy or carotid bruits bilaterally   CV: Rhythm  and rate regular , no  murmurs, rubs or gallops  Lungs: Rhonchi to auscultation bilaterally   Abdomen: Bowel sounds positive  , soft, nondistended.  Groin : No obvious hernias bilaterally   Extremities: There is soft tissue ischemic changes to the superficial epidermis on the anterior surface of both feet consistent with poor peripheral circulation.  Lymphatics: No abnormal lymphadenopathy appreciated   Neurologic: No gross deficits "       Results Review: I have personally reviewed all of the recent lab and imaging results available at this time.  Laboratory exam reveals a proBNP of 60,000, his creatinine is 1.65, with a an AST of 43 ALT of 670 alkaline phosphatase of 101 and a bilirubin of 0.9.  His lactate is 2.2.  INR 1.46.  White count 29,000 with a hemoglobin of 9.7 and a platelet count of 361.    CT scan of the abdomen pelvis was personally viewed by me as well as the final dictated and edited report.  This shows bilateral lower lobe atelectasis worse on the left.  There is some evidence of edema and anasarca.  The gallbladder is distended without obvious evidence of cholecystitis.  There is air and stool within the colon.  There is no evidence of bowel obstruction.  There are no abdominal wall hernias of note.  There is evidence of prior left inguinal hernia.    Ultrasound of the gallbladder likewise demonstrates a distended gallbladder with questionable mild gallbladder wall thickening worrisome for possible acute cholecystitis, but this diagnosis is not for certain.           Assessment and Plan:    Acute cholecystitis- He may have evidence of acute cholecystitis, but his liver function test elevations may also be due to shock liver.  I think the safest course of action in this patient would be for a percutaneous cholecystostomy tube placement.  He is not an operative candidate and is very high risk for clinical decompensation even in his current state.  Cholecystostomy tube would at least allow for management of the gallbladder in addition to antibiotics, and then we can address the gallbladder in the coming weeks if needed and he recovers from his other significant cardiopulmonary complications.  I have discussed this case with the intensivist, and we will follow with you.      Problem List Items Addressed This Visit          Cardiac and Vasculature    Ischemic cardiomyopathy    Relevant Medications    nitroglycerin (NITROSTAT) SL  tablet 0.4 mg    prasugrel (EFFIENT) tablet 10 mg    clopidogrel (PLAVIX) 75 MG tablet    phenylephrine (NATASHA-SYNEPHRINE) 50 mg in sodium chloride 0.9 % 250 mL infusion    digoxin (LANOXIN) injection 250 mcg (Completed)    digoxin (LANOXIN) injection 250 mcg (Completed)    Other Relevant Orders    Insert Arterial Line (Completed)    Acute HFrEF (heart failure with reduced ejection fraction) - Primary    Relevant Medications    nitroglycerin (NITROSTAT) SL tablet 0.4 mg    prasugrel (EFFIENT) tablet 10 mg    clopidogrel (PLAVIX) 75 MG tablet    phenylephrine (NATASHA-SYNEPHRINE) 50 mg in sodium chloride 0.9 % 250 mL infusion    digoxin (LANOXIN) injection 250 mcg (Completed)    digoxin (LANOXIN) injection 250 mcg (Completed)    Other Relevant Orders    Insert Arterial Line (Completed)        Active Hospital Problems    Diagnosis  POA    **Third degree AV block [I44.2]  Yes    TOOTIE (acute kidney injury) [N17.9]  Yes    Lactic acidosis [E87.20]  Yes    Acute cystitis without hematuria [N30.00]  Yes    Ischemic cardiomyopathy [I25.5]  Yes    Acute HFrEF (heart failure with reduced ejection fraction) [I50.21]  Yes    Shock liver [K72.00]  Yes    Complete heart block [I44.2]  Yes    CAD (coronary artery disease) [I25.10]  Yes      Resolved Hospital Problems    Diagnosis Date Resolved POA    ASCVD (arteriosclerotic cardiovascular disease) [I25.10] 07/09/2025 Yes            I discussed the patient's findings and my recommendations with the patient and/or family, as well as the primary team     Dipesh Rock MD  07/22/25  12:59 EDT        Dictated using Dragon Dictation

## 2025-07-22 NOTE — THERAPY TREATMENT NOTE
Pharmacy Consult - Vancomycin Dosing and Monitoring    Christo Clifton is a 70 y.o. male receiving vancomycin therapy.     Indication: Bacteremia, Empiric  Consulting Provider:  Ame PICKETT Consult: N/A    Goal AUC: 400-600 mg/L*hr    Current Antimicrobial Therapy  Anti-Infectives (From admission, onward)      Ordered     Dose/Rate Route Frequency Start Stop    07/22/25 1008  vancomycin (dosing per levels)        Ordering Provider: Kareem Portillo RPH     Not Applicable Daily 07/22/25 1100 07/29/25 0859    07/22/25 1008  vancomycin IVPB 1750 mg in 0.9% Sodium Chloride (premix) 500 mL        Ordering Provider: Kareem Portillo RPH    25 mg/kg × 72.7 kg  285.7 mL/hr over 105 Minutes Intravenous Once 07/22/25 1100      07/22/25 1004  Pharmacy to dose vancomycin        Ordering Provider: David Mccloud MD     Not Applicable Continuous PRN 07/22/25 1004 07/29/25 1003    07/21/25 1243  piperacillin-tazobactam (ZOSYN) 4.5 g IVPB in 100 mL NS MBP (CD)        Ordering Provider: David Mccloud MD    4.5 g  over 4 Hours Intravenous Every 8 Hours 07/21/25 1800 07/25/25 1759    07/18/25 1131  piperacillin-tazobactam (ZOSYN) 3.375 g IVPB in 100 mL NS MBP (CD)  Status:  Discontinued        Ordering Provider: Olayinka Guillen MD    3.375 g  over 4 Hours Intravenous Every 8 Hours 07/18/25 1800 07/21/25 1243    07/18/25 1131  piperacillin-tazobactam (ZOSYN) 3.375 g IVPB in 100 mL NS MBP (CD)        Ordering Provider: Olayinka Guillen MD    3.375 g  over 30 Minutes Intravenous Once 07/18/25 1200 07/18/25 1256    07/09/25 1957  cefTRIAXone (ROCEPHIN) 2,000 mg in sodium chloride 0.9 % 100 mL IVPB-VTB        Ordering Provider: Chandrakant Jones MD    2,000 mg  200 mL/hr over 30 Minutes Intravenous Every 24 Hours 07/10/25 0330 07/14/25 0330          Allergies  Allergies as of 07/09/2025 - Reviewed 07/09/2025   No Active Allergies   Allergen Reaction Noted    Other  06/23/2016     Labs  Results from last 7 days   Lab Units  "07/22/25  0351 07/21/25  0016 07/20/25  0533   BUN mg/dL 87.4* 89.6* 85.9*   CREATININE mg/dL 1.65* 1.90* 1.80*     Results from last 7 days   Lab Units 07/22/25  0351 07/21/25  1402 07/21/25  0016   WBC 10*3/mm3 29.29* 31.73* 31.65*     Evaluation of Dosing     Last Dose Received in the ED/Outside Facility: LD being given today 7/22  Is Patient on Dialysis or Renal Replacement: No    Height - 180.3 cm (70.98\")  Weight - 72.7 kg (160 lb 4.4 oz)    Estimated Creatinine Clearance: 42.2 mL/min (A) (by C-G formula based on SCr of 1.65 mg/dL (H)).    I/O last 3 completed shifts:  In: 4235 [I.V.:2417; Blood:720; Other:150; NG/GT:448; IV Piggyback:500]  Out: 2935 [Urine:2935]    Microbiology and Radiology  Microbiology Results (last 10 days)       Procedure Component Value - Date/Time    Respiratory Culture - Sputum, ET Suction [[id number]] Collected: 07/20/25 2006    Lab Status: Preliminary result Specimen: Sputum from ET Suction Updated: 07/22/25 1027     Respiratory Culture Light growth (2+) The culture consists of normal respiratory falguni. This is a preliminary report; final report to follow.     Gram Stain Many (4+) WBCs per low power field      Rare (1+) Epithelial cells per low power field      No organisms seen    Blood Culture - Blood, Hand, Left [[id number]]  (Normal) Collected: 07/20/25 0545    Lab Status: Preliminary result Specimen: Blood from Hand, Left Updated: 07/22/25 1015     Blood Culture No growth at 2 days    Blood Culture - Blood, Arm, Right [[id number]]  (Normal) Collected: 07/20/25 0533    Lab Status: Preliminary result Specimen: Blood from Arm, Right Updated: 07/22/25 1015     Blood Culture No growth at 2 days    Blood Culture - Blood, Arm, Right [[id number]]  (Abnormal) Collected: 07/18/25 1151    Lab Status: Final result Specimen: Blood from Arm, Right Updated: 07/21/25 0682     Blood Culture Staphylococcus, coagulase negative     Isolated from Aerobic Bottle     Gram Stain Aerobic Bottle Gram " positive cocci in clusters    Narrative:      Probable contaminant requires clinical correlation, susceptibility not performed unless requested by physician.      Blood Culture - Blood, Blood, Central Line [[id number]]  (Abnormal) Collected: 07/18/25 1151    Lab Status: Preliminary result Specimen: Blood, Central Line Updated: 07/22/25 0230     Blood Culture Abnormal Stain     Gram Stain Anaerobic Bottle Gram positive cocci in clusters    Blood Culture ID, PCR - Blood, Arm, Right [[id number]]  (Abnormal) Collected: 07/18/25 1151    Lab Status: Final result Specimen: Blood from Arm, Right Updated: 07/19/25 2149     BCID, PCR Staph spp, not aureus or lugdunensis. Identification by BCID2 PCR.     BOTTLE TYPE Aerobic Bottle    Blood Culture - Blood, Blood, Central Line [[id number]]  (Normal) Collected: 07/15/25 0223    Lab Status: Final result Specimen: Blood, Central Line Updated: 07/20/25 0245     Blood Culture No growth at 5 days    Narrative:      Less than seven (7) mL's of blood was collected.  Insufficient quantity may yield false negative results.    Blood Culture - Blood, Arm, Left [[id number]]  (Normal) Collected: 07/15/25 0223    Lab Status: Final result Specimen: Blood from Arm, Left Updated: 07/20/25 0245     Blood Culture No growth at 5 days    Narrative:      Less than seven (7) mL's of blood was collected.  Insufficient quantity may yield false negative results.    Respiratory Culture - Sputum, ET Suction [[id number]] Collected: 07/15/25 0050    Lab Status: Final result Specimen: Sputum from ET Suction Updated: 07/17/25 1321     Respiratory Culture Light growth (2+) Normal respiratory falguni. No S. aureus or Pseudomonas aeruginosa detected. Final report.     Gram Stain Many (4+) WBCs seen      Rare (1+) Epithelial cells seen      Moderate (3+) Mixed bacterial morphotypes seen on Gram Stain    Respiratory Culture - Sputum, ET Suction [[id number]] Collected: 07/12/25 1721    Lab Status: Final result Specimen:  Sputum from ET Suction Updated: 07/15/25 1051     Respiratory Culture Light growth (2+) Normal Respiratory Cristy     Gram Stain Many (4+) WBCs per low power field      Rare (1+) Epithelial cells per low power field      Many (4+) Gram negative bacilli      Moderate (3+) Gram positive bacilli      Moderate (3+) Gram positive cocci in pairs and clusters          Reported Vancomycin Levels            _________________________________    Predicted Steady State AUC on New Dose: 509 mg/L*hr    Assessment/Plan:   Ordered placed for random Vancomycin level in the AM 7/23  Pharmacy will dose maintenance dose base on random level results- predicted to be 1000 mg q24 based on [facility].  Pharmacy will monitor interactions, adverse effects, and de-escalation when possible- susceptibility report still pending.    Tamia Landa, Pharmacy Intern  7/22/2025  11:30 EDT

## 2025-07-23 NOTE — PROGRESS NOTES
Pharmacy Consult - Vancomycin Dosing and Monitoring (Renal Dysfunction / Dialysis)    Christo Clifton is a 70 y.o. male receiving vancomycin therapy.     Indication: Bacteremia, Empiric  Consulting Provider: Intensivist  ID Consult: No    Goal Trough (if pulse dosing): 15-20 mcg/mL  Goal AUC: 400-600 mg/L*hr     Current Antimicrobial Therapy  Anti-Infectives (From admission, onward)      Ordered     Dose/Rate Route Frequency Start Stop    07/23/25 0754  vancomycin (VANCOCIN) 1,000 mg in sodium chloride 0.9 % 250 mL IVPB-VTB        Ordering Provider: Kareem Portillo RPH    1,000 mg  250 mL/hr over 60 Minutes Intravenous Once 07/23/25 0900      07/22/25 1008  vancomycin (dosing per levels)        Ordering Provider: Kareem Portillo RPH     Not Applicable Daily 07/22/25 1100 07/29/25 0859    07/22/25 1008  vancomycin IVPB 1750 mg in 0.9% Sodium Chloride (premix) 500 mL        Ordering Provider: Kareem Portillo RPH    25 mg/kg × 72.7 kg  285.7 mL/hr over 105 Minutes Intravenous Once 07/22/25 1100 07/22/25 1353    07/22/25 1004  Pharmacy to dose vancomycin        Ordering Provider: David Mccloud MD     Not Applicable Continuous PRN 07/22/25 1004 07/29/25 1003    07/21/25 1243  piperacillin-tazobactam (ZOSYN) 4.5 g IVPB in 100 mL NS MBP (CD)        Ordering Provider: David Mccloud MD    4.5 g  over 4 Hours Intravenous Every 8 Hours 07/21/25 1800 07/25/25 1759    07/18/25 1131  piperacillin-tazobactam (ZOSYN) 3.375 g IVPB in 100 mL NS MBP (CD)  Status:  Discontinued        Ordering Provider: Olayinka Guillen MD    3.375 g  over 4 Hours Intravenous Every 8 Hours 07/18/25 1800 07/21/25 1243    07/18/25 1131  piperacillin-tazobactam (ZOSYN) 3.375 g IVPB in 100 mL NS MBP (CD)        Ordering Provider: Olayinka Guillen MD    3.375 g  over 30 Minutes Intravenous Once 07/18/25 1200 07/18/25 1256    07/09/25 1957  cefTRIAXone (ROCEPHIN) 2,000 mg in sodium chloride 0.9 % 100 mL IVPB-VTB        Ordering Provider: Robert  "Chandrakant VILLANUEVA MD    2,000 mg  200 mL/hr over 30 Minutes Intravenous Every 24 Hours 07/10/25 0330 07/14/25 0330          Allergies  Allergies as of 07/09/2025 - Reviewed 07/09/2025   No Active Allergies   Allergen Reaction Noted    Other  06/23/2016     Labs  Results from last 7 days   Lab Units 07/23/25  0338 07/22/25  2101 07/22/25  0351   BUN mg/dL 79.7* 86.2* 87.4*   CREATININE mg/dL 1.61* 1.72* 1.65*     Results from last 7 days   Lab Units 07/23/25  0338 07/22/25  2101 07/22/25  0351   WBC 10*3/mm3 32.09* 35.15* 29.29*     Evaluation of Dosing  Last Dose Received in the ED/Outside Facility: No  Is Patient on Dialysis or Renal Replacement: No    Height - 180.3 cm (70.98\")  Weight - 72.7 kg (160 lb 4.4 oz)    Estimated Creatinine Clearance: 43.3 mL/min (A) (by C-G formula based on SCr of 1.61 mg/dL (H)).    Intake & Output (last 3 days)         07/20 0701 07/21 0700 07/21 0701 07/22 0700 07/22 0701 07/23 0700 07/23 0701 07/24 0700    I.V. (mL/kg)  2417 (33.2) 2488.5 (34.2)     Blood  720      Other 301 150      NG/ 448      IV Piggyback 100 400 231     Total Intake(mL/kg) 1005 (13.8) 4135 (56.9) 2719.5 (37.4)     Urine (mL/kg/hr) 925 (0.5) 2535 (1.5) 1675 (1)     Drains   430     Total Output 925 2535 2105     Net +80 +1600 +614.5             Urine Unmeasured Occurrence   1 x           Microbiology  Microbiology Results (last 10 days)       Procedure Component Value - Date/Time    Body Fluid Culture - Body Fluid, Gallbladder [[id number]] Collected: 07/22/25 1419    Lab Status: Preliminary result Specimen: Body Fluid from Gallbladder Updated: 07/22/25 1729     Gram Stain No WBCs or organisms seen    Respiratory Culture - Sputum, ET Suction [[id number]] Collected: 07/20/25 2006    Lab Status: Preliminary result Specimen: Sputum from ET Suction Updated: 07/22/25 1027     Respiratory Culture Light growth (2+) The culture consists of normal respiratory falguni. This is a preliminary report; final report to follow. "     Gram Stain Many (4+) WBCs per low power field      Rare (1+) Epithelial cells per low power field      No organisms seen    Blood Culture - Blood, Hand, Left [[id number]]  (Normal) Collected: 07/20/25 0545    Lab Status: Preliminary result Specimen: Blood from Hand, Left Updated: 07/22/25 1015     Blood Culture No growth at 2 days    Blood Culture - Blood, Arm, Right [[id number]]  (Normal) Collected: 07/20/25 0533    Lab Status: Preliminary result Specimen: Blood from Arm, Right Updated: 07/22/25 1015     Blood Culture No growth at 2 days    Blood Culture - Blood, Arm, Right [[id number]]  (Abnormal) Collected: 07/18/25 1151    Lab Status: Preliminary result Specimen: Blood from Arm, Right Updated: 07/23/25 0633     Blood Culture Staphylococcus, coagulase negative     Isolated from Aerobic Bottle     Gram Stain Aerobic Bottle Gram positive cocci in clusters    Narrative:            Blood Culture - Blood, Blood, Central Line [[id number]]  (Abnormal) Collected: 07/18/25 1151    Lab Status: Preliminary result Specimen: Blood, Central Line Updated: 07/23/25 0633     Blood Culture Staphylococcus, coagulase negative     Isolated from Anaerobic Bottle     Gram Stain Anaerobic Bottle Gram positive cocci in clusters    Blood Culture ID, PCR - Blood, Arm, Right [[id number]]  (Abnormal) Collected: 07/18/25 1151    Lab Status: Final result Specimen: Blood from Arm, Right Updated: 07/19/25 2149     BCID, PCR Staph spp, not aureus or lugdunensis. Identification by BCID2 PCR.     BOTTLE TYPE Aerobic Bottle    Blood Culture - Blood, Blood, Central Line [[id number]]  (Normal) Collected: 07/15/25 0223    Lab Status: Final result Specimen: Blood, Central Line Updated: 07/20/25 0245     Blood Culture No growth at 5 days    Narrative:      Less than seven (7) mL's of blood was collected.  Insufficient quantity may yield false negative results.    Blood Culture - Blood, Arm, Left [[id number]]  (Normal) Collected: 07/15/25 0223    Lab Status:  Final result Specimen: Blood from Arm, Left Updated: 07/20/25 0245     Blood Culture No growth at 5 days    Narrative:      Less than seven (7) mL's of blood was collected.  Insufficient quantity may yield false negative results.    Respiratory Culture - Sputum, ET Suction [[id number]] Collected: 07/15/25 0050    Lab Status: Final result Specimen: Sputum from ET Suction Updated: 07/17/25 1321     Respiratory Culture Light growth (2+) Normal respiratory falguni. No S. aureus or Pseudomonas aeruginosa detected. Final report.     Gram Stain Many (4+) WBCs seen      Rare (1+) Epithelial cells seen      Moderate (3+) Mixed bacterial morphotypes seen on Gram Stain          Vancomycin Levels  Results from last 7 days   Lab Units 07/23/25  0338   VANCOMYCIN RM mcg/mL 14.30               Assessment/Plan:  PTD vancomycin 2/2 empiric coverage w/ possible bacteremia.  Vancomycin 1750 mg (~24 mg/kg) IV x 1 on 7/22/25. Vanc random (~16 hrs s/p dose): 14.3 mcg/mL.  Proceed with pulse dosing given TOOTIE w/o return to baseline at this stage. Goal trough level: 15-20 mcg/mL.  Vancomycin 1000 mg (~14 mg/kg) IV x1 on 7/23/25.  Obtain vancomycin random level w/ AM labs on 7/24/25 to assess clearance & pharmacokinetics.  Reassess renal function daily to determine appropriateness of pulse vs scheduled dosing.  Patient also receiving Zosyn. MRSA PCR ordered. De-escalate as clinically indicated.  Pharmacy team to adjust regimen as necessary & follow cultures & sensitivities, renal function, & clinical status.    Thank you,  Kareem Portillo RPH  7/23/2025

## 2025-07-23 NOTE — PROGRESS NOTES
Pulmonary/Critical Care Follow-up     LOS: 14 days   Patient Care Team:  Lesa Rae APRN as PCP - General (Nurse Practitioner)  Given, Phong AGUILAR MD as Consulting Physician (Neurosurgery)    Chief Complaint: Postcardiac arrest, cardiogenic shock      Subjective     History reviewed and updated in EMR as indicated.    Interval History:     Overnight events reviewed.  Patient had V-fib arrest with cardioversion x 2.  He was placed on a fentanyl drip and amiodarone was increased.  Mental status is not really changed today from yesterday.  He had roving eyes on the fentanyl drip and I held the fentanyl drip this morning.  He remains on phenylephrine.    I spoke to the patient's son by phone.  The son thinks that his sister is on his way here and will be here this evening.    History taken from: Chart, staff    PMH/FH/Social History were reviewed and updated appropriately in the electronic medical record.     Review of Systems:    Review of 14 systems was completed with positives and pertinent negatives noted in the subjective section.  All other systems reviewed and are negative.   Exceptions are noted below:    Unable to obtain secondary to endotracheal intubation/altered mental status.      Objective     Vital Signs  Temp:  [97.5 °F (36.4 °C)-100.1 °F (37.8 °C)] 98.2 °F (36.8 °C)  Heart Rate:  [] 79  Resp:  [16-24] 20  BP: ()/(60-91) 135/85  Arterial Line BP: (105-140)/(52-79) 131/57  FiO2 (%):  [30 %-100 %] 35 %  07/22 0701 - 07/23 0700  In: 2719.5 [I.V.:2488.5]  Out: 2105 [Urine:1675; Drains:430]  Body mass index is 22.36 kg/m².  Mode: VC+/AC  FiO2 (%):  [30 %-100 %] 35 %  S RR:  [16-20] 20  S VT:  [530 mL] 530 mL  PEEP/CPAP (cm H2O):  [5 cm H20] 5 cm H20  MAP (cm H2O):  [9.3-18] 14  IV drips:  amiodarone, Last Rate: 1 mg/min (07/23/25 0338)  dexmedetomidine, Last Rate: Stopped (07/21/25 2200)  fentanyl 10 mcg/mL, Last Rate: 100 mcg/hr (07/23/25 0000)  Pharmacy to dose vancomycin  phenylephrine,  Last Rate: 1.3 mcg/kg/min (07/23/25 0526)       Physical Exam:     Constitutional:   On ventilator, mildly agitated   Head:   Normocephalic, atraumatic   Eyes:           Lids and lashes normal, conjunctivae and sclerae normal.  PER.  Upward gaze.   ENMT:  Ears appear intact with no abnormalities noted     Lips normal.  Oral endotracheal tube in place.   Neck:  Trachea midline, no JVD   Lungs/Resp:    On ventilator, symmetric chest rise, no crepitus, mild rhonchi bilaterally.               Heart/CV:   Regular rate and rhythm, no murmur   Abdomen/GI:    Soft, nontender, nondistended   :    Deferred   Extremities/MSK:  No clubbing or cyanosis.  No edema.     Pulses:  Pulses palpable and equal bilaterally   Skin:  No bleeding, bruising or rash   Heme/Lymph:  No cervical or supraclavicular adenopathy.   Neurologic:    Psychiatric:    Moves all extremities.  Does not follow commands.    Mildly agitated    The above physical exam findings were reviewed and reflect my exam findings as of today's exam.   Electronically signed by:  David Mccloud MD  07/23/25  08:31 EDT      Results Review:     I reviewed the patient's new clinical results.   Results from last 7 days   Lab Units 07/23/25 0338 07/22/25 2101 07/22/25 0351   SODIUM mmol/L 153* 150* 150*   POTASSIUM mmol/L 3.7 4.8 4.2   CHLORIDE mmol/L 121* 119* 117*   CO2 mmol/L 17.3* 14.0* 17.9*   BUN mg/dL 79.7* 86.2* 87.4*   CREATININE mg/dL 1.61* 1.72* 1.65*   CALCIUM mg/dL 8.7 8.8 8.5*   BILIRUBIN mg/dL 0.9 0.9 0.9   ALK PHOS U/L 114 132* 101   ALT (SGPT) U/L 658* 614* 670*   AST (SGOT) U/L 319* 365* 483*   GLUCOSE mg/dL 102* 93 104*     Results from last 7 days   Lab Units 07/23/25 0338 07/22/25 2101 07/22/25 0351   WBC 10*3/mm3 32.09* 35.15* 29.29*   HEMOGLOBIN g/dL 9.5* 9.8* 9.7*   HEMATOCRIT % 31.8* 31.4* 31.2*   PLATELETS 10*3/mm3 324 312 361     Results from last 7 days   Lab Units 07/22/25  2225   PH, ARTERIAL pH units 7.450   PO2 ART mm Hg 214.0*   PCO2,  ARTERIAL mm Hg 28.1*   HCO3 ART mmol/L 19.5*     Results from last 7 days   Lab Units 07/22/25  2101 07/22/25  0351 07/19/25  0408   MAGNESIUM mg/dL 4.8* 2.6* 2.6*   PHOSPHORUS mg/dL 3.8  --   --        I reviewed the patient's new imaging including images and reports.    Chest x-ray 7/23/2025 shows borderline cardiomegaly and pulmonary congestion with no definite pneumothorax.  Endotracheal tube in place, right internal jugular catheter in place.    CT abdomen pelvis 7/21/2025 reviewed and shows constipation and enlarged gallbladder.  Radiologist's impression follows:    Impression:     1. Practically complete lower lobe atelectasis, and moderate right lower lobe atelectasis. Moderate to large pleural effusions, incompletely included on this study.     2. Distended gallbladder, but no definite gallbladder inflammatory changes.     3. Relatively atrophic left kidney. Heterogeneous appearance of the left renal pelvis, possibly artifactual. Debris and tumor are both considerations.     4. Suspected mild colonic ileus. Minimal if any right colon mucosal thickening and no apparent large or small bowel inflammation.     5. Large bladder tumor increased from 2/20/2025, and small debris or hematocrit level in the dependent portion of the bladder.     6. Subtle, irregular shaped approximately 2.4 x 1.3 cm right lobe liver lesion nonspecific.       Chest x-ray 7/19/2025 shows cardiomegaly with bilateral mild basilar airspace opacities possibly infiltrate versus atelectasis and small left pleural effusion.    Medication Review:   aspirin, 81 mg, Nasogastric, Daily  [Held by provider] atorvastatin, 80 mg, Oral, Daily  budesonide, 0.5 mg, Nebulization, BID - RT  escitalopram, 10 mg, Nasogastric, Daily  heparin (porcine), 5,000 Units, Subcutaneous, Q8H  insulin glargine, 20 Units, Subcutaneous, Daily  insulin regular, 4-24 Units, Subcutaneous, Q6H  insulin regular, 8 Units, Subcutaneous, Q6H  ipratropium-albuterol, 3 mL,  Nebulization, 4x Daily - RT  levothyroxine, 50 mcg, Nasogastric, Daily  pantoprazole, 40 mg, Intravenous, Q24H  pharmacy consult - MTM, , Not Applicable, Daily  piperacillin-tazobactam, 4.5 g, Intravenous, Q8H  prasugrel, 10 mg, Nasogastric, Daily  QUEtiapine, 25 mg, Nasogastric, Nightly  senna-docusate sodium, 2 tablet, Nasogastric, BID  vancomycin (dosing per levels), , Not Applicable, Daily  vancomycin, 1,000 mg, Intravenous, Once      amiodarone, 1 mg/min, Last Rate: 1 mg/min (07/23/25 0338)  dexmedetomidine, 0.2-1.5 mcg/kg/hr, Last Rate: Stopped (07/21/25 2200)  fentanyl 10 mcg/mL,  mcg/hr, Last Rate: 100 mcg/hr (07/23/25 0000)  Pharmacy to dose vancomycin,   phenylephrine, 0.5-3 mcg/kg/min, Last Rate: 1.3 mcg/kg/min (07/23/25 0526)        Assessment & Plan         Third degree AV block    CAD (coronary artery disease)    TOOTIE (acute kidney injury)    Lactic acidosis    Acute cystitis without hematuria    Ischemic cardiomyopathy    Acute HFrEF (heart failure with reduced ejection fraction)    Shock liver    Complete heart block    70 y.o. active smoker with 90-pack-year history with history of HTN, HLD, T2DM, COPD, hypothyroidism, possible history of seizures, anxiety/depression, admitted at [facility] 6/27-28/2025 with left heart cath showing 2 occluded vein grafts and PCI to left circumflex with stent placement.  He re-presented to [facility] ED on 7/6/2025 with chest pain and was admitted with NSTEMI and had repeat C 7/7/2025 with in-stent thrombosis felt to be secondary to medical noncompliance.  Balloon angioplasty was performed with deployment of overlapping stent just distal to the previous stent.  Echo obtained 24 hours later showed LVEF of 30 to 35% with grade 2 diastolic dysfunction.    That course was complicated by renal failure.    On the morning of 7/9, he developed third-degree AV block and was given atropine secondary to bradycardia.  Attempts were made at transcutaneous pacing and patient became  combative and 1 mg of Versed was given.  He developed asystole on monitor and became unresponsive and had a brief ACLS with ROSC.  Following this the patient was back in third-degree AV block and was agonal he breathing and was intubated.  He was initiated on transcutaneous pacing.    After this, cardiology was called who felt the patient needed a repeat LHC due to possible failure of stent.  He underwent transvenous pacemaker placement on 7/9/2025 and repeat LHC showed patent stent in the proximal left circumflex artery with diffuse coronary artery vasospasm distal to the stent.  Intra-aortic balloon pump was placed.  [facility] was contacted and he was transferred on 7/9/2025.    At admission here, patient was noted to have worsening renal failure and elevated LFTs suggestive of shock liver.  He was started on dopamine with one-to-one support on balloon pump.  Balloon pump was discontinued on 7/12.  He remains encephalopathic.    Patient seemed to be following limited commands a few days ago but now is not following any commands.      Patient was having some hemodynamic instability with A-fib with RVR and hypotension requiring phenylephrine.  Enlarged gallbladder on CT scan and ultrasound concerning for acalculous cholecystitis in the setting of elevated white blood cell count and concern for infection.  I consulted general surgery who recommended a percutaneous drain and this was done on 7/22/2025.  This significant improvement.    V-fib arrest overnight on 7/22-23/2025 with cardioversion x 2.  Patient remains on amiodarone.    I spoke to the patient's son and discussed palliative care versus aggressive care with likely tracheostomy/PEG and rehab placement.  Patient's son is okay with palliative care consultation and I will go ahead and consult them.    Plan:  1.  For AMS/encephalopathy: Unclear etiology.  Slightly improved off of sedation but he is agitated on ventilator.  Neurology following.  MRI  with old stroke.  Hold fentanyl drip at this point if we can and minimize Precedex.  Give intermittent doses of low-dose midazolam.  2.  For respiratory failure: Continue mechanical ventilation.  Wean settings as tolerated.  Not ready for extubation secondary to mental status issues and hemodynamic instability.    3.  For possible UTI/ID: Cultures on 7/8 grew coag negative staph and he completed Rocephin on 7/13.  Started on Zosyn empirically on 7/18 secondary to new fevers.  Continue Zosyn for now and follow-up cultures.  Blood culture 7/18/2025 showed coag negative staph likely contaminant.  Repeated respiratory culture.  4.  For hypothyroidism: Continue levothyroxine.  5.  For TOOTIE: Stable stable.  Watch fluid balance and labs.    6.  For congestive heart failure/arrhythmias: Cardiology/EP following.  7.  For shock: Mainly cardiogenic.  Likely also had critical illness related corticosteroid insufficiency and was started on hydrocortisone which was weaned and ultimately stopped on 7/15/2025.  Watch lactic acid level.  8.  For enlarged gallbladder/concern for acalculous cholecystitis/elevated LFTs: Possibly could be due to shock liver.  With enlarged gallbladder and concern for acalculous cholecystitis, consulted general surgery who recommended cholecystostomy drain.  This was done by IR on 7/22/2025.  Culture with no growth to date.  Continue drainage    Patient is critically ill secondary to multiorgan failure and has high risk of life-threatening decompensation in condition.   As such, the patient requires continuous monitoring and frequent reassessment for consideration of adjustment in management to minimize this risk.  I personally reassessed the patient multiple times today.    Critical care time : 40 minutes spent by me personally and independently.(This excludes time spent performing separately reportable procedures and services).  Critical care time includes high complexity decision making to assess,  manipulate, and support vital organ system failure in this individual who has impairment of one or more vital organ systems such that there is a high probability of imminent or life threatening deterioration in the patient’s condition.    I updated the patient's son by phone.  I discussed palliative care as an option with the patient's son and I will go ahead and place a palliative care consultation.    Electronically signed by:    David Mccloud MD  07/23/25  08:31 EDT      *. Please note that portions of this note were completed with [employer] - a voice recognition program.

## 2025-07-23 NOTE — PROGRESS NOTES
"                  Clinical Nutrition     Patient Name: Christo Clifton  YOB: 1955  MRN: [id number]  Date of Encounter: 25 09:56 EDT  Admission date: 2025  Reason for Visit: Follow-up protocol, EN    Assessment   Nutrition Assessment   Admission Diagnosis:  Complete heart block [I44.2]    Problem List:    Third degree AV block    CAD (coronary artery disease)    TOOTIE (acute kidney injury)    Lactic acidosis    Acute cystitis without hematuria    Ischemic cardiomyopathy    Acute HFrEF (heart failure with reduced ejection fraction)    Shock liver    Complete heart block      PMH:   He  has a past medical history of Anxiety and depression, Arthritis, COPD (chronic obstructive pulmonary disease), Coronary artery disease, Diabetes mellitus, Disease of thyroid gland, Dyslipidemia, History of transfusion, Hypertension, and Seizure disorder.    PSH:  He  has a past surgical history that includes Coronary artery bypass graft; Hernia repair; Cardiac surgery; Circumcision, non-; Toe Surgery (Left); pr incision & drainage leg/ankle abscess/hematoma (Right, 2017); Finger amputation (Right, 2017); Cardiac catheterization; Cardiac catheterization (N/A, 2025); INTRAVASCULAR ULTRASOUND (N/A, 2025); Cardiac catheterization (N/A, 2025); Cardiac catheterization (N/A, 2025); Cardiac catheterization (N/A, 2025); Cardiac electrophysiology procedure (N/A, 2025); and Cardiac catheterization (N/A, 2025).    Applicable Nutrition History:   Skin integrity:  DM ulcers x3 - WOC consult pending    () Admitted to [facility] ED with NSTEMI  () S/P LHC  () Developed AV block; S/P LHC IABP placed    () Intubated (Ischemic cardiomyopathy, cardiogenic shock)  (7/10) KUB - \"Gas and stool in the colon. Mild gaseous distention of the stomach. Gas-filled small bowel loops may reflect a mild ileus\"   () IABP removed     EN history:  () Trophic feeds: Peptamen 1.5 @ 15; FWF @ " 30Q2hr  (7/13) EN advanced to goal rate: 60ml/hr.  (7/15) Movantick started   (7/18) FWF increased to 30ml/hr   (7/21) CT abdomen concern for cholecystitis vs gallbadder distention.  IR drain placed      Labs    Labs Reviewed: Yes    Results from last 7 days   Lab Units 07/23/25  0338 07/22/25  2354 07/22/25  2101 07/22/25  0849 07/22/25  0351 07/20/25  0533 07/19/25  0408   GLUCOSE mg/dL 102*  --  93  --  104*   < > 293*   BUN mg/dL 79.7*  --  86.2*  --  87.4*   < > 89.2*   CREATININE mg/dL 1.61*  --  1.72*  --  1.65*   < > 1.44*   SODIUM mmol/L 153*  --  150*  --  150*   < > 147*   CHLORIDE mmol/L 121*  --  119*  --  117*   < > 114*   POTASSIUM mmol/L 3.7  --  4.8  --  4.2   < > 4.1   PHOSPHORUS mg/dL  --   --  3.8  --   --   --   --    MAGNESIUM mg/dL  --   --  4.8*  --  2.6*  --  2.6*   ALT (SGPT) U/L 658*  --  614*  --  670*   < >  --    LACTATE mmol/L 1.8 2.0 4.7*   < > 1.7   < > 1.8    < > = values in this interval not displayed.       Results from last 7 days   Lab Units 07/23/25  0338 07/22/25  2101 07/22/25  0351 07/20/25  0533 07/18/25  0309   ALBUMIN g/dL 2.7* 2.5* 2.8*   < > 2.9*   PREALBUMIN mg/dL  --   --   --   --  16.3*   CRP mg/dL  --   --   --   --  15.90*   IONIZED CALCIUM mmol/L  --  1.23  --   --   --     < > = values in this interval not displayed.       Results from last 7 days   Lab Units 07/23/25  0514 07/22/25  2303 07/22/25  1744 07/22/25  1157 07/22/25  0514 07/21/25  2318   GLUCOSE mg/dL 104 92 103 104 107 141*     Lab Results   Lab Value Date/Time    HGBA1C 9.10 (H) 10/15/2018 0926    HGBA1C 9.60 (H) 05/14/2018 0828         Results from last 7 days   Lab Units 07/22/25  2101 07/22/25  0351 07/19/25  0408   PROBNP pg/mL 46,826.0* 60,944.0* 47,389.0*         Medications    Medications Reviewed: yes    Scheduled Meds:aspirin, 81 mg, Nasogastric, Daily  [Held by provider] atorvastatin, 80 mg, Oral, Daily  budesonide, 0.5 mg, Nebulization, BID - RT  escitalopram, 10 mg, Nasogastric,  Daily  heparin (porcine), 5,000 Units, Subcutaneous, Q8H  insulin glargine, 20 Units, Subcutaneous, Daily  insulin regular, 4-24 Units, Subcutaneous, Q6H  insulin regular, 8 Units, Subcutaneous, Q6H  ipratropium-albuterol, 3 mL, Nebulization, 4x Daily - RT  levothyroxine, 50 mcg, Nasogastric, Daily  pantoprazole, 40 mg, Intravenous, Q24H  pharmacy consult - [place], , Not Applicable, Daily  piperacillin-tazobactam, 4.5 g, Intravenous, Q8H  prasugrel, 10 mg, Nasogastric, Daily  QUEtiapine, 25 mg, Nasogastric, Nightly  senna-docusate sodium, 2 tablet, Nasogastric, BID  vancomycin (dosing per levels), , Not Applicable, Daily      Continuous Infusions:amiodarone, 1 mg/min, Last Rate: 1 mg/min (07/23/25 0338)  dexmedetomidine, 0.2-1.5 mcg/kg/hr, Last Rate: Stopped (07/21/25 2200)  fentanyl 10 mcg/mL,  mcg/hr, Last Rate: 100 mcg/hr (07/23/25 0000)  Pharmacy to dose vancomycin,   phenylephrine, 0.5-3 mcg/kg/min, Last Rate: 1.3 mcg/kg/min (07/23/25 0526)      PRN Meds:.  acetaminophen    artificial tears    senna-docusate sodium **AND** polyethylene glycol **AND** [DISCONTINUED] bisacodyl **AND** bisacodyl    Calcium Replacement - Follow Nurse / BPA Driven Protocol    dextrose    fentaNYL    glucagon (human recombinant)    hydrALAZINE    ipratropium-albuterol    Magnesium Cardiology Dose Replacement - Follow Nurse / BPA Driven Protocol    midazolam    nitroglycerin    [DISCONTINUED] ondansetron ODT **OR** ondansetron    Pharmacy to dose vancomycin    Phosphorus Replacement - Follow Nurse / BPA Driven Protocol    Potassium Replacement - Follow Nurse / BPA Driven Protocol    sodium chloride    Intake/Ouptut 24 hrs (0701 - 0700)   I&O's Reviewed: yes  Intake & Output (last day)         07/22 0701 07/23 0700 07/23 0701 07/24 0700    I.V. (mL/kg) 2488.5 (34.2)     Blood      Other      NG/GT      IV Piggyback 231     Total Intake(mL/kg) 2719.5 (37.4)     Urine (mL/kg/hr) 1675 (1)     Drains 430     Total Output 2105     Net  "+614.5           Urine Unmeasured Occurrence 1 x         No stool recorded    Anthropometrics     Height: Height: 180.3 cm (70.98\")  Last Filed Weight: Weight: 72.7 kg (160 lb 4.4 oz) (07/17/25 0600)  Method: Weight Method: Bed scale  BMI: BMI (Calculated): 22.4    UBW:    Weight      Weight (kg) Weight (lbs) Weight Method   11/25/2024 75.932 kg  167 lb 6.4 oz     3/5/2025 75.66 kg  166 lb 12.8 oz     5/19/2025 75.116 kg  165 lb 9.6 oz     6/18/2025 75.479 kg  166 lb 6.4 oz     6/27/2025 77.1 kg  169 lb 15.6 oz  Bed scale    6/28/2025 67.7 kg  149 lb 4 oz  Bed scale    7/6/2025 77.5 kg  170 lb 13.7 oz  Bed scale    7/7/2025 77.5 kg  170 lb 13.7 oz  Bed scale    7/9/2025 77.5 kg  170 lb 13.7 oz  Bed scale      Weight change: No significant changes    Nutrition Focused Physical Exam     Date: 7/11    Unable to perform due to Hemodynamic instability     Subjective   Reported/Observed/Food/Nutrition Related History:   7/23  Patient remains intubated and sedated, requiring some pressors.   Cholecystostomy tube placed yesterday.  Output of 430ml documented.  No documented BM since admission. Active bowel regiment: Pericolace BID.  PRN miralax given 7/21. Movantik started 7/14 - 7/16; S/P one dose of Relistor 7/16.  It appears EN has been held since 7/21?  RN reports abdomen pretty soft, so sign of constipation.  Remains critically ill.  Discussion of possible palliative consult to assist with GOC.  Overall worse renal status.  Discussed with RN, reports intensvisit would like to start low rate feeding.  Attempting to reach family to discuss POC.     7/21  EN held. LFT's increasing. Cont intubated.     7/17  Remains intubated; now off propofoL and pressers. Constipations persists. KUB from yesterday indicated moderate colonic stool burden - plan for suppository today    7/15  Tolerating change to TF. Remains intubated/sedated; continues on propofoL (16.28) with ongoing presser requirement. Plan for today per MD, wean " "dopamine/propofoL and start precedex. Movantik initiated, bowel regimen increased.         Remains intubated/sedated; ongoing presser requirement. +propofoL (18.6). KUB () improved from  scan; non-obstructive bowel gas pattern. EN advanced per MD yesterday. LBM  - may increase bowel regimen.       Pt intubated/sedated on MV; +propofoL. Requiring pressers x1. H/H trending down. Noted to have possible mild ileus on KUB. AST/ALT remain elevated. NKFA    Needs Assessment   Date: ; Reassessed: , 7/15, , ,    Height used:Height: 180.3 cm (70.98\")  Weights used: 77.7kg CBW    Estimated Calorie needs: ~ 1850 Kcal/day  Method: 23-27 Kcals/KG = 1515-5810  Method:  MSJ (1509) x 1.2 = 1811  Method:  PSU = 1850  Ve: 11.6; Tmax: 37.2    () REE = 1524    Estimated Protein needs: ~ 87 g PRO/day w/ current renal fxn  Method: 1.0-1.2g/Kg CBW = 73-87    Estimated Fluid needs: ~ ml/day   Per clinical status    Current Nutrition Prescription     PO: NPO Diet NPO Type: Tube Feeding  Oral Nutrition Supplement: N/A  Intake: N/A    EN: Peptamen 1.5  Goal Rate: 60mL/hr- Active order, not infusing for 2 days   Water Flushes: 30mL/hr  Modular: None  Route: ND  Tube: Small bore    At goal over: 20Hrs/day     Rx will supply:   Goal Volume 1200 mL/day     Flush Volume 600 mL/day     Energy 1800 Kcal/day 97 % Est Need as of    Protein 82 g/day 94 % Est Need   Fiber 0 g/day     Water in   mL     Total Water 1524 mL     Meet DRI Yes         --------------------------------------------------------------------------  Product/Rate verified at bedside: No  Infusing Rate at time of visit: EN held since      Average Delivery from [facility] Days:   Volume  mL/day   % Goal Vol.   Flush Volume  mL/day     Energy  Kcal/day  % Est Need as of    Protein  g/day  % Est Need   Fiber  g/day     Water in  EN  mL     Total Water  mL     Meet DRI No          Assessment & Plan   Nutrition Diagnosis "   Date: 7/11 Updated: 7/21, 7/23  Problem Inadequate oral intake    Etiology ARF/MV; Altered GI function; Hemodynamic instability    Signs/Symptoms NPO; cholecystitis; pressor support; constipation    Status: Active EN order in place, but currently on hold    Goal:   Nutrition to support treatment and Adjust EN as appropriate      Nutrition Intervention      Follow treatment progress, Care plan reviewed, EN rec prepared    Current pressors: Hernandez 1.3 mcg/kg/om (RN reports trying to wean off)  No BM since admission, active bowel regiment. Per RN no significant sings of impaction/constipation  Cholecystectomy tube placed yesterday   Overall worse renal status - will change to electrolytes restricted formula.  For trophic feeds will order:  Novasource Renal @ 15ml/hr.  Water flushes @ 50ml/hr   This will provide: 600kcals (33% est needs), 27g PRO (31% est needs), 216ml wafter from EN + 1000ml water flushes.   Monitor clinical course closely and adjust EN as indicated       Monitoring/Evaluation:   Per protocol, I&O, Pertinent labs, EN delivery/tolerance, Weight, GI status, Symptoms, POC/GOC, Hemodynamic stability  Dayami Martinez, TIO  Time Spent: 45min

## 2025-07-23 NOTE — PROGRESS NOTES
"[facility] at [facility]  IP Progress Note      Chief Complaint: Follow-up of CAD/non-STEMI/shock/cardiac arrhythmia    Subjective   Remains critically ill, intubated/sedated and on ventilator.  Events of last 24-hour noted had atrial fibrillation yesterday and was started on IV amiodarone.  Underwent cholecystostomy tube placement yesterday for acalculous cholecystitis.  Last night he had ventricular tachycardia requiring brief CPR and 2 defibrillation shocks.  Currently he responds to stimulation.    Objective     Blood pressure 135/85, pulse 86, temperature 98.2 °F (36.8 °C), temperature source Esophageal, resp. rate 20, height 180.3 cm (70.98\"), weight 72.7 kg (160 lb 4.4 oz), SpO2 96%.     Intake/Output Summary (Last 24 hours) at 7/23/2025 0646  Last data filed at 7/23/2025 0600  Gross per 24 hour   Intake 2719.53 ml   Output 2105 ml   Net 614.53 ml       Physical Exam:  General: No acute distress.   Neck: no JVD.  Chest:No respiratory distress, breath sounds are diminished at bases, scattered rhonchi.  Cardiovascular: Normal S1 and S2, no murmur, gallop or rub.    Extremities: No significant edema.    Results Review:     I reviewed the patient's new clinical results.    Results from last 7 days   Lab Units 07/23/25  0338   WBC 10*3/mm3 32.09*   HEMOGLOBIN g/dL 9.5*   HEMATOCRIT % 31.8*   PLATELETS 10*3/mm3 324     Results from last 7 days   Lab Units 07/23/25  0338   SODIUM mmol/L 153*   POTASSIUM mmol/L 3.7   CHLORIDE mmol/L 121*   CO2 mmol/L 17.3*   BUN mg/dL 79.7*   CREATININE mg/dL 1.61*   CALCIUM mg/dL 8.7   BILIRUBIN mg/dL 0.9   ALK PHOS U/L 114   ALT (SGPT) U/L 658*   AST (SGOT) U/L 319*   GLUCOSE mg/dL 102*     Results from last 7 days   Lab Units 07/23/25  0338   SODIUM mmol/L 153*   POTASSIUM mmol/L 3.7   CHLORIDE mmol/L 121*   CO2 mmol/L 17.3*   BUN mg/dL 79.7*   CREATININE mg/dL 1.61*   GLUCOSE mg/dL 102*   CALCIUM mg/dL 8.7     Results from last 7 days   Lab Units " 07/22/25  2101 07/22/25  0351   INR  1.65* 1.46*     Lab Results   Component Value Date    TROPONINT 5,540 (C) 07/23/2025     aspirin, 81 mg, Nasogastric, Daily  [Held by provider] atorvastatin, 80 mg, Oral, Daily  budesonide, 0.5 mg, Nebulization, BID - RT  escitalopram, 10 mg, Nasogastric, Daily  heparin (porcine), 5,000 Units, Subcutaneous, Q8H  insulin glargine, 20 Units, Subcutaneous, Daily  insulin regular, 4-24 Units, Subcutaneous, Q6H  insulin regular, 8 Units, Subcutaneous, Q6H  ipratropium-albuterol, 3 mL, Nebulization, 4x Daily - RT  levothyroxine, 50 mcg, Nasogastric, Daily  lidocaine PF 1%, 10 mL, Subcutaneous, Once  pantoprazole, 40 mg, Intravenous, Q24H  pharmacy consult - MTM, , Not Applicable, Daily  piperacillin-tazobactam, 4.5 g, Intravenous, Q8H  prasugrel, 10 mg, Nasogastric, Daily  QUEtiapine, 25 mg, Nasogastric, Nightly  senna-docusate sodium, 2 tablet, Nasogastric, BID  vancomycin (dosing per levels), , Not Applicable, Daily         Tele: Sinus Rythym      Assessment:  Acute HFrEF, ischemic cardiomyopathy cardiogenic shock, EF 30-35%. Initially required IABP, now removed  Cardiogenic shock with multisystem involvement, slowly improving, IABP initially which was subsequently removed.  Currently on low-dose Hernandez-Synephrine.  CAD status post PCI/BRITT to circumflex, documented diffuse distal LCx vasospasm  Paroxysmal atrial fibrillation, currently in sinus rhythm.    Pulseless ventricular tachycardia requiring brief CPR and defibrillation, currently in sinus rhythm   Third-degree heart block/cardiac arrest  No AVN blocking agents prior to admission  Temp transvenous pacer intact  On Dopamine gtt  Not requiring pacing currently  EP consulted -continue to monitor  Pacing wire removed   T2DM  TOOTIE/resolved  Baseline creatinine 1.0-1.2  Peak creatinine 3.44 on 7/9/2025  Creatinine 1.6  9.  Hypernatremia.    Plan:  Continue current management and supportive care.  The patient has not made any significant  progress despite full supportive care, recommend involvement of palliative care and discuss goals of care with the family.  Continue current dual antiplatelet therapy and IV amiodarone.  Management of multiple medical conditions per ICU team.  Wean off vasopressors, maintain systolic pressure greater than 100.  Subsequently we will optimize GDMT as tolerated.    Juanita George MD, FACC, [facility]

## 2025-07-23 NOTE — PROGRESS NOTES
"Patient Name:  Christo Clifton  YOB: 1955  [id number]    Surgery Progress Note    Date of visit: 7/23/2025    Subjective   Subjective: IR drain placed yesterday. One episode of V Tach requiring CPR and 2 shocks. Remains critically ill.         Objective     Objective:     /85   Pulse 79   Temp 98.2 °F (36.8 °C) (Esophageal)   Resp 20   Ht 180.3 cm (70.98\")   Wt 72.7 kg (160 lb 4.4 oz)   SpO2 97%   BMI 22.36 kg/m²     Intake/Output Summary (Last 24 hours) at 7/23/2025 0808  Last data filed at 7/23/2025 0600  Gross per 24 hour   Intake 2719.53 ml   Output 2105 ml   Net 614.53 ml       CV:  Rhythm  irregular and rate regular  L:  Rhonchi to auscultation bilaterally   Abd:  Bowel sounds positive , soft, drain with unger bile  Ext:  No cyanosis, clubbing, edema    Recent labs that are back at this time have been reviewed. WBC 32 K           Assessment/ Plan:    Cholecystitis- Cholecystostomy in place.  Overall patient condition is very labile, and he has not made any significant improvement despite aggressive care for over 1 week.  Agree with palliative discussions with family, as I think the likelihood of this arrival, regardless of his gallbladder status, is poor.    Problem List Items Addressed This Visit          Cardiac and Vasculature    Ischemic cardiomyopathy    Relevant Medications    nitroglycerin (NITROSTAT) SL tablet 0.4 mg    prasugrel (EFFIENT) tablet 10 mg    clopidogrel (PLAVIX) 75 MG tablet    phenylephrine (NATASHA-SYNEPHRINE) 50 mg in sodium chloride 0.9 % 250 mL infusion    digoxin (LANOXIN) injection 250 mcg (Completed)    EPINEPHrine (ADRENALIN) injection (Completed)    Other Relevant Orders    Insert Arterial Line (Completed)    Acute HFrEF (heart failure with reduced ejection fraction) - Primary    Relevant Medications    nitroglycerin (NITROSTAT) SL tablet 0.4 mg    prasugrel (EFFIENT) tablet 10 mg    clopidogrel (PLAVIX) 75 MG tablet    phenylephrine (NATASHA-SYNEPHRINE) 50 mg " in sodium chloride 0.9 % 250 mL infusion    digoxin (LANOXIN) injection 250 mcg (Completed)    EPINEPHrine (ADRENALIN) injection (Completed)    Other Relevant Orders    Insert Arterial Line (Completed)        Active Hospital Problems    Diagnosis  POA    **Third degree AV block [I44.2]  Yes    TOOTIE (acute kidney injury) [N17.9]  Yes    Lactic acidosis [E87.20]  Yes    Acute cystitis without hematuria [N30.00]  Yes    Ischemic cardiomyopathy [I25.5]  Yes    Acute HFrEF (heart failure with reduced ejection fraction) [I50.21]  Yes    Shock liver [K72.00]  Yes    Complete heart block [I44.2]  Yes    CAD (coronary artery disease) [I25.10]  Yes      Resolved Hospital Problems    Diagnosis Date Resolved POA    ASCVD (arteriosclerotic cardiovascular disease) [I25.10] 07/09/2025 Yes              Dipesh Rock MD  7/23/2025  08:08 EDT

## 2025-07-23 NOTE — PLAN OF CARE
Goal Outcome Evaluation:      Unable to wean at this time, pt experienced VTACH arrest a little before 2100 on 7/22, defib x2, ROSC achieved, post arrest ABG showed metabolic acidosis and hypoxia, vent settings increased accordingly, pt continues to have large amounts of thick secretions

## 2025-07-23 NOTE — PROGRESS NOTES
Neurology       Patient Care Team:  Lesa Rae APRN as PCP - General (Nurse Practitioner)  Phong Whiting MD as Consulting Physician (Neurosurgery)    Chief complaint: Altered mental status.    History: Patient had an episode of ventricular tachycardia requiring 2 shocks last night.    He is otherwise stable  Past Medical History:   Diagnosis Date    Anxiety and depression     Arthritis     COPD (chronic obstructive pulmonary disease)     Coronary artery disease     Diabetes mellitus     Disease of thyroid gland     Dyslipidemia     History of transfusion     Hypertension     Seizure disorder     Pt states last seizure x1 mo.       Vital Signs   Vitals:    07/23/25 0530 07/23/25 0600 07/23/25 0700 07/23/25 1130   BP:       BP Location:       Patient Position:       Pulse: 87 86 79 86   Resp:   20 23   Temp:       TempSrc:       SpO2: 94% 96% 97% 98%   Weight:       Height:           Physical Exam:   General: Eyes open              Neuro: Grasp my hand on request but will not blink his eyes on request.    Results Review:  Labs reviewed  Results from last 7 days   Lab Units 07/23/25  0338   WBC 10*3/mm3 32.09*   HEMOGLOBIN g/dL 9.5*   HEMATOCRIT % 31.8*   PLATELETS 10*3/mm3 324     Results from last 7 days   Lab Units 07/23/25  1127 07/23/25  0338 07/22/25  2101 07/22/25  0351   SODIUM mmol/L  --  153* 150* 150*   POTASSIUM mmol/L 4.2 3.7 4.8 4.2   CHLORIDE mmol/L  --  121* 119* 117*   CO2 mmol/L  --  17.3* 14.0* 17.9*   BUN mg/dL  --  79.7* 86.2* 87.4*   CREATININE mg/dL  --  1.61* 1.72* 1.65*   CALCIUM mg/dL  --  8.7 8.8 8.5*   BILIRUBIN mg/dL  --  0.9 0.9 0.9   ALK PHOS U/L  --  114 132* 101   ALT (SGPT) U/L  --  658* 614* 670*   AST (SGOT) U/L  --  319* 365* 483*   GLUCOSE mg/dL  --  102* 93 104*       Imaging Results (Last 24 Hours)       Procedure Component Value Units Date/Time    XR Chest 1 View [[id number]] Collected: 07/23/25 0619     Updated: 07/23/25 0624    Narrative:      XR CHEST 1 VW    Date of  Exam: 7/23/2025 3:25 AM EDT    Indication: Respiratory failure. Third degree AV block.    Comparison: 7/22/2025    Findings:  ET tube and right IJ line remain in good position. Feeding tube and NG tube extend below the diaphragm. There is also an esophageal probe. Heart is enlarged status post sternotomy. There are persistent infiltrates in both mid to lower lungs, probably   slightly worsened on the left and stable on the right. Small pleural effusions are present. No definite pneumothorax is seen. There is a skinfold on the right.      Impression:      1.Persistent infiltrates in both mid to lower lungs, probably slightly worsened on the left and stable on the right.  2.Small pleural effusions.        Electronically Signed: Ramiro Partida MD    7/23/2025 6:21 AM EDT    Workstation ID: [id number]    XR Chest 1 View [[id number]] Collected: 07/22/25 2148     Updated: 07/22/25 2155    Narrative:      XR CHEST 1 VW    Date of Exam: 7/22/2025 9:02 PM EDT    Indication: Post code    Comparison: 7/22/2025 at 0515 hours    Findings:  Endotracheal tube is in good position in the mid trachea. An esophageal probe is present. Enteric tubes extend below the diaphragm. Right IJ line tip in the SVC. Heart is mildly enlarged status post sternotomy. There are infiltrates now noted in the mid   to lower lungs which could reflect edema and/or pneumonia. Small right pleural effusion is present. No definitive pneumothorax. There is skin folds on both sides of the chest.      Impression:      1.Tubes and lines appear to be in good position.  2.New infiltrates in the mid to lower lungs which could reflect edema and/or pneumonia. Small right pleural effusion.        Electronically Signed: Ramiro Partida MD    7/22/2025 9:52 PM EDT    Workstation ID: [id number]    CT Percutaneous Cholecystostomy - In process [[id number]] Resulted: 07/22/25 1352     Updated: 07/22/25 1420    This result has not been signed. Information might be incomplete.               Assessment:  Hypoxic encephalopathy,?  Improving    Plan:  Continue support guarded prognosis    Comment:  Guarded prognosis         I discussed the patient's findings and my recommendations with nursing staff    Martin Momin MD  07/23/25  13:24 EDT

## 2025-07-23 NOTE — PROCEDURES
Cardiopulmonary resuscitation note    Presenting rhythm : Ventricular tachycardia    Medications : Epinephrine 1 mg x 1, magnesium 2 g x 1, sodium bicarbonate 50 mill equivalents x 1    Interventions : Defibrillation x 2    Ending rhythm : Sinus tachycardia with PVC    Patient with witnessed ventricular tachycardia cardiac arrest while in the ICU.  ABG with metabolic acidosis and hypoxemia.  Ventilator settings adjusted..Stat chest x-ray.  EKG with normal QTc.  Continue amiodarone drip increased to 1 mg/min.  Check CBC, CMP, lactic acid.  Fentanyl 50 mg x 1 given.  Start fentanyl drip and Precedex drip.  Patient moving extremities and agitated but not following commands.    Himanshu Porter MD  Critical care

## 2025-07-23 NOTE — CASE MANAGEMENT/SOCIAL WORK
Continued Stay Note  [facility]     Patient Name: Christo Clifton  MRN: [id number]  Today's Date: 7/23/2025    Admit Date: 7/9/2025    Plan: Ongoing   Discharge Plan       Row Name 07/23/25 0821       Plan    Plan Ongoing    Patient/Family in Agreement with Plan unable to assess    Plan Comments Patient is still sedated and on a vent. Plan is TBD pending PT/OT recs when medically ready. CM will continue to follow.    Final Discharge Disposition Code 01 - home or self-care                   Discharge Codes    No documentation.                       Ruperto Celeste RN

## 2025-07-23 NOTE — CASE MANAGEMENT/SOCIAL WORK
Continued Stay Note  [facility]     Patient Name: Christo Clifton  MRN: [id number]  Today's Date: 7/23/2025    Admit Date: 7/9/2025    Plan: Ongoing   Discharge Plan       Row Name 07/23/25 1002       Plan    Plan Ongoing    Patient/Family in Agreement with Plan unable to assess    Plan Comments CM received a voicemail from Philly with [employer] [facility] [phone]. Philly is asking for an updated grteta the patient. CM returned the call and gave Philly an update and advised her that the patient is not medically ready for transfer to an LTACH at this time. PAtient coded on night shift last night. CM will continue to follow.    Final Discharge Disposition Code 01 - home or self-care      Row Name 07/23/25 0821       Plan    Plan Ongoing    Patient/Family in Agreement with Plan unable to assess    Plan Comments Patient is still sedated and on a vent. Plan is TBD pending PT/OT recs when medically ready. CM will continue to follow.    Final Discharge Disposition Code 01 - home or self-care                   Discharge Codes    No documentation.                       Ruperto Celeste RN

## 2025-07-24 NOTE — PLAN OF CARE
Goal Outcome Evaluation:         No active ventilator weaning at this time.

## 2025-07-24 NOTE — CASE MANAGEMENT/SOCIAL WORK
Continued Stay Note  [facility]     Patient Name: Christo Clifton  MRN: [id number]  Today's Date: 7/24/2025    Admit Date: 7/9/2025    Plan: Ongoing   Discharge Plan       Row Name 07/24/25 1040       Plan    Plan Ongoing    Patient/Family in Agreement with Plan unable to assess    Plan Comments CM spoke with Roel schroeder by phone. Patient's estranged wife's name to Ada Clifton. Roel believes that they are legally . CM will continue to follow.    Final Discharge Disposition Code 01 - home or self-care      Row Name 07/24/25 1012       Plan    Plan Ongoing    Patient/Family in Agreement with Plan unable to assess    Plan Comments CM attempted to contact Roel schroeder by phone but there was no answer and the mailbox is full and cannot accept messages. Plan is ongoing. CM will continue to follow.    Final Discharge Disposition Code 01 - home or self-care                   Discharge Codes    No documentation.                       Ruperto Celeste RN

## 2025-07-24 NOTE — PROGRESS NOTES
"Patient Name:  Christo Clifton  YOB: 1955  [id number]    Surgery Progress Note    Date of visit: 7/24/2025    Subjective   Subjective: No major issues overnight, remains critically ill and intermittently requiring pressors.         Objective     Objective:     /68   Pulse 82   Temp 99.3 °F (37.4 °C) (Esophageal)   Resp 21   Ht 180.3 cm (70.98\")   Wt 72.7 kg (160 lb 4.4 oz)   SpO2 100%   BMI 22.36 kg/m²     Intake/Output Summary (Last 24 hours) at 7/24/2025 0918  Last data filed at 7/24/2025 0600  Gross per 24 hour   Intake 3039.3 ml   Output 1475 ml   Net 1564.3 ml       CV:  Rhythm  regular and rate regular   L:  Clear to auscultation bilaterally   Abd:  Bowel sounds positive , soft, Drain with bilious output.  Ext:  No cyanosis, clubbing, edema    Recent labs that are back at this time have been reviewed.            Assessment/ Plan:    Acalculous cholecystitis- Stable after cholecystostomy tube, but overall very poor prognosis. Continue cholecystostomy tube.  Agree with palliative care consult.  Will follow.    Problem List Items Addressed This Visit          Cardiac and Vasculature    Ischemic cardiomyopathy    Relevant Medications    nitroglycerin (NITROSTAT) SL tablet 0.4 mg    prasugrel (EFFIENT) tablet 10 mg    clopidogrel (PLAVIX) 75 MG tablet    phenylephrine (NATASHA-SYNEPHRINE) 50 mg in sodium chloride 0.9 % 250 mL infusion    Other Relevant Orders    Insert Arterial Line (Completed)    Acute HFrEF (heart failure with reduced ejection fraction) - Primary    Relevant Medications    nitroglycerin (NITROSTAT) SL tablet 0.4 mg    prasugrel (EFFIENT) tablet 10 mg    clopidogrel (PLAVIX) 75 MG tablet    phenylephrine (NATASHA-SYNEPHRINE) 50 mg in sodium chloride 0.9 % 250 mL infusion    Other Relevant Orders    Insert Arterial Line (Completed)        Active Hospital Problems    Diagnosis  POA    **Third degree AV block [I44.2]  Yes    TOOTIE (acute kidney injury) [N17.9]  Yes    Lactic acidosis " [E87.20]  Yes    Acute cystitis without hematuria [N30.00]  Yes    Ischemic cardiomyopathy [I25.5]  Yes    Acute HFrEF (heart failure with reduced ejection fraction) [I50.21]  Yes    Shock liver [K72.00]  Yes    Complete heart block [I44.2]  Yes    CAD (coronary artery disease) [I25.10]  Yes      Resolved Hospital Problems    Diagnosis Date Resolved POA    ASCVD (arteriosclerotic cardiovascular disease) [I25.10] 07/09/2025 Yes              Dipesh Rock MD  7/24/2025  09:18 EDT

## 2025-07-24 NOTE — PROGRESS NOTES
Pharmacy Consult - Vancomycin Dosing and Monitoring (Renal Dysfunction / Dialysis)    Christo Clifton is a 70 y.o. male receiving vancomycin therapy.     Indication: Bacteremia, Empiric  Consulting Provider: Intensivist  ID Consult: No    Goal Trough (if pulse dosing): 15-20 mcg/mL  Goal AUC: 400-600 mg/L*hr     Current Antimicrobial Therapy  Anti-Infectives (From admission, onward)      Ordered     Dose/Rate Route Frequency Start Stop    07/24/25 0721  vancomycin (VANCOCIN) 1,000 mg in sodium chloride 0.9 % 250 mL IVPB-VTB        Ordering Provider: Kareem Portillo RPH    1,000 mg  250 mL/hr over 60 Minutes Intravenous Once 07/24/25 0830 07/24/25 1058    07/23/25 0754  vancomycin (VANCOCIN) 1,000 mg in sodium chloride 0.9 % 250 mL IVPB-VTB        Ordering Provider: Kareem Portillo RPH    1,000 mg  250 mL/hr over 60 Minutes Intravenous Once 07/23/25 0900 07/23/25 0931    07/22/25 1008  vancomycin (dosing per levels)        Ordering Provider: Kareem Portillo RPH     Not Applicable Daily 07/22/25 1100 07/29/25 0859    07/22/25 1008  vancomycin IVPB 1750 mg in 0.9% Sodium Chloride (premix) 500 mL        Ordering Provider: Kareem Portillo RPH    25 mg/kg × 72.7 kg  285.7 mL/hr over 105 Minutes Intravenous Once 07/22/25 1100 07/22/25 1353    07/22/25 1004  Pharmacy to dose vancomycin        Ordering Provider: David Mccloud MD     Not Applicable Continuous PRN 07/22/25 1004 07/29/25 1003    07/21/25 1243  piperacillin-tazobactam (ZOSYN) 4.5 g IVPB in 100 mL NS MBP (CD)        Ordering Provider: David Mccloud MD    4.5 g  over 4 Hours Intravenous Every 8 Hours 07/21/25 1800 07/25/25 1759    07/18/25 1131  piperacillin-tazobactam (ZOSYN) 3.375 g IVPB in 100 mL NS MBP (CD)  Status:  Discontinued        Ordering Provider: Olayinka Guillen MD    3.375 g  over 4 Hours Intravenous Every 8 Hours 07/18/25 1800 07/21/25 1243    07/18/25 1131  piperacillin-tazobactam (ZOSYN) 3.375 g IVPB in 100 mL NS MBP (CD)       "  Ordering Provider: Olayinka Guillen MD    3.375 g  over 30 Minutes Intravenous Once 07/18/25 1200 07/18/25 1256    07/09/25 1957  cefTRIAXone (ROCEPHIN) 2,000 mg in sodium chloride 0.9 % 100 mL IVPB-VTB        Ordering Provider: Chandrakant Jones MD    2,000 mg  200 mL/hr over 30 Minutes Intravenous Every 24 Hours 07/10/25 0330 07/14/25 0330          Allergies  Allergies as of 07/09/2025 - Reviewed 07/09/2025   No Active Allergies   Allergen Reaction Noted    Other  06/23/2016     Labs  Results from last 7 days   Lab Units 07/24/25  0337 07/23/25  0338 07/22/25  2101   BUN mg/dL 73.5* 79.7* 86.2*   CREATININE mg/dL 1.78* 1.61* 1.72*     Results from last 7 days   Lab Units 07/24/25  0337 07/23/25  0338 07/22/25  2101   WBC 10*3/mm3 28.07* 32.09* 35.15*     Evaluation of Dosing  Last Dose Received in the ED/Outside Facility: No  Is Patient on Dialysis or Renal Replacement: No    Height - 180.3 cm (70.98\")  Weight - 72.7 kg (160 lb 4.4 oz)    Estimated Creatinine Clearance: 39.1 mL/min (A) (by C-G formula based on SCr of 1.78 mg/dL (H)).    Intake & Output (last 3 days)         07/21 0701 07/22 0700 07/22 0701 07/23 0700 07/23 0701  07/24 0700 07/24 0701  07/25 0700    I.V. (mL/kg) 2417 (33.2) 2488.5 (34.2) 1724.3 (23.7)     Blood 720       Other 150  30     NG/  1112     IV Piggyback 400 231 173     Total Intake(mL/kg) 4135 (56.9) 2719.5 (37.4) 3039.3 (41.8)     Urine (mL/kg/hr) 2535 (1.5) 1675 (1) 1250 (0.7)     Emesis/NG output   10     Drains  430 215     Total Output 2535 2105 1475     Net +1600 +614.5 +1564.3             Urine Unmeasured Occurrence  1 x            Microbiology  Microbiology Results (last 10 days)       Procedure Component Value - Date/Time    AFB Culture - Aspirate, Gallbladder [[id number]] Collected: 07/22/25 1419    Lab Status: Preliminary result Specimen: Aspirate from Gallbladder Updated: 07/23/25 1128     AFB Stain No acid fast bacilli seen on concentrated smear    Body Fluid " Culture - Body Fluid, Gallbladder [[id number]] Collected: 07/22/25 1419    Lab Status: Preliminary result Specimen: Body Fluid from Gallbladder Updated: 07/24/25 0738     Body Fluid Culture No growth at 2 days     Gram Stain No WBCs or organisms seen    Respiratory Culture - Sputum, ET Suction [[id number]] Collected: 07/20/25 2006    Lab Status: Final result Specimen: Sputum from ET Suction Updated: 07/23/25 1010     Respiratory Culture Light growth (2+) Normal respiratory falguni. No S. aureus or Pseudomonas aeruginosa detected. Final report.     Gram Stain Many (4+) WBCs per low power field      Rare (1+) Epithelial cells per low power field      No organisms seen    Blood Culture - Blood, Hand, Left [[id number]]  (Normal) Collected: 07/20/25 0545    Lab Status: Preliminary result Specimen: Blood from Hand, Left Updated: 07/24/25 1015     Blood Culture No growth at 4 days    Blood Culture - Blood, Arm, Right [[id number]]  (Normal) Collected: 07/20/25 0533    Lab Status: Preliminary result Specimen: Blood from Arm, Right Updated: 07/24/25 1015     Blood Culture No growth at 4 days    Blood Culture - Blood, Arm, Right [[id number]]  (Abnormal) Collected: 07/18/25 1151    Lab Status: Edited Result - FINAL Specimen: Blood from Arm, Right Updated: 07/24/25 0627     Blood Culture Staphylococcus haemolyticus     Isolated from Aerobic Bottle     Gram Stain Aerobic Bottle Gram positive cocci in clusters    Narrative:      Probable contaminant requires clinical correlation, susceptibility not performed unless requested by physician.    Blood Culture - Blood, Blood, Central Line [[id number]]  (Abnormal) Collected: 07/18/25 1151    Lab Status: Final result Specimen: Blood, Central Line Updated: 07/24/25 0627     Blood Culture Staphylococcus epidermidis     Isolated from Anaerobic Bottle     Gram Stain Anaerobic Bottle Gram positive cocci in clusters    Narrative:      Probable contaminant requires clinical correlation, susceptibility  not performed unless requested by physician.      Blood Culture ID, PCR - Blood, Arm, Right [[id number]]  (Abnormal) Collected: 07/18/25 1151    Lab Status: Final result Specimen: Blood from Arm, Right Updated: 07/19/25 2149     BCID, PCR Staph spp, not aureus or lugdunensis. Identification by BCID2 PCR.     BOTTLE TYPE Aerobic Bottle    Blood Culture - Blood, Blood, Central Line [[id number]]  (Normal) Collected: 07/15/25 0223    Lab Status: Final result Specimen: Blood, Central Line Updated: 07/20/25 0245     Blood Culture No growth at 5 days    Narrative:      Less than seven (7) mL's of blood was collected.  Insufficient quantity may yield false negative results.    Blood Culture - Blood, Arm, Left [[id number]]  (Normal) Collected: 07/15/25 0223    Lab Status: Final result Specimen: Blood from Arm, Left Updated: 07/20/25 0245     Blood Culture No growth at 5 days    Narrative:      Less than seven (7) mL's of blood was collected.  Insufficient quantity may yield false negative results.    Respiratory Culture - Sputum, ET Suction [[id number]] Collected: 07/15/25 0050    Lab Status: Final result Specimen: Sputum from ET Suction Updated: 07/17/25 1321     Respiratory Culture Light growth (2+) Normal respiratory falguni. No S. aureus or Pseudomonas aeruginosa detected. Final report.     Gram Stain Many (4+) WBCs seen      Rare (1+) Epithelial cells seen      Moderate (3+) Mixed bacterial morphotypes seen on Gram Stain          Vancomycin Levels  Results from last 7 days   Lab Units 07/24/25  0337 07/23/25  0338   VANCOMYCIN RM mcg/mL 14.40 14.30               Insight[facility] AUC Calculation:    Current AUC: 424 mg/L*hr    Predicted Steady State AUC on Current Dose: --- mg/L*hr  _________________________________    Predicted Steady State AUC on New Dose: 519 mg/L*hr  (based on 1 g IV q24h)    Assessment/Plan:  PTD vancomycin 2/2 bacteremia +/- empiric coverage.  Vancomycin 1000 mg (~14 mg/kg) IV x1 on 7/23/25. Vanc random: 14.4  mcg/mL (~19 hrs s/p dose).  Proceed with pulse dosing given TOOTIE w/o return to baseline at this stage. Goal trough level: 15-20 mcg/mL.  Vancomycin 1000 mg (~14 mg/kg) IV x1 on 7/24/25.  Obtain vancomycin random level w/ AM labs on 7/25/25 to assess clearance & pharmacokinetics.  Reassess renal function daily to determine appropriateness of pulse vs scheduled dosing. Consider scheduled dose on 7/25/25.  Patient also receiving Zosyn. MRSA PCR ordered; however, likely of little clinical utility. De-escalate as clinically indicated.  Pharmacy team to adjust regimen as necessary & follow cultures & sensitivities, renal function, & clinical status.    Thank you,  Kareem Portillo RPH  7/24/2025

## 2025-07-24 NOTE — PLAN OF CARE
Goal Outcome Evaluation:   Pt restless when off fent drip. Currently off to assess neuro status. Eyes open but no focusing, roving side to side. No movement of legs noted. This am appeared to raise eye brows slightly to name. Moved hands very slowly and weakly when requested. Required brooklynn drip for short periods of time. Currently off. Amio at 1mcg/min. Lungs coarse. Sats >95% on 35% FIO2 and vent settings. Thick secretions mod to large amounts. Urine output adequate. No BM but abdomen soft and BS hypoactive in all quads. Skin on coccyx and heels intact. Afebrile. Daughter and granddaughter at bedside early in shift.

## 2025-07-24 NOTE — PROGRESS NOTES
"[facility]  IP Progress Note      Chief Complaint: Follow-up of CAD/non-STEMI/shock/cardiac arrhythmia    Subjective   No significant change in condition, remains intubated and sedated, other than occasional PACs and PVCs no significant arrhythmias overnight.  He is intermittently requiring vasopressors.  Currently on IV amiodarone and antibiotics.  Responds to stimulation but does not seem to follow commands.    Objective     Blood pressure 114/68, pulse 82, temperature 99.3 °F (37.4 °C), temperature source Esophageal, resp. rate 21, height 180.3 cm (70.98\"), weight 72.7 kg (160 lb 4.4 oz), SpO2 100%.     Intake/Output Summary (Last 24 hours) at 7/24/2025 0847  Last data filed at 7/24/2025 0600  Gross per 24 hour   Intake 3039.3 ml   Output 1440 ml   Net 1599.3 ml       Physical Exam:  General: No apparent distress.  Neck: no JVD.  Chest:No respiratory distress, breath sounds are diminished at bases, scattered rhonchi.  Cardiovascular: Normal S1 and S2, no murmur, gallop or rub.    Extremities: No edema.    Results Review:     I reviewed the patient's new clinical results.    Results from last 7 days   Lab Units 07/24/25  0337   WBC 10*3/mm3 28.07*   HEMOGLOBIN g/dL 8.9*   HEMATOCRIT % 29.8*   PLATELETS 10*3/mm3 245     Results from last 7 days   Lab Units 07/24/25  0337   SODIUM mmol/L 151*   POTASSIUM mmol/L 3.6   CHLORIDE mmol/L 121*   CO2 mmol/L 19.0*   BUN mg/dL 73.5*   CREATININE mg/dL 1.78*   CALCIUM mg/dL 8.7   BILIRUBIN mg/dL 0.7   ALK PHOS U/L 103   ALT (SGPT) U/L 514*   AST (SGOT) U/L 170*   GLUCOSE mg/dL 204*     Results from last 7 days   Lab Units 07/24/25  0337   SODIUM mmol/L 151*   POTASSIUM mmol/L 3.6   CHLORIDE mmol/L 121*   CO2 mmol/L 19.0*   BUN mg/dL 73.5*   CREATININE mg/dL 1.78*   GLUCOSE mg/dL 204*   CALCIUM mg/dL 8.7     Results from last 7 days   Lab Units 07/22/25  2101 07/22/25  0351   INR  1.65* 1.46*     Lab Results   Component Value Date    " TROPONINT 5,540 (C) 07/23/2025     aspirin, 81 mg, Nasogastric, Daily  [Held by provider] atorvastatin, 80 mg, Oral, Daily  budesonide, 0.5 mg, Nebulization, BID - RT  escitalopram, 10 mg, Nasogastric, Daily  heparin (porcine), 5,000 Units, Subcutaneous, Q8H  insulin glargine, 20 Units, Subcutaneous, Daily  insulin regular, 4-24 Units, Subcutaneous, Q6H  insulin regular, 8 Units, Subcutaneous, Q6H  ipratropium-albuterol, 3 mL, Nebulization, 4x Daily - RT  levothyroxine, 50 mcg, Nasogastric, Daily  pantoprazole, 40 mg, Intravenous, Q24H  pharmacy consult - MTM, , Not Applicable, Daily  piperacillin-tazobactam, 4.5 g, Intravenous, Q8H  potassium chloride, 20 mEq, Intravenous, Q1H  prasugrel, 10 mg, Nasogastric, Daily  QUEtiapine, 25 mg, Nasogastric, Nightly  senna-docusate sodium, 2 tablet, Nasogastric, BID  vancomycin (dosing per levels), , Not Applicable, Daily  vancomycin, 1,000 mg, Intravenous, Once         Tele: Sinus Rythym      Assessment:  Acute HFrEF, ischemic cardiomyopathy cardiogenic shock, EF 30-35%. Initially required IABP, now removed  Cardiogenic shock with multisystem involvement, slowly improving, IABP initially which was subsequently removed.  Currently on low-dose Hernandez-Synephrine.  CAD status post PCI/BRITT to circumflex, documented diffuse distal LCx vasospasm  Paroxysmal atrial fibrillation, currently in sinus rhythm.    Pulseless ventricular tachycardia requiring brief CPR and defibrillation, currently in sinus rhythm   Third-degree heart block/cardiac arrest  No AVN blocking agents prior to admission  Temp transvenous pacer intact  On Dopamine gtt  Not requiring pacing currently  EP consulted -continue to monitor  Pacing wire removed   T2DM  TOOTIE/resolved  Baseline creatinine 1.0-1.2  Peak creatinine 3.44 on 7/9/2025  Creatinine 1.78  9.  Hypernatremia.    Plan:  Continue current management and supportive care.  The patient has not made any significant progress despite full supportive care,  recommend involvement of palliative care and discuss goals of care with the family.  Continue current dual antiplatelet therapy and decrease IV amiodarone to 0.5 mg dose.  Management of multiple medical conditions per ICU team.  We will continue to optimize GDMT as tolerated.  No room for ACE inhibitor/ARB/Entresto/beta-blockers at present due to intermittent need for vasopressors and renal dysfunction.    Juanita George MD, FACC, [facility]

## 2025-07-24 NOTE — PROGRESS NOTES
Neurology       Patient Care Team:  Lesa Rae APRN as PCP - General (Nurse Practitioner)  Phong Whiting MD as Consulting Physician (Neurosurgery)    Chief complaint: Altered mental status    History: No change.    Patient is still on the ventilator.    He lays with his eyes open his mouth open.    Occasionally he will move his head around.  Rarely he will follow commands.      Past Medical History:   Diagnosis Date    Anxiety and depression     Arthritis     COPD (chronic obstructive pulmonary disease)     Coronary artery disease     Diabetes mellitus     Disease of thyroid gland     Dyslipidemia     History of transfusion     Hypertension     Seizure disorder     Pt states last seizure x1 mo.       Vital Signs   Vitals:    07/24/25 0700 07/24/25 0716 07/24/25 1125 07/24/25 1320   BP: 114/68      BP Location:       Patient Position:       Pulse: 82 82 85 85   Resp:  21 21 21   Temp:       TempSrc:       SpO2: 99% 100% 99% 98%   Weight:       Height:           Physical Exam:   General: Awake              Neuro: Staring off into space.  Not following commands.        Results Review:  White count 28,000.    Sodium 151  Results from last 7 days   Lab Units 07/24/25  0337   WBC 10*3/mm3 28.07*   HEMOGLOBIN g/dL 8.9*   HEMATOCRIT % 29.8*   PLATELETS 10*3/mm3 245     Results from last 7 days   Lab Units 07/24/25  0337 07/23/25  1127 07/23/25  0338 07/22/25  2101   SODIUM mmol/L 151*  --  153* 150*   POTASSIUM mmol/L 3.6 4.2 3.7 4.8   CHLORIDE mmol/L 121*  --  121* 119*   CO2 mmol/L 19.0*  --  17.3* 14.0*   BUN mg/dL 73.5*  --  79.7* 86.2*   CREATININE mg/dL 1.78*  --  1.61* 1.72*   CALCIUM mg/dL 8.7  --  8.7 8.8   BILIRUBIN mg/dL 0.7  --  0.9 0.9   ALK PHOS U/L 103  --  114 132*   ALT (SGPT) U/L 514*  --  658* 614*   AST (SGOT) U/L 170*  --  319* 365*   GLUCOSE mg/dL 204*  --  102* 93       Imaging Results (Last 24 Hours)       Procedure Component Value Units Date/Time    XR Chest 1 View [[id number]] Collected:  07/24/25 0647     Updated: 07/24/25 0651    Narrative:      XR CHEST 1 VW    Date of Exam: 7/24/2025 2:48 AM EDT    Indication: while intubate    Comparison: 7/23/2025    Findings:  Endotracheal tube, nasoenteric tube, and right IJ line appear in satisfactory position. Heart size is normal. There are postop changes of prior bypass surgery. There are bilateral pulmonary infiltrates presumably reflecting pulmonary edema. There are   small bilateral pleural effusions that appear decreased.      Impression:      Impression:  1.Bilateral pulmonary infiltrates presumably reflecting pulmonary edema.  2.Small bilateral pleural effusions.        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

## 2025-07-24 NOTE — CONSULTS
Palliative Care Initial Consult   Attending Physician: David Mccloud MD  Referring Provider: Dr. David Mccloud    Reason for Referral:  assistance with clarification of goals of care and psychosocial support    Code Status:   Code Status and Medical Interventions: CPR (Attempt to Resuscitate); Full Support   Ordered at: 07/09/25 1958     Code Status (Patient has no pulse and is not breathing):    CPR (Attempt to Resuscitate)     Medical Interventions (Patient has pulse or is breathing):    Full Support      Advanced Directives:     Family/Support: Roel Ferguson (son), Betsey Lamb  Goals of Care: TBD.    HPI: Christo Clifton is a 70 y.o. male with PMH significant for COPD, T2DM, CAD, HTN, seizures, hypothyroidism, HLD, anxiety and depression, recent admission at [facility] from 6/27-6/28 for LHC with findings of two occluded vein grafts and stent placement. Patient presented to [facility] ED on 7/6 with chest pain where he underwent repeat LHC showing in-stent thrombus with balloon angioplasty performed and overlapping stent placement. Hospitalization complicated by worsening renal failure and development of third-degree AV block. Patient developed asystole requiring ACLS, ROSC achieved, intubated. Repeat LHC with balloon pump placed due to diffuse coronary artery vasospasm, now removed. Patient transferred to [facility] on 7/9 for higher level of care. ECHO with EF 30-35%, requiring vasopressor support. Cholecystostomy drain placed 7/22 for enlarged gallbladder and cholecystitis. Patient without significant progress per Neurology, Cardiology and Intensivist with concerns for poor prognosis. Palliative Care consulted for GOC in the context of complex medical decision making and support.       Patient on MV, moving left arm, does not grasp. Eyes are open but does not track. No family at bedside. RN reports increased secretions.     ROS: ROS limited by MV/AMS.       Past Medical History:   Diagnosis Date    Anxiety and  depression     Arthritis     COPD (chronic obstructive pulmonary disease)     Coronary artery disease     Diabetes mellitus     Disease of thyroid gland     Dyslipidemia     History of transfusion     Hypertension     Seizure disorder     Pt states last seizure x1 mo.     Past Surgical History:   Procedure Laterality Date    AMPUTATION DIGIT Right 09/13/2017    Procedure: RIGHT 1ST TOE AMPUTATION ;  Surgeon: Lee Lee MD;  Location: [facility] OR;  Service:     CARDIAC CATHETERIZATION      CARDIAC CATHETERIZATION N/A 6/27/2025    Procedure: Left Heart Cath;  Surgeon: Mimi Botello MD;  Location: [facility] CATH INVASIVE LOCATION;  Service: Cardiovascular;  Laterality: N/A;    CARDIAC CATHETERIZATION N/A 6/27/2025    Procedure: Percutaneous Coronary Intervention;  Surgeon: Mimi Botello MD;  [facility] CATH INVASIVE LOCATION;  Service: Cardiovascular;  Laterality: N/A;    CARDIAC CATHETERIZATION N/A 7/7/2025    Procedure: Left Heart Cath;  Surgeon: Anali Arevalo MD;  Location: [facility] CATH INVASIVE LOCATION;  Service: Cardiovascular;  Laterality: N/A;    CARDIAC CATHETERIZATION N/A 7/9/2025    Procedure: Coronary angiography;  Surgeon: Mimi Botello MD;  Location: [facility] CATH INVASIVE LOCATION;  Service: Cardiovascular;  Laterality: N/A;    CARDIAC CATHETERIZATION N/A 7/9/2025    Procedure: Intra-Aortic Baloon Pump Insertion;  Surgeon: Mimi Botello MD;  Location: [facility] CATH INVASIVE LOCATION;  Service: Cardiovascular;  Laterality: N/A;    CARDIAC ELECTROPHYSIOLOGY PROCEDURE N/A 7/9/2025    Procedure: Temporary Pacemaker;  Surgeon: Mimi Botello MD;  Location: [facility] CATH INVASIVE LOCATION;  Service: Cardiovascular;  Laterality: N/A;    CARDIAC SURGERY      CIRCUMCISION      CORONARY ARTERY BYPASS GRAFT      HERNIA REPAIR      X2    INTRAVASCULAR ULTRASOUND N/A 6/27/2025    Procedure: Intravascular Ultrasound;  Surgeon: Mimi Botello MD;  Location: [facility] CATH INVASIVE  "LOCATION;  Service: Cardiovascular;  Laterality: N/A;    UT INCISION & DRAINAGE LEG/ANKLE ABSCESS/HEMATOMA Right 05/24/2017    Procedure: Debridement of right first toe;  Surgeon: Ronald Perdue MD;  Location: [facility] OR;  Service: General    TOE SURGERY Left     debridement     Social History     Socioeconomic History    Marital status:    Tobacco Use    Smoking status: Every Day     Current packs/day: 0.00     Average packs/day: 2.0 packs/day for 45.0 years (90.0 ttl pk-yrs)     Types: Cigarettes     Start date: 1965     Last attempt to quit: 2010     Years since quitting: 15.5    Smokeless tobacco: Never    Tobacco comments:     Trying to quit   Vaping Use    Vaping status: Never Used   Substance and Sexual Activity    Alcohol use: No    Drug use: No    Sexual activity: Defer     Family History   Problem Relation Age of Onset    Diabetes Mother     Hypertension Mother     Diabetes Father        No Active Allergies    Current medication reviewed for route, type, dose and frequency and are current per MAR at time of dictation.    Palliative Performance Scale Score:  20%    /68   Pulse 82   Temp 99.3 °F (37.4 °C) (Esophageal)   Resp 21   Ht 180.3 cm (70.98\")   Wt 72.7 kg (160 lb 4.4 oz)   SpO2 100%   BMI 22.36 kg/m²     Intake/Output Summary (Last 24 hours) at 7/24/2025 0915  Last data filed at 7/24/2025 0600  Gross per 24 hour   Intake 3039.3 ml   Output 1475 ml   Net 1564.3 ml       Physical Exam:    General Appearance:    Patient laying in bed, eyes open, NAD, agitated   HEENT:    NC/AT,eyes do not track, anicteric, MMM, face relaxed, ETT in place, moving head   Neck:   supple, trachea midline, no JVD   Lungs:     CTA bilat, diminished in bases; on MV FiO2 35%, RR set on 20, RR observed 22, PEEP 5    Heart:    RRR, normal S1 and S2, no M/R/G, HR 85   Abdomen:     hypoactive bowel sounds, soft, nontender, nondistended   G/U:   Deferred   MSK/Extremities:   Wasting, +1-2 BLE edema   Pulses:   " Pulses palpable and equal bilaterally   Skin:   Warm, dry   Neurologic:   Does not follow commands,    Psych:   agitated         Labs:   Results from last 7 days   Lab Units 07/24/25  0337   WBC 10*3/mm3 28.07*   HEMOGLOBIN g/dL 8.9*   HEMATOCRIT % 29.8*   PLATELETS 10*3/mm3 245     Results from last 7 days   Lab Units 07/24/25  0337   SODIUM mmol/L 151*   POTASSIUM mmol/L 3.6   CHLORIDE mmol/L 121*   CO2 mmol/L 19.0*   BUN mg/dL 73.5*   CREATININE mg/dL 1.78*   GLUCOSE mg/dL 204*   CALCIUM mg/dL 8.7     Results from last 7 days   Lab Units 07/24/25  0337   SODIUM mmol/L 151*   POTASSIUM mmol/L 3.6   CHLORIDE mmol/L 121*   CO2 mmol/L 19.0*   BUN mg/dL 73.5*   CREATININE mg/dL 1.78*   CALCIUM mg/dL 8.7   BILIRUBIN mg/dL 0.7   ALK PHOS U/L 103   ALT (SGPT) U/L 514*   AST (SGOT) U/L 170*   GLUCOSE mg/dL 204*     Imaging Results (Last 72 Hours)       Procedure Component Value Units Date/Time    XR Chest 1 View [[id number]] Collected: 07/24/25 0647     Updated: 07/24/25 0651    Narrative:      XR CHEST 1 VW    Date of Exam: 7/24/2025 2:48 AM EDT    Indication: while intubate    Comparison: 7/23/2025    Findings:  Endotracheal tube, nasoenteric tube, and right IJ line appear in satisfactory position. Heart size is normal. There are postop changes of prior bypass surgery. There are bilateral pulmonary infiltrates presumably reflecting pulmonary edema. There are   small bilateral pleural effusions that appear decreased.      Impression:      Impression:  1.Bilateral pulmonary infiltrates presumably reflecting pulmonary edema.  2.Small bilateral pleural effusions.        Electronically Signed: Jason Balderas MD    7/24/2025 6:48 AM EDT    Workstation ID: [id number]    XR Chest 1 View [[id number]] Collected: 07/23/25 0619     Updated: 07/23/25 0624    Narrative:      XR CHEST 1 VW    Date of Exam: 7/23/2025 3:25 AM EDT    Indication: Respiratory failure. Third degree AV block.    Comparison: 7/22/2025    Findings:  ET tube and  right IJ line remain in good position. Feeding tube and NG tube extend below the diaphragm. There is also an esophageal probe. Heart is enlarged status post sternotomy. There are persistent infiltrates in both mid to lower lungs, probably   slightly worsened on the left and stable on the right. Small pleural effusions are present. No definite pneumothorax is seen. There is a skinfold on the right.      Impression:      1.Persistent infiltrates in both mid to lower lungs, probably slightly worsened on the left and stable on the right.  2.Small pleural effusions.        Electronically Signed: Ramiro Partida MD    7/23/2025 6:21 AM EDT    Workstation ID: [id number]    XR Chest 1 View [[id number]] Collected: 07/22/25 2148     Updated: 07/22/25 2155    Narrative:      XR CHEST 1 VW    Date of Exam: 7/22/2025 9:02 PM EDT    Indication: Post code    Comparison: 7/22/2025 at 0515 hours    Findings:  Endotracheal tube is in good position in the mid trachea. An esophageal probe is present. Enteric tubes extend below the diaphragm. Right IJ line tip in the SVC. Heart is mildly enlarged status post sternotomy. There are infiltrates now noted in the mid   to lower lungs which could reflect edema and/or pneumonia. Small right pleural effusion is present. No definitive pneumothorax. There is skin folds on both sides of the chest.      Impression:      1.Tubes and lines appear to be in good position.  2.New infiltrates in the mid to lower lungs which could reflect edema and/or pneumonia. Small right pleural effusion.        Electronically Signed: Ramiro Partida MD    7/22/2025 9:52 PM EDT    Workstation ID: [id number]    CT Percutaneous Cholecystostomy - In process [[id number]] Resulted: 07/22/25 1352     Updated: 07/22/25 1420    This result has not been signed. Information might be incomplete.      XR Chest 1 View [[id number]] Collected: 07/22/25 0601     Updated: 07/22/25 0605    Narrative:      XR CHEST 1 VW    Date of Exam:  7/22/2025 5:26 AM EDT    Indication: Respiratory failure.    Comparison: 7/21/2025    Findings:  Endotracheal tube and right IJ line remain in good position. Feeding tube extends below the diaphragm. Heart size stable status post sternotomy. Small left pleural effusion is present. Mild bibasilar atelectasis is not significantly changed. No   pneumothorax is identified.      Impression:      Small left pleural effusion with mild bibasilar atelectasis.        Electronically Signed: Ramiro Partida MD    7/22/2025 6:02 AM EDT    Workstation ID: [id number]    CT Head Without Contrast [[id number]] Collected: 07/22/25 0318     Updated: 07/22/25 0322    Narrative:      CT HEAD WO CONTRAST    Date of Exam: 7/22/2025 2:33 AM EDT    Indication: AMS, neuro change.    Comparison: 7/14/2025 and brain MRI 7/19/2025.    Technique: Axial CT images were obtained of the head without contrast administration.  Automated exposure control and iterative construction methods were used.      Findings:  Superficial soft tissues appear within normal limits. The calvarium is intact.  Paranasal sinuses and mastoid air cells appear well aerated. There is thinning of the orbital lenses bilaterally suggesting prior lens replacement. There is no acute   intracranial hemorrhage.  No mass effect or midline shift.  No abnormal extra-axial collections.  Gray-white differentiation is within normal limits. There is mild patchy white matter hypoattenuation. Stable chronic right occipital lobe infarct. No new   hypoattenuating lesions in the brain. Mild intracranial vascular calcification. The ventricles appear normal in size and configuration for the patient's age.      Impression:      Impression:  1.No acute intracranial abnormality.  2.Mild chronic small vessel ischemic change and chronic right occipital lobe infarct.            Electronically Signed: Joon Gerber MD    7/22/2025 3:19 AM EDT    Workstation ID: [id number]    US Gallbladder [[id number]]  Collected: 07/21/25 1756     Updated: 07/21/25 1801    Narrative:      US GALLBLADDER    Date of Exam: 7/21/2025 5:30 PM EDT    Indication: GB distension, elevated LFTS, leukocytosis.    Comparison: CT abdomen pelvis 7/21/2025    Technique: Grayscale and color Doppler ultrasound evaluation of the right upper quadrant was performed.      Findings:    Liver has homogenous echogenicity and normal echotexture.  No focal hepatic lesion. Hepatopetal flow of the main portal vein. No visualized ascites.    Fluid distended gallbladder measuring up to 12 cm in length and 6 cm diameter. No visualized echogenic shadowing gallstone. Gallbladder wall thickening measuring 5 mm along hepatic margin. Trace pericholecystic fluid.    No intrahepatic duct dilation. The common bile duct measures maximally 5-6 mm normal for age..    The visualized portions of the head and body of the pancreas are grossly unremarkable. The pancreatic tail is not seen due to bowel gas.    Homogeneous cortical echotexture of the right kidney. No hydronephrosis, shadowing echogenicities or solid masses.    Partially imaged right pleural effusion.      Impression:      Sonographic findings that could reflect acalculus cholecystitis in the right clinical setting. No evidence of biliary obstruction.    Partially imaged right pleural effusion.      Electronically Signed: David Ponce MD    7/21/2025 5:58 PM EDT    Workstation ID: [id number]    CT Abdomen Pelvis Without Contrast [[id number]] Collected: 07/21/25 1057     Updated: 07/21/25 1119    Narrative:      CT ABDOMEN PELVIS WO CONTRAST    Date of Exam: 7/21/2025 10:39 AM EDT    Indication: lactic acidosis shock.    Comparison: 2/28/2025 abdomen pelvis CT scan    Technique: Axial CT images were obtained of the abdomen and pelvis without the administration of contrast. Reconstructed coronal and sagittal images were also obtained. Automated exposure control and iterative construction methods were  used.      Findings:  Prior study report noted worrisome 5.6 cm bladder tumor concerning for carcinoma. History today indicates postcardiac arrest, cardiogenic shock, possible UTI, shock liver    Appearance of the lung bases suggest complete atelectasis of left lower lobe, and moderate right lower lobe with adjacent moderate to large pleural effusions.    Feeding tube is seen in the duodenum. Liver morphology appears grossly normal. There is a hypodense 2.4 x 1.3 cm right lobe liver lesion, subtle on previous outside scan, and not present on 2015 scan, today's image 43/2. Spleen is not enlarged. Pancreas   and adrenal glands appear within the limits.     Gallbladder is distended. Its slightly indistinct margins are attributable to motion related blurring and also anasarca, seen throughout the scan elsewhere.    There is a large left upper pole renal cyst as on the prior study. Left kidney appears relatively atrophic. There is decreasing prominence of the renal pelves still enlarged, but improved compared to the prior study. There is a mildly heterogeneous   appearance of the left renal pelvis, particularly seen on images 70 through 77, questionable for debris, possibly just artifactual.    No upper abdominal free air, ascites or adenopathy is seen.    Regarding the lower abdomen and pelvis, no ureteral dilatation is seen. There is mild water density pelvic ascites, and again the previously noted large bladder mass. Bladder mass appears increased from 5.3 x 4.4 cm in axial cross-section to   approximately 6.8 x 5.0 cm. There is a suggestion of some hyperdense debris or hemorrhage in the most dependent portion of the bladder. Vega balloon catheter is seen in place.    Bowel loops are generally normal in caliber, considered upper limits of normal, particularly the nondependent loops. There is minimal if any right colon wall thickening and no obvious inflammation. No small bowel inflammation or dilatation is  seen.    Prostate is mildly prominent in size. Seminal vesicles appear normal. No intrapelvic adenopathy is seen. Bony structures appear to be intact.      Impression:      Impression:      1. Practically complete lower lobe atelectasis, and moderate right lower lobe atelectasis. Moderate to large pleural effusions, incompletely included on this study.    2. Distended gallbladder, but no definite gallbladder inflammatory changes.    3. Relatively atrophic left kidney. Heterogeneous appearance of the left renal pelvis, possibly artifactual. Debris and tumor are both considerations.    4. Suspected mild colonic ileus. Minimal if any right colon mucosal thickening and no apparent large or small bowel inflammation.    5. Large bladder tumor increased from 2/20/2025, and small debris or hematocrit level in the dependent portion of the bladder.    6. Subtle, irregular shaped approximately 2.4 x 1.3 cm right lobe liver lesion nonspecific.        Electronically Signed: Grayson Hanson MD    7/21/2025 11:15 AM EDT    Workstation ID: [id number]                  Diagnostics: Reviewed    A:   Third degree AV block    CAD (coronary artery disease)    TOOTIE (acute kidney injury)    Lactic acidosis    Acute cystitis without hematuria    Ischemic cardiomyopathy    Acute HFrEF (heart failure with reduced ejection fraction)    Shock liver    Complete heart block     70 y.o. male with CAD, TOOTIE, HFrEF, shock liver, cardiac arrhythmia, respiratory failure on MV, debility, agitation.    S/S:   Dyspnea -currently on MV, intubated since 7/9  -nebs, antibiotics  -upcoming decision regarding trach/PEG versus comfort measures with palliative extubation    2. Agitation -no sedation on at time of visit to determine neuro status    3. Debility -poor neurological prognosis    4. Constipation -no BM documented since 7/9  -continue bowel regimen    5. GOC -Full Code/Full Support -per review of chart  -no family at bedside  -called Betsey at 11:43am at  [phone] who reports that she is unsure of any legal separation between patient and spouse Pretty, she reports they have not been in contact for years, she does not provide contact information for Pretty. She reports there are four adult children (including herself and Roel), but does not provide the contact information for them. Reviewed patient's condition and upcoming decision that will require majority of NOK (in this case adult children since Pretty is unavailable and unable to contact her). Discussed options for trach/PEG with transition to LTC versus palliative extubation and comfort focused plan of care. Betsey will call family to set up time on Saturday that family will be able to meet with Palliative Care and see patient. Betsey requested information on how to talk with young children about dying.   -called Roel at 11:42am with no answer and then called back at 12:08pm at [phone]. Reviewed requiring majority of NOK to determine GOC. Reviewed family meeting Saturday with NOK. Reviewed patient's condition and options for trach/PEG versus palliative extubation with comfort focused plan of care.   -family meeting Saturday between 10am-4pm, Betsey to call Palliative Office with specific time.     ADDENDUM: Betsey called back with plan to meet 3:30pm on Saturday with family to discuss GOC.      P:  No family at bedside. Called and spoke to daughter, Betsey, and son, Roel, on separate calls to update on condition, upcoming decisions, and need for family meeting to bring together NOK so that majority will be able to make a decision regarding GOC. Family meeting planned for Saturday between 10am-4pm, daughter to call with specific time. Updated Intensivist and RN.  Thank you for this consult and allowing us to participate in patient's plan of care. Palliative Care Team will continue to follow patient. Please do not hesitate to contact us regarding further symptom management or goals of care  needs.  Time: 60 minutes spent reviewing medical and medication records, assessing and examining patient, discussing with family, answering questions, providing some guidance about a plan and documentation of care, and coordinating care with other healthcare members, with > 50% time spent face to face.         Florencia Be, APRN  7/24/2025

## 2025-07-24 NOTE — PROGRESS NOTES
Pulmonary/Critical Care Follow-up     LOS: 15 days   Patient Care Team:  Lesa Rae APRN as PCP - General (Nurse Practitioner)  Given, Phong AGUILAR MD as Consulting Physician (Neurosurgery)    Chief Complaint: Postcardiac arrest, cardiogenic shock      Subjective     History reviewed and updated in EMR as indicated.    Interval History:     No improvement on my exam today.  Tmax was 99.9 Fahrenheit.  Patient is unable to relay any history.  Off of phenylephrine.    History taken from: Chart, staff    PMH/FH/Social History were reviewed and updated appropriately in the electronic medical record.     Review of Systems:    Review of 14 systems was completed with positives and pertinent negatives noted in the subjective section.  All other systems reviewed and are negative.   Exceptions are noted below:    Unable to obtain secondary to endotracheal intubation/altered mental status.      Objective     Vital Signs  Temp:  [97.3 °F (36.3 °C)-99.9 °F (37.7 °C)] 99.9 °F (37.7 °C)  Heart Rate:  [73-90] 85  Resp:  [20-21] 20  BP: ()/(51-85) 123/85  Arterial Line BP: ()/(38-63) 125/58  FiO2 (%):  [35 %] 35 %  07/23 0701 - 07/24 0700  In: 3039.3 [I.V.:1724.3]  Out: 1475 [Urine:1250; Drains:215]  Body mass index is 22.36 kg/m².  Mode: VC+/AC  FiO2 (%):  [35 %] 35 %  S RR:  [20] 20  S VT:  [530 mL] 530 mL  PEEP/CPAP (cm H2O):  [5 cm H20] 5 cm H20  MAP (cm H2O):  [13-15] 14  IV drips:  amiodarone, Last Rate: 0.5 mg/min (07/24/25 1038)  dexmedetomidine, Last Rate: Stopped (07/21/25 2200)  fentanyl 10 mcg/mL, Last Rate: Stopped (07/24/25 0400)  Pharmacy to dose vancomycin  phenylephrine, Last Rate: Stopped (07/24/25 0315)       Physical Exam:     Constitutional:   On ventilator, mildly agitated   Head:   Normocephalic, atraumatic   Eyes:           Lids and lashes normal, conjunctivae and sclerae normal.  PER.  Upward gaze.   ENMT:  Ears appear intact with no abnormalities noted     Lips normal.  Oral endotracheal tube  in place.   Neck:  Trachea midline, no JVD   Lungs/Resp:    On ventilator, symmetric chest rise, no crepitus, mild rhonchi bilaterally.               Heart/CV:   Regular rate and rhythm, no murmur   Abdomen/GI:    Soft, nontender, nondistended   :    Deferred   Extremities/MSK:  No clubbing or cyanosis.  No edema.     Pulses:  Pulses palpable and equal bilaterally   Skin:  No bleeding, bruising or rash   Heme/Lymph:  No cervical or supraclavicular adenopathy.   Neurologic:    Psychiatric:    Moves all extremities.  Does not follow commands.    Mildly agitated    The above physical exam findings were reviewed and reflect my exam findings as of today's exam.   Electronically signed by:  David Mccloud MD  07/24/25  18:00 EDT      Results Review:     I reviewed the patient's new clinical results.   Results from last 7 days   Lab Units 07/24/25  1640 07/24/25  0337 07/23/25  1127 07/23/25  0338 07/22/25  2101   SODIUM mmol/L  --  151*  --  153* 150*   POTASSIUM mmol/L 3.5 3.6 4.2 3.7 4.8   CHLORIDE mmol/L  --  121*  --  121* 119*   CO2 mmol/L  --  19.0*  --  17.3* 14.0*   BUN mg/dL  --  73.5*  --  79.7* 86.2*   CREATININE mg/dL  --  1.78*  --  1.61* 1.72*   CALCIUM mg/dL  --  8.7  --  8.7 8.8   BILIRUBIN mg/dL  --  0.7  --  0.9 0.9   ALK PHOS U/L  --  103  --  114 132*   ALT (SGPT) U/L  --  514*  --  658* 614*   AST (SGOT) U/L  --  170*  --  319* 365*   GLUCOSE mg/dL  --  204*  --  102* 93     Results from last 7 days   Lab Units 07/24/25  0337 07/23/25  0338 07/22/25  2101   WBC 10*3/mm3 28.07* 32.09* 35.15*   HEMOGLOBIN g/dL 8.9* 9.5* 9.8*   HEMATOCRIT % 29.8* 31.8* 31.4*   PLATELETS 10*3/mm3 245 324 312     Results from last 7 days   Lab Units 07/22/25  2225   PH, ARTERIAL pH units 7.450   PO2 ART mm Hg 214.0*   PCO2, ARTERIAL mm Hg 28.1*   HCO3 ART mmol/L 19.5*     Results from last 7 days   Lab Units 07/22/25  2101 07/22/25  0351 07/19/25  0408   MAGNESIUM mg/dL 4.8* 2.6* 2.6*   PHOSPHORUS mg/dL 3.8  --   --         I reviewed the patient's new imaging including images and reports.    Chest x-ray 7/23/2025 shows borderline cardiomegaly and pulmonary congestion with no definite pneumothorax.  Endotracheal tube in place, right internal jugular catheter in place.    CT abdomen pelvis 7/21/2025 reviewed and shows constipation and enlarged gallbladder.  Radiologist's impression follows:    Impression:     1. Practically complete lower lobe atelectasis, and moderate right lower lobe atelectasis. Moderate to large pleural effusions, incompletely included on this study.     2. Distended gallbladder, but no definite gallbladder inflammatory changes.     3. Relatively atrophic left kidney. Heterogeneous appearance of the left renal pelvis, possibly artifactual. Debris and tumor are both considerations.     4. Suspected mild colonic ileus. Minimal if any right colon mucosal thickening and no apparent large or small bowel inflammation.     5. Large bladder tumor increased from 2/20/2025, and small debris or hematocrit level in the dependent portion of the bladder.     6. Subtle, irregular shaped approximately 2.4 x 1.3 cm right lobe liver lesion nonspecific.       Chest x-ray 7/19/2025 shows cardiomegaly with bilateral mild basilar airspace opacities possibly infiltrate versus atelectasis and small left pleural effusion.    Medication Review:   aspirin, 81 mg, Nasogastric, Daily  [Held by provider] atorvastatin, 80 mg, Oral, Daily  budesonide, 0.5 mg, Nebulization, BID - RT  escitalopram, 10 mg, Nasogastric, Daily  heparin (porcine), 5,000 Units, Subcutaneous, Q8H  insulin glargine, 20 Units, Subcutaneous, Daily  insulin regular, 4-24 Units, Subcutaneous, Q6H  insulin regular, 8 Units, Subcutaneous, Q6H  ipratropium-albuterol, 3 mL, Nebulization, 4x Daily - RT  levothyroxine, 50 mcg, Nasogastric, Daily  pantoprazole, 40 mg, Intravenous, Q24H  pharmacy consult - MTM, , Not Applicable, Daily  piperacillin-tazobactam, 4.5 g,  Intravenous, Q8H  potassium chloride, 20 mEq, Intravenous, Q1H  prasugrel, 10 mg, Nasogastric, Daily  QUEtiapine, 25 mg, Nasogastric, Nightly  senna-docusate sodium, 2 tablet, Nasogastric, BID  vancomycin (dosing per levels), , Not Applicable, Daily      amiodarone, 0.5 mg/min, Last Rate: 0.5 mg/min (07/24/25 1038)  dexmedetomidine, 0.2-1.5 mcg/kg/hr, Last Rate: Stopped (07/21/25 2200)  fentanyl 10 mcg/mL,  mcg/hr, Last Rate: Stopped (07/24/25 0400)  Pharmacy to dose vancomycin,   phenylephrine, 0.5-3 mcg/kg/min, Last Rate: Stopped (07/24/25 0315)        Assessment & Plan         Third degree AV block    CAD (coronary artery disease)    TOOTIE (acute kidney injury)    Lactic acidosis    Acute cystitis without hematuria    Ischemic cardiomyopathy    Acute HFrEF (heart failure with reduced ejection fraction)    Shock liver    Complete heart block    70 y.o. active smoker with 90-pack-year history with history of HTN, HLD, T2DM, COPD, hypothyroidism, possible history of seizures, anxiety/depression, admitted at [facility] [place] 6/27-28/2025 with left heart cath showing 2 occluded vein grafts and PCI to left circumflex with stent placement.  He re-presented to [facility] [place] ED on 7/6/2025 with chest pain and was admitted with NSTEMI and had repeat C 7/7/2025 with in-stent thrombosis felt to be secondary to medical noncompliance.  Balloon angioplasty was performed with deployment of overlapping stent just distal to the previous stent.  Echo obtained 24 hours later showed LVEF of 30 to 35% with grade 2 diastolic dysfunction.    That course was complicated by renal failure.    On the morning of 7/9, he developed third-degree AV block and was given atropine secondary to bradycardia.  Attempts were made at transcutaneous pacing and patient became combative and 1 mg of Versed was given.  He developed asystole on monitor and became unresponsive and had a brief ACLS with ROSC.  Following this the patient was back in third-degree AV block and was  agonal he breathing and was intubated.  He was initiated on transcutaneous pacing.    After this, cardiology was called who felt the patient needed a repeat LHC due to possible failure of stent.  He underwent transvenous pacemaker placement on 7/9/2025 and repeat LHC showed patent stent in the proximal left circumflex artery with diffuse coronary artery vasospasm distal to the stent.  Intra-aortic balloon pump was placed.  [facility] was contacted and he was transferred on 7/9/2025.    At admission here, patient was noted to have worsening renal failure and elevated LFTs suggestive of shock liver.  He was started on dopamine with one-to-one support on balloon pump.  Balloon pump was discontinued on 7/12.  He remains encephalopathic.    Patient seemed to be following limited commands a few days ago but now is not following any commands.      Patient was having some hemodynamic instability with A-fib with RVR and hypotension requiring phenylephrine.  Enlarged gallbladder on CT scan and ultrasound concerning for acalculous cholecystitis in the setting of elevated white blood cell count and concern for infection.  I consulted general surgery who recommended a percutaneous drain and this was done on 7/22/2025.  This significant improvement.    V-fib arrest overnight on 7/22-23/2025 with cardioversion x 2.  Patient remains on amiodarone.    I spoke to the patient's son on 7/23/2025 and discussed palliative care versus aggressive care with likely tracheostomy/PEG and rehab placement.  Patient's son is okay with palliative care consultation and I consult with them.  Tentative plan is for family meeting on Saturday.    Plan:  1.  For AMS/encephalopathy: Unclear etiology.  Slightly improved off of sedation but he is agitated on ventilator.  Neurology following.  MRI with old stroke.  Hold fentanyl drip at this point if we can and minimize Precedex.  Give intermittent doses of low-dose midazolam.  2.  For  respiratory failure: Continue mechanical ventilation.  Wean settings as tolerated.  Not ready for extubation secondary to mental status issues and hemodynamic instability.    3.  For possible UTI/ID: Cultures on 7/8 grew coag negative staph and he completed Rocephin on 7/13.  Started on Zosyn empirically on 7/18 secondary to new fevers.  Continue Zosyn for now and follow-up cultures.  Blood culture 7/18/2025 showed coag negative staph likely contaminant.  Repeated respiratory culture.  4.  For hypothyroidism: Continue levothyroxine.  5.  For TOOTIE: Stable stable.  Watch fluid balance and labs.    6.  For congestive heart failure/arrhythmias: Cardiology/EP following.  7.  For shock: Mainly cardiogenic.  Likely also had critical illness related corticosteroid insufficiency and was started on hydrocortisone which was weaned and ultimately stopped on 7/15/2025.  Watch lactic acid level.  8.  For enlarged gallbladder/concern for acalculous cholecystitis/elevated LFTs: Possibly could be due to shock liver.  With enlarged gallbladder and concern for acalculous cholecystitis, consulted general surgery who recommended cholecystostomy drain.  This was done by IR on 7/22/2025.  Culture with no growth to date.  Continue drainage  9.  For possible bladder tumor on CT abdomen: Further evaluation after acute issues are managed.    Patient is critically ill secondary to multiorgan failure and has high risk of life-threatening decompensation in condition.   As such, the patient requires continuous monitoring and frequent reassessment for consideration of adjustment in management to minimize this risk.  I personally reassessed the patient multiple times today.    Critical care time : 33 minutes spent by me personally and independently.(This excludes time spent performing separately reportable procedures and services).  Critical care time includes high complexity decision making to assess, manipulate, and support vital organ system  failure in this individual who has impairment of one or more vital organ systems such that there is a high probability of imminent or life threatening deterioration in the patient’s condition.    I spoke with Dr. Momin with neurology.    I updated the patient's son by phone.  I discussed palliative care as an option with the patient's son and I will go ahead and place a palliative care consultation.    Electronically signed by:    David Mccloud MD  07/24/25  18:00 EDT      *. Please note that portions of this note were completed with [employer] - a voice recognition program.

## 2025-07-24 NOTE — PLAN OF CARE
Goal Outcome Evaluation:  Plan of Care Reviewed With: family        Progress: no change  Outcome Evaluation: Palliative consult for GOC/ACP and support for pt and family. Pt seen today by Palliative APRN and Palliative RN; on MV, awake but did not engage, some restlessness. ICU managing meds for relief of discomfort. Palliative APRN reached pt's son, Roel, and pt's daughter, Betsey, by phone. Arranging family care conference with all four adult children for Saturday 7/26 between 10 am - 4 pm; Betsey to call to notify of more specific time.     0930: Palliative IDT discussion: MD, MIKO, RN, SW,   After hours, weekends and holidays, contact Palliative Provider by calling [phone]       Problem: Palliative Care  Goal: Enhanced Quality of Life  Outcome: Progressing  Intervention: Maximize Comfort  Flowsheets (Taken 7/24/2025 1332)  Pain Management Interventions: (ICU managing signs of discomfort) other (see comments)  Intervention: Optimize Function  Flowsheets (Taken 7/24/2025 1332)  Sensory Stimulation Regulation: quiet environment promoted  Intervention: Promote Advance Care Planning  Flowsheets (Taken 7/24/2025 1332)  Life Transition/Adjustment:   palliative care discussed   palliative care initiated  Intervention: Optimize Psychosocial Wellbeing  Flowsheets (Taken 7/24/2025 1332)  Spiritual Activities Assistance: (Spiritual Care consult) other (see comments)  Family/Support System Care:   caregiver stress acknowledged   family care conference arranged   involvement promoted   presence promoted   self-care encouraged   support provided

## 2025-07-24 NOTE — CASE MANAGEMENT/SOCIAL WORK
Continued Stay Note  [facility]     Patient Name: Christo Clifton  MRN: [id number]  Today's Date: 7/24/2025    Admit Date: 7/9/2025    Plan: Ongoing   Discharge Plan       Row Name 07/24/25 1012       Plan    Plan Ongoing    Patient/Family in Agreement with Plan unable to assess    Plan Comments CM attempted to contact Roel schroeder by phone but there was no answer and the mailbox is full and cannot accept messages. Plan is ongoing. CM will continue to follow.    Final Discharge Disposition Code 01 - home or self-care                   Discharge Codes    No documentation.                       Ruperto Celeste RN

## 2025-07-24 NOTE — PROGRESS NOTES
"                  Clinical Nutrition     Patient Name: Christo Clifton  YOB: 1955  MRN: [id number]  Date of Encounter: 25 09:19 EDT  Admission date: 2025  Reason for Visit: Follow-up protocol, EN    Assessment   Nutrition Assessment   Admission Diagnosis:  Complete heart block [I44.2]    Problem List:    Third degree AV block    CAD (coronary artery disease)    TOOTIE (acute kidney injury)    Lactic acidosis    Acute cystitis without hematuria    Ischemic cardiomyopathy    Acute HFrEF (heart failure with reduced ejection fraction)    Shock liver    Complete heart block      PMH:   He  has a past medical history of Anxiety and depression, Arthritis, COPD (chronic obstructive pulmonary disease), Coronary artery disease, Diabetes mellitus, Disease of thyroid gland, Dyslipidemia, History of transfusion, Hypertension, and Seizure disorder.    PSH:  He  has a past surgical history that includes Coronary artery bypass graft; Hernia repair; Cardiac surgery; Circumcision, non-; Toe Surgery (Left); pr incision & drainage leg/ankle abscess/hematoma (Right, 2017); Finger amputation (Right, 2017); Cardiac catheterization; Cardiac catheterization (N/A, 2025); INTRAVASCULAR ULTRASOUND (N/A, 2025); Cardiac catheterization (N/A, 2025); Cardiac catheterization (N/A, 2025); Cardiac catheterization (N/A, 2025); Cardiac electrophysiology procedure (N/A, 2025); and Cardiac catheterization (N/A, 2025).    Applicable Nutrition History:   Skin integrity:  DM ulcers x3 - WOC consult pending    () Admitted to [facility] ED with NSTEMI  () S/P LHC  () Developed AV block; S/P LHC IABP placed    () Intubated (Ischemic cardiomyopathy, cardiogenic shock)  (7/10) KUB - \"Gas and stool in the colon. Mild gaseous distention of the stomach. Gas-filled small bowel loops may reflect a mild ileus\"   () IABP removed     EN history:  () Trophic feeds: Peptamen 1.5 @ 15; FWF @ " 30Q2hr  (7/13) EN advanced to goal rate: 60ml/hr.  (7/15) Movantick started   (7/18) FWF increased to 30ml/hr   (7/21) CT abdomen concern for cholecystitis vs gallbadder distention.  IR drain placed      Labs    Labs Reviewed: Yes    Results from last 7 days   Lab Units 07/24/25  0352 07/24/25  0337 07/23/25  2047 07/23/25  1127 07/23/25 0338 07/22/25  2354 07/22/25  2101 07/22/25  0849 07/22/25  0351 07/20/25  0533 07/19/25  0408   GLUCOSE mg/dL  --  204*  --   --  102*  --  93  --  104*   < > 293*   BUN mg/dL  --  73.5*  --   --  79.7*  --  86.2*  --  87.4*   < > 89.2*   CREATININE mg/dL  --  1.78*  --   --  1.61*  --  1.72*  --  1.65*   < > 1.44*   SODIUM mmol/L  --  151*  --   --  153*  --  150*  --  150*   < > 147*   CHLORIDE mmol/L  --  121*  --   --  121*  --  119*  --  117*   < > 114*   POTASSIUM mmol/L  --  3.6  --  4.2 3.7  --  4.8  --  4.2   < > 4.1   PHOSPHORUS mg/dL  --   --   --   --   --   --  3.8  --   --   --   --    MAGNESIUM mg/dL  --   --   --   --   --   --  4.8*  --  2.6*  --  2.6*   ALT (SGPT) U/L  --  514*  --   --  658*  --  614*  --  670*   < >  --    LACTATE mmol/L 1.7  --  1.6 1.7 1.8   < > 4.7*   < > 1.7   < > 1.8    < > = values in this interval not displayed.       Results from last 7 days   Lab Units 07/24/25  0337 07/23/25 0338 07/22/25 2101 07/20/25  0533 07/18/25  0309   ALBUMIN g/dL 2.7* 2.7* 2.5*   < > 2.9*   PREALBUMIN mg/dL  --   --   --   --  16.3*   CRP mg/dL  --   --   --   --  15.90*   IONIZED CALCIUM mmol/L  --   --  1.23  --   --     < > = values in this interval not displayed.       Results from last 7 days   Lab Units 07/24/25  0628 07/24/25  0008 07/23/25  1716 07/23/25  1115 07/23/25  0514 07/22/25  2303   GLUCOSE mg/dL 189* 213* 150* 101 104 92     Lab Results   Lab Value Date/Time    HGBA1C 9.10 (H) 10/15/2018 0926    HGBA1C 9.60 (H) 05/14/2018 0828         Results from last 7 days   Lab Units 07/22/25  2101 07/22/25  0351 07/19/25  0408   PROBNP pg/mL 46,826.0*  60,944.0* 47,389.0*         Medications    Medications Reviewed: yes    Scheduled Meds:aspirin, 81 mg, Nasogastric, Daily  [Held by provider] atorvastatin, 80 mg, Oral, Daily  budesonide, 0.5 mg, Nebulization, BID - RT  escitalopram, 10 mg, Nasogastric, Daily  heparin (porcine), 5,000 Units, Subcutaneous, Q8H  insulin glargine, 20 Units, Subcutaneous, Daily  insulin regular, 4-24 Units, Subcutaneous, Q6H  insulin regular, 8 Units, Subcutaneous, Q6H  ipratropium-albuterol, 3 mL, Nebulization, 4x Daily - RT  levothyroxine, 50 mcg, Nasogastric, Daily  pantoprazole, 40 mg, Intravenous, Q24H  pharmacy consult - MTM, , Not Applicable, Daily  piperacillin-tazobactam, 4.5 g, Intravenous, Q8H  prasugrel, 10 mg, Nasogastric, Daily  QUEtiapine, 25 mg, Nasogastric, Nightly  senna-docusate sodium, 2 tablet, Nasogastric, BID  vancomycin (dosing per levels), , Not Applicable, Daily  vancomycin, 1,000 mg, Intravenous, Once      Continuous Infusions:amiodarone, 1 mg/min, Last Rate: 1 mg/min (07/24/25 0422)  dexmedetomidine, 0.2-1.5 mcg/kg/hr, Last Rate: Stopped (07/21/25 2200)  fentanyl 10 mcg/mL,  mcg/hr, Last Rate: Stopped (07/24/25 0400)  Pharmacy to dose vancomycin,   phenylephrine, 0.5-3 mcg/kg/min, Last Rate: Stopped (07/24/25 0315)      PRN Meds:.  acetaminophen    artificial tears    senna-docusate sodium **AND** polyethylene glycol **AND** [DISCONTINUED] bisacodyl **AND** bisacodyl    Calcium Replacement - Follow Nurse / BPA Driven Protocol    dextrose    fentaNYL    glucagon (human recombinant)    hydrALAZINE    ipratropium-albuterol    Magnesium Cardiology Dose Replacement - Follow Nurse / BPA Driven Protocol    midazolam    nitroglycerin    [DISCONTINUED] ondansetron ODT **OR** ondansetron    Pharmacy to dose vancomycin    Phosphorus Replacement - Follow Nurse / BPA Driven Protocol    Potassium Replacement - Follow Nurse / BPA Driven Protocol    sodium chloride    Intake/Ouptut 24 hrs (0701 - 0700)   I&O's  "Reviewed: yes  Intake & Output (last day)         07/23 0701  07/24 0700 07/24 0701 07/25 0700    I.V. (mL/kg) 1724.3 (23.7)     Other 30     NG/GT 1112     IV Piggyback 173     Total Intake(mL/kg) 3039.3 (41.8)     Urine (mL/kg/hr) 1250 (0.7)     Emesis/NG output 10     Drains 215     Total Output 1475     Net +1564.3               No stool recorded    Anthropometrics     Height: Height: 180.3 cm (70.98\")  Last Filed Weight: Weight: 72.7 kg (160 lb 4.4 oz) (07/17/25 0600)  Method: Weight Method: Bed scale  BMI: BMI (Calculated): 22.4    UBW:    Weight      Weight (kg) Weight (lbs) Weight Method   11/25/2024 75.932 kg  167 lb 6.4 oz     3/5/2025 75.66 kg  166 lb 12.8 oz     5/19/2025 75.116 kg  165 lb 9.6 oz     6/18/2025 75.479 kg  166 lb 6.4 oz     6/27/2025 77.1 kg  169 lb 15.6 oz  Bed scale    6/28/2025 67.7 kg  149 lb 4 oz  Bed scale    7/6/2025 77.5 kg  170 lb 13.7 oz  Bed scale    7/7/2025 77.5 kg  170 lb 13.7 oz  Bed scale    7/9/2025 77.5 kg  170 lb 13.7 oz  Bed scale      Weight change: No significant changes    Nutrition Focused Physical Exam     Date: 7/11    Unable to perform due to Hemodynamic instability     Subjective   Reported/Observed/Food/Nutrition Related History:   7/24  During MDR: family came late yesterday.  Stable over-night.  Palliative consult in place.  Off pressors this morning.  Still lots of thick secretions.  Off fentanyl.  Tolerating current EN. Ok to advance to goal rate per intenvisit.  Still with no BM.      7/23  Patient remains intubated and sedated, requiring some pressors.   Cholecystostomy tube placed yesterday.  Output of 430ml documented.  No documented BM since admission. Active bowel regiment: Pericolace BID.  PRN miralax given 7/21. Movantik started 7/14 - 7/16; S/P one dose of Relistor 7/16.  It appears EN has been held since 7/21?  RN reports abdomen pretty soft, so sign of constipation.  Remains critically ill.  Discussion of possible palliative consult to assist " "with GOC.  Overall worse renal status.  Discussed with RN, reports intensvisit would like to start low rate feeding.  Attempting to reach family to discuss POC.     7/21  EN held. LFT's increasing. Cont intubated.     7/17  Remains intubated; now off propofoL and pressers. Constipations persists. KUB from yesterday indicated moderate colonic stool burden - plan for suppository today    7/15  Tolerating change to TF. Remains intubated/sedated; continues on propofoL (16.28) with ongoing presser requirement. Plan for today per MD, wean dopamine/propofoL and start precedex. Movantik initiated, bowel regimen increased.       7/14  Remains intubated/sedated; ongoing presser requirement. +propofoL (18.6). KUB (7/12) improved from 7/8 scan; non-obstructive bowel gas pattern. EN advanced per MD yesterday. LBM 7/8 - may increase bowel regimen.     7/11  Pt intubated/sedated on MV; +propofoL. Requiring pressers x1. H/H trending down. Noted to have possible mild ileus on KUB. AST/ALT remain elevated. NKFA    Needs Assessment   Date: 7/11; Reassessed: 7/14, 7/15, 7/17, 7/21,7/23    Height used:Height: 180.3 cm (70.98\")  Weights used: 77.7kg CBW    Estimated Calorie needs: ~ 1850 Kcal/day  Method: 23-27 Kcals/KG = 7849-9070  Method:  MSJ (1509) x 1.2 = 1811  Method:  PSU = 1850  Ve: 11.6; Tmax: 37.2    (7/14) REE = 1524    Estimated Protein needs: ~ 87 g PRO/day w/ current renal fxn  Method: 1.0-1.2g/Kg CBW = 73-87    Estimated Fluid needs: ~ ml/day   Per clinical status    Current Nutrition Prescription     PO: NPO Diet NPO Type: Tube Feeding  Oral Nutrition Supplement: N/A  Intake: N/A    EN: [employer] Renal  Goal Rate: 15ml/hr  Water Flushes: 50mL/hr  Modular: None  Route: ND per KUB 7/12  Tube: Small bore    At goal over: 20Hrs/day     Rx will supply:   Goal Volume 300 mL/day     Flush Volume 1000 mL/day     Energy 600 Kcal/day 33 % Est Need as of 7/17   Protein 27 g/day 31 % Est Need   Fiber 0 g/day     Water in   mL  "    Total Water 1216 mL     Meet DRI No         --------------------------------------------------------------------------  Product/Rate verified at bedside: Yes  Infusing Rate at time of visit: 15ml; FWF @ 50ml/hr     Average Delivery from [facility] Day:   Volume 604 mL/day   % Goal Vol.   Flush Volume  mL/day     Energy  Kcal/day  % Est Need as of    Protein  g/day  % Est Need   Fiber  g/day     Water in  EN  mL     Total Water  mL     Meet DRI No          Assessment & Plan   Nutrition Diagnosis   Date:  Updated: ,   Problem Inadequate oral intake    Etiology ARF/MV; Altered GI function; Hemodynamic instability    Signs/Symptoms NPO; cholecystitis; pressor support; constipation    Status: Active EN order in place, but currently on hold    Goal:   Nutrition to support treatment and Adjust EN as appropriate      Nutrition Intervention      Follow treatment progress, Care plan reviewed, EN rec prepared    Currently off pressors   Still no BM since admission, active bowel regiment. Abdomen soft.  Cholecystectomy tube remains in place.  Next Pre-Ablumin, CRP .  Monitor trend.     Electrolytes stable.  No need for electrolytes restricted formula  Will order the following:  Peptamen 1.5 @ 15ml/hr.  Advance by 10ml Q6hrs to goal rate 62ml/hr.  TGV 1240ml.  Water flushes @ 50ml/hr   This will provide: 1860kcals (100% est needs), 84g PRO (97% est needs), 955ml wafter from EN + 1000ml water flushes.       Monitoring/Evaluation:   Per protocol, I&O, Pertinent labs, EN delivery/tolerance, Weight, GI status, Symptoms, POC/GOC, Hemodynamic stability  Dayami Martinez RD  Time Spent: 45min

## 2025-07-25 NOTE — PROGRESS NOTES
"Patient Name:  Christo Clifton  YOB: 1955  [id number]    Surgery Progress Note    Date of visit: 7/25/2025    Subjective   Subjective: Stable overnight. Remains ventilated.         Objective     Objective:     /65 (BP Location: Right arm, Patient Position: Lying)   Pulse 78   Temp 99.5 °F (37.5 °C) (Esophageal)   Resp 20   Ht 180.3 cm (70.98\")   Wt 72.7 kg (160 lb 4.4 oz)   SpO2 100%   BMI 22.36 kg/m²     Intake/Output Summary (Last 24 hours) at 7/25/2025 0751  Last data filed at 7/25/2025 0600  Gross per 24 hour   Intake 2880.12 ml   Output 1785 ml   Net 1095.12 ml       CV:  Rhythm  regular and rate regular   L:  Rhonchi to auscultation bilaterally   Abd:  Bowel sounds positive , soft, cholecystostomy with bilious output.  Ext:  No cyanosis, clubbing, edema    Recent labs that are back at this time have been reviewed.            Assessment/ Plan:    Acalculous cholecystitis- Stable after cholecystostomy tube placement. Continue ICU care. Family discussion scheduled for tomorrow.      Problem List Items Addressed This Visit          Cardiac and Vasculature    Ischemic cardiomyopathy    Relevant Medications    nitroglycerin (NITROSTAT) SL tablet 0.4 mg    prasugrel (EFFIENT) tablet 10 mg    clopidogrel (PLAVIX) 75 MG tablet    phenylephrine (NATASHA-SYNEPHRINE) 50 mg in sodium chloride 0.9 % 250 mL infusion    norepinephrine (LEVOPHED) 8 mg in 250 mL NS infusion (premix)    Other Relevant Orders    Insert Arterial Line (Completed)    Acute HFrEF (heart failure with reduced ejection fraction) - Primary    Relevant Medications    nitroglycerin (NITROSTAT) SL tablet 0.4 mg    prasugrel (EFFIENT) tablet 10 mg    clopidogrel (PLAVIX) 75 MG tablet    phenylephrine (NATASHA-SYNEPHRINE) 50 mg in sodium chloride 0.9 % 250 mL infusion    norepinephrine (LEVOPHED) 8 mg in 250 mL NS infusion (premix)    Other Relevant Orders    Insert Arterial Line (Completed)        Active Hospital Problems    Diagnosis  POA "    **Third degree AV block [I44.2]  Yes    TOOTIE (acute kidney injury) [N17.9]  Yes    Lactic acidosis [E87.20]  Yes    Acute cystitis without hematuria [N30.00]  Yes    Ischemic cardiomyopathy [I25.5]  Yes    Acute HFrEF (heart failure with reduced ejection fraction) [I50.21]  Yes    Shock liver [K72.00]  Yes    Complete heart block [I44.2]  Yes    CAD (coronary artery disease) [I25.10]  Yes      Resolved Hospital Problems    Diagnosis Date Resolved POA    ASCVD (arteriosclerotic cardiovascular disease) [I25.10] 07/09/2025 Yes              Dipesh Rock MD  7/25/2025  07:51 EDT

## 2025-07-25 NOTE — PLAN OF CARE
Goal Outcome Evaluation:  Plan of Care Reviewed With: other (see comments) (pt unable to participate)        Progress: no change  Outcome Evaluation: Pt seen at 1020; opened eyes briefly to voice, otherwise dozing, more calm than yesterday's encounter, no observable signs of discomfort. Family conference with Palliative APRN tomorrow afternoon.     0930: Palliative IDT discussion: MIKO VARGAS, RN,   After hours, weekends and holidays, contact Palliative Provider by calling [phone]       Problem: Palliative Care  Goal: Enhanced Quality of Life  Outcome: Progressing  Intervention: Maximize Comfort  Flowsheets (Taken 7/25/2025 1234)  Pain Management Interventions: (interventions for discomfort managed by unit staff) other (see comments)  Intervention: Optimize Function  Flowsheets (Taken 7/25/2025 1234)  Sleep/Rest Enhancement: awakenings minimized  Intervention: Optimize Psychosocial Wellbeing  Flowsheets (Taken 7/25/2025 1234)  Family/Support System Care: (tomorrow afternoon) family care conference arranged

## 2025-07-25 NOTE — PROGRESS NOTES
"Neurology       Patient Care Team:  Lesa Rae APRN as PCP - General (Nurse Practitioner)  Phong Whiting MD as Consulting Physician (Neurosurgery)    Chief complaint: Altered mental status    History: Patient is essentially unchanged.    He is moving back to bed with his head but nothing else.    He is not following commands.      Past Medical History:   Diagnosis Date    Anxiety and depression     Arthritis     COPD (chronic obstructive pulmonary disease)     Coronary artery disease     Diabetes mellitus     Disease of thyroid gland     Dyslipidemia     History of transfusion     Hypertension     Seizure disorder     Pt states last seizure x1 mo.       Vital Signs   Vitals:    07/25/25 0825 07/25/25 0832 07/25/25 0900 07/25/25 0955   BP:    116/66   BP Location:       Patient Position:    Lying   Pulse: 78 77 75 74   Resp: 20   20   Temp:       TempSrc:       SpO2: 100% 100% 100% 100%   Weight:       Height: 180.3 cm (70.98\")          Physical Exam:   General: Eyes open              Neuro: Moving his head and his arm on the left.    Not following command    Results Review:  18,000    Sodium 151  Results from last 7 days   Lab Units 07/25/25  0429   WBC 10*3/mm3 18.03*   HEMOGLOBIN g/dL 8.7*   HEMATOCRIT % 29.2*   PLATELETS 10*3/mm3 165     Results from last 7 days   Lab Units 07/25/25  0513 07/24/25  1640 07/24/25  0337 07/23/25  1127 07/23/25  0338   SODIUM mmol/L 151*  --  151*  --  153*   POTASSIUM mmol/L 3.9 3.5 3.6   < > 3.7   CHLORIDE mmol/L 122*  --  121*  --  121*   CO2 mmol/L 20.0*  --  19.0*  --  17.3*   BUN mg/dL 62.2*  --  73.5*  --  79.7*   CREATININE mg/dL 1.56*  --  1.78*  --  1.61*   CALCIUM mg/dL 8.4*  --  8.7  --  8.7   BILIRUBIN mg/dL 0.7  --  0.7  --  0.9   ALK PHOS U/L 97  --  103  --  114   ALT (SGPT) U/L 319*  --  514*  --  658*   AST (SGOT) U/L 82*  --  170*  --  319*   GLUCOSE mg/dL 149*  --  204*  --  102*    < > = values in this interval not displayed.       Imaging Results " (Last 24 Hours)       Procedure Component Value Units Date/Time    XR Chest 1 View [[id number]] Collected: 07/25/25 0715     Updated: 07/25/25 0723    Narrative:      XR CHEST 1 VW    Date of Exam: 7/25/2025 3:59 AM EDT    Indication: while intubate    Comparison: 7/24/2025    Findings:  Patient is rotated to the left. There appear to be superimposed shadows of a feeding tube and NG tube. It is difficult to determine if the ET tube is still present, but no evidence of high or low position of the ET tube is seen. Right IJ catheter tip   lies in the proximal SVC. Heart is enlarged. Vasculature is cephalized. Diffuse pulmonary interstitial disease pattern has changing appearance, increased in the right base, improved in the left base. There appear to be small pleural effusions. Multiple   skin fold shadows are again seen superimposed over the right chest. No pneumothorax is identified.      Impression:      Impression:    1. ET tube marker difficult to identify, either withdrawn or superimposed over the feeding tube and NG tube on today's study. If ET tube is still present, consider repeat radiograph with the patient in as close to straight AP position as possible.    2. Changing pattern of pulmonary interstitial disease, increased in the right base, improved on the left.    3. Multiple skinfold shadows superimposed on the right chest. No evidence of pneumothorax.      Electronically Signed: Grayson Hanson MD    7/25/2025 7:20 AM EDT    Workstation ID: [id number]            Assessment:  Likely evolving vegetative state    Plan:  Continue current support    Comment:  Poor prognosis         I discussed the patient's findings and my recommendations with primary care team    Martin Momin MD  07/25/25  12:15 EDT

## 2025-07-25 NOTE — PROGRESS NOTES
Pulmonary/Critical Care Follow-up     LOS: 16 days   Patient Care Team:  Lesa Rae APRN as PCP - General (Nurse Practitioner)  Given, Phong AGUILAR MD as Consulting Physician (Neurosurgery)    Chief Complaint: Postcardiac arrest, cardiogenic shock      Subjective     History reviewed and updated in EMR as indicated.    Interval History:     No neurologic improvement on my exam today.  Tmax was 100 Fahrenheit.  Patient is unable to relay any history.  Remains off of pressors.    History taken from: Chart, staff    PMH/FH/Social History were reviewed and updated appropriately in the electronic medical record.     Review of Systems:    Review of 14 systems was completed with positives and pertinent negatives noted in the subjective section.  All other systems reviewed and are negative.   Exceptions are noted below:    Unable to obtain secondary to endotracheal intubation/altered mental status.      Objective     Vital Signs  Temp:  [98.2 °F (36.8 °C)-100 °F (37.8 °C)] 99.7 °F (37.6 °C)  Heart Rate:  [68-87] 82  Resp:  [20-24] 24  BP: ()/(48-85) 127/71  Arterial Line BP: ()/(37-64) 111/47  FiO2 (%):  [35 %] 35 %  07/24 0701 - 07/25 0700  In: 2880.1 [I.V.:1243.1]  Out: 1785 [Urine:1475; Drains:310]  Body mass index is 22.36 kg/m².  Mode: VC+/AC  FiO2 (%):  [35 %] 35 %  S RR:  [20] 20  S VT:  [530 mL] 530 mL  PEEP/CPAP (cm H2O):  [5 cm H20] 5 cm H20  MAP (cm H2O):  [11-15] 15  IV drips:  amiodarone, Last Rate: 0.5 mg/min (07/25/25 1217)  dexmedetomidine, Last Rate: Stopped (07/21/25 2200)  fentanyl 10 mcg/mL, Last Rate: Stopped (07/24/25 0400)  norepinephrine, Last Rate: Stopped (07/24/25 2205)  Pharmacy to dose vancomycin  phenylephrine, Last Rate: Stopped (07/24/25 0315)       Physical Exam:     Constitutional:   On ventilator, mildly agitated   Head:   Normocephalic, atraumatic   Eyes:           Lids and lashes normal, conjunctivae and sclerae normal.  PER.  Upward gaze.   ENMT:  Ears appear intact with  no abnormalities noted     Lips normal.  Oral endotracheal tube in place.   Neck:  Trachea midline, no JVD   Lungs/Resp:    On ventilator, symmetric chest rise, no crepitus, mild rhonchi bilaterally.               Heart/CV:   Regular rate and rhythm, no murmur   Abdomen/GI:    Soft, nontender, nondistended   :    Deferred   Extremities/MSK:  No clubbing or cyanosis.  No edema.     Pulses:  Pulses palpable and equal bilaterally   Skin:  No bleeding, bruising or rash   Heme/Lymph:  No cervical or supraclavicular adenopathy.   Neurologic:    Psychiatric:    Moves all extremities.  Does not follow commands.    Mildly agitated    The above physical exam findings were reviewed and reflect my exam findings as of today's exam.   Electronically signed by:  David Mccloud MD  07/25/25  16:15 EDT      Results Review:     I reviewed the patient's new clinical results.   Results from last 7 days   Lab Units 07/25/25  1414 07/25/25  0513 07/24/25  1640 07/24/25  0337 07/23/25  1127 07/23/25  0338   SODIUM mmol/L  --  151*  --  151*  --  153*   POTASSIUM mmol/L 3.9 3.9 3.5 3.6   < > 3.7   CHLORIDE mmol/L  --  122*  --  121*  --  121*   CO2 mmol/L  --  20.0*  --  19.0*  --  17.3*   BUN mg/dL  --  62.2*  --  73.5*  --  79.7*   CREATININE mg/dL  --  1.56*  --  1.78*  --  1.61*   CALCIUM mg/dL  --  8.4*  --  8.7  --  8.7   BILIRUBIN mg/dL  --  0.7  --  0.7  --  0.9   ALK PHOS U/L  --  97  --  103  --  114   ALT (SGPT) U/L  --  319*  --  514*  --  658*   AST (SGOT) U/L  --  82*  --  170*  --  319*   GLUCOSE mg/dL  --  149*  --  204*  --  102*    < > = values in this interval not displayed.     Results from last 7 days   Lab Units 07/25/25  0429 07/24/25  0337 07/23/25  0338   WBC 10*3/mm3 18.03* 28.07* 32.09*   HEMOGLOBIN g/dL 8.7* 8.9* 9.5*   HEMATOCRIT % 29.2* 29.8* 31.8*   PLATELETS 10*3/mm3 165 245 324     Results from last 7 days   Lab Units 07/22/25  2225   PH, ARTERIAL pH units 7.450   PO2 ART mm Hg 214.0*   PCO2, ARTERIAL  mm Hg 28.1*   HCO3 ART mmol/L 19.5*     Results from last 7 days   Lab Units 07/25/25  0513 07/22/25  2101 07/22/25  0351 07/19/25  0408   MAGNESIUM mg/dL  --  4.8* 2.6* 2.6*   PHOSPHORUS mg/dL 2.6 3.8  --   --        I reviewed the patient's new imaging including images and reports.    Chest x-ray 7/25/2025 shows mild pulmonary congestion and basilar atelectasis.    Chest x-ray 7/23/2025 shows borderline cardiomegaly and pulmonary congestion with no definite pneumothorax.  Endotracheal tube in place, right internal jugular catheter in place.    CT abdomen pelvis 7/21/2025 reviewed and shows constipation and enlarged gallbladder.  Radiologist's impression follows:    Impression:     1. Practically complete lower lobe atelectasis, and moderate right lower lobe atelectasis. Moderate to large pleural effusions, incompletely included on this study.     2. Distended gallbladder, but no definite gallbladder inflammatory changes.     3. Relatively atrophic left kidney. Heterogeneous appearance of the left renal pelvis, possibly artifactual. Debris and tumor are both considerations.     4. Suspected mild colonic ileus. Minimal if any right colon mucosal thickening and no apparent large or small bowel inflammation.     5. Large bladder tumor increased from 2/20/2025, and small debris or hematocrit level in the dependent portion of the bladder.     6. Subtle, irregular shaped approximately 2.4 x 1.3 cm right lobe liver lesion nonspecific.       Chest x-ray 7/19/2025 shows cardiomegaly with bilateral mild basilar airspace opacities possibly infiltrate versus atelectasis and small left pleural effusion.    Medication Review:   aspirin, 81 mg, Nasogastric, Daily  [Held by provider] atorvastatin, 80 mg, Oral, Daily  budesonide, 0.5 mg, Nebulization, BID - RT  escitalopram, 10 mg, Nasogastric, Daily  heparin (porcine), 5,000 Units, Subcutaneous, Q8H  insulin glargine, 20 Units, Subcutaneous, Daily  insulin regular, 4-24 Units,  Subcutaneous, Q6H  insulin regular, 8 Units, Subcutaneous, Q6H  ipratropium-albuterol, 3 mL, Nebulization, 4x Daily - RT  levothyroxine, 50 mcg, Nasogastric, Daily  pantoprazole, 40 mg, Intravenous, Q24H  pharmacy consult - MTM, , Not Applicable, Daily  piperacillin-tazobactam, 4.5 g, Intravenous, Q8H  prasugrel, 10 mg, Nasogastric, Daily  QUEtiapine, 25 mg, Nasogastric, Nightly  senna-docusate sodium, 2 tablet, Nasogastric, BID  [START ON 7/26/2025] vancomycin, 1,000 mg, Intravenous, Q24H      amiodarone, 0.5 mg/min, Last Rate: 0.5 mg/min (07/25/25 1217)  dexmedetomidine, 0.2-1.5 mcg/kg/hr, Last Rate: Stopped (07/21/25 2200)  fentanyl 10 mcg/mL,  mcg/hr, Last Rate: Stopped (07/24/25 0400)  norepinephrine, 0.02-0.3 mcg/kg/min, Last Rate: Stopped (07/24/25 2205)  Pharmacy to dose vancomycin,   phenylephrine, 0.5-3 mcg/kg/min, Last Rate: Stopped (07/24/25 0315)        Assessment & Plan         Third degree AV block    CAD (coronary artery disease)    TOOTIE (acute kidney injury)    Lactic acidosis    Acute cystitis without hematuria    Ischemic cardiomyopathy    Acute HFrEF (heart failure with reduced ejection fraction)    Shock liver    Complete heart block    70 y.o. active smoker with 90-pack-year history with history of HTN, HLD, T2DM, COPD, hypothyroidism, possible history of seizures, anxiety/depression, admitted at [facility] 6/27-28/2025 with left heart cath showing 2 occluded vein grafts and PCI to left circumflex with stent placement.  He re-presented to [facility] ED on 7/6/2025 with chest pain and was admitted with NSTEMI and had repeat C 7/7/2025 with in-stent thrombosis felt to be secondary to medical noncompliance.  Balloon angioplasty was performed with deployment of overlapping stent just distal to the previous stent.  Echo obtained 24 hours later showed LVEF of 30 to 35% with grade 2 diastolic dysfunction.    Postoperative course was complicated by renal failure.    On the morning of 7/9, he developed  third-degree AV block and was given atropine secondary to bradycardia.  Attempts were made at transcutaneous pacing and patient became combative and 1 mg of Versed was given.  He developed asystole on monitor and became unresponsive and had a brief ACLS with ROSC.  Following this the patient was back in third-degree AV block and was agonal he breathing and was intubated.  He was initiated on transcutaneous pacing.    After this, cardiology was called who felt the patient needed a repeat LHC due to possible failure of stent.  He underwent transvenous pacemaker placement on 7/9/2025 and repeat LHC showed patent stent in the proximal left circumflex artery with diffuse coronary artery vasospasm distal to the stent.  Intra-aortic balloon pump was placed.  [facility] was contacted and he was transferred on 7/9/2025.    At admission here, patient was noted to have worsening renal failure and elevated LFTs suggestive of shock liver.  He was started on dopamine with one-to-one support on balloon pump.  Balloon pump was discontinued on 7/12.  He remains encephalopathic.    Patient seemed to be following limited commands a few days ago but now is not following any commands.      Patient was having some hemodynamic instability with A-fib with RVR and hypotension requiring phenylephrine.  Enlarged gallbladder on CT scan and ultrasound concerning for acalculous cholecystitis in the setting of elevated white blood cell count and concern for infection.  I consulted general surgery who recommended a percutaneous drain and this was done on 7/22/2025.  This significant improvement.    V-fib arrest overnight on 7/22-23/2025 with cardioversion x 2.  Patient remains on amiodarone.    I spoke to the patient's son on 7/23/2025 and discussed palliative care versus aggressive care with likely tracheostomy/PEG and rehab placement.  Patient's son is okay with palliative care consultation and I consult with them.  Tentative plan  is for family meeting on Saturday.  Patient is still not making any neurologic improvement.    Plan:  1.  For AMS/encephalopathy: Unclear etiology.  No improvement.  Neurology following.  MRI with old stroke.  He has been off of sedatives.    2.  For respiratory failure: Continue mechanical ventilation.  Wean settings as tolerated.  Not ready for extubation secondary to mental status issues and hemodynamic instability.    3.  For possible UTI/ID: Cultures on 7/8 grew coag negative staph and he completed Rocephin on 7/13.  Started on Zosyn empirically on 7/18 secondary to new fevers.  Continue Zosyn for now and follow-up cultures.  Blood culture 7/18/2025 showed coag negative staph likely contaminant.  Repeated respiratory culture.  4.  For hypothyroidism: Continue levothyroxine.  5.  For TOOTIE: Stable stable.  Watch fluid balance and labs.    6.  For congestive heart failure/arrhythmias: Cardiology/EP following.  7.  For shock: Mainly cardiogenic.  Likely also had critical illness related corticosteroid insufficiency and was started on hydrocortisone which was weaned and ultimately stopped on 7/15/2025.  Watch lactic acid level.  8.  For enlarged gallbladder/concern for acalculous cholecystitis/elevated LFTs: Possibly could be due to shock liver.  With enlarged gallbladder and concern for acalculous cholecystitis, consulted general surgery who recommended cholecystostomy drain.  This was done by IR on 7/22/2025.  Culture with no growth to date.  Continue drainage.  White blood cell count improved.  Extend antibiotics for 10 days total currently Zosyn.  9.  For possible bladder tumor on CT abdomen: Further evaluation after acute issues are managed.    Patient is critically ill secondary to multiorgan failure and has high risk of life-threatening decompensation in condition.   As such, the patient requires continuous monitoring and frequent reassessment for consideration of adjustment in management to minimize this  risk.  I personally reassessed the patient multiple times today.    Critical care time : 31 minutes spent by me personally and independently.(This excludes time spent performing separately reportable procedures and services).  Critical care time includes high complexity decision making to assess, manipulate, and support vital organ system failure in this individual who has impairment of one or more vital organ systems such that there is a high probability of imminent or life threatening deterioration in the patient’s condition.    I spoke with Dr. Momin with neurology.    Tentative plan for family conference tomorrow with palliative care.    Electronically signed by:    David Mccloud MD  07/25/25  16:15 EDT      *. Please note that portions of this note were completed with [employer] - a voice recognition program.

## 2025-07-25 NOTE — PROGRESS NOTES
"[facility] at [facility]  IP Progress Note      Chief Complaint: Follow-up of CAD/non-STEMI/shock/cardiac arrhythmia    Subjective   Status quo, no new events.  No more significant arrhythmias, remains on amiodarone infusion.  Has not required any more vasopressors.  Was seen by palliative care and they have meeting scheduled with family for Saturday afternoon.  Objective     Blood pressure 116/65, pulse 78, temperature 99.5 °F (37.5 °C), temperature source Esophageal, resp. rate 20, height 180.3 cm (70.98\"), weight 72.7 kg (160 lb 4.4 oz), SpO2 100%.     Intake/Output Summary (Last 24 hours) at 7/25/2025 0726  Last data filed at 7/25/2025 0600  Gross per 24 hour   Intake 2880.12 ml   Output 1785 ml   Net 1095.12 ml       Physical Exam:  General: Intubated/sedated.  Agitated at times, does not follow commands  Neck: no JVD.  Chest:No respiratory distress, breath sounds are clear anteriorly, scattered rhonchi.  Cardiovascular: Normal S1 and S2, no murmur, gallop or rub.    Extremities: No edema.      Results Review:     I reviewed the patient's new clinical results.    Results from last 7 days   Lab Units 07/25/25  0429   WBC 10*3/mm3 18.03*   HEMOGLOBIN g/dL 8.7*   HEMATOCRIT % 29.2*   PLATELETS 10*3/mm3 165     Results from last 7 days   Lab Units 07/25/25  0513   SODIUM mmol/L 151*   POTASSIUM mmol/L 3.9   CHLORIDE mmol/L 122*   CO2 mmol/L 20.0*   BUN mg/dL 62.2*   CREATININE mg/dL 1.56*   CALCIUM mg/dL 8.4*   BILIRUBIN mg/dL 0.7   ALK PHOS U/L 97   ALT (SGPT) U/L 319*   AST (SGOT) U/L 82*   GLUCOSE mg/dL 149*     Results from last 7 days   Lab Units 07/25/25  0513   SODIUM mmol/L 151*   POTASSIUM mmol/L 3.9   CHLORIDE mmol/L 122*   CO2 mmol/L 20.0*   BUN mg/dL 62.2*   CREATININE mg/dL 1.56*   GLUCOSE mg/dL 149*   CALCIUM mg/dL 8.4*     Results from last 7 days   Lab Units 07/22/25  2101 07/22/25  0351   INR  1.65* 1.46*     Lab Results   Component Value Date    TROPONINT 5,540 (C) " 07/23/2025     aspirin, 81 mg, Nasogastric, Daily  [Held by provider] atorvastatin, 80 mg, Oral, Daily  budesonide, 0.5 mg, Nebulization, BID - RT  escitalopram, 10 mg, Nasogastric, Daily  heparin (porcine), 5,000 Units, Subcutaneous, Q8H  insulin glargine, 20 Units, Subcutaneous, Daily  insulin regular, 4-24 Units, Subcutaneous, Q6H  insulin regular, 8 Units, Subcutaneous, Q6H  ipratropium-albuterol, 3 mL, Nebulization, 4x Daily - RT  levothyroxine, 50 mcg, Nasogastric, Daily  pantoprazole, 40 mg, Intravenous, Q24H  pharmacy consult - MTM, , Not Applicable, Daily  piperacillin-tazobactam, 4.5 g, Intravenous, Q8H  potassium chloride, 20 mEq, Oral, Once  prasugrel, 10 mg, Nasogastric, Daily  QUEtiapine, 25 mg, Nasogastric, Nightly  senna-docusate sodium, 2 tablet, Nasogastric, BID  vancomycin (dosing per levels), , Not Applicable, Daily         Tele: Sinus Rythym      Assessment:  Acute HFrEF, ischemic cardiomyopathy cardiogenic shock, EF 30-35%. Initially required IABP, now removed  Cardiogenic shock with multisystem involvement, slowly improving, IABP initially which was subsequently removed.  Currently on low-dose Hernandez-Synephrine.  CAD status post PCI/BRITT to circumflex, documented diffuse distal LCx vasospasm  Paroxysmal atrial fibrillation, currently in sinus rhythm.    Pulseless ventricular tachycardia requiring brief CPR and defibrillation, currently in sinus rhythm   Third-degree heart block/cardiac arrest  No AVN blocking agents prior to admission  Temp transvenous pacer intact  On Dopamine gtt  Not requiring pacing currently  EP consulted -continue to monitor  Pacing wire removed   T2DM  TOOTIE/resolved  Baseline creatinine 1.0-1.2  Peak creatinine 3.44 on 7/9/2025  Creatinine 1. 56 today  9.  Hypernatremia.  10. Anoxic encephalopathy    Plan:  Continue current management and supportive care.  Continue aspirin and prasugrel for dual antiplatelet therapy and continue current dose of IV amiodarone.  Due to  unstable hemodynamics and recurrent need for vasopressors we will hold off optimizing GDMT for now, due to no significant recovery of brain function and encephalopathy is prognosis remains poor.  Will wait for palliative care meeting with family tomorrow and continue to address his medical management accordingly.    Juanita George MD, FACC, [facility]\]

## 2025-07-25 NOTE — CASE MANAGEMENT/SOCIAL WORK
Continued Stay Note  [facility]     Patient Name: Christo Clifton  MRN: [id number]  Today's Date: 7/25/2025    Admit Date: 7/9/2025    Plan: Ongoing   Discharge Plan       Row Name 07/25/25 1012       Plan    Plan Ongoing    Patient/Family in Agreement with Plan unable to assess    Plan Comments Discussed in MDR today. Palliative is having a family meeting with the patient's children on Saturday. Plan is ongoing pending the meeting. CM will continue to follow.    Final Discharge Disposition Code 01 - home or self-care                   Discharge Codes    No documentation.                       Ruperto Celeste RN

## 2025-07-25 NOTE — PROGRESS NOTES
Pharmacy Consult - Vancomycin Dosing and Monitoring (Renal Dysfunction / Dialysis)    Christo Clifton is a 70 y.o. male receiving vancomycin therapy.     Indication: Bacteremia, Empiric  Consulting Provider: Intensivist  ID Consult: No    Goal Trough (if pulse dosing): 15-20 mcg/mL  Goal AUC: 400-600 mg/L*hr     Current Antimicrobial Therapy  Anti-Infectives (From admission, onward)      Ordered     Dose/Rate Route Frequency Start Stop    07/25/25 0959  piperacillin-tazobactam (ZOSYN) 4.5 g IVPB in 100 mL NS MBP (CD)        Ordering Provider: David Mccloud MD    4.5 g  over 4 Hours Intravenous Every 8 Hours 07/25/25 1800 07/28/25 1759    07/25/25 0731  vancomycin (VANCOCIN) 1,000 mg in sodium chloride 0.9 % 250 mL IVPB-VTB        Ordering Provider: Duglas Ferguson, MillieD    1,000 mg  250 mL/hr over 60 Minutes Intravenous Once 07/25/25 1000 07/25/25 1049    07/24/25 0721  vancomycin (VANCOCIN) 1,000 mg in sodium chloride 0.9 % 250 mL IVPB-VTB        Ordering Provider: Kareem Portillo RPH    1,000 mg  250 mL/hr over 60 Minutes Intravenous Once 07/24/25 0830 07/24/25 1058    07/23/25 0754  vancomycin (VANCOCIN) 1,000 mg in sodium chloride 0.9 % 250 mL IVPB-VTB        Ordering Provider: Kareem Portillo RPH    1,000 mg  250 mL/hr over 60 Minutes Intravenous Once 07/23/25 0900 07/23/25 0931    07/22/25 1008  vancomycin (dosing per levels)        Ordering Provider: Kareem Portillo RPH     Not Applicable Daily 07/22/25 1100 07/29/25 0859    07/22/25 1008  vancomycin IVPB 1750 mg in 0.9% Sodium Chloride (premix) 500 mL        Ordering Provider: Kareem Portillo RPH    25 mg/kg × 72.7 kg  285.7 mL/hr over 105 Minutes Intravenous Once 07/22/25 1100 07/22/25 1353    07/22/25 1004  Pharmacy to dose vancomycin        Ordering Provider: David Mccloud MD     Not Applicable Continuous PRN 07/22/25 1004 07/29/25 1003    07/21/25 1243  piperacillin-tazobactam (ZOSYN) 4.5 g IVPB in 100 mL NS MBP (CD)        Ordering Provider:  "David Mccloud MD    4.5 g  over 4 Hours Intravenous Every 8 Hours 07/21/25 1800 07/25/25 1759    07/18/25 1131  piperacillin-tazobactam (ZOSYN) 3.375 g IVPB in 100 mL NS MBP (CD)  Status:  Discontinued        Ordering Provider: Olayinka Guillen MD    3.375 g  over 4 Hours Intravenous Every 8 Hours 07/18/25 1800 07/21/25 1243    07/18/25 1131  piperacillin-tazobactam (ZOSYN) 3.375 g IVPB in 100 mL NS MBP (CD)        Ordering Provider: Olayinka Guillen MD    3.375 g  over 30 Minutes Intravenous Once 07/18/25 1200 07/18/25 1256    07/09/25 1957  cefTRIAXone (ROCEPHIN) 2,000 mg in sodium chloride 0.9 % 100 mL IVPB-VTB        Ordering Provider: Chandrakant Jones MD    2,000 mg  200 mL/hr over 30 Minutes Intravenous Every 24 Hours 07/10/25 0330 07/14/25 0330          Allergies  Allergies as of 07/09/2025 - Reviewed 07/09/2025   No Active Allergies   Allergen Reaction Noted    Other  06/23/2016     Labs  Results from last 7 days   Lab Units 07/25/25  0513 07/24/25  0337 07/23/25  0338   BUN mg/dL 62.2* 73.5* 79.7*   CREATININE mg/dL 1.56* 1.78* 1.61*     Results from last 7 days   Lab Units 07/25/25  0429 07/24/25  0337 07/23/25  0338   WBC 10*3/mm3 18.03* 28.07* 32.09*     Evaluation of Dosing  Last Dose Received in the ED/Outside Facility: No  Is Patient on Dialysis or Renal Replacement: No    Height - 180.3 cm (70.98\")  Weight - 72.7 kg (160 lb 4.4 oz)    Estimated Creatinine Clearance: 44.7 mL/min (A) (by C-G formula based on SCr of 1.56 mg/dL (H)).    Intake & Output (last 3 days)         07/22 0701 07/23 0700 07/23 0701 07/24 0700 07/24 0701 07/25 0700 07/25 0701 07/26 0700    I.V. (mL/kg) 2488.5 (34.2) 1724.3 (23.7) 1243.1 (17.1)     Blood        Other  30 660     NG/GT  1112 417     IV Piggyback 231 173 560 350    Total Intake(mL/kg) 2719.5 (37.4) 3039.3 (41.8) 2880.1 (39.6) 350 (4.8)    Urine (mL/kg/hr) 1675 (1) 1250 (0.7) 1475 (0.8)     Emesis/NG output  10      Drains 430 215 310     Total Output 2105 " 1475 1785     Net +614.5 +1564.3 +1095.1 +350            Urine Unmeasured Occurrence 1 x             Microbiology  Microbiology Results (last 10 days)       Procedure Component Value - Date/Time    Anaerobic Culture - Drainage, Gallbladder [[id number]]  (Normal) Collected: 07/22/25 1419    Lab Status: Preliminary result Specimen: Drainage from Gallbladder Updated: 07/25/25 0746     Anaerobic Culture No anaerobes isolated at 3 days    AFB Culture - Aspirate, Gallbladder [[id number]] Collected: 07/22/25 1419    Lab Status: Preliminary result Specimen: Aspirate from Gallbladder Updated: 07/23/25 1128     AFB Stain No acid fast bacilli seen on concentrated smear    Body Fluid Culture - Body Fluid, Gallbladder [[id number]] Collected: 07/22/25 1419    Lab Status: Final result Specimen: Body Fluid from Gallbladder Updated: 07/25/25 0658     Body Fluid Culture No growth at 3 days     Gram Stain No WBCs or organisms seen    Respiratory Culture - Sputum, ET Suction [[id number]] Collected: 07/20/25 2006    Lab Status: Final result Specimen: Sputum from ET Suction Updated: 07/23/25 1010     Respiratory Culture Light growth (2+) Normal respiratory falguni. No S. aureus or Pseudomonas aeruginosa detected. Final report.     Gram Stain Many (4+) WBCs per low power field      Rare (1+) Epithelial cells per low power field      No organisms seen    Blood Culture - Blood, Hand, Left [[id number]]  (Normal) Collected: 07/20/25 0545    Lab Status: Final result Specimen: Blood from Hand, Left Updated: 07/25/25 1015     Blood Culture No growth at 5 days    Blood Culture - Blood, Arm, Right [[id number]]  (Normal) Collected: 07/20/25 0533    Lab Status: Final result Specimen: Blood from Arm, Right Updated: 07/25/25 1015     Blood Culture No growth at 5 days    Blood Culture - Blood, Arm, Right [[id number]]  (Abnormal) Collected: 07/18/25 1151    Lab Status: Edited Result - FINAL Specimen: Blood from Arm, Right Updated: 07/24/25 0627     Blood Culture  Staphylococcus haemolyticus     Isolated from Aerobic Bottle     Gram Stain Aerobic Bottle Gram positive cocci in clusters    Narrative:      Probable contaminant requires clinical correlation, susceptibility not performed unless requested by physician.    Blood Culture - Blood, Blood, Central Line [[id number]]  (Abnormal) Collected: 07/18/25 1151    Lab Status: Final result Specimen: Blood, Central Line Updated: 07/24/25 0627     Blood Culture Staphylococcus epidermidis     Isolated from Anaerobic Bottle     Gram Stain Anaerobic Bottle Gram positive cocci in clusters    Narrative:      Probable contaminant requires clinical correlation, susceptibility not performed unless requested by physician.      Blood Culture ID, PCR - Blood, Arm, Right [[id number]]  (Abnormal) Collected: 07/18/25 1151    Lab Status: Final result Specimen: Blood from Arm, Right Updated: 07/19/25 2149     BCID, PCR Staph spp, not aureus or lugdunensis. Identification by BCID2 PCR.     BOTTLE TYPE Aerobic Bottle          Vancomycin Levels  Results from last 7 days   Lab Units 07/25/25  0513 07/24/25  0337 07/23/25  0338   VANCOMYCIN RM mcg/mL 15.30 14.40 14.30               [facility] AUC Calculation:    Current AUC: 458 mg/L*hr    Predicted Steady State AUC on Current Dose: 466 mg/L*hr (based on 1 g IV q24h)  _________________________________    Predicted Steady State AUC on New Dose: 466 mg/L*hr  (based on 1 g IV q24h)    Assessment/Plan:  PTD vancomycin 2/2 bacteremia +/- empiric coverage.  Proceed with pulse dosing given TOOTIE w/o return to baseline at this stage. Goal trough level: 15-20 mcg/mL.  Vancomycin random on 7/25 was 15.3 mcg/mL.   7/25 Scr 1.56/CrCl 44.7 ml/min, output improved, will schedule vancomycin 1000 mg IV Q24H and follow renal function closely.    Will order additional levels when clinically appropriate.   Pharmacy team to adjust regimen as necessary & follow cultures & sensitivities, renal function, & clinical  status.    Thank you,  Duglas Ferguson, PharmD  7/25/2025

## 2025-07-25 NOTE — PLAN OF CARE
Goal Outcome Evaluation:                         No active ventilator weaning at this time

## 2025-07-26 NOTE — PROGRESS NOTES
Intensive Care Follow-up     Hospital:  LOS: 17 days   Mr. Christo Clifton, 70 y.o. male is followed for:   Third degree AV block        Subjective     70 y.o. active smoker with 90-pack-year history with history of HTN, HLD, T2DM, COPD, hypothyroidism, possible history of seizures, anxiety/depression, admitted at [facility] [place] 6/27-28/2025 with left heart cath showing 2 occluded vein grafts and PCI to left circumflex with stent placement.  He re-presented to [facility] [place] ED on 7/6/2025 with chest pain and was admitted with NSTEMI and had repeat LHC 7/7/2025 with in-stent thrombosis felt to be secondary to medical noncompliance.  Balloon angioplasty was performed with deployment of overlapping stent just distal to the previous stent.  Echo obtained 24 hours later showed LVEF of 30 to 35% with grade 2 diastolic dysfunction.     Postoperative course was complicated by renal failure.     On the morning of 7/9, he developed third-degree AV block and was given atropine secondary to bradycardia.  Attempts were made at transcutaneous pacing and patient became combative and 1 mg of Versed was given.  He developed asystole on monitor and became unresponsive and had a brief ACLS with ROSC.  Following this the patient was back in third-degree AV block and was agonal he breathing and was intubated.  He was initiated on transcutaneous pacing.     After this, cardiology was called who felt the patient needed a repeat LHC due to possible failure of stent.  He underwent transvenous pacemaker placement on 7/9/2025 and repeat LHC showed patent stent in the proximal left circumflex artery with diffuse coronary artery vasospasm distal to the stent.  Intra-aortic balloon pump was placed.  [facility] was contacted and he was transferred on 7/9/2025.     At admission here, patient was noted to have worsening renal failure and elevated LFTs suggestive of shock liver.  He was started on dopamine with one-to-one support on balloon pump.  Balloon  pump was discontinued on 7/12.  He remains encephalopathic.     Interval History:  The chart has been reviewed.  The patient has remained afebrile overnight.  He is very uncomfortable appearing on the ventilator.  He is however following limited commands for me.  I was able to place him on a pressure support of 15 and he has been generating spontaneous breaths without agitation.  He remains on amiodarone currently.  He has not required pressors.  Chest x-ray has been reviewed and remains clear.  Endotracheal tube is well-positioned.  Her data has been reviewed.    The patient's past medical, surgical and social history were reviewed and updated in Epic as appropriate.        Objective     Infusions:  amiodarone, 0.5 mg/min, Last Rate: 0.5 mg/min (07/26/25 0929)  dexmedetomidine, 0.2-1.5 mcg/kg/hr, Last Rate: Stopped (07/21/25 2200)  fentanyl 10 mcg/mL,  mcg/hr, Last Rate: Stopped (07/24/25 0400)  norepinephrine, 0.02-0.3 mcg/kg/min, Last Rate: Stopped (07/24/25 2205)  Pharmacy to dose vancomycin,   phenylephrine, 0.5-3 mcg/kg/min, Last Rate: Stopped (07/24/25 0315)      Medications:  aspirin, 81 mg, Nasogastric, Daily  [Held by provider] atorvastatin, 80 mg, Oral, Daily  budesonide, 0.5 mg, Nebulization, BID - RT  escitalopram, 10 mg, Nasogastric, Daily  heparin (porcine), 5,000 Units, Subcutaneous, Q8H  insulin glargine, 20 Units, Subcutaneous, Daily  insulin regular, 4-24 Units, Subcutaneous, Q6H  insulin regular, 8 Units, Subcutaneous, Q6H  ipratropium-albuterol, 3 mL, Nebulization, 4x Daily - RT  levothyroxine, 50 mcg, Nasogastric, Daily  pantoprazole, 40 mg, Intravenous, Q24H  pharmacy consult - MTM, , Not Applicable, Daily  piperacillin-tazobactam, 4.5 g, Intravenous, Q8H  prasugrel, 10 mg, Nasogastric, Daily  QUEtiapine, 25 mg, Nasogastric, Nightly  senna-docusate sodium, 2 tablet, Nasogastric, BID  vancomycin, 1,000 mg, Intravenous, Q24H        Vital Sign Min/Max for last 24 hours  Temp  Min: 96.8 °F  "(36 °C)  Max: 99.7 °F (37.6 °C)   BP  Min: 93/50  Max: 140/67   Pulse  Min: 61  Max: 87   Resp  Min: 20  Max: 30   SpO2  Min: 99 %  Max: 100 %   No data recorded       Input/Output for last 24 hour shift  07/25 0701 - 07/26 0700  In: 3345 [I.V.:847]  Out: 1510 [Urine:1200; Drains:310]   Mode: VC+/AC  FiO2 (%):  [35 %] 35 %  S RR:  [20] 20  S VT:  [530 mL] 530 mL  PEEP/CPAP (cm H2O):  [5 cm H20] 5 cm H20  OK SUP:  [15 cm H20] 15 cm H20  MAP (cm H2O):  [12-21] 12  Objective:  General Appearance:  Uncomfortable and ill-appearing.    Vital signs: (most recent): Blood pressure 130/67, pulse 77, temperature 98.6 °F (37 °C), temperature source Oral, resp. rate (!) 30, height 180.3 cm (70.98\"), weight 72.7 kg (160 lb 4.4 oz), SpO2 100%.    HEENT: (Endotracheal tube currently in place.  There was a mild air leak however this was resolved with infusion of 3 mL of air to the cuff.)    Lungs:  Normal effort.  There are decreased breath sounds.  No rales, wheezes or rhonchi.    Heart: Normal rate.  Regular rhythm.  S1 normal and S2 normal.  No murmur.   Abdomen: Abdomen is soft and non-distended.  Hypoactive bowel sounds.     Neurological: (Arousable.  Following some commands though not all.  Only on spontaneous mode.  Seems to moving all 4 extremities equally).    Pupils:  Pupils are equal, round, and reactive to light.  Pupils are equal.   Skin:  Warm.                Results from last 7 days   Lab Units 07/26/25  0428 07/25/25  0429 07/24/25  0337   WBC 10*3/mm3 13.34* 18.03* 28.07*   HEMOGLOBIN g/dL 8.3* 8.7* 8.9*   PLATELETS 10*3/mm3 132* 165 245     Results from last 7 days   Lab Units 07/26/25  0419 07/26/25  0014 07/25/25  1414 07/25/25  0513 07/23/25  0338 07/22/25  2101 07/22/25  0351   SODIUM mmol/L 150* 150*  --  151*   < > 150* 150*   POTASSIUM mmol/L 4.3 4.4 3.9 3.9   < > 4.8 4.2   CO2 mmol/L 17.1* 17.3*  --  20.0*   < > 14.0* 17.9*   BUN mg/dL 52.0* 52.5*  --  62.2*   < > 86.2* 87.4*   CREATININE mg/dL 1.60* 1.56*  " --  1.56*   < > 1.72* 1.65*   MAGNESIUM mg/dL  --   --   --   --   --  4.8* 2.6*   PHOSPHORUS mg/dL  --   --   --  2.6  --  3.8  --    GLUCOSE mg/dL 183* 207*  --  149*   < > 93 104*    < > = values in this interval not displayed.     Estimated Creatinine Clearance: 43.5 mL/min (A) (by C-G formula based on SCr of 1.6 mg/dL (H)).    Results from last 7 days   Lab Units 07/22/25  2225   PH, ARTERIAL pH units 7.450   PCO2, ARTERIAL mm Hg 28.1*   PO2 ART mm Hg 214.0*         I reviewed the patient's results and images.     Assessment & Plan   Impression      Third-degree AV block  Ischemic cardiomyopathy with acute left ventricular failure  Acute kidney injury  Acute respiratory failure     Plan        We will attempt some pressure support trials today.  The patient has been following some commands intermittently.  I do not however feel that he is able to protect his airway currently.  Try pressure support as tolerated today.  Continue with nutritional support.  Monitor renal function closely.  Monitor sodium closely.  Antimicrobial therapy as before.  Overall I feel that the patient's potential for extubation and remaining extubated is a very low and if the family decides to continue on he will require tracheostomy and PEG placement.  This is the first day that I have seen him and he is following minimally though it seems that this has been waxing and waning over the past week or so.  I feel that his likelihood of meaningful recovery is very low with his background of ischemic cardiomyopathy with heart failure, continuing renal failure, and neurological dysfunction.   I believe that my partner has consulted palliative care to help in determining goals of care moving forward with the family.  At the very least, consideration should be made towards making the patient I DO NOT RESUSCITATE when family has arrived as I do not believe that given his current state of health that CPR or ACLS protocols would benefit him in any  way.    Plan of care and goals reviewed with mulitdisciplinary/antibiotic stewardship team during rounds.   I discussed the patient's findings and my recommendations with nursing staff     High level of risk due to:  drug(s) requiring intensive monitoring for toxicity, parenteral controlled substances, and decision to de-escalate care.        Dipesh Sanches MD, [facility]  Pulmonology and Critical Care Medicine

## 2025-07-26 NOTE — NURSING NOTE
[facility] notified of impending terminal extubation spoke to Crystal-released to extubate call wurufus time of death.

## 2025-07-26 NOTE — PROGRESS NOTES
"[facility]  IP Progress Note      Chief Complaint: Follow-up of CAD/non-STEMI/shock/cardiac arrhythmia    Subjective   Remains on ventilator, does not follow commands and just stares, not requiring any more vasopressors.  Rhythm and hemodynamics have been stable  Objective     Blood pressure 124/77, pulse 80, temperature 96.8 °F (36 °C), temperature source Oral, resp. rate 24, height 180.3 cm (70.98\"), weight 72.7 kg (160 lb 4.4 oz), SpO2 100%.     Intake/Output Summary (Last 24 hours) at 7/26/2025 0652  Last data filed at 7/26/2025 0600  Gross per 24 hour   Intake 3345 ml   Output 1510 ml   Net 1835 ml       Physical Exam:  General: Intubated/on ventilator  Neck: no JVD.  Chest:No respiratory distress, breath sounds are clear anteriorly with diminished breath sounds at bases.    Cardiovascular: Normal S1 and S2, distant heart sounds.  Extremities: No significant edema.        Results Review:     I reviewed the patient's new clinical results.    Results from last 7 days   Lab Units 07/26/25  0428   WBC 10*3/mm3 13.34*   HEMOGLOBIN g/dL 8.3*   HEMATOCRIT % 28.1*   PLATELETS 10*3/mm3 132*     Results from last 7 days   Lab Units 07/26/25  0419   SODIUM mmol/L 150*   POTASSIUM mmol/L 4.3   CHLORIDE mmol/L 124*   CO2 mmol/L 17.1*   BUN mg/dL 52.0*   CREATININE mg/dL 1.60*   CALCIUM mg/dL 8.2*   BILIRUBIN mg/dL 0.8   ALK PHOS U/L 83   ALT (SGPT) U/L 254*   AST (SGOT) U/L 63*   GLUCOSE mg/dL 183*     Results from last 7 days   Lab Units 07/26/25  0419   SODIUM mmol/L 150*   POTASSIUM mmol/L 4.3   CHLORIDE mmol/L 124*   CO2 mmol/L 17.1*   BUN mg/dL 52.0*   CREATININE mg/dL 1.60*   GLUCOSE mg/dL 183*   CALCIUM mg/dL 8.2*     Results from last 7 days   Lab Units 07/22/25  2101 07/22/25  0351   INR  1.65* 1.46*     Lab Results   Component Value Date    TROPONINT 5,540 (C) 07/23/2025     aspirin, 81 mg, Nasogastric, Daily  [Held by provider] atorvastatin, 80 mg, Oral, Daily  budesonide, " 0.5 mg, Nebulization, BID - RT  escitalopram, 10 mg, Nasogastric, Daily  heparin (porcine), 5,000 Units, Subcutaneous, Q8H  insulin glargine, 20 Units, Subcutaneous, Daily  insulin regular, 4-24 Units, Subcutaneous, Q6H  insulin regular, 8 Units, Subcutaneous, Q6H  ipratropium-albuterol, 3 mL, Nebulization, 4x Daily - RT  levothyroxine, 50 mcg, Nasogastric, Daily  pantoprazole, 40 mg, Intravenous, Q24H  pharmacy consult - MTM, , Not Applicable, Daily  piperacillin-tazobactam, 4.5 g, Intravenous, Q8H  prasugrel, 10 mg, Nasogastric, Daily  QUEtiapine, 25 mg, Nasogastric, Nightly  senna-docusate sodium, 2 tablet, Nasogastric, BID  vancomycin, 1,000 mg, Intravenous, Q24H         Tele: Sinus Rythym      Assessment:  Acute HFrEF, ischemic cardiomyopathy cardiogenic shock, EF 30-35%. Initially required IABP, now removed  Cardiogenic shock with multisystem involvement, slowly improving, IABP initially which was subsequently removed.  Currently on low-dose Hernandez-Synephrine.  CAD status post PCI/BRITT to circumflex, documented diffuse distal LCx vasospasm  Paroxysmal atrial fibrillation, currently in sinus rhythm.    Pulseless ventricular tachycardia requiring brief CPR and defibrillation, currently in sinus rhythm   Third-degree heart block/cardiac arrest  No AVN blocking agents prior to admission  Temp transvenous pacer intact  On Dopamine gtt  Not requiring pacing currently  EP consulted -continue to monitor  Pacing wire removed   T2DM  TOOTIE/resolved  Baseline creatinine 1.0-1.2  Peak creatinine 3.44 on 7/9/2025  Creatinine 1. 6 today  9.  Hypernatremia.  10. Anoxic encephalopathy    Plan:  Continue current management and supportive care.  Continue aspirin and prasugrel for dual antiplatelet therapy and continue current dose of IV amiodarone.  Due to unstable hemodynamics and recurrent need for vasopressors we will hold off optimizing GDMT for now.  Due to no significant recovery of brain function and persistent encephalopathy  his prognosis for meaningful functional status remains poor.  Will wait for palliative care meeting with family today to discuss goals of care and continue to address his medical management accordingly.    Juanita George MD, FACC, [facility]\]

## 2025-07-26 NOTE — SIGNIFICANT NOTE
Spoke with patient's son and daughter (BOAZ) and granddaughter at bedside regarding goals of care. Family has elected to proceed with COMFORT MEASURES ONLY and would like to compassionately extubate. I spoke with them about discontinuation of labs, tube feedings, and discontinuation of IV medications. They wish to proceed as they do not believe he would want to have a trach/PEG. CODE STATUS changed and compassionate extubation order set placed. Timing of ventilator withdrawal per family.     Nhi Hall, MSN, APRN, ACNPC-AG  Pulmonary and Critical Care Medicine  Electronically signed by MIKO Argueta, 07/26/25, 5:21 PM EDT.

## 2025-07-26 NOTE — CONSULTS
Joined palliative team for family meeting.  Family appropriately tearful.  From conversation with family, patient has no spiritual/Shinto needs.  They shared that his meaning came from his three year old great grandson.  Provided empathic listening, support.

## 2025-07-26 NOTE — PROGRESS NOTES
"Palliative Care Daily Progress Note     C/C: Patient agitated on MV.     S: Medical record reviewed. Follow-up visit for GOC and symptom management. Events noted. Family arrived at 4:20pm for meeting. Son, Roel, and daughter, Betsey, who are available NOK.  Granddaughter also present. Met with family in family conference room in conjunction with Chaplain Dona.     ROS: ROS limited by MV/AMS.     O: Code Status:   Code Status and Medical Interventions: CPR (Attempt to Resuscitate); Full Support   Ordered at: 07/09/25 1958     Code Status (Patient has no pulse and is not breathing):    CPR (Attempt to Resuscitate)     Medical Interventions (Patient has pulse or is breathing):    Full Support      Advanced Directives: Advance Directive Status: Patient does not have advance directive   Goals of Care: Ongoing.   Palliative Performance Scale Score: 20%     /68 (BP Location: Right arm, Patient Position: Lying)   Pulse 75   Temp 98.4 °F (36.9 °C) (Oral)   Resp 23   Ht 180.3 cm (70.98\")   Wt 72.7 kg (160 lb 4.4 oz)   SpO2 100%   BMI 22.36 kg/m²     Intake/Output Summary (Last 24 hours) at 7/26/2025 1649  Last data filed at 7/26/2025 1600  Gross per 24 hour   Intake 2051 ml   Output 1470 ml   Net 581 ml       PE:  General Appearance:    Patient laying in bed, eyes open but does not track, does not follow commands at time of visit   HEENT:    NC/AT, anicteric, MMM,    Neck:   supple, trachea midline, no JVD   Lungs:     CTA bilaterally, diminished in bases; on MV FiO2 35%; RR set 20, observed RR 21, PEEP 5     Heart:    RRR, normal S1 and S2, no M/R/G, HR 75   Abdomen:     Normal bowel sounds, soft, nontender, nondistended   G/U:   Deferred   MSK/Extremities:    +1 BLE edema   Pulses:   Pulses palpable and equal bilaterally   Skin:   Warm, dry   Neurologic:   Does not follow commands at time of visit   Psych:   agitated     Meds: Reviewed and changes noted    Labs:   Results from last 7 days   Lab Units " 07/26/25  0428   WBC 10*3/mm3 13.34*   HEMOGLOBIN g/dL 8.3*   HEMATOCRIT % 28.1*   PLATELETS 10*3/mm3 132*     Results from last 7 days   Lab Units 07/26/25  0419   SODIUM mmol/L 150*   POTASSIUM mmol/L 4.3   CHLORIDE mmol/L 124*   CO2 mmol/L 17.1*   BUN mg/dL 52.0*   CREATININE mg/dL 1.60*   GLUCOSE mg/dL 183*   CALCIUM mg/dL 8.2*     Results from last 7 days   Lab Units 07/26/25  0419   SODIUM mmol/L 150*   POTASSIUM mmol/L 4.3   CHLORIDE mmol/L 124*   CO2 mmol/L 17.1*   BUN mg/dL 52.0*   CREATININE mg/dL 1.60*   CALCIUM mg/dL 8.2*   BILIRUBIN mg/dL 0.8   ALK PHOS U/L 83   ALT (SGPT) U/L 254*   AST (SGOT) U/L 63*   GLUCOSE mg/dL 183*     Imaging Results (Last 72 Hours)       Procedure Component Value Units Date/Time    XR Chest 1 View [[id number]] Collected: 07/26/25 1143     Updated: 07/26/25 1153    Narrative:      XR CHEST 1 VW    Date of Exam: 7/26/2025 2:59 AM EDT    Indication: Intubated    Comparison: Chest radiograph dated 7/25/2025    Findings:  The endotracheal tube, smallbore feeding tube, and gastric suction tube are in expected position. There is a right IJ central line with tip terminating at the mid SVC. The cardiomediastinal silhouette is unchanged. There are increased perihilar   interstitial opacities, similar to the prior examination which could relate to interstitial edema or bronchitis/bronchopneumonia. There is a probable trace left-sided pleural effusion. There is no visible pneumothorax. Median sternotomy wires are present   and intact. Evidence of prior CABG.      Impression:      Impression:  1.Support devices appear in expected position.  2.Increased perihilar interstitial opacities which could relate to interstitial edema or bronchitis/bronchopneumonia.  3.Trace left-sided pleural effusion. No visible pneumothorax.        Electronically Signed: Bam Joyce MD    7/26/2025 11:50 AM EDT    Workstation ID: [id number]    XR Chest 1 View [[id number]] Collected: 07/25/25 0715     Updated:  07/25/25 0723    Narrative:      XR CHEST 1 VW    Date of Exam: 7/25/2025 3:59 AM EDT    Indication: while intubate    Comparison: 7/24/2025    Findings:  Patient is rotated to the left. There appear to be superimposed shadows of a feeding tube and NG tube. It is difficult to determine if the ET tube is still present, but no evidence of high or low position of the ET tube is seen. Right IJ catheter tip   lies in the proximal SVC. Heart is enlarged. Vasculature is cephalized. Diffuse pulmonary interstitial disease pattern has changing appearance, increased in the right base, improved in the left base. There appear to be small pleural effusions. Multiple   skin fold shadows are again seen superimposed over the right chest. No pneumothorax is identified.      Impression:      Impression:    1. ET tube marker difficult to identify, either withdrawn or superimposed over the feeding tube and NG tube on today's study. If ET tube is still present, consider repeat radiograph with the patient in as close to straight AP position as possible.    2. Changing pattern of pulmonary interstitial disease, increased in the right base, improved on the left.    3. Multiple skinfold shadows superimposed on the right chest. No evidence of pneumothorax.      Electronically Signed: Grayson Hanson MD    7/25/2025 7:20 AM EDT    Workstation ID: [id number]    XR Chest 1 View [[id number]] Collected: 07/24/25 0647     Updated: 07/24/25 0651    Narrative:      XR CHEST 1 VW    Date of Exam: 7/24/2025 2:48 AM EDT    Indication: while intubate    Comparison: 7/23/2025    Findings:  Endotracheal tube, nasoenteric tube, and right IJ line appear in satisfactory position. Heart size is normal. There are postop changes of prior bypass surgery. There are bilateral pulmonary infiltrates presumably reflecting pulmonary edema. There are   small bilateral pleural effusions that appear decreased.      Impression:      Impression:  1.Bilateral pulmonary infiltrates  presumably reflecting pulmonary edema.  2.Small bilateral pleural effusions.        Electronically Signed: Jason Balderas MD    7/24/2025 6:48 AM EDT    Workstation ID: [id number]                  Diagnostics: Reviewed    A:   Third degree AV block    CAD (coronary artery disease)    TOOTIE (acute kidney injury)    Lactic acidosis    Acute cystitis without hematuria    Ischemic cardiomyopathy    Acute HFrEF (heart failure with reduced ejection fraction)    Shock liver    Complete heart block   70 y.o. male with CAD, TOOTIE, HFrEF, shock liver, cardiac arrhythmia, respiratory failure on MV, debility, agitation.    S/S:   Dyspnea -currently on MV, intubated since 7/9  -nebs, antibiotics  -reviewed trach/PEG versus comfort measures with palliative extubation with family     2. Agitation -recommend benzo prn for comfort measures     3. Debility -poor neurological prognosis     4. Constipation -continue bowel regimen     5. GOC -DNR/DNI/Comfort measures -per discussion with family  7/24: called Betsey at 11:43am at [phone] who reports that she is unsure of any legal separation between patient and spouse Pretty, she reports they have not been in contact for years, she does not provide contact information for Pretty. She reports there are four adult children (including herself and Roel), but does not provide the contact information for them. Reviewed patient's condition and upcoming decision that will require majority of NOK (in this case adult children since Pretty is unavailable and unable to contact her). Discussed options for trach/PEG with transition to LTC versus palliative extubation and comfort focused plan of care. Betsey will call family to set up time on Saturday that family will be able to meet with Palliative Care and see patient. Betsey requested information on how to talk with young children about dying.   7/24: called Roel at 11:42am with no answer and then called back at 12:08pm at [phone]. Reviewed  requiring majority of NOK to determine GOC. Reviewed family meeting Saturday with NOK. Reviewed patient's condition and options for trach/PEG versus palliative extubation with comfort focused plan of care.   7/24: family meeting Saturday 3:30pm  7/26: Met with family at 4:20pm after family arrived in family conference room. Only available NOK are two children, Betsey and Roel. Reviewed patient's condition and options for ongoing full treatment with trach/PEG versus comfort measures with palliative extubation. Discussed with MIKO Miramontes.     P: Met with available NOK, son and daughter, to discuss GOC in light of patient's poor prognosis. Family electing comfort focused plan of care with symptom management throughout end of life with discontinuation of labs, discontinuation of TF, discontinuation of amiodarone drip. Discussed with MIKO Miramontes. Reviewed inpatient hospice services with transition to 5B if patient survives overnight.   Palliative Care Team will continue to follow patient. Please do not hesitate to contact us regarding further sx mgmt or GOC needs.  MIKO Kinney  7/26/2025  Time spent: 50 minutes

## 2025-07-26 NOTE — PROGRESS NOTES
"Patient Name:  Christo Clifton  YOB: 1955  [id number]    Surgery Progress Note    Date of visit: 7/26/2025    Subjective   Subjective: Remains critically ill. Moving spontaneously but nondirected. Does not follow commands. Does not focus on examining physician.          Objective     Objective:     /77 (BP Location: Right arm, Patient Position: Lying)   Pulse 80   Temp 96.8 °F (36 °C) (Oral)   Resp 24   Ht 180.3 cm (70.98\")   Wt 72.7 kg (160 lb 4.4 oz)   SpO2 100%   BMI 22.36 kg/m²     Intake/Output Summary (Last 24 hours) at 7/26/2025 0706  Last data filed at 7/26/2025 0600  Gross per 24 hour   Intake 3345 ml   Output 1510 ml   Net 1835 ml       CV:  Rhythm  regular and rate regular   L:  Mild rhonchi to auscultation bilaterally   Abd:  Bowel sounds positive , soft, nontender. Cholecystostomy with unger bile  Ext:  No cyanosis, clubbing, edema    Recent labs that are back at this time have been reviewed.            Assessment/ Plan:    Problem List Items Addressed This Visit          Cardiac and Vasculature    Ischemic cardiomyopathy- Family conference today. Overall prognosis quite poor, both from a cardiac and neurologic standpoint.    Relevant Medications    nitroglycerin (NITROSTAT) SL tablet 0.4 mg    prasugrel (EFFIENT) tablet 10 mg    clopidogrel (PLAVIX) 75 MG tablet    phenylephrine (NATASHA-SYNEPHRINE) 50 mg in sodium chloride 0.9 % 250 mL infusion    norepinephrine (LEVOPHED) 8 mg in 250 mL NS infusion (premix)    Other Relevant Orders    Insert Arterial Line (Completed)    Acute HFrEF (heart failure with reduced ejection fraction) - Primary    Relevant Medications    nitroglycerin (NITROSTAT) SL tablet 0.4 mg    prasugrel (EFFIENT) tablet 10 mg    clopidogrel (PLAVIX) 75 MG tablet    phenylephrine (NATASHA-SYNEPHRINE) 50 mg in sodium chloride 0.9 % 250 mL infusion    norepinephrine (LEVOPHED) 8 mg in 250 mL NS infusion (premix)    Other Relevant Orders    Insert Arterial Line " (Completed)        Active Hospital Problems    Diagnosis  POA    **Third degree AV block [I44.2]  Yes    TOOTIE (acute kidney injury) [N17.9]  Yes    Lactic acidosis [E87.20]  Yes    Acute cystitis without hematuria [N30.00]  Yes    Ischemic cardiomyopathy [I25.5]  Yes    Acute HFrEF (heart failure with reduced ejection fraction) [I50.21]  Yes    Shock liver [K72.00]  Yes    Complete heart block [I44.2]  Yes    CAD (coronary artery disease) [I25.10]  Yes      Resolved Hospital Problems    Diagnosis Date Resolved POA    ASCVD (arteriosclerotic cardiovascular disease) [I25.10] 07/09/2025 Yes              Dipesh Rock MD  7/26/2025  07:06 EDT

## 2025-07-26 NOTE — PROGRESS NOTES
Pharmacy Consult - Vancomycin Dosing and Monitoring (Renal Dysfunction / Dialysis)    Christo Clifton is a 70 y.o. male receiving vancomycin therapy.     Indication: Bacteremia, Empiric  Consulting Provider: Intensivist  ID Consult: No    Goal Trough (if pulse dosing): 15-20 mcg/mL  Goal AUC: 400-600 mg/L*hr     Current Antimicrobial Therapy  Anti-Infectives (From admission, onward)      Ordered     Dose/Rate Route Frequency Start Stop    07/25/25 1052  vancomycin (VANCOCIN) 1,000 mg in sodium chloride 0.9 % 250 mL IVPB-VTB        Ordering Provider: Kareem Portillo RPH    1,000 mg  250 mL/hr over 60 Minutes Intravenous Every 24 Hours 07/26/25 1000 07/29/25 0959    07/25/25 0959  piperacillin-tazobactam (ZOSYN) 4.5 g IVPB in 100 mL NS MBP (CD)        Ordering Provider: David Mccloud MD    4.5 g  over 4 Hours Intravenous Every 8 Hours 07/25/25 1800 07/28/25 1759    07/25/25 0731  vancomycin (VANCOCIN) 1,000 mg in sodium chloride 0.9 % 250 mL IVPB-VTB        Ordering Provider: Duglas Ferguson PharmD    1,000 mg  250 mL/hr over 60 Minutes Intravenous Once 07/25/25 1000 07/25/25 1049    07/24/25 0721  vancomycin (VANCOCIN) 1,000 mg in sodium chloride 0.9 % 250 mL IVPB-VTB        Ordering Provider: Kareem Portillo RPH    1,000 mg  250 mL/hr over 60 Minutes Intravenous Once 07/24/25 0830 07/24/25 1058    07/23/25 0754  vancomycin (VANCOCIN) 1,000 mg in sodium chloride 0.9 % 250 mL IVPB-VTB        Ordering Provider: Kareem Portillo RPH    1,000 mg  250 mL/hr over 60 Minutes Intravenous Once 07/23/25 0900 07/23/25 0931    07/22/25 1008  vancomycin (dosing per levels)  Status:  Discontinued        Ordering Provider: Kareem Portillo RPH     Not Applicable Daily 07/22/25 1100 07/25/25 1052    07/22/25 1008  vancomycin IVPB 1750 mg in 0.9% Sodium Chloride (premix) 500 mL        Ordering Provider: Kareem Portillo RPH    25 mg/kg × 72.7 kg  285.7 mL/hr over 105 Minutes Intravenous Once 07/22/25 1100 07/22/25 1353    07/22/25  "1004  Pharmacy to dose vancomycin        Ordering Provider: David Mccloud MD     Not Applicable Continuous PRN 07/22/25 1004 07/29/25 1003    07/21/25 1243  piperacillin-tazobactam (ZOSYN) 4.5 g IVPB in 100 mL NS MBP (CD)        Ordering Provider: David Mccloud MD    4.5 g  over 4 Hours Intravenous Every 8 Hours 07/21/25 1800 07/25/25 1349    07/18/25 1131  piperacillin-tazobactam (ZOSYN) 3.375 g IVPB in 100 mL NS MBP (CD)  Status:  Discontinued        Ordering Provider: Olayinka Guillen MD    3.375 g  over 4 Hours Intravenous Every 8 Hours 07/18/25 1800 07/21/25 1243    07/18/25 1131  piperacillin-tazobactam (ZOSYN) 3.375 g IVPB in 100 mL NS MBP (CD)        Ordering Provider: Olayinka Guillen MD    3.375 g  over 30 Minutes Intravenous Once 07/18/25 1200 07/18/25 1256    07/09/25 1957  cefTRIAXone (ROCEPHIN) 2,000 mg in sodium chloride 0.9 % 100 mL IVPB-VTB        Ordering Provider: Chandrakant Jones MD    2,000 mg  200 mL/hr over 30 Minutes Intravenous Every 24 Hours 07/10/25 0330 07/14/25 0330          Allergies  Allergies as of 07/09/2025 - Reviewed 07/09/2025   No Active Allergies   Allergen Reaction Noted    Other  06/23/2016     Labs  Results from last 7 days   Lab Units 07/26/25  0419 07/26/25  0014 07/25/25  0513   BUN mg/dL 52.0* 52.5* 62.2*   CREATININE mg/dL 1.60* 1.56* 1.56*     Results from last 7 days   Lab Units 07/26/25  0428 07/25/25  0429 07/24/25  0337   WBC 10*3/mm3 13.34* 18.03* 28.07*     Evaluation of Dosing  Last Dose Received in the ED/Outside Facility: No  Is Patient on Dialysis or Renal Replacement: No    Height - 180.3 cm (70.98\")  Weight - 72.7 kg (160 lb 4.4 oz)    Estimated Creatinine Clearance: 43.5 mL/min (A) (by C-G formula based on SCr of 1.6 mg/dL (H)).    Intake & Output (last 3 days)         07/23 0701 07/24 0700 07/24 0701 07/25 0700 07/25 0701 07/26 0700 07/26 0701 07/27 0700    I.V. (mL/kg) 1724.3 (23.7) 1243.1 (17.1) 847 (11.7)     Other 30 660      NG/GT 1112 " 417 1948     IV Piggyback 173 560 550     Total Intake(mL/kg) 3039.3 (41.8) 2880.1 (39.6) 3345 (46)     Urine (mL/kg/hr) 1250 (0.7) 1475 (0.8) 1200 (0.7)     Emesis/NG output 10       Drains 215 310 310     Stool   0     Total Output 1475 1785 1510     Net +1564.3 +1095.1 +1835             Stool Unmeasured Occurrence   2 x           Microbiology  Microbiology Results (last 10 days)       Procedure Component Value - Date/Time    Anaerobic Culture - Drainage, Gallbladder [[id number]]  (Normal) Collected: 07/22/25 1419    Lab Status: Preliminary result Specimen: Drainage from Gallbladder Updated: 07/25/25 0746     Anaerobic Culture No anaerobes isolated at 3 days    AFB Culture - Aspirate, Gallbladder [[id number]] Collected: 07/22/25 1419    Lab Status: Preliminary result Specimen: Aspirate from Gallbladder Updated: 07/23/25 1128     AFB Stain No acid fast bacilli seen on concentrated smear    Body Fluid Culture - Body Fluid, Gallbladder [[id number]] Collected: 07/22/25 1419    Lab Status: Final result Specimen: Body Fluid from Gallbladder Updated: 07/25/25 0658     Body Fluid Culture No growth at 3 days     Gram Stain No WBCs or organisms seen    Respiratory Culture - Sputum, ET Suction [[id number]] Collected: 07/20/25 2006    Lab Status: Final result Specimen: Sputum from ET Suction Updated: 07/23/25 1010     Respiratory Culture Light growth (2+) Normal respiratory falguni. No S. aureus or Pseudomonas aeruginosa detected. Final report.     Gram Stain Many (4+) WBCs per low power field      Rare (1+) Epithelial cells per low power field      No organisms seen    Blood Culture - Blood, Hand, Left [[id number]]  (Normal) Collected: 07/20/25 0545    Lab Status: Final result Specimen: Blood from Hand, Left Updated: 07/25/25 1015     Blood Culture No growth at 5 days    Blood Culture - Blood, Arm, Right [[id number]]  (Normal) Collected: 07/20/25 0533    Lab Status: Final result Specimen: Blood from Arm, Right Updated: 07/25/25 1015      Blood Culture No growth at 5 days    Blood Culture - Blood, Arm, Right [[id number]]  (Abnormal) Collected: 07/18/25 1151    Lab Status: Edited Result - FINAL Specimen: Blood from Arm, Right Updated: 07/24/25 0627     Blood Culture Staphylococcus haemolyticus     Isolated from Aerobic Bottle     Gram Stain Aerobic Bottle Gram positive cocci in clusters    Narrative:      Probable contaminant requires clinical correlation, susceptibility not performed unless requested by physician.    Blood Culture - Blood, Blood, Central Line [[id number]]  (Abnormal) Collected: 07/18/25 1151    Lab Status: Final result Specimen: Blood, Central Line Updated: 07/24/25 0627     Blood Culture Staphylococcus epidermidis     Isolated from Anaerobic Bottle     Gram Stain Anaerobic Bottle Gram positive cocci in clusters    Narrative:      Probable contaminant requires clinical correlation, susceptibility not performed unless requested by physician.      Blood Culture ID, PCR - Blood, Arm, Right [[id number]]  (Abnormal) Collected: 07/18/25 1151    Lab Status: Final result Specimen: Blood from Arm, Right Updated: 07/19/25 2149     BCID, PCR Staph spp, not aureus or lugdunensis. Identification by BCID2 PCR.     BOTTLE TYPE Aerobic Bottle          Vancomycin Levels  Results from last 7 days   Lab Units 07/25/25  0513 07/24/25  0337 07/23/25  0338   VANCOMYCIN RM mcg/mL 15.30 14.40 14.30               [facility] AUC Calculation:    Current AUC: 458 mg/L*hr    Predicted Steady State AUC on Current Dose: 476 mg/L*hr (based on 1 g IV q24h)  _________________________________    Predicted Steady State AUC on New Dose: --- mg/L*hr    Assessment/Plan:  PTD vancomycin 2/2 empiric coverage +/- bacteremia.  TOOTIE noted - Scr remains elevated but stable. Tolerating vancomycin 1 g IV q24h thus will continue scheduled regimen.  No further levels at this stage - will order as clinically indicated.  Pharmacy team to adjust regimen as necessary. Follow cultures &  sensitivities, renal function, & clinical status.    Thank you,  Kareem Portillo, [facility], located within [facility]  7/26/2025

## 2025-07-27 NOTE — PROGRESS NOTES
Continued Stay Note   [place]     Patient Name: Christo Clifton  MRN: [id number]  Today's Date: 7/27/2025    Admit Date: 7/9/2025    Plan: Ongoing   Discharge Plan       Row Name 07/27/25 1015       Plan    Plan Comments Inpatient hospice to meet with family today at 1500. Please call [phone] with needs/concerns.                   Discharge Codes    No documentation.                       Edna Cross RN

## 2025-07-27 NOTE — SIGNIFICANT NOTE
Exam confirms with auscultation zero audible heart tones and zero audible respirations. Mr.Wayne Clifton was pronounced dead on 7/27/2025 at 1311pm.  MD notified by Patient's RN.    Kianna Vela RN  Clinical House Supervisor  7/27/2025 17:52 EDT

## 2025-07-27 NOTE — DISCHARGE SUMMARY
Death Summary    Patient name: Christo Clifton  CSN: [id number]  MRN: [id number]  : 1955  Today's date: 2025     Date of Admission: 2025  Date and Time of Death:  2025; 13:11    Admitting Physician:  Dr. Maribel Marcus MD; Intensivist  Primary Care Provider: Lesa Rae APRN  Consultations:  Dr. Aaron Gaitan MD; Cardiac EP     Dr. Juanita George MD; Cardiology     Dr. Martin Momin MD; Neurology     MIKO Patterson; Palliative Care    Admission Diagnosis: Third degree AV block     Diagnoses at the Time of Death:     Third degree AV block    CAD (coronary artery disease)    TOOTIE (acute kidney injury)    Lactic acidosis    Acute cystitis without hematuria    Ischemic cardiomyopathy    Acute HFrEF (heart failure with reduced ejection fraction)    Shock liver    Complete heart block    History of Present Illness & Hospital Course:    Christo Clifton is a 70 y.o. male, active smoker with 90-pack-year history, with PMH significant for HTN, HLD, T2DM, COPD, hypothyroidism, possible history of seizures, and anxiety/depression who was admitted at [facility] from 25 to 25 with left heart cath showing 2 occluded vein grafts and PCI to left circumflex with stent placement.  He re-presented to [facility] ED on 25 with chest pain and was admitted with NSTEMI. He had a repeat C 25 with in-stent thrombosis felt to be secondary to medical noncompliance.  Balloon angioplasty was performed with deployment of overlapping stent just distal to the previous stent.  Echocardiogram obtained 24 hours later showed LVEF of 30 to 35% with grade 2 diastolic dysfunction.     Postoperative course was complicated by renal failure.    On the morning of 25, he developed third-degree AV block and was given atropine secondary to bradycardia.  Attempts were made at transcutaneous pacing and patient; however, patient became combative and 1 mg of Versed was given.  He developed asystole on monitor and became  unresponsive and had a brief ACLS with ROSC.  Following this, the patient was back in third-degree AV block and was agonal he breathing and was intubated.  He was initiated on transcutaneous pacing.     Cardiology was called who felt the patient needed a repeat LHC due to possible failure of stent.  He underwent transvenous pacemaker placement on 7/9/25 and repeat LHC showed patent stent in the proximal left circumflex artery with diffuse coronary artery vasospasm distal to the stent.  Intra-aortic balloon pump was placed.  [facility] was contacted and he was transferred on 7/9/2025.     At admission here, patient was noted to have worsening renal failure and elevated LFTs suggestive of shock liver.  He was started on dopamine with one-to-one support on balloon pump.  Balloon pump was discontinued on 7/12.  A temporary pacemaker was placed. He became more hemodynamically stable; however, he was not able to follow commands and was somnolent. Neurology was consulted.     He progressed to multiorgan failure and required percutaneous cholecystostomy tube placement for cholecystitis; however, despite aggressive care in all regards, his prognosis remained poor. On 7/23/25, he had a VF arrest with cardioversion x 2. Post code he had roving eye movements and was minimally responsive, felt to be secondary to hypoxic encephalopathy.     Given poor prognosis, Palliative Care was consulted. Next of kin was contacted and goals of care conference was held. They elected to proceed with COMFORT MEASURES ONLY on 7/26/25 and patient was compassionately extubated. Mr. Christo Clifton was pronounced dead on 7/27/25 at 13:11.    Nhi Hall, MSN, APRN, ACNPC-AG  Pulmonary and Critical Care Medicine  Electronically signed by MIKO Argueta, 07/27/25, 4:35 PM EDT.

## 2025-07-27 NOTE — PROGRESS NOTES
Note made of family's decision to proceed with a palliative care course and of palliative extubation.  Continue with cholecystostomy tube.  Will sign off, please call with any questions you may have.    Dipesh Rock MD  07:38 EDT

## 2025-07-27 NOTE — DISCHARGE PLACEMENT REQUEST
"Christo Clifton (70 y.o. Male)       Date of Birth   1955    Social Security Number       Address   [address] [place] [id number]    Home Phone       MRN   [id number]       Jainism   None    Marital Status                               Admission Date   7/9/2025    Admission Type   Elective    Admitting Provider   Chandrakant Jones MD    Attending Provider   David Mccloud MD    [facility], Room/Bed   [facility]       Discharge Date       Discharge Disposition       Discharge Destination                                 Attending Provider: David Mccloud MD    Allergies: No Active Allergies    Isolation: None   Infection: None   Code Status: No CPR    Ht: 180.3 cm (70.98\")   Wt: 72.7 kg (160 lb 4.4 oz)    Admission Cmt: None   Principal Problem: Third degree AV block [I44.2]                   Active Insurance as of 7/9/2025       Primary Coverage       Payor Plan Insurance Group Employer/Plan Group    [employer] MEDICARE REPLACEMENT UHC MEDICARE ADVANTAGE SNP PPO [id number]       Payor Plan Address Payor Plan Phone Number Payor Plan Fax Number Effective Dates    [address] [address]   3/1/2025 - None Entered    [facility] [id number]         Subscriber Name Subscriber Birth Date Member ID       CHRISTO CLIFTON 1955 [id number]               Secondary Coverage       Payor Plan Insurance Group Employer/Plan Group    [facility] COMMUNITY PLAN [facility] COMMUNITY PLAN [facility]       Payor Plan Address Payor Plan Phone Number Payor Plan Fax Number Effective Dates    [address]   1/1/2024 - None Entered    [facility] [address]         Subscriber Name Subscriber Birth Date Member ID       CHRISTO CLIFTON 1955 [id number]                     Emergency Contacts        (Rel.) Home Phone Work Phone Mobile Phone    CHARLIE LOAIZA (Son) [phone] -- --    Betsey Lamb (Daughter) -- -- [phone]              Emergency Contact Information       " Name Relation Home Work Mobile    CHARLIE LOAIZA Son [phone]            Other Contacts       Name Relation Home Work Mobile    Betsey Lamb Daughter   [phone]          Insurance Information                  [employer] MEDICARE REPLACEMENT/[facility] MEDICARE ADVANTAGE SNP PPO Phone: --    Subscriber: Christo Clifton Subscriber#: [id number]    Group#: [id number] Precert#: --    Authorization#: [id number] Effective Date: --        [facility] [place]/[facility] [place] Phone: --    Subscriber: Christo Clifton Subscriber#: [id number]    Group#: [id number] Precert#: --    Authorization#: -- Effective Date: --             History & Physical        Maribel Marcus MD at 25            CRITICAL CARE ADMISSION NOTE       Patient's name Christo Clifton MRN: [id number] : 1955-70 y.o.-male  Admission date 2025  Length of stay 0  ICU LOS 4h    REASON FOR CONSULTATION / MAIN COMPLAINT  Third degree AV block   No chief complaint on file.     HISTORY OF MAIN COMPLAINT    Christo Clifton is a 70 year-old male with COPD, T2DM, CAD, HTN, seizures, hypothyroidism, dyslipidemia, anxiety, and depression that was recently admitted at [facility] from - for [facility] where he was found to have 2 occluded vein grafts and did have PCI to left circ with stent placement. Patient re-presented to [facility] ED on 25 with chest pain that began the night prior. He was admitted with an NSTEMI and underwent repeat C on 28 which demonstrated in-stent thrombus felt to be secondary to medical noncompliance. Balloon angioplasty performed to stent with deployment of overlapping stent just distal to previous stent. ECHO obtained 24 hours later which showed LVEF 30-35% with grade 2 diastolic dysfunction.     Stay complicated by worsening renal failure.    On the morning of , patient developed third-degree AV block. Atropine given with temporary improvement of HR to 50s / HR dropped back down to 30.  Attempted to transcutaneous  "pace patient when patient became combative and 1 mg IV Versed given. Patient developed complete asystole on monitor and became unresponsive. He received \"brief ACLS\" and had ROSC. Following this, patient back in third-degree AV block. He was agonally breathing and was intubated. Transcutaneous pacing was initiated.     Following this, Cardiology called who felt patient needed repeat LHC due to possible failure of stent. He underwent transvenous pacer placement on 7/9/25 and repeat LHC showing patent stent in proximal left circ artery with diffuse coronary artery vasospasm distal to stent. Balloon pump placed.     [facility] contacted for transfer to higher level of care and he has been accepted.     Time spent: 8 minutes  Electronically signed by MIOK Mcnamara, 07/09/25, 6:12 PM EDT.      PAST MEDICAL HISTORY:  Past Medical History:   Diagnosis Date    Anxiety and depression     Arthritis     COPD (chronic obstructive pulmonary disease)     Coronary artery disease     Diabetes mellitus     Disease of thyroid gland     Dyslipidemia     History of transfusion     Hypertension     Seizure disorder     Pt states last seizure x1 mo.       PAST SURGICAL HISTORY:  Past Surgical History:   Procedure Laterality Date    AMPUTATION DIGIT Right 09/13/2017    Procedure: RIGHT 1ST TOE AMPUTATION ;  Surgeon: Lee Lee MD;  Location: [facility];  Service:     CARDIAC CATHETERIZATION      CARDIAC CATHETERIZATION N/A 6/27/2025    Procedure: Left Heart Cath;  Surgeon: Mimi Botello MD;  Location: [facility] INVASIVE LOCATION;  Service: Cardiovascular;  Laterality: N/A;    CARDIAC CATHETERIZATION N/A 6/27/2025    Procedure: Percutaneous Coronary Intervention;  Surgeon: Mimi Botello MD;  Location: [facility] CATH INVASIVE LOCATION;  Service: Cardiovascular;  Laterality: N/A;    CARDIAC CATHETERIZATION N/A 7/7/2025    Procedure: Left Heart Cath;  Surgeon: Anali Arevalo MD;  Location: [facility] CATH INVASIVE LOCATION;  " Service: Cardiovascular;  Laterality: N/A;    CARDIAC SURGERY      CIRCUMCISION      CORONARY ARTERY BYPASS GRAFT      HERNIA REPAIR      X2    INTRAVASCULAR ULTRASOUND N/A 6/27/2025    Procedure: Intravascular Ultrasound;  Surgeon: Mimi Botello MD;  Location: [facility] CATH INVASIVE LOCATION;  Service: Cardiovascular;  Laterality: N/A;    KS INCISION & DRAINAGE LEG/ANKLE ABSCESS/HEMATOMA Right 05/24/2017    Procedure: Debridement of right first toe;  Surgeon: Ronald Perdue MD;  Location: [facility] OR;  Service: General    TOE SURGERY Left     debridement       FAMILY HISTORY:  Family History   Problem Relation Age of Onset    Diabetes Mother     Hypertension Mother     Diabetes Father        SOCIAL HISTORY:  Social History     Tobacco Use    Smoking status: Every Day     Current packs/day: 0.00     Average packs/day: 2.0 packs/day for 45.0 years (90.0 ttl pk-yrs)     Types: Cigarettes     Start date: 1965     Last attempt to quit: 2010     Years since quitting: 15.5    Smokeless tobacco: Never    Tobacco comments:     Trying to quit   Vaping Use    Vaping status: Never Used   Substance Use Topics    Alcohol use: No    Drug use: No       ALLERGIES:  Allergies   Allergen Reactions    Other        HOME MEDS INCLUDE  Current Outpatient Medications   Medication Instructions    [Paused] amLODIPine (NORVASC) 5 mg, Daily    aspirin 81 mg, Daily    atorvastatin (LIPITOR) 80 mg, Daily    [START ON 7/10/2025] cefTRIAXone 2,000 mg in sodium chloride 0.9 % 100 mL IVPB 2,000 mg, Intravenous, Every 24 Hours    DOPamine (INTROPIN) 400 mg/250 mL (1.6 mg/mL) in D5W infusion 2-20 mcg/kg/min, Intravenous, Titrated    escitalopram (LEXAPRO) 10 mg, Daily    fentaNYL 10 mcg/1 mL NS  mcg/hr ( mcg/hr), Intravenous, Titrated    fluticasone (FLONASE) 50 MCG/ACT nasal spray 1 spray, Nasal, Daily PRN    insulin glargine (LANTUS, SEMGLEE) 14 Units, Daily    levothyroxine (SYNTHROID, LEVOTHROID) 50 mcg, Daily    [START ON  7/10/2025] prasugrel (EFFIENT) 10 mg, Oral, Daily    prasugrel (EFFIENT) 60 mg, Oral, Once    propofol (DIPRIVAN) 10 mg/mL emulsion infusion 5-50 mcg/kg/min, Intravenous, Titrated       SCHEDULED MEDS:  [START ON 7/10/2025] aspirin, 81 mg, Oral, Daily  [START ON 7/10/2025] atorvastatin, 80 mg, Oral, Daily  [START ON 7/10/2025] cefTRIAXone (ROCEPHIN) 2,000 mg in sodium chloride 0.9 % 100 mL IVPB-VTB, 2,000 mg, Intravenous, Q24H  DOPamine, , ,   escitalopram, 10 mg, Oral, Daily  [START ON 7/10/2025] insulin regular, 3-14 Units, Subcutaneous, Q6H  [START ON 7/10/2025] levothyroxine, 50 mcg, Oral, Daily  mupirocin, 1 Application, Each Nare, BID  [START ON 7/10/2025] prasugrel, 10 mg, Oral, Daily  propofol, , ,   sodium chloride, 10 mL, Intravenous, Q12H         CONTINUOUS INFUSIONS:  DOPamine, 2-20 mcg/kg/min (Dosing Weight), Last Rate: 15 mcg/kg/min (07/09/25 2243)  fentanyl 10 mcg/mL,  mcg/hr, Last Rate: 100 mcg/hr (07/09/25 2249)  heparin, 12 Units/kg/hr, Last Rate: 12 Units/kg/hr (07/09/25 2255)  Pharmacy to Dose Heparin,   propofol, 5-50 mcg/kg/min (Dosing Weight), Last Rate: 30 mcg/kg/min (07/09/25 2210)         REVIEW OF SYSTEMS:  Unable to obtain because patient is intubated/sedated    PHYSICAL EXAMINATION:    Temp:  [93.4 °F (34.1 °C)-98.8 °F (37.1 °C)] 97.9 °F (36.6 °C)  Heart Rate:  [] 77  Resp:  [16-27] 16  BP: ()/(29-96) 95/45  Arterial Line BP: (113-124)/(44-57) 123/44  FiO2 (%):  [45 %-100 %] 45 %    Intake/Output Summary (Last 24 hours) at 7/9/2025 2317  Last data filed at 7/9/2025 2200  Gross per 24 hour   Intake --   Output 110 ml   Net -110 ml     Mode: VC+/AC  FiO2 (%):  [45 %-100 %] 45 %  S RR:  [16-20] 16  S VT:  [400 mL-530 mL] 530 mL  PEEP/CPAP (cm H2O):  [5 cm H20] 5 cm H20  MAP (cm H2O):  [1-10] 8.6    General appearance: ill appearing  Trachea: midline  Lymphatic: no lymphadenopathy.  Chest: full vent support  Heart: regular rate and rhythm, S1, S2 normal  Abdomen:  soft  Genitourinary: duran in place  Neurological: sedated  Psychiatric: sedated  Skin: warm, dry, IABP - left fem area    DATA REVIEW:    1. Medical chart reviewed in detail  2. I reviewed the actual EKG rhythm strips and Pulse Oximetry data on the monitors  3. I reviewed today's lab values and the report of the most recent Chest X ray.  4. In addition, I have independently reviewed the actual image of the most recent chest Xray    Hematology:  Results from last 7 days   Lab Units 07/09/25 1906 07/09/25 1055 07/09/25  0038   WBC 10*3/mm3 26.27* 25.37* 18.83*   HEMOGLOBIN g/dL 9.1* 8.3* 8.3*   HEMATOCRIT % 28.9* 27.8* 25.9*   PLATELETS 10*3/mm3 430 494* 423     Chemistry:  Estimated Creatinine Clearance: 21.6 mL/min (A) (by C-G formula based on SCr of 3.44 mg/dL (H)).  Results from last 7 days   Lab Units 07/09/25 1906 07/09/25  1055   SODIUM mmol/L 130* 134*   POTASSIUM mmol/L 5.2 4.6   CHLORIDE mmol/L 96* 98   CO2 mmol/L 18.5* 11.8*   BUN mg/dL 52.7* 50.4*   CREATININE mg/dL 3.44* 3.39*   GLUCOSE mg/dL 283* 348*     Results from last 7 days   Lab Units 07/09/25 1915 07/09/25 1906 07/09/25 1055 07/08/25  0140 07/07/25  1955   IONIZED CALCIUM mmol/L 1.07*  --   --   --   --    CALCIUM mg/dL  --  8.3* 8.3*   < >  --    MAGNESIUM mg/dL  --  2.4  --   --  1.9    < > = values in this interval not displayed.     Hepatic Panel:  Results from last 7 days   Lab Units 07/09/25 1906 07/09/25 1055 07/06/25  1629   ALBUMIN g/dL 3.4* 2.9* 3.0*   TOTAL PROTEIN g/dL 6.8 6.3 6.5   BILIRUBIN mg/dL 0.6 0.5 0.8   AST (SGOT) U/L 2,383* 325* 27   ALT (SGPT) U/L 2,020* 288* 20   ALK PHOS U/L 113 110 100     Coagulation Labs:  Results from last 7 days   Lab Units 07/09/25  1915 07/09/25  1254   PROTIME Seconds 18.4* 16.5*   INR  1.43* 1.25*   APTT seconds 36.5* 30.6      Cardiac Labs:  Results from last 7 days   Lab Units 07/07/25  1955 07/07/25  1846 07/07/25  1714   HSTROP T ng/L >10,000* >10,000* >10,000*      Biomarkers:  Results from last 7 days   Lab Units 07/09/25  2209 07/09/25  1906 07/09/25  0038 07/08/25  1048   LACTATE mmol/L 1.4 2.5*  --  1.7   PROCALCITONIN ng/mL  --  0.37* 0.18  --      U/A  Results from last 7 days   Lab Units 07/09/25 1915   COLOR UA  Brown*   CLARITY UA  Turbid*   PH, URINE  <=5.0   SPECIFIC GRAVITY, URINE  1.029   GLUCOSE UA  100 mg/dL (Trace)*   KETONES UA  Negative   BILIRUBIN UA  Small (1+)*   PROTEIN UA  >=300 mg/dL (3+)*   BLOOD UA  Large (3+)*   LEUKOCYTES UA  Moderate (2+)*   NITRITE UA  Negative   UROBILINOGEN UA  1.0 E.U./dL     Results from last 7 days   Lab Units 07/09/25 1915   RBC UA /HPF Too Numerous to Count*   WBC UA /HPF Too Numerous to Count*   BACTERIA UA /HPF 4+*   SQUAM EPITHEL UA /HPF Too Numerous to Count*   HYALINE CASTS UA /LPF Too Numerous to Count     Arterial Blood Gases:  Results from last 7 days   Lab Units 07/09/25  1908 07/09/25  1053 07/08/25  0114   PH, ARTERIAL pH units 7.328* 7.195* 7.515*   PCO2, ARTERIAL mm Hg 35.1 36.1 24.8*   PO2 ART mm Hg 82.5* 94.4 94.4   FIO2 % 50 100 28       Images:  XR Chest 1 View  Result Date: 7/9/2025  Impression: Endotracheal tube projects 4 cm above the isaías. Stable appearance of additional support devices. Findings suggestive of mild pulmonary edema. Multifocal infection, including atypical/viral infection, can appear similar. Electronically Signed: Tab Cerrato MD  7/9/2025 8:14 PM EDT  Workstation ID: [id number]     Arterial Doppler Lower Extremity Bilateral  Result Date: 7/9/2025  1. Dampened flow 2. Irregular waveforms but no occlusion.   This report was finalized on 7/9/2025 3:38 PM by Dr. Sky Roberts MD.      XR Chest 1 View  Result Date: 7/9/2025  Line placement as above    This report was finalized on 7/9/2025 10:50 AM by Dr. Sky Roberts MD.      XR Chest 1 View  Result Date: 7/9/2025  Appearance suggestive of pulmonary congestion    This report was finalized on 7/9/2025 7:34 AM by Dr. Swanson  MD Armando.        Echo:  Results for orders placed during the hospital encounter of 07/06/25    Adult Transthoracic Echo Complete W/ Cont if Necessary Per Protocol 07/08/2025 12:27 PM    Interpretation Summary    Left ventricle is normal in size with moderately reduced ejection fraction of around 30 to 35%. Grade 2 diastolic dysfunction. There is hypo to akinesis of anterior and anterolateral wall.    Right ventricle is normal in size and function.    Normal biatrial size.    The aortic valve appears trileaflet. There is no significant aortic stenosis or regurgitation.    There is moderate mitral regurgitation.    There is no evidence of pericardial effusion.    The aortic root measures 3.8 cm.          ASSESSMENT & PLAN     Third degree AV block    CAD (coronary artery disease)    TOOTIE (acute kidney injury)    Lactic acidosis    Acute cystitis without hematuria    Ischemic cardiomyopathy    Acute HFrEF (heart failure with reduced ejection fraction)    Shock liver       71 yo M with h/o COPD, DM, CAD, HTN, HLD, CKD presented to OSH on 6/27 where he was found to have 2 occluded vein grafts and underwent PCI to the Lcx.Patient came back to the hospital on 7/6 with chest pain, diagnosed with NSTEMI. Underwent cardiac cath and was found to have instent thrombosis. PTCA was done with an overlapping stent. Echo showed 30-35% which was a significant decreased from 55% earlier in June. On 7/9 patient developed a 3rd degree block. Received atropine, was attempted to be paced transcutaneously, then transvenously. He had a brief asystole. Was intubated. He underwent another LHC and was found to have a vasospasm distal to the stent. IABP was placed. Patient was transferred to our ICU.      Neuro - continue sedation. Patient is on propofol and fentanyl.   CV - monitor hemodynamics, IABP in place for coronary perfusion in the settings of CAD, recent stent and vasospasm. He is also on dopamine, wean as tolerated, would likely to  minimize dopamine to avoid arrhythmogenicity. DAPT. Statin. Heparin gtt  Pulm - full vent support, wean as tolerated.    - noted TOOTIE on CKD, monitor closely kidney fn, doesn't require HD at this time, but his UO is low. Monitor lytes and replace.   GI - noted shock liver, likely from cardiac arrest, hypotension. Monitor liver enzymes, will check ammonia, coags. Will check KUB as on CXR dilated bowel loops noted. NPO.   ID - noted UTI, patient is on rocephin, continue currently, check procal  Hem onc - monitor for coagulopathy in the settings of shock liver, and s/p cardiac arrets.   Endo - monitor BG, goal 140-180      Bowel regimen: prn  Diet: NPO Diet NPO Type: Strict NPO  GI ppx: ppi  DVT PPX: VTE Prophylaxis:  Pharmacologic & mechanical VTE prophylaxis orders are present.       Advance Directives: Code Status (Patient has no pulse and is not breathing): CPR (Attempt to Resuscitate)  Medical Interventions (Patient has pulse or is breathing): Full Support       Dispo: ICU    I have spent a total of 50 critical care minutes in the management of this patient, apart from any time taken to perform necessary procedures. Critical care time includes time reviewing chart, images, report of images, rounding with nurse, respiratory therapist and pharmacist, and discussions with available family at bedside.    Signed: Maribel Marcus MD  23:17 EDT 7/9/2025     Electronically signed by Maribel Marcus MD at 07/09/25 0569

## 2025-07-28 NOTE — CASE MANAGEMENT/SOCIAL WORK
Continued Stay Note  [facility]     Patient Name: Christo Clifton  MRN: [id number]  Today's Date: 2025    Admit Date: 2025    Plan:    Discharge Plan       Row Name 25 1236       Plan    Plan     Patient/Family in Agreement with Plan unable to assess    Final Discharge Disposition Code 20 -     Final Note Patient                    Discharge Codes    No documentation.                       Ruperto Celeste RN

## 2025-07-29 LAB
FUNGUS WND CULT: NORMAL
MYCOBACTERIUM SPEC CULT: NORMAL
NIGHT BLUE STAIN TISS: NORMAL

## 2025-08-05 LAB
FUNGUS WND CULT: NORMAL
MYCOBACTERIUM SPEC CULT: NORMAL
NIGHT BLUE STAIN TISS: NORMAL

## 2025-08-12 LAB
FUNGUS WND CULT: NORMAL
MYCOBACTERIUM SPEC CULT: NORMAL
NIGHT BLUE STAIN TISS: NORMAL

## 2025-08-19 LAB
FUNGUS WND CULT: NORMAL
MYCOBACTERIUM SPEC CULT: NORMAL
NIGHT BLUE STAIN TISS: NORMAL

## 2025-08-26 LAB
FUNGUS WND CULT: NORMAL
MYCOBACTERIUM SPEC CULT: NORMAL
NIGHT BLUE STAIN TISS: NORMAL

## (undated) DEVICE — LN INJ CONTRST FLXCIL HP F/M LL 1200PSI10

## (undated) DEVICE — 3M™ STERI-STRIP™ REINFORCED ADHESIVE SKIN CLOSURES, R1547, 1/2 IN X 4 IN (12 MM X 100 MM), 6 STRIPS/ENVELOPE: Brand: 3M™ STERI-STRIP™

## (undated) DEVICE — UNDERCAST PADDING: Brand: DEROYAL

## (undated) DEVICE — TRY SKINPREP PVP SCRB W PAINT

## (undated) DEVICE — BNDG ELAS CO-FLEX SLF ADHR 2IN 5YD LF STRL

## (undated) DEVICE — CATH F5 INF JR 4 100CM: Brand: INFINITI

## (undated) DEVICE — ANTIBACTERIAL UNDYED BRAIDED (POLYGLACTIN 910), SYNTHETIC ABSORBABLE SUTURE: Brand: COATED VICRYL

## (undated) DEVICE — GW INQW FIX/CORE PTFE J/3MM .035 260CM

## (undated) DEVICE — GLV SURG TRIUMPH ORTHO W/ALOE PF LTX 7.5 STRL

## (undated) DEVICE — PAD, DEFIB, ADULT, RADIOTRANS, ZOLL: Brand: MEDLINE

## (undated) DEVICE — Device: Brand: ASAHI SION BLUE

## (undated) DEVICE — CATH F5 INF JL 4 100CM: Brand: INFINITI

## (undated) DEVICE — DRSNG SURESITE WNDW 4X4.5

## (undated) DEVICE — MINI TREK CORONARY DILATATION CATHETER 2.0 MM X 12 MM / RAPID-EXCHANGE: Brand: MINI TREK

## (undated) DEVICE — ST EXT IV SMRTSTE 2VLV FIX M LL 6ML 41

## (undated) DEVICE — 6F .070 XB 3.5 ECO PK: Brand: VISTA BRITE TIP

## (undated) DEVICE — NC TREK NEO™ CORONARY DILATATION CATHETER 3.00 MM X 8 MM / RAPID-EXCHANGE: Brand: NC TREK NEO™

## (undated) DEVICE — PINNACLE INTRODUCER SHEATH: Brand: PINNACLE

## (undated) DEVICE — ADHS LIQ MASTISOL 2/3ML

## (undated) DEVICE — GAUZE,PACKING STRIP,PLAIN,1/4"X5YD,STRL: Brand: CURAD

## (undated) DEVICE — SUT ETHLN 3/0 FS1 663G

## (undated) DEVICE — ADULT DISPOSABLE SINGLE-PATIENT USE PULSE OXIMETER SENSOR: Brand: NONIN

## (undated) DEVICE — HOLDER: Brand: DEROYAL

## (undated) DEVICE — SHEATH INTRO FAST/CATH STD .038 18G 6F 12CM

## (undated) DEVICE — Device

## (undated) DEVICE — SUT MNCRYL PLS ANTIB UD 2/0 CP1 27IN

## (undated) DEVICE — INTENDED FOR TISSUE SEPARATION, AND OTHER PROCEDURES THAT REQUIRE A SHARP SURGICAL BLADE TO PUNCTURE OR CUT.: Brand: BARD-PARKER ® STAINLESS STEEL BLADES

## (undated) DEVICE — CATH F5 INF PIG145 110CM 6SH: Brand: INFINITI

## (undated) DEVICE — ST INF PRI SMRTSTE 20DRP 2VLV 24ML 117

## (undated) DEVICE — SUT NLY 2/0 664G

## (undated) DEVICE — PK BASIC 70

## (undated) DEVICE — BNDG GZ SOF-FORM CONFRM 2X75IN LF STRL

## (undated) DEVICE — NC TREK NEO™ CORONARY DILATATION CATHETER 3.50 MM X 12 MM / RAPID-EXCHANGE: Brand: NC TREK NEO™

## (undated) DEVICE — NC TREK NEO™ CORONARY DILATATION CATHETER 2.50 X 12 MM / RAPID-EXCHANGE: Brand: NC TREK NEO™

## (undated) DEVICE — PK CATH CARD 70

## (undated) DEVICE — LN FLTR ORL/NASL MICROSTREAM NONINTUB A/LNG

## (undated) DEVICE — SWAN-GANZ BIPOLAR PACING CATHETER: Brand: SWAN-GANZ

## (undated) DEVICE — PERCLOSE™ PROSTYLE™ SUTURE-MEDIATED CLOSURE AND REPAIR SYSTEM: Brand: PERCLOSE™ PROSTYLE™

## (undated) DEVICE — NC TREK NEO™ CORONARY DILATATION CATHETER 3.00 MM X 15 MM / RAPID-EXCHANGE: Brand: NC TREK NEO™

## (undated) DEVICE — PK EXTREM LOWR 70

## (undated) DEVICE — MODEL AT P65, P/N 701554-001KIT CONTENTS: HAND CONTROLLER, 3-WAY HIGH-PRESSURE STOPCOCK WITH ROTATING END AND PREMIUM HIGH-PRESSURE TUBING: Brand: ANGIOTOUCH® KIT

## (undated) DEVICE — MICROSURGICAL DILATATION DEVICE: Brand: WOLVERINE CORONARY CUTTING BALLOON

## (undated) DEVICE — DEV INFL MONARCH 25W

## (undated) DEVICE — SUT ETHLN 4/0 P3 18IN 699G

## (undated) DEVICE — HI-TORQUE PILOT 50 GUIDE WIRE .014 J TIP 3.0 CM X 190 CM: Brand: HI-TORQUE PILOT

## (undated) DEVICE — SPNG GZ STRL 2S 4X4 12PLY

## (undated) DEVICE — SPLNT ORTHOGLASS 4INX15FT

## (undated) DEVICE — CATH IMG IVUS EAGLE EYE PLATIN RX DIGITAL .014IN 5FR

## (undated) DEVICE — A2000 MULTI-USE SYRINGE KIT, P/N 701277-003KIT CONTENTS: 100ML CONTRAST RESERVOIR AND TUBING WITH CONTRAST SPIKE AND CLAMP: Brand: A2000 MULTI-USE SYRINGE KIT

## (undated) DEVICE — SHEET,DRAPE,53X77,STERILE: Brand: MEDLINE

## (undated) DEVICE — DRSNG PAD ABD 8X10IN STRL

## (undated) DEVICE — DRAPE,EXTREMITY,89X128,STERILE: Brand: MEDLINE

## (undated) DEVICE — BNDG ELAS ELITE V/CLOSE 6IN 5YD LF STRL

## (undated) DEVICE — NC TREK NEO™ CORONARY DILATATION CATHETER 3.00 MM X 12 MM / RAPID-EXCHANGE: Brand: NC TREK NEO™

## (undated) DEVICE — CATH F5 INF IM 100CM: Brand: INFINITI

## (undated) DEVICE — CATH BALN INTRAVASC/LITHO C2PLUS 2.5X12MM

## (undated) DEVICE — ENCORE® LATEX MICRO SIZE 7.5, STERILE LATEX POWDER-FREE SURGICAL GLOVE: Brand: ENCORE